# Patient Record
Sex: FEMALE | Race: OTHER | Employment: UNEMPLOYED | URBAN - METROPOLITAN AREA
[De-identification: names, ages, dates, MRNs, and addresses within clinical notes are randomized per-mention and may not be internally consistent; named-entity substitution may affect disease eponyms.]

---

## 2019-05-26 ENCOUNTER — HOSPITAL ENCOUNTER (EMERGENCY)
Age: 34
Discharge: HOME | End: 2019-05-26
Attending: EMERGENCY MEDICINE
Payer: MEDICAID

## 2019-05-26 ENCOUNTER — APPOINTMENT (OUTPATIENT)
Dept: CT IMAGING | Age: 34
End: 2019-05-26
Attending: EMERGENCY MEDICINE
Payer: MEDICAID

## 2019-05-26 VITALS
OXYGEN SATURATION: 100 % | TEMPERATURE: 98.4 F | DIASTOLIC BLOOD PRESSURE: 87 MMHG | HEIGHT: 64 IN | WEIGHT: 127 LBS | RESPIRATION RATE: 16 BRPM | SYSTOLIC BLOOD PRESSURE: 161 MMHG | HEART RATE: 91 BPM | BODY MASS INDEX: 21.68 KG/M2

## 2019-05-26 DIAGNOSIS — D64.9 ANEMIA, UNSPECIFIED TYPE: ICD-10-CM

## 2019-05-26 DIAGNOSIS — R10.32 ABDOMINAL PAIN, LLQ (LEFT LOWER QUADRANT): ICD-10-CM

## 2019-05-26 DIAGNOSIS — R73.9 HYPERGLYCEMIA: Primary | ICD-10-CM

## 2019-05-26 DIAGNOSIS — N28.9 RENAL INSUFFICIENCY: ICD-10-CM

## 2019-05-26 LAB
ALBUMIN SERPL-MCNC: 2.7 G/DL (ref 3.4–5)
ALBUMIN/GLOB SERPL: 0.7 {RATIO} (ref 0.8–1.7)
ALP SERPL-CCNC: 121 U/L (ref 45–117)
ALT SERPL-CCNC: 9 U/L (ref 13–56)
ANION GAP SERPL CALC-SCNC: 11 MMOL/L (ref 3–18)
APPEARANCE UR: CLEAR
AST SERPL-CCNC: 11 U/L (ref 15–37)
BACTERIA URNS QL MICRO: ABNORMAL /HPF
BASOPHILS # BLD: 0 K/UL (ref 0–0.1)
BASOPHILS NFR BLD: 0 % (ref 0–2)
BILIRUB SERPL-MCNC: 0.4 MG/DL (ref 0.2–1)
BILIRUB UR QL: NEGATIVE
BUN SERPL-MCNC: 21 MG/DL (ref 7–18)
BUN/CREAT SERPL: 14 (ref 12–20)
CALCIUM SERPL-MCNC: 8.1 MG/DL (ref 8.5–10.1)
CHLORIDE SERPL-SCNC: 104 MMOL/L (ref 100–108)
CO2 SERPL-SCNC: 23 MMOL/L (ref 21–32)
COLOR UR: YELLOW
CREAT SERPL-MCNC: 1.55 MG/DL (ref 0.6–1.3)
DIFFERENTIAL METHOD BLD: ABNORMAL
EOSINOPHIL # BLD: 0.1 K/UL (ref 0–0.4)
EOSINOPHIL NFR BLD: 1 % (ref 0–5)
EPITH CASTS URNS QL MICRO: ABNORMAL /LPF (ref 0–5)
ERYTHROCYTE [DISTWIDTH] IN BLOOD BY AUTOMATED COUNT: 12.8 % (ref 11.6–14.5)
GLOBULIN SER CALC-MCNC: 4 G/DL (ref 2–4)
GLUCOSE SERPL-MCNC: 233 MG/DL (ref 74–99)
GLUCOSE UR STRIP.AUTO-MCNC: 500 MG/DL
HCG UR QL: NEGATIVE
HCT VFR BLD AUTO: 26.7 % (ref 35–45)
HGB BLD-MCNC: 8.8 G/DL (ref 12–16)
HGB UR QL STRIP: ABNORMAL
KETONES UR QL STRIP.AUTO: NEGATIVE MG/DL
LEUKOCYTE ESTERASE UR QL STRIP.AUTO: NEGATIVE
LIPASE SERPL-CCNC: 124 U/L (ref 73–393)
LYMPHOCYTES # BLD: 2.8 K/UL (ref 0.9–3.6)
LYMPHOCYTES NFR BLD: 19 % (ref 21–52)
MAGNESIUM SERPL-MCNC: 2.2 MG/DL (ref 1.6–2.6)
MCH RBC QN AUTO: 29.5 PG (ref 24–34)
MCHC RBC AUTO-ENTMCNC: 33 G/DL (ref 31–37)
MCV RBC AUTO: 89.6 FL (ref 74–97)
MONOCYTES # BLD: 1.2 K/UL (ref 0.05–1.2)
MONOCYTES NFR BLD: 8 % (ref 3–10)
MUCOUS THREADS URNS QL MICRO: ABNORMAL /LPF
NEUTS SEG # BLD: 10.8 K/UL (ref 1.8–8)
NEUTS SEG NFR BLD: 72 % (ref 40–73)
NITRITE UR QL STRIP.AUTO: NEGATIVE
PH UR STRIP: 7 [PH] (ref 5–8)
PLATELET # BLD AUTO: 382 K/UL (ref 135–420)
PMV BLD AUTO: 8.5 FL (ref 9.2–11.8)
POTASSIUM SERPL-SCNC: 4.5 MMOL/L (ref 3.5–5.5)
PROT SERPL-MCNC: 6.7 G/DL (ref 6.4–8.2)
PROT UR STRIP-MCNC: 300 MG/DL
RBC # BLD AUTO: 2.98 M/UL (ref 4.2–5.3)
RBC #/AREA URNS HPF: ABNORMAL /HPF (ref 0–5)
SODIUM SERPL-SCNC: 138 MMOL/L (ref 136–145)
SP GR UR REFRACTOMETRY: 1.02 (ref 1–1.03)
UROBILINOGEN UR QL STRIP.AUTO: 1 EU/DL (ref 0.2–1)
WBC # BLD AUTO: 15 K/UL (ref 4.6–13.2)
WBC URNS QL MICRO: ABNORMAL /HPF (ref 0–5)

## 2019-05-26 PROCEDURE — 80053 COMPREHEN METABOLIC PANEL: CPT

## 2019-05-26 PROCEDURE — 83690 ASSAY OF LIPASE: CPT

## 2019-05-26 PROCEDURE — 96374 THER/PROPH/DIAG INJ IV PUSH: CPT

## 2019-05-26 PROCEDURE — 81025 URINE PREGNANCY TEST: CPT

## 2019-05-26 PROCEDURE — 81001 URINALYSIS AUTO W/SCOPE: CPT

## 2019-05-26 PROCEDURE — 96361 HYDRATE IV INFUSION ADD-ON: CPT

## 2019-05-26 PROCEDURE — 83735 ASSAY OF MAGNESIUM: CPT

## 2019-05-26 PROCEDURE — 74176 CT ABD & PELVIS W/O CONTRAST: CPT

## 2019-05-26 PROCEDURE — 74011250636 HC RX REV CODE- 250/636: Performed by: EMERGENCY MEDICINE

## 2019-05-26 PROCEDURE — 85025 COMPLETE CBC W/AUTO DIFF WBC: CPT

## 2019-05-26 PROCEDURE — 99284 EMERGENCY DEPT VISIT MOD MDM: CPT

## 2019-05-26 RX ORDER — INSULIN GLARGINE 100 [IU]/ML
30 INJECTION, SOLUTION SUBCUTANEOUS
COMMUNITY

## 2019-05-26 RX ORDER — KETOROLAC TROMETHAMINE 15 MG/ML
15 INJECTION, SOLUTION INTRAMUSCULAR; INTRAVENOUS
Status: COMPLETED | OUTPATIENT
Start: 2019-05-26 | End: 2019-05-26

## 2019-05-26 RX ADMIN — SODIUM CHLORIDE 1000 ML: 900 INJECTION, SOLUTION INTRAVENOUS at 00:25

## 2019-05-26 RX ADMIN — KETOROLAC TROMETHAMINE 15 MG: 15 INJECTION, SOLUTION INTRAMUSCULAR; INTRAVENOUS at 00:25

## 2019-05-26 NOTE — ED PROVIDER NOTES
EMERGENCY DEPARTMENT HISTORY AND PHYSICAL EXAM    Date: 5/26/2019  Patient Name: Lisbeth Munoz    History of Presenting Illness     Chief Complaint   Patient presents with    Abdominal Pain         History Provided By: Patient    Additional History (Context): Lisbeth Munoz is a 37 y.o. female with PMHX diabetes presents to the emergency department C/O left lower quadrant abdominal pain that radiates up to her left flank that started this evening. Patient states that she had vaginal irritation and discharge approximately 1 week ago and was diagnosed with a UTI. Patient states that she was started on amoxicillin. patient denies any current vaginal discharge or vaginal pain or itching. Pt denies nausea, vomiting, diarrhea, and any other sxs or complaints. PCP: No primary care provider on file. Past History     Past Medical History:  Past Medical History:   Diagnosis Date    Diabetes ([address]     Hypertension     UTI (urinary tract infection)        Past Surgical History:  Past Surgical History:   Procedure Laterality Date    HX GYN      tubal ligation, C Section       Family History:  History reviewed. No pertinent family history. Social History:  Social History     Tobacco Use    Smoking status: Never Smoker    Smokeless tobacco: Never Used   Substance Use Topics    Alcohol use: Never     Frequency: Never    Drug use: Not on file       Allergies:  No Known Allergies      Review of Systems   Review of Systems   Constitutional: Positive for chills. Negative for fever. HENT: Negative for congestion, ear pain, sinus pain and sore throat. Eyes: Negative for pain and visual disturbance. Respiratory: Negative for cough and shortness of breath. Cardiovascular: Negative for chest pain and leg swelling. Gastrointestinal: Positive for abdominal pain. Negative for constipation, diarrhea, nausea and vomiting. Genitourinary: Positive for flank pain.  Negative for dysuria, hematuria, vaginal bleeding and vaginal discharge. Musculoskeletal: Negative for back pain and neck pain. Skin: Negative for rash and wound. Neurological: Negative for dizziness, tremors, weakness, light-headedness and numbness. All other systems reviewed and are negative. Physical Exam     Vitals:    05/26/19 0009   BP: 164/82   Pulse: 96   Resp: 18   Temp: 98.1 °F (36.7 °C)   SpO2: 100%   Weight: 57.6 kg (127 lb)   Height: 5' 4\" (1.626 m)     Physical Exam    Nursing note and vitals reviewed    Constitutional: [address] female, no acute distress  Head: Normocephalic, Atraumatic  Eyes: Pupils are equal, round, and reactive to light, EOMI  Neck: Supple, non-tender  ENT: Poor dentition, moist mucous membranes  Cardiovascular: Regular rate and rhythm, no murmurs, rubs, or gallops  Chest: Normal work of breathing and chest excursion bilaterally  Lungs: Clear to ausculation bilaterally, no wheezes, no rhonchi  Abdomen: Soft, non tender, non distended, normoactive bowel sounds  Back: No evidence of trauma or deformity  Extremities: No evidence of trauma or deformity, no LE edema  Skin: Warm and dry, normal cap refill  Neuro: Alert and appropriate, CN intact, normal speech, moving all 4 extremities freely and symmetrically  Psychiatric: Normal mood and affect       Diagnostic Study Results     Labs -   No results found for this or any previous visit (from the past 12 hour(s)). Radiologic Studies -   No orders to display     CT Results  (Last 48 hours)    None        CXR Results  (Last 48 hours)    None            Medical Decision Making   I am the first provider for this patient. I reviewed the vital signs, available nursing notes, past medical history, past surgical history, family history and social history. Vital Signs-Reviewed the patient's vital signs.     Pulse Oximetry Analysis -100 % on room air    Records Reviewed: Nursing Notes and Old Medical Records    Provider Notes:   37 y.o. female presenting with left lower quadrant abdominal pain that radiates up to her left flank. Afebrile, not tachycardic. She does not appear acutely ill or in distress. Noted to be mildly hypertensive. Soft, nondistended abdomen with no tenderness, no rebound or guarding. Will obtain labs, urinalysis. No indication for pelvic exam as patient recently had one and is currently denying vaginal symptoms. Will obtain a CT scan to evaluate for stone. Will provide symptom control and reassess. Procedures:  Procedures    ED Course:   12:06 AM   Initial assessment performed. The patients presenting problems have been discussed, and they are in agreement with the care plan formulated and outlined with them. I have encouraged them to ask questions as they arise throughout their visit.    1:51 AM  UA not consistent with UTI. CMP showing renal insufficiency and hyperglycemia not consistent with DKA. CBC showing mild leukocytosis, anemia, no indication for emergent transfusion. CT scan showing no acute intra-abdominal process. On reassessment, patient reports improved symptoms. Urged the patient to finish her entire course of antibiotics as prescribed by provider urgent care. Diagnosis and Disposition       DISCHARGE NOTE:  1:26 AM    Phyllis Chong's  results have been reviewed with her. She has been counseled regarding her diagnosis, treatment, and plan. She verbally conveys understanding and agreement of the signs, symptoms, diagnosis, treatment and prognosis and additionally agrees to follow up as discussed. She also agrees with the care-plan and conveys that all of her questions have been answered. I have also provided discharge instructions for her that include: educational information regarding their diagnosis and treatment, and list of reasons why they would want to return to the ED prior to their follow-up appointment, should her condition change.  She has been provided with education for proper emergency department utilization. CLINICAL IMPRESSION:    No diagnosis found. PLAN:  1. D/C Home  2. Current Discharge Medication List        3. Follow-up Information    None       ____________________________________     Please note that this dictation was completed with [employer], the computer voice recognition software. Quite often unanticipated grammatical, syntax, homophones, and other interpretive errors are inadvertently transcribed by the computer software. Please disregard these errors. Please excuse any errors that have escaped final proofreading.

## 2019-05-26 NOTE — DISCHARGE INSTRUCTIONS
You were seen and evaluated in the Emergency Department. Please understand that your work up is not all encompassing and you should follow up with your primary care physician for further management and continuity of care. Please return to Emergency Department or seek medical attention immediately if you have acute worsening in your symptoms or develop chest pain, shortness of breath, repeated vomiting, fever, altered level of consciousness, coughing up blood, or start sweating and feel clammy. If you were prescribed any medicine for home, please take as prescribed by your health-care provider. If you were given any follow-up appointments or numbers to call, please do so as instructed. Avoid any tobacco products or excessive alcohol. Patient Education        Abdominal Pain: Care Instructions  Your Care Instructions    Abdominal pain has many possible causes. Some aren't serious and get better on their own in a few days. Others need more testing and treatment. If your pain continues or gets worse, you need to be rechecked and may need more tests to find out what is wrong. You may need surgery to correct the problem. Don't ignore new symptoms, such as fever, nausea and vomiting, urination problems, pain that gets worse, and dizziness. These may be signs of a more serious problem. Your doctor may have recommended a follow-up visit in the next 8 to 12 hours. If you are not getting better, you may need more tests or treatment. The doctor has checked you carefully, but problems can develop later. If you notice any problems or new symptoms, get medical treatment right away. Follow-up care is a key part of your treatment and safety. Be sure to make and go to all appointments, and call your doctor if you are having problems. It's also a good idea to know your test results and keep a list of the medicines you take. How can you care for yourself at home? · Rest until you feel better.   · To prevent dehydration, drink plenty of fluids, enough so that your urine is light yellow or clear like water. Choose water and other caffeine-free clear liquids until you feel better. If you have kidney, heart, or liver disease and have to limit fluids, talk with your doctor before you increase the amount of fluids you drink. · If your stomach is upset, eat mild foods, such as rice, dry toast or crackers, bananas, and applesauce. Try eating several small meals instead of two or three large ones. · Wait until 48 hours after all symptoms have gone away before you have spicy foods, alcohol, and drinks that contain caffeine. · Do not eat foods that are high in fat. · Avoid anti-inflammatory medicines such as aspirin, ibuprofen (Advil, Motrin), and naproxen (Aleve). These can cause stomach upset. Talk to your doctor if you take daily aspirin for another health problem. When should you call for help? Call 911 anytime you think you may need emergency care. For example, call if:    · You passed out (lost consciousness).     · You pass maroon or very bloody stools.     · You vomit blood or what looks like coffee grounds.     · You have new, severe belly pain.    Call your doctor now or seek immediate medical care if:    · Your pain gets worse, especially if it becomes focused in one area of your belly.     · You have a new or higher fever.     · Your stools are black and look like tar, or they have streaks of blood.     · You have unexpected vaginal bleeding.     · You have symptoms of a urinary tract infection. These may include:  ? Pain when you urinate. ? Urinating more often than usual.  ? Blood in your urine.     · You are dizzy or lightheaded, or you feel like you may faint.    Watch closely for changes in your health, and be sure to contact your doctor if:    · You are not getting better after 1 day (24 hours). Where can you learn more? Go to http://anita-chino.info/.   Enter H512 in the search box to learn more about \"Abdominal Pain: Care Instructions. \"  Current as of: September 23, 2018  Content Version: 11.9  © 9858-4101 [employer]. Care instructions adapted under license by [employer] (which disclaims liability or warranty for this information). If you have questions about a medical condition or this instruction, always ask your healthcare professional. [address] any warranty or liability for your use of this information. Patient Education        Anemia: Care Instructions  Your Care Instructions    Anemia is a low level of red blood cells, which carry oxygen throughout your body. Many things can cause anemia. Lack of iron is one of the most common causes. Your body needs iron to make hemoglobin, a substance in red blood cells that carries oxygen from the lungs to your body's cells. Without enough iron, the body produces fewer and smaller red blood cells. As a result, your body's cells do not get enough oxygen, and you feel tired and weak. And you may have trouble concentrating. Bleeding is the most common cause of a lack of iron. You may have heavy menstrual bleeding or bleeding caused by conditions such as ulcers, hemorrhoids, or cancer. Regular use of aspirin or other anti-inflammatory medicines (such as ibuprofen) also can cause bleeding in some people. A lack of iron in your diet also can cause anemia, especially at times when the body needs more iron, such as during pregnancy, infancy, and the teen years. Your doctor may have prescribed iron pills. It may take several months of treatment for your iron levels to return to normal. Your doctor also may suggest that you eat foods that are rich in iron, such as meat and beans. There are many other causes of anemia. It is not always due to a lack of iron. Finding the specific cause of your anemia will help your doctor find the right treatment for you.   Follow-up care is a key part of your treatment and safety. Be sure to make and go to all appointments, and call your doctor if you are having problems. It's also a good idea to know your test results and keep a list of the medicines you take. How can you care for yourself at home? · Take your medicines exactly as prescribed. Call your doctor if you think you are having a problem with your medicine. · If your doctor recommends iron pills, take them as directed:  ? Try to take the pills on an empty stomach about 1 hour before or 2 hours after meals. But you may need to take iron with food to avoid an upset stomach. ? Do not take antacids or drink milk or caffeine drinks (such as coffee, tea, or cola) at the same time or within 2 hours of the time that you take your iron. They can make it hard for your body to absorb the iron. ? Vitamin C (from food or supplements) helps your body absorb iron. Try taking iron pills with a glass of orange juice or some other food that is high in vitamin C, such as citrus fruits. ? Iron pills may cause stomach problems, such as heartburn, nausea, diarrhea, constipation, and cramps. Be sure to drink plenty of fluids, and include fruits, vegetables, and fiber in your diet each day. Iron pills often make your bowel movements dark or green. ? If you forget to take an iron pill, do not take a double dose of iron the next time you take a pill. ? Keep iron pills out of the reach of small children. An overdose of iron can be very dangerous. · Follow your doctor's advice about eating iron-rich foods. These include red meat, shellfish, poultry, eggs, beans, raisins, whole-grain bread, and leafy green vegetables. · Steam vegetables to help them keep their iron content. When should you call for help? Call 911 anytime you think you may need emergency care. For example, call if:    · You have symptoms of a heart attack. These may include:  ? Chest pain or pressure, or a strange feeling in the chest.  ? Sweating. ?  Shortness of breath. ? Nausea or vomiting. ? Pain, pressure, or a strange feeling in the back, neck, jaw, or upper belly or in one or both shoulders or arms. ? Lightheadedness or sudden weakness. ? A fast or irregular heartbeat. After you call 911, the  may tell you to chew 1 adult-strength or 2 to 4 low-dose aspirin. Wait for an ambulance. Do not try to drive yourself.     · You passed out (lost consciousness).    Call your doctor now or seek immediate medical care if:    · You have new or increased shortness of breath.     · You are dizzy or lightheaded, or you feel like you may faint.     · Your fatigue and weakness continue or get worse.     · You have any abnormal bleeding, such as:  ? Nosebleeds. ? Vaginal bleeding that is different (heavier, more frequent, at a different time of the month) than what you are used to.  ? Bloody or black stools, or rectal bleeding. ? Bloody or pink urine.    Watch closely for changes in your health, and be sure to contact your doctor if:    · You do not get better as expected. Where can you learn more? Go to http://anita-chino.info/. Enter R301 in the search box to learn more about \"Anemia: Care Instructions. \"  Current as of: May 6, 2018  Content Version: 11.9  © 0818-0439 [employer]. Care instructions adapted under license by [employer] (which disclaims liability or warranty for this information). If you have questions about a medical condition or this instruction, always ask your healthcare professional. [place] [address] any warranty or liability for your use of this information.

## 2019-06-06 ENCOUNTER — HOSPITAL ENCOUNTER (INPATIENT)
Age: 34
LOS: 2 days | Discharge: HOME | DRG: 420 | End: 2019-06-08
Attending: EMERGENCY MEDICINE | Admitting: HOSPITALIST
Payer: MEDICAID

## 2019-06-06 ENCOUNTER — APPOINTMENT (OUTPATIENT)
Dept: CT IMAGING | Age: 34
DRG: 420 | End: 2019-06-06
Attending: EMERGENCY MEDICINE
Payer: MEDICAID

## 2019-06-06 ENCOUNTER — HOSPITAL ENCOUNTER (EMERGENCY)
Age: 34
Discharge: HOME | DRG: 420 | End: 2019-06-06
Attending: EMERGENCY MEDICINE
Payer: MEDICAID

## 2019-06-06 ENCOUNTER — APPOINTMENT (OUTPATIENT)
Dept: GENERAL RADIOLOGY | Age: 34
DRG: 420 | End: 2019-06-06
Attending: EMERGENCY MEDICINE
Payer: MEDICAID

## 2019-06-06 VITALS
SYSTOLIC BLOOD PRESSURE: 157 MMHG | TEMPERATURE: 97.7 F | OXYGEN SATURATION: 100 % | RESPIRATION RATE: 19 BRPM | HEART RATE: 110 BPM | DIASTOLIC BLOOD PRESSURE: 73 MMHG

## 2019-06-06 DIAGNOSIS — D72.829 LEUKOCYTOSIS, UNSPECIFIED TYPE: ICD-10-CM

## 2019-06-06 DIAGNOSIS — K31.84 GASTROPARESIS: ICD-10-CM

## 2019-06-06 DIAGNOSIS — N17.9 ACUTE KIDNEY INJURY (HCC): ICD-10-CM

## 2019-06-06 DIAGNOSIS — R19.7 DIARRHEA, UNSPECIFIED TYPE: ICD-10-CM

## 2019-06-06 DIAGNOSIS — E87.20 LACTIC ACID ACIDOSIS: ICD-10-CM

## 2019-06-06 DIAGNOSIS — E10.10 DIABETIC KETOACIDOSIS WITHOUT COMA ASSOCIATED WITH TYPE 1 DIABETES MELLITUS (HCC): Primary | ICD-10-CM

## 2019-06-06 DIAGNOSIS — R65.10 SIRS (SYSTEMIC INFLAMMATORY RESPONSE SYNDROME) (HCC): ICD-10-CM

## 2019-06-06 DIAGNOSIS — R10.13 ABDOMINAL PAIN, EPIGASTRIC: ICD-10-CM

## 2019-06-06 DIAGNOSIS — E86.0 DEHYDRATION: ICD-10-CM

## 2019-06-06 DIAGNOSIS — R11.2 INTRACTABLE VOMITING WITH NAUSEA, UNSPECIFIED VOMITING TYPE: ICD-10-CM

## 2019-06-06 DIAGNOSIS — R73.9 HYPERGLYCEMIA: Primary | ICD-10-CM

## 2019-06-06 PROBLEM — E10.65 UNCONTROLLED TYPE 1 DIABETES MELLITUS WITH HYPERGLYCEMIA (HCC): Status: ACTIVE | Noted: 2019-06-06

## 2019-06-06 PROBLEM — E11.10 DKA (DIABETIC KETOACIDOSES): Status: ACTIVE | Noted: 2019-06-06

## 2019-06-06 PROBLEM — N39.0 UTI (URINARY TRACT INFECTION): Status: ACTIVE | Noted: 2019-06-06

## 2019-06-06 LAB
ADMINISTERED INITIALS, ADMINIT: NORMAL
ALBUMIN SERPL-MCNC: 2.8 G/DL (ref 3.4–5)
ALBUMIN SERPL-MCNC: 3 G/DL (ref 3.4–5)
ALBUMIN/GLOB SERPL: 0.7 {RATIO} (ref 0.8–1.7)
ALBUMIN/GLOB SERPL: 0.7 {RATIO} (ref 0.8–1.7)
ALP SERPL-CCNC: 130 U/L (ref 45–117)
ALP SERPL-CCNC: 144 U/L (ref 45–117)
ALT SERPL-CCNC: 7 U/L (ref 13–56)
ALT SERPL-CCNC: 7 U/L (ref 13–56)
ANION GAP SERPL CALC-SCNC: 12 MMOL/L (ref 3–18)
ANION GAP SERPL CALC-SCNC: 14 MMOL/L (ref 3–18)
ANION GAP SERPL CALC-SCNC: 15 MMOL/L (ref 3–18)
APPEARANCE UR: CLEAR
AST SERPL-CCNC: 10 U/L (ref 15–37)
AST SERPL-CCNC: 9 U/L (ref 15–37)
ATRIAL RATE: 109 BPM
BACTERIA URNS QL MICRO: ABNORMAL /HPF
BASOPHILS # BLD: 0 K/UL (ref 0–0.1)
BASOPHILS # BLD: 0 K/UL (ref 0–0.1)
BASOPHILS NFR BLD: 0 % (ref 0–2)
BASOPHILS NFR BLD: 0 % (ref 0–2)
BILIRUB DIRECT SERPL-MCNC: <0.1 MG/DL (ref 0–0.2)
BILIRUB SERPL-MCNC: 0.4 MG/DL (ref 0.2–1)
BILIRUB SERPL-MCNC: 0.4 MG/DL (ref 0.2–1)
BILIRUB UR QL: NEGATIVE
BUN SERPL-MCNC: 18 MG/DL (ref 7–18)
BUN SERPL-MCNC: 18 MG/DL (ref 7–18)
BUN SERPL-MCNC: 19 MG/DL (ref 7–18)
BUN/CREAT SERPL: 11 (ref 12–20)
BUN/CREAT SERPL: 11 (ref 12–20)
BUN/CREAT SERPL: 12 (ref 12–20)
CALCIUM SERPL-MCNC: 7.7 MG/DL (ref 8.5–10.1)
CALCIUM SERPL-MCNC: 8.5 MG/DL (ref 8.5–10.1)
CALCIUM SERPL-MCNC: 8.6 MG/DL (ref 8.5–10.1)
CALCULATED P AXIS, ECG09: 66 DEGREES
CALCULATED R AXIS, ECG10: 57 DEGREES
CALCULATED T AXIS, ECG11: 50 DEGREES
CHLORIDE SERPL-SCNC: 103 MMOL/L (ref 100–108)
CHLORIDE SERPL-SCNC: 103 MMOL/L (ref 100–108)
CHLORIDE SERPL-SCNC: 108 MMOL/L (ref 100–108)
CO2 SERPL-SCNC: 18 MMOL/L (ref 21–32)
CO2 SERPL-SCNC: 19 MMOL/L (ref 21–32)
CO2 SERPL-SCNC: 23 MMOL/L (ref 21–32)
COLOR UR: YELLOW
CREAT SERPL-MCNC: 1.55 MG/DL (ref 0.6–1.3)
CREAT SERPL-MCNC: 1.57 MG/DL (ref 0.6–1.3)
CREAT SERPL-MCNC: 1.8 MG/DL (ref 0.6–1.3)
D50 ADMINISTERED, D50ADM: 0 ML
D50 ORDER, D50ORD: 0 ML
DIAGNOSIS, 93000: NORMAL
DIFFERENTIAL METHOD BLD: ABNORMAL
DIFFERENTIAL METHOD BLD: ABNORMAL
EOSINOPHIL # BLD: 0 K/UL (ref 0–0.4)
EOSINOPHIL # BLD: 0.2 K/UL (ref 0–0.4)
EOSINOPHIL NFR BLD: 0 % (ref 0–5)
EOSINOPHIL NFR BLD: 1 % (ref 0–5)
EPITH CASTS URNS QL MICRO: ABNORMAL /LPF (ref 0–5)
ERYTHROCYTE [DISTWIDTH] IN BLOOD BY AUTOMATED COUNT: 12.9 % (ref 11.6–14.5)
ERYTHROCYTE [DISTWIDTH] IN BLOOD BY AUTOMATED COUNT: 13 % (ref 11.6–14.5)
EST. AVERAGE GLUCOSE BLD GHB EST-MCNC: 338 MG/DL
GLOBULIN SER CALC-MCNC: 3.9 G/DL (ref 2–4)
GLOBULIN SER CALC-MCNC: 4.4 G/DL (ref 2–4)
GLUCOSE BLD STRIP.AUTO-MCNC: 169 MG/DL (ref 70–110)
GLUCOSE BLD STRIP.AUTO-MCNC: 172 MG/DL (ref 70–110)
GLUCOSE BLD STRIP.AUTO-MCNC: 180 MG/DL (ref 70–110)
GLUCOSE BLD STRIP.AUTO-MCNC: 246 MG/DL (ref 70–110)
GLUCOSE BLD STRIP.AUTO-MCNC: 329 MG/DL (ref 70–110)
GLUCOSE BLD STRIP.AUTO-MCNC: 338 MG/DL (ref 70–110)
GLUCOSE BLD STRIP.AUTO-MCNC: 372 MG/DL (ref 70–110)
GLUCOSE BLD STRIP.AUTO-MCNC: 392 MG/DL (ref 70–110)
GLUCOSE BLD STRIP.AUTO-MCNC: 419 MG/DL (ref 70–110)
GLUCOSE BLD STRIP.AUTO-MCNC: 428 MG/DL (ref 70–110)
GLUCOSE BLD STRIP.AUTO-MCNC: 465 MG/DL (ref 70–110)
GLUCOSE BLD STRIP.AUTO-MCNC: 495 MG/DL (ref 70–110)
GLUCOSE BLD STRIP.AUTO-MCNC: 500 MG/DL (ref 70–110)
GLUCOSE BLD STRIP.AUTO-MCNC: 541 MG/DL (ref 70–110)
GLUCOSE SERPL-MCNC: 380 MG/DL (ref 74–99)
GLUCOSE SERPL-MCNC: 498 MG/DL (ref 74–99)
GLUCOSE SERPL-MCNC: 548 MG/DL (ref 74–99)
GLUCOSE UR STRIP.AUTO-MCNC: >1000 MG/DL
GLUCOSE, GLC: 169 MG/DL
GLUCOSE, GLC: 172 MG/DL
GLUCOSE, GLC: 180 MG/DL
GLUCOSE, GLC: 246 MG/DL
GLUCOSE, GLC: 329 MG/DL
GLUCOSE, GLC: 372 MG/DL
GLUCOSE, GLC: 419 MG/DL
GLUCOSE, GLC: 465 MG/DL
GLUCOSE, GLC: 495 MG/DL
HBA1C MFR BLD: 13.4 % (ref 4.2–5.6)
HCG SERPL QL: NEGATIVE
HCT VFR BLD AUTO: 28.3 % (ref 35–45)
HCT VFR BLD AUTO: 28.6 % (ref 35–45)
HGB BLD-MCNC: 9.6 G/DL (ref 12–16)
HGB BLD-MCNC: 9.7 G/DL (ref 12–16)
HGB UR QL STRIP: ABNORMAL
HIGH TARGET, HITG: 180 MG/DL
INSULIN ADMINSTERED, INSADM: 10.8 UNITS/HOUR
INSULIN ADMINSTERED, INSADM: 13.1 UNITS/HOUR
INSULIN ADMINSTERED, INSADM: 3.3 UNITS/HOUR
INSULIN ADMINSTERED, INSADM: 3.4 UNITS/HOUR
INSULIN ADMINSTERED, INSADM: 3.6 UNITS/HOUR
INSULIN ADMINSTERED, INSADM: 5.6 UNITS/HOUR
INSULIN ADMINSTERED, INSADM: 7.2 UNITS/HOUR
INSULIN ADMINSTERED, INSADM: 8.1 UNITS/HOUR
INSULIN ADMINSTERED, INSADM: 9.4 UNITS/HOUR
INSULIN ORDER, INSORD: 10.8 UNITS/HOUR
INSULIN ORDER, INSORD: 13.1 UNITS/HOUR
INSULIN ORDER, INSORD: 3.3 UNITS/HOUR
INSULIN ORDER, INSORD: 3.4 UNITS/HOUR
INSULIN ORDER, INSORD: 3.6 UNITS/HOUR
INSULIN ORDER, INSORD: 5.6 UNITS/HOUR
INSULIN ORDER, INSORD: 7.2 UNITS/HOUR
INSULIN ORDER, INSORD: 8.1 UNITS/HOUR
INSULIN ORDER, INSORD: 9.4 UNITS/HOUR
KETONES UR QL STRIP.AUTO: 15 MG/DL
LACTATE BLD-SCNC: 2.87 MMOL/L (ref 0.4–2)
LACTATE SERPL-SCNC: 3.4 MMOL/L (ref 0.4–2)
LEUKOCYTE ESTERASE UR QL STRIP.AUTO: NEGATIVE
LIPASE SERPL-CCNC: 103 U/L (ref 73–393)
LIPASE SERPL-CCNC: 149 U/L (ref 73–393)
LOW TARGET, LOT: 140 MG/DL
LYMPHOCYTES # BLD: 0.6 K/UL (ref 0.9–3.6)
LYMPHOCYTES # BLD: 2.3 K/UL (ref 0.9–3.6)
LYMPHOCYTES NFR BLD: 14 % (ref 21–52)
LYMPHOCYTES NFR BLD: 4 % (ref 21–52)
MAGNESIUM SERPL-MCNC: 1.2 MG/DL (ref 1.6–2.6)
MAGNESIUM SERPL-MCNC: 2 MG/DL (ref 1.6–2.6)
MAGNESIUM SERPL-MCNC: 2 MG/DL (ref 1.6–2.6)
MAGNESIUM SERPL-MCNC: 2.4 MG/DL (ref 1.6–2.6)
MCH RBC QN AUTO: 29.4 PG (ref 24–34)
MCH RBC QN AUTO: 29.9 PG (ref 24–34)
MCHC RBC AUTO-ENTMCNC: 33.6 G/DL (ref 31–37)
MCHC RBC AUTO-ENTMCNC: 34.3 G/DL (ref 31–37)
MCV RBC AUTO: 87.3 FL (ref 74–97)
MCV RBC AUTO: 87.7 FL (ref 74–97)
MINUTES UNTIL NEXT BG, NBG: 60 MIN
MONOCYTES # BLD: 0.1 K/UL (ref 0.05–1.2)
MONOCYTES # BLD: 0.9 K/UL (ref 0.05–1.2)
MONOCYTES NFR BLD: 0 % (ref 3–10)
MONOCYTES NFR BLD: 5 % (ref 3–10)
MULTIPLIER, MUL: 0.02
MULTIPLIER, MUL: 0.02
MULTIPLIER, MUL: 0.03
MULTIPLIER, MUL: 0.04
NEUTS SEG # BLD: 13.5 K/UL (ref 1.8–8)
NEUTS SEG # BLD: 16.5 K/UL (ref 1.8–8)
NEUTS SEG NFR BLD: 80 % (ref 40–73)
NEUTS SEG NFR BLD: 96 % (ref 40–73)
NITRITE UR QL STRIP.AUTO: NEGATIVE
ORDER INITIALS, ORDINIT: NORMAL
P-R INTERVAL, ECG05: 118 MS
PH UR STRIP: 6 [PH] (ref 5–8)
PHOSPHATE SERPL-MCNC: 3.6 MG/DL (ref 2.5–4.9)
PLATELET # BLD AUTO: 428 K/UL (ref 135–420)
PLATELET # BLD AUTO: 437 K/UL (ref 135–420)
PMV BLD AUTO: 8.9 FL (ref 9.2–11.8)
PMV BLD AUTO: 9.3 FL (ref 9.2–11.8)
POTASSIUM SERPL-SCNC: 4 MMOL/L (ref 3.5–5.5)
POTASSIUM SERPL-SCNC: 4.1 MMOL/L (ref 3.5–5.5)
POTASSIUM SERPL-SCNC: 4.1 MMOL/L (ref 3.5–5.5)
PROT SERPL-MCNC: 6.7 G/DL (ref 6.4–8.2)
PROT SERPL-MCNC: 7.4 G/DL (ref 6.4–8.2)
PROT UR STRIP-MCNC: 300 MG/DL
Q-T INTERVAL, ECG07: 388 MS
QRS DURATION, ECG06: 80 MS
QTC CALCULATION (BEZET), ECG08: 522 MS
RBC # BLD AUTO: 3.24 M/UL (ref 4.2–5.3)
RBC # BLD AUTO: 3.26 M/UL (ref 4.2–5.3)
RBC #/AREA URNS HPF: ABNORMAL /HPF (ref 0–5)
SODIUM SERPL-SCNC: 137 MMOL/L (ref 136–145)
SODIUM SERPL-SCNC: 138 MMOL/L (ref 136–145)
SODIUM SERPL-SCNC: 140 MMOL/L (ref 136–145)
SP GR UR REFRACTOMETRY: 1.02 (ref 1–1.03)
UROBILINOGEN UR QL STRIP.AUTO: 0.2 EU/DL (ref 0.2–1)
VENTRICULAR RATE, ECG03: 109 BPM
WBC # BLD AUTO: 16.9 K/UL (ref 4.6–13.2)
WBC # BLD AUTO: 17.2 K/UL (ref 4.6–13.2)
WBC URNS QL MICRO: ABNORMAL /HPF (ref 0–5)
YEAST URNS QL MICRO: ABNORMAL

## 2019-06-06 PROCEDURE — 82962 GLUCOSE BLOOD TEST: CPT

## 2019-06-06 PROCEDURE — 74011000250 HC RX REV CODE- 250: Performed by: EMERGENCY MEDICINE

## 2019-06-06 PROCEDURE — 83036 HEMOGLOBIN GLYCOSYLATED A1C: CPT

## 2019-06-06 PROCEDURE — 77030011943

## 2019-06-06 PROCEDURE — 74011250636 HC RX REV CODE- 250/636: Performed by: EMERGENCY MEDICINE

## 2019-06-06 PROCEDURE — 96361 HYDRATE IV INFUSION ADD-ON: CPT

## 2019-06-06 PROCEDURE — 99285 EMERGENCY DEPT VISIT HI MDM: CPT

## 2019-06-06 PROCEDURE — 74011000258 HC RX REV CODE- 258: Performed by: EMERGENCY MEDICINE

## 2019-06-06 PROCEDURE — 74011636637 HC RX REV CODE- 636/637: Performed by: EMERGENCY MEDICINE

## 2019-06-06 PROCEDURE — 81001 URINALYSIS AUTO W/SCOPE: CPT

## 2019-06-06 PROCEDURE — 87086 URINE CULTURE/COLONY COUNT: CPT

## 2019-06-06 PROCEDURE — 96376 TX/PRO/DX INJ SAME DRUG ADON: CPT

## 2019-06-06 PROCEDURE — 74011250636 HC RX REV CODE- 250/636: Performed by: HOSPITALIST

## 2019-06-06 PROCEDURE — 96375 TX/PRO/DX INJ NEW DRUG ADDON: CPT

## 2019-06-06 PROCEDURE — C9113 INJ PANTOPRAZOLE SODIUM, VIA: HCPCS | Performed by: EMERGENCY MEDICINE

## 2019-06-06 PROCEDURE — 74011000250 HC RX REV CODE- 250: Performed by: HOSPITALIST

## 2019-06-06 PROCEDURE — 83735 ASSAY OF MAGNESIUM: CPT

## 2019-06-06 PROCEDURE — 80076 HEPATIC FUNCTION PANEL: CPT

## 2019-06-06 PROCEDURE — 83690 ASSAY OF LIPASE: CPT

## 2019-06-06 PROCEDURE — 80048 BASIC METABOLIC PNL TOTAL CA: CPT

## 2019-06-06 PROCEDURE — 83605 ASSAY OF LACTIC ACID: CPT

## 2019-06-06 PROCEDURE — 80053 COMPREHEN METABOLIC PANEL: CPT

## 2019-06-06 PROCEDURE — 87077 CULTURE AEROBIC IDENTIFY: CPT

## 2019-06-06 PROCEDURE — 85025 COMPLETE CBC W/AUTO DIFF WBC: CPT

## 2019-06-06 PROCEDURE — 74176 CT ABD & PELVIS W/O CONTRAST: CPT

## 2019-06-06 PROCEDURE — 87040 BLOOD CULTURE FOR BACTERIA: CPT

## 2019-06-06 PROCEDURE — 74011250637 HC RX REV CODE- 250/637: Performed by: EMERGENCY MEDICINE

## 2019-06-06 PROCEDURE — 74018 RADEX ABDOMEN 1 VIEW: CPT

## 2019-06-06 PROCEDURE — 96372 THER/PROPH/DIAG INJ SC/IM: CPT

## 2019-06-06 PROCEDURE — 65610000006 HC RM INTENSIVE CARE

## 2019-06-06 PROCEDURE — 84100 ASSAY OF PHOSPHORUS: CPT

## 2019-06-06 PROCEDURE — 84703 CHORIONIC GONADOTROPIN ASSAY: CPT

## 2019-06-06 PROCEDURE — 93005 ELECTROCARDIOGRAM TRACING: CPT

## 2019-06-06 PROCEDURE — 96374 THER/PROPH/DIAG INJ IV PUSH: CPT

## 2019-06-06 PROCEDURE — 87045 FECES CULTURE AEROBIC BACT: CPT

## 2019-06-06 PROCEDURE — 96365 THER/PROPH/DIAG IV INF INIT: CPT

## 2019-06-06 PROCEDURE — 36415 COLL VENOUS BLD VENIPUNCTURE: CPT

## 2019-06-06 RX ORDER — ONDANSETRON 2 MG/ML
4 INJECTION INTRAMUSCULAR; INTRAVENOUS
Status: COMPLETED | OUTPATIENT
Start: 2019-06-06 | End: 2019-06-06

## 2019-06-06 RX ORDER — LIDOCAINE HYDROCHLORIDE 10 MG/ML
50 INJECTION INFILTRATION; PERINEURAL
Status: DISCONTINUED | OUTPATIENT
Start: 2019-06-06 | End: 2019-06-06

## 2019-06-06 RX ORDER — SODIUM CHLORIDE 9 MG/ML
150 INJECTION, SOLUTION INTRAVENOUS CONTINUOUS
Status: DISCONTINUED | OUTPATIENT
Start: 2019-06-06 | End: 2019-06-07

## 2019-06-06 RX ORDER — PANTOPRAZOLE SODIUM 40 MG/10ML
40 INJECTION, POWDER, LYOPHILIZED, FOR SOLUTION INTRAVENOUS
Status: COMPLETED | OUTPATIENT
Start: 2019-06-06 | End: 2019-06-06

## 2019-06-06 RX ORDER — DICYCLOMINE HYDROCHLORIDE 10 MG/ML
20 INJECTION INTRAMUSCULAR ONCE
Status: DISCONTINUED | OUTPATIENT
Start: 2019-06-06 | End: 2019-06-06

## 2019-06-06 RX ORDER — LABETALOL HCL 20 MG/4 ML
20 SYRINGE (ML) INTRAVENOUS
Status: COMPLETED | OUTPATIENT
Start: 2019-06-06 | End: 2019-06-06

## 2019-06-06 RX ORDER — TRAMADOL HYDROCHLORIDE 50 MG/1
50 TABLET ORAL
Status: DISCONTINUED | OUTPATIENT
Start: 2019-06-06 | End: 2019-06-08 | Stop reason: HOSPADM

## 2019-06-06 RX ORDER — SODIUM CHLORIDE 0.9 % (FLUSH) 0.9 %
5-10 SYRINGE (ML) INJECTION AS NEEDED
Status: DISCONTINUED | OUTPATIENT
Start: 2019-06-06 | End: 2019-06-08 | Stop reason: HOSPADM

## 2019-06-06 RX ORDER — MORPHINE SULFATE 4 MG/ML
4 INJECTION INTRAVENOUS
Status: COMPLETED | OUTPATIENT
Start: 2019-06-06 | End: 2019-06-06

## 2019-06-06 RX ORDER — MAGNESIUM SULFATE 100 %
4 CRYSTALS MISCELLANEOUS AS NEEDED
Status: DISCONTINUED | OUTPATIENT
Start: 2019-06-06 | End: 2019-06-07

## 2019-06-06 RX ORDER — METOCLOPRAMIDE HYDROCHLORIDE 5 MG/ML
10 INJECTION INTRAMUSCULAR; INTRAVENOUS
Status: COMPLETED | OUTPATIENT
Start: 2019-06-06 | End: 2019-06-06

## 2019-06-06 RX ORDER — DEXTROSE MONOHYDRATE 100 MG/ML
125-250 INJECTION, SOLUTION INTRAVENOUS AS NEEDED
Status: DISCONTINUED | OUTPATIENT
Start: 2019-06-06 | End: 2019-06-08 | Stop reason: HOSPADM

## 2019-06-06 RX ORDER — DEXTROSE 50 % IN WATER (D50W) INTRAVENOUS SYRINGE
25-50 AS NEEDED
Status: DISCONTINUED | OUTPATIENT
Start: 2019-06-06 | End: 2019-06-06 | Stop reason: RX

## 2019-06-06 RX ORDER — LOPERAMIDE HYDROCHLORIDE 2 MG/1
2 CAPSULE ORAL ONCE
Status: COMPLETED | OUTPATIENT
Start: 2019-06-06 | End: 2019-06-06

## 2019-06-06 RX ORDER — HEPARIN SODIUM 5000 [USP'U]/ML
5000 INJECTION, SOLUTION INTRAVENOUS; SUBCUTANEOUS EVERY 8 HOURS
Status: DISCONTINUED | OUTPATIENT
Start: 2019-06-06 | End: 2019-06-08 | Stop reason: HOSPADM

## 2019-06-06 RX ORDER — KETOROLAC TROMETHAMINE 30 MG/ML
30 INJECTION, SOLUTION INTRAMUSCULAR; INTRAVENOUS
Status: COMPLETED | OUTPATIENT
Start: 2019-06-06 | End: 2019-06-06

## 2019-06-06 RX ORDER — DICYCLOMINE HYDROCHLORIDE 10 MG/ML
20 INJECTION INTRAMUSCULAR
Status: COMPLETED | OUTPATIENT
Start: 2019-06-06 | End: 2019-06-06

## 2019-06-06 RX ORDER — LOPERAMIDE HYDROCHLORIDE 2 MG/1
2 CAPSULE ORAL
Qty: 20 CAP | Refills: 0 | Status: SHIPPED | OUTPATIENT
Start: 2019-06-06 | End: 2019-06-16

## 2019-06-06 RX ADMIN — METOCLOPRAMIDE 10 MG: 5 INJECTION, SOLUTION INTRAMUSCULAR; INTRAVENOUS at 04:30

## 2019-06-06 RX ADMIN — SODIUM CHLORIDE 8.1 UNITS/HR: 900 INJECTION, SOLUTION INTRAVENOUS at 14:39

## 2019-06-06 RX ADMIN — SODIUM CHLORIDE 150 ML/HR: 900 INJECTION, SOLUTION INTRAVENOUS at 14:07

## 2019-06-06 RX ADMIN — SODIUM CHLORIDE 1000 ML: 900 INJECTION, SOLUTION INTRAVENOUS at 12:28

## 2019-06-06 RX ADMIN — SODIUM CHLORIDE 1000 ML: 900 INJECTION, SOLUTION INTRAVENOUS at 05:51

## 2019-06-06 RX ADMIN — CEFTRIAXONE 1 G: 1 INJECTION, POWDER, FOR SOLUTION INTRAMUSCULAR; INTRAVENOUS at 20:09

## 2019-06-06 RX ADMIN — LIDOCAINE HYDROCHLORIDE 40 ML: 20 SOLUTION ORAL; TOPICAL at 05:50

## 2019-06-06 RX ADMIN — DICYCLOMINE HYDROCHLORIDE 20 MG: 20 INJECTION, SOLUTION INTRAMUSCULAR at 04:21

## 2019-06-06 RX ADMIN — HUMAN INSULIN 10 UNITS: 100 INJECTION, SOLUTION SUBCUTANEOUS at 05:53

## 2019-06-06 RX ADMIN — LOPERAMIDE HYDROCHLORIDE 2 MG: 2 CAPSULE ORAL at 05:51

## 2019-06-06 RX ADMIN — LABETALOL 20 MG/4 ML (5 MG/ML) INTRAVENOUS SYRINGE 20 MG: at 04:58

## 2019-06-06 RX ADMIN — SODIUM CHLORIDE 1000 ML: 900 INJECTION, SOLUTION INTRAVENOUS at 04:25

## 2019-06-06 RX ADMIN — PIPERACILLIN SODIUM,TAZOBACTAM SODIUM 3.38 G: 3; .375 INJECTION, POWDER, FOR SOLUTION INTRAVENOUS at 13:54

## 2019-06-06 RX ADMIN — METOCLOPRAMIDE 10 MG: 5 INJECTION, SOLUTION INTRAMUSCULAR; INTRAVENOUS at 12:42

## 2019-06-06 RX ADMIN — MORPHINE SULFATE 4 MG: 4 INJECTION INTRAVENOUS at 13:54

## 2019-06-06 RX ADMIN — PANTOPRAZOLE SODIUM 40 MG: 40 INJECTION, POWDER, FOR SOLUTION INTRAVENOUS at 04:26

## 2019-06-06 RX ADMIN — KETOROLAC TROMETHAMINE 30 MG: 30 INJECTION, SOLUTION INTRAMUSCULAR at 12:42

## 2019-06-06 RX ADMIN — LABETALOL 20 MG/4 ML (5 MG/ML) INTRAVENOUS SYRINGE 20 MG: at 05:53

## 2019-06-06 RX ADMIN — HEPARIN SODIUM 5000 UNITS: 5000 INJECTION, SOLUTION INTRAVENOUS; SUBCUTANEOUS at 20:09

## 2019-06-06 RX ADMIN — LIDOCAINE HYDROCHLORIDE 50 MG: 20 INJECTION, SOLUTION EPIDURAL; INFILTRATION; INTRACAUDAL; PERINEURAL at 05:58

## 2019-06-06 RX ADMIN — SODIUM CHLORIDE 150 ML/HR: 900 INJECTION, SOLUTION INTRAVENOUS at 20:30

## 2019-06-06 RX ADMIN — ONDANSETRON 4 MG: 2 INJECTION INTRAMUSCULAR; INTRAVENOUS at 12:31

## 2019-06-06 NOTE — ED NOTES
Attempted straight cath, less than 1 mL collected. Pt laying in large amount of urine on chux. Cleaned and dried pt.

## 2019-06-06 NOTE — ROUTINE PROCESS
TRANSFER - IN REPORT: 
 
Verbal report received from S. Tonja Sever, RN (name) on Tonia Phi  being received from ER (unit) for routine progression of care Report consisted of patients Situation, Background, Assessment and  
Recommendations(SBAR). Information from the following report(s) SBAR, Kardex, ED Summary, Procedure Summary and Recent Results was reviewed with the receiving nurse. Opportunity for questions and clarification was provided. Assessment completed upon patients arrival to unit and care assumed. Pt aox4, following commands, co abdominal pain, room air, tele monitored, insulin gtt. Q 1hr BS 
 
1915  Bedside, Verbal and Written shift change report given to ELIZABETH Manzo (oncoming nurse) by Willean Libman. Aranza Garcia RN (offgoing nurse). Report included the following information SBAR, Kardex, Intake/Output and Recent Results.

## 2019-06-06 NOTE — ED NOTES
Discharge instructions reviewed with opportunity for questions provided. Pt vocalized understanding. Armband removed and shredded. Pt stable condition at time of discharge. Pt standing by in lobby for transport.

## 2019-06-06 NOTE — H&P
History & Physical    Patient: Dorothy Chinchilla MRN: [id number]  CSN: [id number]    YOB: 1985  Age: 37 y.o. Sex: female      DOA: 6/6/2019  Primary Care Provider:  Mary Alice Biggs MD      Assessment/Plan     Patient Active Problem List   Diagnosis Code    Gastroparesis K31.84    Dehydration E86.0    Leukocytosis D72.829    Epigastric pain R10.13    Lactic acid acidosis E87.2    Acute kidney injury ([address] N17.9    Intractable vomiting with nausea R11.2    Uncontrolled type 1 diabetes mellitus with hyperglycemia ([address] E10.65       Admit to ICU    IDDM with hyperglycemia - started on insulin drip. Transition to basal, premeal and SSI once her blood sugars stabilized. IVF    N/V - secondary to gastroparesis and hyperglycemia. Continue with reglan. LESLIE - secondary to dehydration, started on IVF. Leukocytosis  - reactive vs infectious. Possible UTI - follow urine cultures, start on ceftriaxone. DVT prophylaxis with heparin    Estimated length of stay : 1-2 days    CC: N/V       HPI:     Dorothy Chinchilla is a 37 y.o. female who has past history of IDDM, gastroparesis, HTN presents to ER with concerns of N/V. Patient reports that she has been vomiting since yesterday, she was seen in ER yesterday and her blood sugars elevated, she was discharged. She returned with concerns of persistent n/v, she reports that she has not eaten since yesterday due to vomiting and has not taken her insulin. She has gastroparesis and takes reglan. In ER she is noted to have elevated blood sugars in 500 range, her CO2 at 18, anion gap at 15. Her CT abdomen showed possible stranding at ureters. Her wbc elevated at 17    Past Medical History:   Diagnosis Date    [facility] [address]     Gastroparesis     Hypertension     UTI (urinary tract infection)        Past Surgical History:   Procedure Laterality Date    HX GYN      tubal ligation, C Section       History reviewed.  No pertinent family history. Social History     Socioeconomic History    Marital status: SINGLE     Spouse name: Not on file    Number of children: Not on file    Years of education: Not on file    Highest education level: Not on file   Tobacco Use    Smoking status: Never Smoker    Smokeless tobacco: Never Used   Substance and Sexual Activity    Alcohol use: Never     Frequency: Never       Prior to Admission medications    Medication Sig Start Date End Date Taking? Authorizing Provider   loperamide (IMODIUM) 2 mg capsule Take 1 Cap by mouth four (4) times daily as needed for Diarrhea for up to 10 days. 6/6/19 6/16/19  Magnolia Lindo DO   famotidine (PEPCID PO) Take  by mouth. Tameka Balderas MD   insulin glargine (LANTUS U-100 INSULIN) 100 unit/mL injection 30 Units by SubCUTAneous route nightly. Indications: type 2 diabetes mellitus    Tameka Balderas MD   metoclopramide HCl (REGLAN PO) Take  by mouth. Tameka Balderas MD   insulin admin. supplies (INPEN, FOR NOVOLOG, SC) 10-12 Units by SubCUTAneous route Before breakfast, lunch, and dinner. Tameka Balderas MD       No Known Allergies    Review of Systems  Gen: No fever, chills, malaise, weight loss/gain. Heent: No headache, rhinorrhea, epistaxis, ear pain, hearing loss, sinus pain, neck pain/stiffness, sore throat. Heart: No chest pain, palpitations, STEVENS, pnd, or orthopnea. Resp: No cough, hemoptysis, wheezing and shortness of breath. GI: see above. : No urinary obstruction, dysuria or hematuria. Derm: No rash, new skin lesion or pruritis. Musc/skeletal: no bone or joint complains. Vasc: No edema, cyanosis or claudication. Endo: No heat/cold intolerance, no polyuria,polydipsia or polyphagia. Neuro: No unilateral weakness, numbness, tingling. No seizures. Heme: No easy bruising or bleeding.           Physical Exam:     Physical Exam:  Visit Vitals  /81   Pulse (!) 112   Temp 98.2 °F (36.8 °C)   Resp 14   Ht 5' 4\" (1.626 m)   Wt 57.6 kg (127 lb)   LMP 2019   SpO2 100%   BMI 21.80 kg/m²      O2 Device: Room air    Temp (24hrs), Av °F (36.7 °C), Min:97.7 °F (36.5 °C), Max:98.2 °F (36.8 °C)    701 -  1900  In:  [I.V.:]  Out: -    No intake/output data recorded. General:  Awake, cooperative, no distress. Head:  Normocephalic, without obvious abnormality, atraumatic. Eyes:  Conjunctivae/corneas clear, sclera anicteric, PERRL, EOMs intact. Nose: Nares normal. No drainage or sinus tenderness. Throat: Lips, mucosa, and tongue normal.    Neck: Supple, symmetrical, trachea midline, no adenopathy. Lungs:   Clear to auscultation bilaterally. Heart:   S1, S2, no murmur, click, rub or gallop. Abdomen: Soft, non-tender. Bowel sounds normal. No masses,  No organomegaly. Extremities: Extremities normal, atraumatic, no cyanosis or edema. Capillary refill normal.   Pulses: 2+ and symmetric all extremities. Skin: Skin color pink, turgor normal. No rashes or lesions   Neurologic: CNII-XII intact. No focal motor or sensory deficit.        Labs Reviewed:    CMP:   Lab Results   Component Value Date/Time     2019 01:51 PM    K 4.1 2019 01:51 PM     2019 01:51 PM    CO2 18 (L) 2019 01:51 PM    AGAP 14 2019 01:51 PM     (HH) 2019 01:51 PM    BUN 18 2019 01:51 PM    CREA 1.55 (H) 2019 01:51 PM    GFRAA 46 (L) 2019 01:51 PM    GFRNA 38 (L) 2019 01:51 PM    CA 7.7 (L) 2019 01:51 PM    MG 2.0 2019 05:26 PM    PHOS 3.6 2019 01:51 PM    ALB 2.8 (L) 2019 01:51 PM    TP 6.7 2019 01:51 PM    GLOB 3.9 2019 01:51 PM    AGRAT 0.7 (L) 2019 01:51 PM    SGOT 10 (L) 2019 01:51 PM    ALT 7 (L) 2019 01:51 PM     CBC:   Lab Results   Component Value Date/Time    WBC 17.2 (H) 2019 12:10 PM    HGB 9.6 (L) 2019 12:10 PM    HCT 28.6 (L) 2019 12:10 PM     (H) 2019 12:10 PM Procedures/imaging: see electronic medical records for all procedures/Xrays and details which were not copied into this note but were reviewed prior to creation of Plan        CC: Linda Waggoner MD

## 2019-06-06 NOTE — ED TRIAGE NOTES
Pt arrives via ems stretcher with c\o n/v/d x 1 day, pt sts she has pmhx of gastroparesis and DM, pt is able to make needs known speaking in complete sentences, pt in nad at this time, MD Harjinder Mendosa at bedside throughout triage

## 2019-06-06 NOTE — DIABETES MGMT
Diabetes Patient/Family Education Record  Factors That  May Influence Patients Ability  to Learn or  Comply with Recommendations   []   Language barrier    []   Cultural needs   [x]   Motivation    []   Cognitive limitation    []   Physical   []   Education    []   Physiological factors   []   Hearing/vision/speaking impairment   []   Uatsdin beliefs    []   Financial factors   []  Other:   []  No factors identified at this time.      Person Instructed:   [x]   Patient   [x]   Family   []  Other     Preference for Learning:   [x]   Verbal   []   Written   []  Demonstration     Level of Comprehension & Competence:    []  Good                                      [x] Fair                                     []  Poor                             [x]  Needs Reinforcement   [x]  Teachback completed    Education Component:   [x]  Medication management, including confirmation of home regimen    []  Nutritional management -obtain usual meal pattern   []  Exercise   []  Signs, symptoms, and treatment of hyperglycemia and hypoglycemia   [] Prevention, recognition and treatment of hyperglycemia and hypoglycemia   [x]  Importance of blood glucose monitoring; per pt daughter monitors BG 3x/day    []  Instruction on use of the blood glucose meter   [x]  Discuss the importance of HbA1C monitoring    []  Sick day guidelines   []  Proper use and disposal of lancets, needles, syringes or insulin pens (if appropriate)   []  Potential long-term complications (retinopathy, kidney disease, neuropathy, foot care)   [] Information about whom to contact in case of emergency or for more information    [x]  Goal:  Patient/family will demonstrate understanding of Diabetes Self Management Skills by: (date) _______  Plan for post-discharge education or self-management support:    [x] Outpatient class schedule provided            [] Patient Declined    [] Scheduled for outpatient classes (date) _______  Verify:  Does patient understand how diabetes medications work? ____________________________  Does patient know what their most recent A1c is? __________yes_________________________  Does patient monitor glucose at home? ____________________________yes_____________  Does patient have difficulty obtaining diabetes medications or testing supplies? _________no________         Laura Zarate MS, RN, CDE  Glycemic Control Team  [phone]  Pager [phone] (- 8:00-4:30P)  *After Hours pager [phone]

## 2019-06-06 NOTE — ED PROVIDER NOTES
EMERGENCY DEPARTMENT HISTORY AND PHYSICAL EXAM    Date: 6/6/2019  Patient Name: Pascual Lorenzo    History of Presenting Illness     Chief Complaint   Patient presents with    Nausea    Vomiting         History Provided By: Patient    Additional History (Context): Pascual Lorenzo is a 37 y.o. female with PMHX gastroparesis, diabetes presents to the emergency department C/O 2 days of diarrhea, nausea, vomiting and epigastric burning abdominal pain. Patient states that she has not taken her insulin in 2 days due to the vomiting. Patient states that she has Reglan for her gastroparesis however has not been able to tolerate p.o. Pt denies dysuria, hematuria, fever, chills and any other sxs or complaints. PCP: Christopher Barragan MD    Current Facility-Administered Medications   Medication Dose Route Frequency Provider Last Rate Last Dose    sodium chloride 0.9 % bolus infusion 1,000 mL  1,000 mL IntraVENous ONCE Magnolia Lindo, DO 1,000 mL/hr at 06/06/19 0551 1,000 mL at 06/06/19 0551     Current Outpatient Medications   Medication Sig Dispense Refill    loperamide (IMODIUM) 2 mg capsule Take 1 Cap by mouth four (4) times daily as needed for Diarrhea for up to 10 days. 20 Cap 0    famotidine (PEPCID PO) Take  by mouth.  insulin glargine (LANTUS U-100 INSULIN) 100 unit/mL injection by SubCUTAneous route nightly.  metoclopramide HCl (REGLAN PO) Take  by mouth.  insulin admin. supplies (INPEN, FOR NOVOLOG, SC) by SubCUTAneous route. Past History     Past Medical History:  Past Medical History:   Diagnosis Date    Diabetes ([address]     Gastroparesis     Hypertension     UTI (urinary tract infection)        Past Surgical History:  Past Surgical History:   Procedure Laterality Date    HX GYN      tubal ligation, C Section       Family History:  History reviewed. No pertinent family history.     Social History:  Social History     Tobacco Use    Smoking status: Never Smoker    Smokeless tobacco: Never Used   Substance Use Topics    Alcohol use: Never     Frequency: Never    Drug use: Not on file       Allergies:  No Known Allergies      Review of Systems   Review of Systems   Constitutional: Negative for chills and fever. HENT: Negative for congestion, ear pain, sinus pain and sore throat. Eyes: Negative for pain and visual disturbance. Respiratory: Negative for cough and shortness of breath. Cardiovascular: Negative for chest pain and leg swelling. Gastrointestinal: Positive for abdominal pain, diarrhea, nausea and vomiting. Negative for constipation. Genitourinary: Negative for dysuria, hematuria, vaginal bleeding and vaginal discharge. Musculoskeletal: Negative for back pain and neck pain. Skin: Negative for rash and wound. Neurological: Negative for dizziness, tremors, weakness, light-headedness and numbness. All other systems reviewed and are negative.       Physical Exam     Vitals:    06/06/19 0515 06/06/19 0600 06/06/19 0607 06/06/19 0630   BP: (!) 199/112 179/89 (!) 167/91 157/73   Pulse:  (!) 109 (!) 108 (!) 110   Resp:  13 14 19   Temp:       SpO2: 99% 100% 99% 100%     Physical Exam    Nursing note and vitals reviewed    Constitutional: 89 [facility] female, appears older than stated age  Head: Normocephalic, Atraumatic  Eyes: Pupils are equal, round, and reactive to light, EOMI  Neck: Supple, non-tender  Cardiovascular: Regular rate and rhythm, no murmurs, rubs, or gallops  Chest: Normal work of breathing and chest excursion bilaterally  Lungs: Clear to ausculation bilaterally, no wheezes, no rhonchi  Abdomen: Soft, mild epigastric tenderness with no rebound or guarding, non distended, normoactive bowel sounds  Back: No evidence of trauma or deformity  Extremities: No evidence of trauma or deformity, no LE edema  Skin: Warm and dry, normal cap refill  Neuro: Alert and appropriate, CN intact, normal speech, moving all 4 extremities freely and symmetrically  Psychiatric: Normal mood and affect       Diagnostic Study Results     Labs -     Recent Results (from the past 12 hour(s))   CBC WITH AUTOMATED DIFF    Collection Time: 06/06/19  4:10 AM   Result Value Ref Range    WBC 16.9 (H) 4.6 - 13.2 K/uL    RBC 3.24 (L) 4.20 - 5.30 M/uL    HGB 9.7 (L) 12.0 - 16.0 g/dL    HCT 28.3 (L) 35.0 - 45.0 %    MCV 87.3 74.0 - 97.0 FL    MCH 29.9 24.0 - 34.0 PG    MCHC 34.3 31.0 - 37.0 g/dL    RDW 12.9 11.6 - 14.5 %    PLATELET 271 (H) 497 - 420 K/uL    MPV 9.3 9.2 - 11.8 FL    NEUTROPHILS 80 (H) 40 - 73 %    LYMPHOCYTES 14 (L) 21 - 52 %    MONOCYTES 5 3 - 10 %    EOSINOPHILS 1 0 - 5 %    BASOPHILS 0 0 - 2 %    ABS. NEUTROPHILS 13.5 (H) 1.8 - 8.0 K/UL    ABS. LYMPHOCYTES 2.3 0.9 - 3.6 K/UL    ABS. MONOCYTES 0.9 0.05 - 1.2 K/UL    ABS. EOSINOPHILS 0.2 0.0 - 0.4 K/UL    ABS. BASOPHILS 0.0 0.0 - 0.1 K/UL    DF AUTOMATED     GLUCOSE, POC    Collection Time: 06/06/19  4:11 AM   Result Value Ref Range    Glucose (POC) 392 (H) 70 - 705 mg/dL   METABOLIC PANEL, COMPREHENSIVE    Collection Time: 06/06/19  4:45 AM   Result Value Ref Range    Sodium 138 136 - 145 mmol/L    Potassium 4.1 3.5 - 5.5 mmol/L    Chloride 103 100 - 108 mmol/L    CO2 23 21 - 32 mmol/L    Anion gap 12 3.0 - 18 mmol/L    Glucose 380 (H) 74 - 99 mg/dL    BUN 18 7.0 - 18 MG/DL    Creatinine 1.57 (H) 0.6 - 1.3 MG/DL    BUN/Creatinine ratio 11 (L) 12 - 20      GFR est AA 45 (L) >60 ml/min/1.73m2    GFR est non-AA 37 (L) >60 ml/min/1.73m2    Calcium 8.5 8.5 - 10.1 MG/DL    Bilirubin, total 0.4 0.2 - 1.0 MG/DL    ALT (SGPT) 7 (L) 13 - 56 U/L    AST (SGOT) 9 (L) 15 - 37 U/L    Alk.  phosphatase 144 (H) 45 - 117 U/L    Protein, total 7.4 6.4 - 8.2 g/dL    Albumin 3.0 (L) 3.4 - 5.0 g/dL    Globulin 4.4 (H) 2.0 - 4.0 g/dL    A-G Ratio 0.7 (L) 0.8 - 1.7     LIPASE    Collection Time: 06/06/19  4:45 AM   Result Value Ref Range    Lipase 149 73 - 393 U/L   MAGNESIUM    Collection Time: 06/06/19  4:45 AM   Result Value Ref Range    Magnesium 2.4 1.6 - 2.6 mg/dL   EKG, 12 LEAD, INITIAL    Collection Time: 06/06/19  5:55 AM   Result Value Ref Range    Ventricular Rate 109 BPM    Atrial Rate 109 BPM    P-R Interval 118 ms    QRS Duration 80 ms    Q-T Interval 388 ms    QTC Calculation (Bezet) 522 ms    Calculated P Axis 66 degrees    Calculated R Axis 57 degrees    Calculated T Axis 50 degrees    Diagnosis       Sinus tachycardia  Nonspecific ST abnormality  Abnormal ECG  No previous ECGs available     GLUCOSE, POC    Collection Time: 06/06/19  6:29 AM   Result Value Ref Range    Glucose (POC) 338 (H) 70 - 110 mg/dL       Radiologic Studies -   No orders to display     CT Results  (Last 48 hours)    None        CXR Results  (Last 48 hours)    None            Medical Decision Making   I am the first provider for this patient. I reviewed the vital signs, available nursing notes, past medical history, past surgical history, family history and social history. Vital Signs-Reviewed the patient's vital signs. Pulse Oximetry Analysis -100 % on room air    Records Reviewed: Nursing Notes and Old Medical Records     EKG interpretation: (Preliminary)  6:18 AM   Sinus tachycardia 109 bpm.  No acute ST elevation. Provider Notes:   37 y.o. female with a history of gastroparesis, diabetes presenting with vomiting, diarrhea, epigastric abdominal pain. Patient is afebrile and not tachycardic. However noted to be hypertensive. Will evaluate for DKA and obtain labs. Will give IV fluids. Will treat symptomatically. Patient's hypertension likely secondary to persistent vomiting, will treat her vomiting and reassess her blood pressure. However if patient continues to be persistently hypertensive in the emergency department, will treat with antihypertensive. Procedures:  Procedures    ED Course:   4:06 AM   Initial assessment performed.  The patients presenting problems have been discussed, and they are in agreement with the care plan formulated and outlined with them. I have encouraged them to ask questions as they arise throughout their visit. 6:20 AM  Patient's labs showing hyperglycemia, not consistent with DKA. Patient's blood pressure responsive with antihypertensive medications. Patient given insulin for hyperglycemia. Has had no episodes of emesis in the emergency department. Will discharge with symptom control for her diarrhea with Imodium. Diagnosis and Disposition       DISCHARGE NOTE:  6:41 AM    Phyllis Chong's  results have been reviewed with her. She has been counseled regarding her diagnosis, treatment, and plan. She verbally conveys understanding and agreement of the signs, symptoms, diagnosis, treatment and prognosis and additionally agrees to follow up as discussed. She also agrees with the care-plan and conveys that all of her questions have been answered. I have also provided discharge instructions for her that include: educational information regarding their diagnosis and treatment, and list of reasons why they would want to return to the ED prior to their follow-up appointment, should her condition change. She has been provided with education for proper emergency department utilization. CLINICAL IMPRESSION:    1. Hyperglycemia    2. Gastroparesis    3. Diarrhea, unspecified type        PLAN:  1. D/C Home  2. Current Discharge Medication List      START taking these medications    Details   loperamide (IMODIUM) 2 mg capsule Take 1 Cap by mouth four (4) times daily as needed for Diarrhea for up to 10 days. Qty: 20 Cap, Refills: 0           3.    Follow-up Information     Follow up With Specialties Details Why Contact Michel Infante MD [facility] Schedule an appointment as soon as possible for a visit in 2 days  [phone] [address]  [phone]      [facility] EMERGENCY DEPT Emergency Medicine  As needed if symptoms worsen [address] [id number]  [phone]        ____________________________________     Please note that this dictation was completed with [email or web address], the computer voice recognition software. Quite often unanticipated grammatical, syntax, homophones, and other interpretive errors are inadvertently transcribed by the computer software. Please disregard these errors. Please excuse any errors that have escaped final proofreading.

## 2019-06-06 NOTE — ED PROVIDER NOTES
EMERGENCY DEPARTMENT HISTORY AND PHYSICAL EXAM    Date: 6/6/2019  Patient Name: Rudy Jensen    History of Presenting Illness     Chief Complaint   Patient presents with    Vomiting         History Provided By: Patient    Chief Complaint: Vomiting  Duration: About 7 Hours      Additional History (Context): Rudy Jensen is a 37 y.o. female with PMHX of insulin-dependent diabetes gastroparesis, hypertension, UTI, tubal ligation who presents to the emergency department C/O multiple episodes of vomiting since discharge from here about 4 am this morning. Patient states that she was in the ED last getting IV fluids and medicines to lower her blood sugar. She states that ever since she was discharged she has been throwing up and did also have 2 episodes of diarrhea. She attempted to use Zofran but that did not help and she continued to vomit. She reports at least 7 episodes of vomiting. Associated sxs include moderate abdominal pain especially in the mid upper region, and feeling of dehydration. She admits that she was unable to take any of her diabetes medication since discharge last night. Pt denies fever, back pain, dysuria, hematuria, vaginal symptoms, cough, and any other sxs or complaints. LMP 3 weeks ago. History of tubal ligation.     PCP: Rafiq Booth MD    Current Facility-Administered Medications   Medication Dose Route Frequency Provider Last Rate Last Dose    insulin regular (NOVOLIN R, HUMULIN R) 100 Units in 0.9% sodium chloride 100 mL infusion  1-10 Units/hr IntraVENous TITRATE Yoel Arreola, DO 8.1 mL/hr at 06/06/19 1439 8.1 Units/hr at 06/06/19 1439    glucose chewable tablet 16 g  4 Tab Oral PRN Yoel Duck, DO        glucagon (GLUCAGEN) injection 1 mg  1 mg IntraMUSCular PRN Yoel Duck, DO        dextrose (D50W) injection syrg 12.5-25 g  25-50 mL IntraVENous PRN Yoel Duck, DO        0.9% sodium chloride infusion  150 mL/hr IntraVENous CONTINUOUS Ian Peter  mL/hr at 06/06/19 1407 150 mL/hr at 06/06/19 1407     Current Outpatient Medications   Medication Sig Dispense Refill    loperamide (IMODIUM) 2 mg capsule Take 1 Cap by mouth four (4) times daily as needed for Diarrhea for up to 10 days. 20 Cap 0    famotidine (PEPCID PO) Take  by mouth.  insulin glargine (LANTUS U-100 INSULIN) 100 unit/mL injection by SubCUTAneous route nightly.  metoclopramide HCl (REGLAN PO) Take  by mouth.  insulin admin. supplies (INPEN, FOR NOVOLOG, SC) by SubCUTAneous route. Past History     Past Medical History:  Past Medical History:   Diagnosis Date    Diabetes ([facility] [address]     Gastroparesis     Hypertension     UTI (urinary tract infection)        Past Surgical History:  Past Surgical History:   Procedure Laterality Date    HX GYN      tubal ligation, C Section       Family History:  History reviewed. No pertinent family history. Social History:  Social History     Tobacco Use    Smoking status: Never Smoker    Smokeless tobacco: Never Used   Substance Use Topics    Alcohol use: Never     Frequency: Never    Drug use: Not on file       Allergies:  No Known Allergies      Review of Systems   Review of Systems   Constitutional: Negative for chills and fever. HENT: Negative for congestion, rhinorrhea, sore throat and trouble swallowing. Eyes: Negative for visual disturbance. Respiratory: Negative for cough, shortness of breath and wheezing. Cardiovascular: Negative for chest pain. Gastrointestinal: Positive for abdominal pain, diarrhea, nausea and vomiting. Endocrine: Negative for polyuria. Genitourinary: Negative for dysuria. Musculoskeletal: Negative for arthralgias and neck stiffness. Skin: Negative for pallor and rash. Neurological: Negative for dizziness, weakness, numbness and headaches. Hematological: Does not bruise/bleed easily. Psychiatric/Behavioral: Negative for confusion and dysphoric mood.    All other systems reviewed and are negative. Physical Exam     Vitals:    06/06/19 1205 06/06/19 1234 06/06/19 1330 06/06/19 1428   BP: (!) 176/96 (!) 174/95 129/77 (!) 163/94   Pulse: (!) 110 (!) 111 (!) 112 (!) 111   Resp: 16 16 18 18   Temp: 98 °F (36.7 °C)   98.2 °F (36.8 °C)   SpO2: 98% 100% 99% 99%   Weight: 57.6 kg (127 lb)      Height: 5' 4\" (1.626 m)        Physical Exam   Constitutional: She is oriented to person, place, and time. She appears well-developed and well-nourished. No distress. HENT:   Head: Normocephalic and atraumatic. Dry mucous membranes  Fruity breath   Eyes: Pupils are equal, round, and reactive to light. Conjunctivae are normal. No scleral icterus. Neck: Normal range of motion. Neck supple. Cardiovascular: Intact distal pulses. Capillary refill < 3 seconds  Tachycardic   Pulmonary/Chest: Effort normal and breath sounds normal. No respiratory distress. She has no wheezes. Abdominal: Soft. Bowel sounds are normal. She exhibits no distension and no mass. There is tenderness. There is guarding. There is no rebound. Abdominal tenderness generalized but worse in the epigastric region with guarding    Patient going to bathroom with more nausea and vomiting   Musculoskeletal: Normal range of motion. She exhibits no edema. Lymphadenopathy:     She has no cervical adenopathy. Neurological: She is alert and oriented to person, place, and time. No cranial nerve deficit. Coordination normal.   Skin: Skin is warm and dry. No rash noted. She is not diaphoretic. Psychiatric: Her behavior is normal. Thought content normal.   Nursing note and vitals reviewed.         Diagnostic Study Results     Labs -     Recent Results (from the past 12 hour(s))   CBC WITH AUTOMATED DIFF    Collection Time: 06/06/19  4:10 AM   Result Value Ref Range    WBC 16.9 (H) 4.6 - 13.2 K/uL    RBC 3.24 (L) 4.20 - 5.30 M/uL    HGB 9.7 (L) 12.0 - 16.0 g/dL    HCT 28.3 (L) 35.0 - 45.0 %    MCV 87.3 74.0 - 97.0 FL    MCH 29.9 24.0 - 34.0 PG MCHC 34.3 31.0 - 37.0 g/dL    RDW 12.9 11.6 - 14.5 %    PLATELET 324 (H) 190 - 420 K/uL    MPV 9.3 9.2 - 11.8 FL    NEUTROPHILS 80 (H) 40 - 73 %    LYMPHOCYTES 14 (L) 21 - 52 %    MONOCYTES 5 3 - 10 %    EOSINOPHILS 1 0 - 5 %    BASOPHILS 0 0 - 2 %    ABS. NEUTROPHILS 13.5 (H) 1.8 - 8.0 K/UL    ABS. LYMPHOCYTES 2.3 0.9 - 3.6 K/UL    ABS. MONOCYTES 0.9 0.05 - 1.2 K/UL    ABS. EOSINOPHILS 0.2 0.0 - 0.4 K/UL    ABS. BASOPHILS 0.0 0.0 - 0.1 K/UL    DF AUTOMATED     GLUCOSE, POC    Collection Time: 06/06/19  4:11 AM   Result Value Ref Range    Glucose (POC) 392 (H) 70 - 938 mg/dL   METABOLIC PANEL, COMPREHENSIVE    Collection Time: 06/06/19  4:45 AM   Result Value Ref Range    Sodium 138 136 - 145 mmol/L    Potassium 4.1 3.5 - 5.5 mmol/L    Chloride 103 100 - 108 mmol/L    CO2 23 21 - 32 mmol/L    Anion gap 12 3.0 - 18 mmol/L    Glucose 380 (H) 74 - 99 mg/dL    BUN 18 7.0 - 18 MG/DL    Creatinine 1.57 (H) 0.6 - 1.3 MG/DL    BUN/Creatinine ratio 11 (L) 12 - 20      GFR est AA 45 (L) >60 ml/min/1.73m2    GFR est non-AA 37 (L) >60 ml/min/1.73m2    Calcium 8.5 8.5 - 10.1 MG/DL    Bilirubin, total 0.4 0.2 - 1.0 MG/DL    ALT (SGPT) 7 (L) 13 - 56 U/L    AST (SGOT) 9 (L) 15 - 37 U/L    Alk.  phosphatase 144 (H) 45 - 117 U/L    Protein, total 7.4 6.4 - 8.2 g/dL    Albumin 3.0 (L) 3.4 - 5.0 g/dL    Globulin 4.4 (H) 2.0 - 4.0 g/dL    A-G Ratio 0.7 (L) 0.8 - 1.7     LIPASE    Collection Time: 06/06/19  4:45 AM   Result Value Ref Range    Lipase 149 73 - 393 U/L   MAGNESIUM    Collection Time: 06/06/19  4:45 AM   Result Value Ref Range    Magnesium 2.4 1.6 - 2.6 mg/dL   EKG, 12 LEAD, INITIAL    Collection Time: 06/06/19  5:55 AM   Result Value Ref Range    Ventricular Rate 109 BPM    Atrial Rate 109 BPM    P-R Interval 118 ms    QRS Duration 80 ms    Q-T Interval 388 ms    QTC Calculation (Bezet) 522 ms    Calculated P Axis 66 degrees    Calculated R Axis 57 degrees    Calculated T Axis 50 degrees    Diagnosis       Sinus tachycardia  Nonspecific ST abnormality  Abnormal ECG  No previous ECGs available     GLUCOSE, POC    Collection Time: 06/06/19  6:29 AM   Result Value Ref Range    Glucose (POC) 338 (H) 70 - 110 mg/dL   CBC WITH AUTOMATED DIFF    Collection Time: 06/06/19 12:10 PM   Result Value Ref Range    WBC 17.2 (H) 4.6 - 13.2 K/uL    RBC 3.26 (L) 4.20 - 5.30 M/uL    HGB 9.6 (L) 12.0 - 16.0 g/dL    HCT 28.6 (L) 35.0 - 45.0 %    MCV 87.7 74.0 - 97.0 FL    MCH 29.4 24.0 - 34.0 PG    MCHC 33.6 31.0 - 37.0 g/dL    RDW 13.0 11.6 - 14.5 %    PLATELET 371 (H) 629 - 420 K/uL    MPV 8.9 (L) 9.2 - 11.8 FL    NEUTROPHILS 96 (H) 40 - 73 %    LYMPHOCYTES 4 (L) 21 - 52 %    MONOCYTES 0 (L) 3 - 10 %    EOSINOPHILS 0 0 - 5 %    BASOPHILS 0 0 - 2 %    ABS. NEUTROPHILS 16.5 (H) 1.8 - 8.0 K/UL    ABS. LYMPHOCYTES 0.6 (L) 0.9 - 3.6 K/UL    ABS. MONOCYTES 0.1 0.05 - 1.2 K/UL    ABS. EOSINOPHILS 0.0 0.0 - 0.4 K/UL    ABS.  BASOPHILS 0.0 0.0 - 0.1 K/UL    DF AUTOMATED     METABOLIC PANEL, BASIC    Collection Time: 06/06/19 12:10 PM   Result Value Ref Range    Sodium 137 136 - 145 mmol/L    Potassium 4.0 3.5 - 5.5 mmol/L    Chloride 103 100 - 108 mmol/L    CO2 19 (L) 21 - 32 mmol/L    Anion gap 15 3.0 - 18 mmol/L    Glucose 548 (HH) 74 - 99 mg/dL    BUN 19 (H) 7.0 - 18 MG/DL    Creatinine 1.80 (H) 0.6 - 1.3 MG/DL    BUN/Creatinine ratio 11 (L) 12 - 20      GFR est AA 39 (L) >60 ml/min/1.73m2    GFR est non-AA 32 (L) >60 ml/min/1.73m2    Calcium 8.6 8.5 - 10.1 MG/DL   HEMOGLOBIN A1C WITH EAG    Collection Time: 06/06/19 12:10 PM   Result Value Ref Range    Hemoglobin A1c 13.4 (H) 4.2 - 5.6 %    Est. average glucose 338 mg/dL   GLUCOSE, POC    Collection Time: 06/06/19 12:11 PM   Result Value Ref Range    Glucose (POC) 541 (HH) 70 - 110 mg/dL   URINALYSIS W/ RFLX MICROSCOPIC    Collection Time: 06/06/19 12:30 PM   Result Value Ref Range    Color YELLOW      Appearance CLEAR      Specific gravity 1.022 1.005 - 1.030      pH (UA) 6.0 5.0 - 8.0 Protein 300 (A) NEG mg/dL    Glucose >1,000 (A) NEG mg/dL    Ketone 15 (A) NEG mg/dL    Bilirubin NEGATIVE  NEG      Blood MODERATE (A) NEG      Urobilinogen 0.2 0.2 - 1.0 EU/dL    Nitrites NEGATIVE  NEG      Leukocyte Esterase NEGATIVE  NEG     URINE MICROSCOPIC ONLY    Collection Time: 06/06/19 12:30 PM   Result Value Ref Range    WBC 2 to 4 0 - 5 /hpf    RBC 20 to 25 0 - 5 /hpf    Epithelial cells 2+ 0 - 5 /lpf    Bacteria FEW (A) NEG /hpf    Yeast 1+ (A) NEG   GLUCOSE, POC    Collection Time: 06/06/19  1:49 PM   Result Value Ref Range    Glucose (POC) 465 (HH) 70 - 110 mg/dL   POC LACTIC ACID    Collection Time: 06/06/19  1:49 PM   Result Value Ref Range    Lactic Acid (POC) 2.87 (HH) 0.40 - 2.00 mmol/L   HEPATIC FUNCTION PANEL    Collection Time: 06/06/19  1:51 PM   Result Value Ref Range    Protein, total 6.7 6.4 - 8.2 g/dL    Albumin 2.8 (L) 3.4 - 5.0 g/dL    Globulin 3.9 2.0 - 4.0 g/dL    A-G Ratio 0.7 (L) 0.8 - 1.7      Bilirubin, total 0.4 0.2 - 1.0 MG/DL    Bilirubin, direct <0.1 0.0 - 0.2 MG/DL    Alk.  phosphatase 130 (H) 45 - 117 U/L    AST (SGOT) 10 (L) 15 - 37 U/L    ALT (SGPT) 7 (L) 13 - 56 U/L   LIPASE    Collection Time: 06/06/19  1:51 PM   Result Value Ref Range    Lipase 103 73 - 491 U/L   METABOLIC PANEL, BASIC    Collection Time: 06/06/19  1:51 PM   Result Value Ref Range    Sodium 140 136 - 145 mmol/L    Potassium 4.1 3.5 - 5.5 mmol/L    Chloride 108 100 - 108 mmol/L    CO2 18 (L) 21 - 32 mmol/L    Anion gap 14 3.0 - 18 mmol/L    Glucose 498 (HH) 74 - 99 mg/dL    BUN 18 7.0 - 18 MG/DL    Creatinine 1.55 (H) 0.6 - 1.3 MG/DL    BUN/Creatinine ratio 12 12 - 20      GFR est AA 46 (L) >60 ml/min/1.73m2    GFR est non-AA 38 (L) >60 ml/min/1.73m2    Calcium 7.7 (L) 8.5 - 10.1 MG/DL   MAGNESIUM    Collection Time: 06/06/19  1:51 PM   Result Value Ref Range    Magnesium 2.0 1.6 - 2.6 mg/dL   PHOSPHORUS    Collection Time: 06/06/19  1:51 PM   Result Value Ref Range    Phosphorus 3.6 2.5 - 4.9 MG/DL GLUCOSTABILIZER    Collection Time: 06/06/19  2:35 PM   Result Value Ref Range    Glucose 465 mg/dL    Insulin order 8.1 units/hour    Insulin adminstered 8.1 units/hour    Multiplier 0.020     Low target 140 mg/dL    High target 180 mg/dL    D50 order 0.0 ml    D50 administered 0.00 ml    Minutes until next BG 60 min    Order initials SMY     Administered initials SMY        Radiologic Studies -   CT ABD PELV WO CONT   Final Result   IMPRESSION:            1. Interval increased nonspecific bilateral perirenal stranding with mildly   dilated duplicated upper right-sided ureters and mildly dilated single left   upper ureter, with no evidence of urinary system calculus. Mild ureteral   dilatation may be related to vesicoureteral reflux, less likely sequela of   previously passed calculus. Possibility of ascending UTI and pyelonephritis   should also be considered, suggest correlation to urinalysis. 2. No other new acute or other significant findings. XR ABD (KUB)   Final Result   IMPRESSION:      Nonspecific nonobstructive gas pattern. CT Results  (Last 48 hours)               06/06/19 1407  CT ABD PELV WO CONT Final result    Impression:  IMPRESSION:               1. Interval increased nonspecific bilateral perirenal stranding with mildly   dilated duplicated upper right-sided ureters and mildly dilated single left   upper ureter, with no evidence of urinary system calculus. Mild ureteral   dilatation may be related to vesicoureteral reflux, less likely sequela of   previously passed calculus. Possibility of ascending UTI and pyelonephritis   should also be considered, suggest correlation to urinalysis. 2. No other new acute or other significant findings. Narrative:  EXAM:CT abdomen pelvis without contrast       CLINICAL INDICATION/HISTORY: Epigastric pain, vomiting, leukocytosis, diabetes       COMPARISON: 5/26/2019.        TECHNIQUE: Standard helical CT performed through the abdomen and pelvis without   contrast.  Coronal and sagittal reformations were generated. One or more dose   reduction techniques were used on this CT: automated exposure control,   adjustment of the mAs and/or kVp according to patient's size, and iterative   reconstruction techniques. The specific techniques utilized on this CT exam have   been documented in the patient's electronic medical record. Digital imaging and   communications and medicine (DICOM) format image data are available to   nonaffiliated external healthcare facilities or entities on a secure, media   free, reciprocally searchable basis with patient authorization for at least a 12   month period after this study. _______________       FINDINGS:       INFERIOR THORAX: Lung bases are clear with heart size within normal limits. LIVER/BILIARY: No suspicious liver lesion. No biliary dilation. Gallbladder   unremarkable. SPLEEN: Normal.       PANCREAS: Normal.       ADRENALS: Normal.       KIDNEYS: There is duplicated right-sided collecting system with mild dilatation   of both upper ureters down to the level of the uterus with no definite   dilatation more distally. No evidence of urinary system calculus. There is   increased mild bilateral perirenal stranding. There is also mild prominence of   single left ureter down to the level of the uterus. Stable small left lower pole   renal cyst.       LYMPH NODES: Several nonspecific subcentimeter likely benign reactive   retroperitoneal lymph nodes, stable with no new adenopathy. GI TRACT: No wall thickening or dilation. No ascites or free air. VASCULATURE: Unremarkable. PELVIC ORGANS: Bladder uterus and adnexa unremarkable with previous bilateral   tubal ligation. OTHER: No aggressive appearing osseous lesion.        _______________               CXR Results  (Last 48 hours)    None          Medications given in the ED-  Medications   insulin regular (NOVOLIN R, HUMULIN R) 100 Units in 0.9% sodium chloride 100 mL infusion (8.1 Units/hr IntraVENous New Bag 6/6/19 1439)   glucose chewable tablet 16 g (has no administration in time range)   glucagon (GLUCAGEN) injection 1 mg (has no administration in time range)   dextrose (D50W) injection syrg 12.5-25 g (has no administration in time range)   0.9% sodium chloride infusion (150 mL/hr IntraVENous New Bag 6/6/19 1407)   ondansetron (ZOFRAN) injection 4 mg (4 mg IntraVENous Given 6/6/19 1231)   sodium chloride 0.9 % bolus infusion 1,000 mL (0 mL IntraVENous IV Completed 6/6/19 1407)     Followed by   sodium chloride 0.9 % bolus infusion 1,000 mL (0 mL IntraVENous IV Completed 6/6/19 1407)   metoclopramide HCl (REGLAN) injection 10 mg (10 mg IntraVENous Given 6/6/19 1242)   ketorolac (TORADOL) injection 30 mg (30 mg IntraVENous Given 6/6/19 1242)   piperacillin-tazobactam (ZOSYN) 3.375 g in 0.9% sodium chloride (MBP/ADV) 100 mL MBP (0 g IntraVENous IV Completed 6/6/19 1501)   morphine injection 4 mg (4 mg IntraVENous Given 6/6/19 1354)         Medical Decision Making   I am the first provider for this patient. I reviewed the vital signs, available nursing notes, past medical history, past surgical history, family history and social history. Vital Signs-Reviewed the patient's vital signs. Pulse Oximetry Analysis -100 % on room air    Cardiac Monitor:  Rate: 116 bpm  Rhythm: Sinus tachycardia        Records Reviewed: Nursing Notes and Old Medical Records    Provider Notes (Medical Decision Making): DDX: DKA, dehydration, metabolic, hyperglycemia, stroke paresis, GERD, biliary, pancreatitis, appendicitis, SBO, UTI, other intra-abdominal etiology    Emergent IV fluid, Zofran, Reglan, analgesia. Consider CT abdomen      Procedures:  Procedures    ED Course:   Initial assessment performed.  The patients presenting problems have been discussed, and they are in agreement with the care plan formulated and outlined with them.  I have encouraged them to ask questions as they arise throughout their visit. WBC 17.5 will initiate sepsis bundle    Will get CT abdomen and pelvis, unable to do contrast as patient is creatinine has increased to 1.8, she has increased from 1.57 early this a.m prior to discharge.   CO2 19    Insulin glucose stabilizer    Patient had dose of Zosyn, 2 L bolus IV fluid, maintenance fluids as well    UA: Negative nitrites, negative leukocytes    Consult:  Discussed care with Dr. Betsy Lambert, Specialty: Hospitalist, standard discussion; including history of patients chief complaint, available diagnostic results, and treatment course. He accepts admission to ICU  2:15 PM , 6/6/2019     I discussed diagnosis, results, plan for admission with patient who agrees    Consult:  Discussed care with Dr. Mikala Jacobs specialty: ICU intensivist standard discussion; including history of patients chief complaint, available diagnostic results, and treatment course. Accepts consult  2:50 PM, 6/6/2019     No source of infection. Leukocytosis and lactic acid likely associated with DKA, stress phase reaction    Critical care time:   I have spent 60 minutes of critical care time involved in lab review, consultations with specialist, family decision making, and documentation. During this entire length of time I was immediately available to the patient. Critical care: The reason for providing this level of medical care for this critically ill patient was due to a critical illness that impaired one or more vital organ systems such that there was a high probability of imminent or life threatening deterioration in the patients condition. This care involved high complexity decision making to assess, manipulate and support vital system functions, to treat this degree vital organ system failure and to prevent further life threatening deterioration of the patient's condition.          Diagnosis and Disposition         CLINICAL IMPRESSION:    1. Diabetic ketoacidosis without coma associated with type 1 diabetes mellitus ([facility] [address]    2. Intractable vomiting with nausea, unspecified vomiting type    3. Abdominal pain, epigastric    4. Leukocytosis, unspecified type    5. SIRS (systemic inflammatory response syndrome) (HCC)    6. Gastroparesis    7. Acute kidney injury ([facility] [address]    8. Dehydration    9. Lactic acid acidosis        PLAN:  1. D/C Home  2. Current Discharge Medication List        3. Follow-up Information    None           DO Leeanna Sherwood medical dictation software was used for portions of this report. Unintended transcription errors may occur. My signature above authenticates this document and my orders, the final    diagnosis (es), discharge prescription (s), and instructions in the Epic    record.

## 2019-06-06 NOTE — DISCHARGE INSTRUCTIONS
Patient Education        Learning About High Blood Sugar  What is high blood sugar? Your body turns the food you eat into glucose (sugar), which it uses for energy. But if your body isn't able to use the sugar right away, it can build up in your blood and lead to high blood sugar. When the amount of sugar in your blood stays too high for too much of the time, you may have diabetes. Diabetes is a disease that can cause serious health problems. The good news is that lifestyle changes may help you get your blood sugar back to normal and avoid or delay diabetes. What causes high blood sugar? Sugar (glucose) can build up in your blood if you:  · Are overweight. · Have a family history of diabetes. · Take certain medicines, such as steroids. What are the symptoms? Having high blood sugar may not cause any symptoms at all. Or it may make you feel very thirsty or very hungry. You may also urinate more often than usual, have blurry vision, or lose weight without trying. How is high blood sugar treated? You can take steps to lower your blood sugar level if you understand what makes it get higher. Your doctor may want you to learn how to test your blood sugar level at home. Then you can see how illness, stress, or different kinds of food or medicine raise or lower your blood sugar level. Other tests may be needed to see if you have diabetes. How can you prevent high blood sugar? · Watch your weight. If you're overweight, losing just a small amount of weight may help. Reducing fat around your waist is most important. · Limit the amount of calories, sweets, and unhealthy fat you eat. Ask your doctor if a dietitian can help you. A registered dietitian can help you create meal plans that fit your lifestyle. · Get at least 30 minutes of exercise on most days of the week. Exercise helps control your blood sugar. It also helps you maintain a healthy weight. Walking is a good choice.  You also may want to do other activities, such as running, swimming, cycling, or playing tennis or team sports. · If your doctor prescribed medicines, take them exactly as prescribed. Call your doctor if you think you are having a problem with your medicine. You will get more details on the specific medicines your doctor prescribes. Follow-up care is a key part of your treatment and safety. Be sure to make and go to all appointments, and call your doctor if you are having problems. It's also a good idea to know your test results and keep a list of the medicines you take. Where can you learn more? Go to http://anitaTexertchino.info/. Enter O108 in the search box to learn more about \"Learning About High Blood Sugar. \"  Current as of: July 25, 2018  Content Version: 11.9  © 6018-6736 [employer]. Care instructions adapted under license by [employer]. (which disclaims liability or warranty for this information). If you have questions about a medical condition or this instruction, always ask your healthcare professional. [address] any warranty or liability for your use of this information. You were seen and evaluated in the Emergency Department. Please understand that your work up is not all encompassing and you should follow up with your primary care physician for further management and continuity of care. Please return to Emergency Department or seek medical attention immediately if you have acute worsening in your symptoms or develop chest pain, shortness of breath, repeated vomiting, fever, altered level of consciousness, coughing up blood, or start sweating and feel clammy. If you were prescribed any medicine for home, please take as prescribed by your health-care provider. If you were given any follow-up appointments or numbers to call, please do so as instructed. Avoid any tobacco products or excessive alcohol.   Patient Education        Diarrhea: Care Instructions  Your Care Instructions    Diarrhea is loose, watery stools (bowel movements). The exact cause is often hard to find. Sometimes diarrhea is your body's way of getting rid of what caused an upset stomach. Viruses, food poisoning, and many medicines can cause diarrhea. Some people get diarrhea in response to emotional stress, anxiety, or certain foods. Almost everyone has diarrhea now and then. It usually isn't serious, and your stools will return to normal soon. The important thing to do is replace the fluids you have lost, so you can prevent dehydration. The doctor has checked you carefully, but problems can develop later. If you notice any problems or new symptoms, get medical treatment right away. Follow-up care is a key part of your treatment and safety. Be sure to make and go to all appointments, and call your doctor if you are having problems. It's also a good idea to know your test results and keep a list of the medicines you take. How can you care for yourself at home? · Watch for signs of dehydration, which means your body has lost too much water. Dehydration is a serious condition and should be treated right away. Signs of dehydration are:  ? Increasing thirst and dry eyes and mouth. ? Feeling faint or lightheaded. ? Darker urine, and a smaller amount of urine than normal.  · To prevent dehydration, drink plenty of fluids, enough so that your urine is light yellow or clear like water. Choose water and other caffeine-free clear liquids until you feel better. If you have kidney, heart, or liver disease and have to limit fluids, talk with your doctor before you increase the amount of fluids you drink. · Begin eating small amounts of mild foods the next day, if you feel like it. ? Try yogurt that has live cultures of Lactobacillus. (Check the label.)  ? Avoid spicy foods, fruits, alcohol, and caffeine until 48 hours after all symptoms are gone. ?  Avoid chewing gum that contains sorbitol. ? Avoid dairy products (except for yogurt with Lactobacillus) while you have diarrhea and for 3 days after symptoms are gone. · The doctor may recommend that you take over-the-counter medicine, such as loperamide (Imodium), if you still have diarrhea after 6 hours. Read and follow all instructions on the label. Do not use this medicine if you have bloody diarrhea, a high fever, or other signs of serious illness. Call your doctor if you think you are having a problem with your medicine. When should you call for help? Call 911 anytime you think you may need emergency care. For example, call if:    · You passed out (lost consciousness).     · Your stools are maroon or very bloody.    Call your doctor now or seek immediate medical care if:    · You are dizzy or lightheaded, or you feel like you may faint.     · Your stools are black and look like tar, or they have streaks of blood.     · You have new or worse belly pain.     · You have symptoms of dehydration, such as:  ? Dry eyes and a dry mouth. ? Passing only a little dark urine. ? Feeling thirstier than usual.     · You have a new or higher fever.    Watch closely for changes in your health, and be sure to contact your doctor if:    · Your diarrhea is getting worse.     · You see pus in the diarrhea.     · You are not getting better after 2 days (48 hours). Where can you learn more? Go to http://anita-chino.info/. Enter A513 in the search box to learn more about \"Diarrhea: Care Instructions. \"  Current as of: September 23, 2018  Content Version: 11.9  © 7834-7817 [employer]. Care instructions adapted under license by [employer] (which disclaims liability or warranty for this information). If you have questions about a medical condition or this instruction, always ask your healthcare professional. [address] any warranty or liability for your use of this information. Patient Education        Gastroparesis: Care Instructions  Your Care Instructions    When you have gastroparesis, your stomach takes a lot longer to empty. This delay can cause belly pain, bloating, and belching. It also can cause hiccups, heartburn, nausea or vomiting. You may not feel like eating. These symptoms may come and go. They most often occur during and after meals. You may feel full after only a few bites of food. This condition occurs when the nerves to the stomach don't work properly. Diabetes is the most common cause of this nerve damage. Gastroparesis can make it harder to control your blood sugar levels. But keeping your blood sugar levels under control may help with your symptoms. Parkinson's disease, stroke, and some medicines can also cause this condition. Home treatment can often help. Follow-up care is a key part of your treatment and safety. Be sure to make and go to all appointments, and call your doctor if you are having problems. It's also a good idea to know your test results and keep a list of the medicines you take. How can you care for yourself at home? · Eat several small meals each day rather than three large meals. · Eat foods that are low in fiber and fat. · If your doctor suggests it, take medicines that help the stomach empty more quickly. These are called motility agents. When should you call for help? Call your doctor now or seek immediate medical care if:    · You are vomiting.     · You have new or worse belly pain.     · You have a fever.     · You cannot pass stools or gas.    Watch closely for changes in your health, and be sure to contact your doctor if you have any problems. Where can you learn more? Go to http://anita-chino.info/. Enter M106 in the search box to learn more about \"Gastroparesis: Care Instructions. \"  Current as of: March 27, 2018  Content Version: 11.9  © 9277-3233 [employer].  Care instructions adapted under license by [employer] (which disclaims liability or warranty for this information). If you have questions about a medical condition or this instruction, always ask your healthcare professional. [address] any warranty or liability for your use of this information.

## 2019-06-06 NOTE — ED TRIAGE NOTES
Pt arrived per EMS with c/o n/v after being d/c this morning around 0300 for same symptoms. Pt was treated for high BG and sent home. EMS reports pt states she took zofran w/ no relief and continual vomiting since discharge.

## 2019-06-06 NOTE — DIABETES MGMT
GLYCEMIC CONTROL PROGRESS NOTE:    - pt admitted in DKA, Glucostabilizer insulin gtt  - chart reviewed, known h/o poorly managed T2DM since birth of last child in 2009 (pt confirmed)  - HbA1C not within recommended range for age + comorbids on basal/MTI home regimen(pt admits to  Missing some mealtime injections)  - per pt first time in DKA, confirmed the following home regimen   *Lantus 30 units at bedtime   *Humalog 10-12 units qac  - recommend monitor IP BG trends and make adjustments to home regimen if needed  Recent Glucose Results:   Lab Results   Component Value Date/Time     () 06/06/2019 01:51 PM     () 06/06/2019 12:10 PM    GLUCPOC 419 () 06/06/2019 04:54 PM    GLUCPOC 428 () 06/06/2019 04:53 PM    GLUCPOC 495 ([facility]) 06/06/2019 03:46 PM     Dale Elena MS, RN, CDE  Glycemic Control Team  [phone]  Pager [phone] (M-TH 8:00-4:30P)  *After Hours pager [phone]        -

## 2019-06-06 NOTE — ROUTINE PROCESS
TRANSFER - OUT REPORT: 
 
Verbal report given to Jose Evangelista RN(name) on Nataliya Mitchell  being transferred to ICU(unit) for routine progression of care Report consisted of patients Situation, Background, Assessment and  
Recommendations(SBAR). Information from the following report(s) SBAR, ED Summary, [facility] and Recent Results was reviewed with the receiving nurse. Lines:  
Peripheral IV 06/06/19 Right; Lower Forearm (Active) Site Assessment Clean, dry, & intact 6/6/2019 12:10 PM  
Phlebitis Assessment 0 6/6/2019 12:10 PM  
Infiltration Assessment 0 6/6/2019 12:10 PM  
Dressing Status Clean, dry, & intact 6/6/2019 12:10 PM  
Dressing Type Transparent 6/6/2019 12:10 PM  
Hub Color/Line Status Pink;Patent; Flushed 6/6/2019 12:10 PM  
Action Taken Blood drawn 6/6/2019 12:10 PM  
Alcohol Cap Used Yes 6/6/2019 12:10 PM  
   
Peripheral IV 06/06/19 Left Forearm (Active) Site Assessment Clean, dry, & intact 6/6/2019  1:51 PM  
Phlebitis Assessment 0 6/6/2019  1:51 PM  
Infiltration Assessment 0 6/6/2019  1:51 PM  
Dressing Status Clean, dry, & intact 6/6/2019  1:51 PM  
Dressing Type Transparent 6/6/2019  1:51 PM  
Hub Color/Line Status Pink;Patent; Flushed 6/6/2019  1:51 PM  
Action Taken Blood drawn 6/6/2019  1:51 PM  
Alcohol Cap Used Yes 6/6/2019  1:51 PM  
  
 
Opportunity for questions and clarification was provided. Patient transported with: 
 Monitor Registered Nurse

## 2019-06-06 NOTE — DIABETES MGMT
NUTRITIONAL ASSESSMENT GLYCEMIC CONTROL/ PLAN OF CARE     Phyllis Chong           37 y.o.           6/6/2019                 1. Diabetic ketoacidosis without coma associated with type 1 diabetes mellitus ([address]    2. Intractable vomiting with nausea, unspecified vomiting type    3. Abdominal pain, epigastric    4. Leukocytosis, unspecified type    5. SIRS (systemic inflammatory response syndrome) (HCC)    6. Gastroparesis    7. Acute kidney injury ([address]    8. Dehydration    9. Lactic acid acidosis      PMHx: Type 1 Diabetes, Gastroparesis, HTN, UTI     INTERVENTIONS/PLAN:   -Recommend clear liquid as tolerated and transitioning to Diabetic Consistent Carb /Low fiber 5856-8681 kcal.    ASSESSMENT:   Nutritional Status:  Normal weight per BMI 21.8 kg/m2 (18.5-24.9 kg/m2)   NPO     Diabetes Management:     Recent Glucose Results:   Lab Results   Component Value Date/Time     () 06/06/2019 01:51 PM     () 06/06/2019 12:10 PM    GLUCPOC 495 () 06/06/2019 03:46 PM    GLUCPOC 500 () 06/06/2019 03:44 PM    GLUCPOC 465 ([facility]) 06/06/2019 01:49 PM     Within target range (non-ICU: <140; ICU<180): [] Yes   [x]  No    Current Insulin regimen:   Insulin regular via continuous insulin infusion    Home medication/insulin regimen:   Lantus   Novolog   *units not specified in PTA medications. HbA1c:(6/6/2019) 13.4% equivalent to average blood glucose level 338 mg/dL. Adequate glycemic control PTA:  [] Yes  [x] No     SUBJECTIVE/OBJECTIVE:   Information obtained from: Pt started on continuous insulin infusion. Pt w/ Hgb A1C 13.4% elevated for age and co-morbidities. Recommend starting pt on clear liquids once cleared to start PO diet per MD. Transition to Diabetic Consistent Carb Diet/ Low fiber 4331-6637 kcal as tolerated. Pt w/ hx of Gastroparesis. Low fiber will likely assist w/ diet tolerance once transitioning to PO diet. Unable to speak w/ pt. Will continue to monitor and follow-up per policy. Diet: DIET NPO    No data found. Medications: [x]                Reviewed     Most Recent POC Glucose:   Recent Labs     06/06/19  1351 06/06/19  1210 06/06/19  0445   * 548* 380*         Labs:   Lab Results   Component Value Date/Time    Hemoglobin A1c 13.4 (H) 06/06/2019 12:10 PM     Lab Results   Component Value Date/Time    Sodium 140 06/06/2019 01:51 PM    Potassium 4.1 06/06/2019 01:51 PM    Chloride 108 06/06/2019 01:51 PM    CO2 18 (L) 06/06/2019 01:51 PM    Anion gap 14 06/06/2019 01:51 PM    Glucose 498 (HH) 06/06/2019 01:51 PM    BUN 18 06/06/2019 01:51 PM    Creatinine 1.55 (H) 06/06/2019 01:51 PM    Calcium 7.7 (L) 06/06/2019 01:51 PM    Magnesium 2.0 06/06/2019 01:51 PM    Phosphorus 3.6 06/06/2019 01:51 PM    Albumin 2.8 (L) 06/06/2019 01:51 PM       Anthropometrics:  BMI (calculated): 21.8 kg/m2  Wt Readings from Last 1 Encounters:   06/06/19 57.6 kg (127 lb)      Ht Readings from Last 1 Encounters:   06/06/19 5' 4\" (1.626 m)       Estimated Nutrition Needs: 1565 kcal/day (MSJ x 1.25 ), 46 g PRO/day ( 0.8 g PRO/kg), 1565 mL fluid/day (1 mL fluid/kcal)  Based on:   [x]          Actual BW : 57.6 kg    Nutrition Diagnoses: Altered nutrition-related lab value related to hx of Type 1 Diabetes as evidenced by Hgb A1C 13.4%. Nutrition Interventions:  -Recommend clear liquid as tolerated and transitioning to Diabetic Consistent Carb /Low fiber 5712-9382 kcal.     Goal:   Blood glucose will be within target range of  mg/dL by 6/9/19.      Nutrition Monitoring and Evaluation      []     Monitor po intake on meal rounds  [x]     Continue inpatient monitoring and intervention  []     Other:      Nutrition Risk:  []   High     [x]  Moderate    []  Minimal/Uncompromised    Essie To  [phone]

## 2019-06-07 ENCOUNTER — HOME HEALTH ADMISSION (OUTPATIENT)
Dept: HOME HEALTH SERVICES | Facility: HOME HEALTH | Age: 34
End: 2019-06-07

## 2019-06-07 PROBLEM — D64.9 CHRONIC ANEMIA: Status: ACTIVE | Noted: 2019-06-07

## 2019-06-07 LAB
ADMINISTERED INITIALS, ADMINIT: NORMAL
ANION GAP SERPL CALC-SCNC: 10 MMOL/L (ref 3–18)
BUN SERPL-MCNC: 21 MG/DL (ref 7–18)
BUN/CREAT SERPL: 11 (ref 12–20)
CALCIUM SERPL-MCNC: 7 MG/DL (ref 8.5–10.1)
CHLORIDE SERPL-SCNC: 107 MMOL/L (ref 100–108)
CO2 SERPL-SCNC: 22 MMOL/L (ref 21–32)
CREAT SERPL-MCNC: 1.87 MG/DL (ref 0.6–1.3)
D50 ADMINISTERED, D50ADM: 0 ML
D50 ORDER, D50ORD: 0 ML
ERYTHROCYTE [DISTWIDTH] IN BLOOD BY AUTOMATED COUNT: 13.4 % (ref 11.6–14.5)
GLUCOSE BLD STRIP.AUTO-MCNC: 103 MG/DL (ref 70–110)
GLUCOSE BLD STRIP.AUTO-MCNC: 111 MG/DL (ref 70–110)
GLUCOSE BLD STRIP.AUTO-MCNC: 114 MG/DL (ref 70–110)
GLUCOSE BLD STRIP.AUTO-MCNC: 127 MG/DL (ref 70–110)
GLUCOSE BLD STRIP.AUTO-MCNC: 128 MG/DL (ref 70–110)
GLUCOSE BLD STRIP.AUTO-MCNC: 142 MG/DL (ref 70–110)
GLUCOSE BLD STRIP.AUTO-MCNC: 147 MG/DL (ref 70–110)
GLUCOSE BLD STRIP.AUTO-MCNC: 170 MG/DL (ref 70–110)
GLUCOSE BLD STRIP.AUTO-MCNC: 173 MG/DL (ref 70–110)
GLUCOSE BLD STRIP.AUTO-MCNC: 181 MG/DL (ref 70–110)
GLUCOSE BLD STRIP.AUTO-MCNC: 198 MG/DL (ref 70–110)
GLUCOSE BLD STRIP.AUTO-MCNC: 211 MG/DL (ref 70–110)
GLUCOSE BLD STRIP.AUTO-MCNC: 218 MG/DL (ref 70–110)
GLUCOSE BLD STRIP.AUTO-MCNC: 81 MG/DL (ref 70–110)
GLUCOSE SERPL-MCNC: 133 MG/DL (ref 74–99)
GLUCOSE, GLC: 111 MG/DL
GLUCOSE, GLC: 127 MG/DL
GLUCOSE, GLC: 128 MG/DL
GLUCOSE, GLC: 142 MG/DL
GLUCOSE, GLC: 147 MG/DL
GLUCOSE, GLC: 170 MG/DL
GLUCOSE, GLC: 173 MG/DL
GLUCOSE, GLC: 181 MG/DL
GLUCOSE, GLC: 198 MG/DL
GLUCOSE, GLC: 211 MG/DL
HCT VFR BLD AUTO: 23.5 % (ref 35–45)
HGB BLD-MCNC: 7.9 G/DL (ref 12–16)
HIGH TARGET, HITG: 180 MG/DL
INSULIN ADMINSTERED, INSADM: 0 UNITS/HOUR
INSULIN ADMINSTERED, INSADM: 0 UNITS/HOUR
INSULIN ADMINSTERED, INSADM: 0.7 UNITS/HOUR
INSULIN ADMINSTERED, INSADM: 1 UNITS/HOUR
INSULIN ADMINSTERED, INSADM: 1.4 UNITS/HOUR
INSULIN ADMINSTERED, INSADM: 2.5 UNITS/HOUR
INSULIN ADMINSTERED, INSADM: 2.6 UNITS/HOUR
INSULIN ADMINSTERED, INSADM: 3 UNITS/HOUR
INSULIN ADMINSTERED, INSADM: 3.3 UNITS/HOUR
INSULIN ADMINSTERED, INSADM: 3.6 UNITS/HOUR
INSULIN ORDER, INSORD: 0 UNITS/HOUR
INSULIN ORDER, INSORD: 0 UNITS/HOUR
INSULIN ORDER, INSORD: 0.7 UNITS/HOUR
INSULIN ORDER, INSORD: 1 UNITS/HOUR
INSULIN ORDER, INSORD: 1.4 UNITS/HOUR
INSULIN ORDER, INSORD: 2.5 UNITS/HOUR
INSULIN ORDER, INSORD: 2.6 UNITS/HOUR
INSULIN ORDER, INSORD: 3 UNITS/HOUR
INSULIN ORDER, INSORD: 3.3 UNITS/HOUR
INSULIN ORDER, INSORD: 3.6 UNITS/HOUR
LOW TARGET, LOT: 140 MG/DL
MAGNESIUM SERPL-MCNC: 1.8 MG/DL (ref 1.6–2.6)
MAGNESIUM SERPL-MCNC: 3.1 MG/DL (ref 1.6–2.6)
MAGNESIUM SERPL-MCNC: 3.2 MG/DL (ref 1.6–2.6)
MCH RBC QN AUTO: 29.6 PG (ref 24–34)
MCHC RBC AUTO-ENTMCNC: 33.6 G/DL (ref 31–37)
MCV RBC AUTO: 88 FL (ref 74–97)
MINUTES UNTIL NEXT BG, NBG: 60 MIN
MULTIPLIER, MUL: 0
MULTIPLIER, MUL: 0
MULTIPLIER, MUL: 0.01
MULTIPLIER, MUL: 0.01
MULTIPLIER, MUL: 0.02
MULTIPLIER, MUL: 0.02
MULTIPLIER, MUL: 0.03
ORDER INITIALS, ORDINIT: NORMAL
PLATELET # BLD AUTO: 336 K/UL (ref 135–420)
PMV BLD AUTO: 8.4 FL (ref 9.2–11.8)
POTASSIUM SERPL-SCNC: 3.5 MMOL/L (ref 3.5–5.5)
RBC # BLD AUTO: 2.67 M/UL (ref 4.2–5.3)
SODIUM SERPL-SCNC: 139 MMOL/L (ref 136–145)
WBC # BLD AUTO: 24.1 K/UL (ref 4.6–13.2)

## 2019-06-07 PROCEDURE — 74011000250 HC RX REV CODE- 250: Performed by: HOSPITALIST

## 2019-06-07 PROCEDURE — 82962 GLUCOSE BLOOD TEST: CPT

## 2019-06-07 PROCEDURE — 83735 ASSAY OF MAGNESIUM: CPT

## 2019-06-07 PROCEDURE — 80048 BASIC METABOLIC PNL TOTAL CA: CPT

## 2019-06-07 PROCEDURE — 74011000258 HC RX REV CODE- 258: Performed by: INTERNAL MEDICINE

## 2019-06-07 PROCEDURE — 85027 COMPLETE CBC AUTOMATED: CPT

## 2019-06-07 PROCEDURE — 74011250637 HC RX REV CODE- 250/637: Performed by: INTERNAL MEDICINE

## 2019-06-07 PROCEDURE — 74011250636 HC RX REV CODE- 250/636: Performed by: INTERNAL MEDICINE

## 2019-06-07 PROCEDURE — 36415 COLL VENOUS BLD VENIPUNCTURE: CPT

## 2019-06-07 PROCEDURE — 74011250636 HC RX REV CODE- 250/636: Performed by: HOSPITALIST

## 2019-06-07 PROCEDURE — 65270000029 HC RM PRIVATE

## 2019-06-07 PROCEDURE — 74011636637 HC RX REV CODE- 636/637: Performed by: HOSPITALIST

## 2019-06-07 RX ORDER — DEXTROSE MONOHYDRATE AND SODIUM CHLORIDE 5; .9 G/100ML; G/100ML
150 INJECTION, SOLUTION INTRAVENOUS CONTINUOUS
Status: DISCONTINUED | OUTPATIENT
Start: 2019-06-07 | End: 2019-06-07

## 2019-06-07 RX ORDER — POTASSIUM CHLORIDE 20 MEQ/1
40 TABLET, EXTENDED RELEASE ORAL
Status: COMPLETED | OUTPATIENT
Start: 2019-06-07 | End: 2019-06-07

## 2019-06-07 RX ORDER — MAGNESIUM SULFATE 100 %
16 CRYSTALS MISCELLANEOUS AS NEEDED
Status: DISCONTINUED | OUTPATIENT
Start: 2019-06-07 | End: 2019-06-08 | Stop reason: HOSPADM

## 2019-06-07 RX ORDER — MAGNESIUM SULFATE 1 G/100ML
INJECTION INTRAVENOUS
Status: DISCONTINUED
Start: 2019-06-07 | End: 2019-06-07 | Stop reason: WASHOUT

## 2019-06-07 RX ORDER — MAGNESIUM SULFATE HEPTAHYDRATE 40 MG/ML
INJECTION, SOLUTION INTRAVENOUS
Status: DISPENSED
Start: 2019-06-07 | End: 2019-06-07

## 2019-06-07 RX ORDER — INSULIN GLARGINE 100 [IU]/ML
30 INJECTION, SOLUTION SUBCUTANEOUS DAILY
Status: DISCONTINUED | OUTPATIENT
Start: 2019-06-08 | End: 2019-06-07

## 2019-06-07 RX ORDER — INSULIN GLARGINE 100 [IU]/ML
30 INJECTION, SOLUTION SUBCUTANEOUS DAILY
Status: DISCONTINUED | OUTPATIENT
Start: 2019-06-07 | End: 2019-06-08 | Stop reason: HOSPADM

## 2019-06-07 RX ORDER — METOCLOPRAMIDE 5 MG/1
10 TABLET ORAL 4 TIMES DAILY
Status: DISCONTINUED | OUTPATIENT
Start: 2019-06-08 | End: 2019-06-08 | Stop reason: HOSPADM

## 2019-06-07 RX ORDER — INSULIN LISPRO 100 [IU]/ML
INJECTION, SOLUTION INTRAVENOUS; SUBCUTANEOUS
Status: DISCONTINUED | OUTPATIENT
Start: 2019-06-07 | End: 2019-06-08 | Stop reason: HOSPADM

## 2019-06-07 RX ORDER — INSULIN LISPRO 100 [IU]/ML
5 INJECTION, SOLUTION INTRAVENOUS; SUBCUTANEOUS
Status: DISCONTINUED | OUTPATIENT
Start: 2019-06-07 | End: 2019-06-08 | Stop reason: HOSPADM

## 2019-06-07 RX ORDER — FAMOTIDINE 20 MG/1
20 TABLET, FILM COATED ORAL DAILY
Status: DISCONTINUED | OUTPATIENT
Start: 2019-06-08 | End: 2019-06-08 | Stop reason: HOSPADM

## 2019-06-07 RX ORDER — MAGNESIUM SULFATE HEPTAHYDRATE 40 MG/ML
2 INJECTION, SOLUTION INTRAVENOUS
Status: COMPLETED | OUTPATIENT
Start: 2019-06-07 | End: 2019-06-07

## 2019-06-07 RX ADMIN — INSULIN LISPRO 5 UNITS: 100 INJECTION, SOLUTION INTRAVENOUS; SUBCUTANEOUS at 18:12

## 2019-06-07 RX ADMIN — CEFTRIAXONE 1 G: 1 INJECTION, POWDER, FOR SOLUTION INTRAMUSCULAR; INTRAVENOUS at 18:13

## 2019-06-07 RX ADMIN — INSULIN GLARGINE 30 UNITS: 100 INJECTION, SOLUTION SUBCUTANEOUS at 12:46

## 2019-06-07 RX ADMIN — HEPARIN SODIUM 5000 UNITS: 5000 INJECTION, SOLUTION INTRAVENOUS; SUBCUTANEOUS at 12:47

## 2019-06-07 RX ADMIN — MAGNESIUM SULFATE HEPTAHYDRATE 2 G: 40 INJECTION, SOLUTION INTRAVENOUS at 03:24

## 2019-06-07 RX ADMIN — MAGNESIUM SULFATE HEPTAHYDRATE 2 G: 40 INJECTION, SOLUTION INTRAVENOUS at 02:23

## 2019-06-07 RX ADMIN — HEPARIN SODIUM 5000 UNITS: 5000 INJECTION, SOLUTION INTRAVENOUS; SUBCUTANEOUS at 02:25

## 2019-06-07 RX ADMIN — POTASSIUM CHLORIDE 40 MEQ: 20 TABLET, EXTENDED RELEASE ORAL at 18:13

## 2019-06-07 RX ADMIN — INSULIN LISPRO 4 UNITS: 100 INJECTION, SOLUTION INTRAVENOUS; SUBCUTANEOUS at 18:12

## 2019-06-07 RX ADMIN — DEXTROSE MONOHYDRATE AND SODIUM CHLORIDE 150 ML/HR: 5; .9 INJECTION, SOLUTION INTRAVENOUS at 02:33

## 2019-06-07 RX ADMIN — HEPARIN SODIUM 5000 UNITS: 5000 INJECTION, SOLUTION INTRAVENOUS; SUBCUTANEOUS at 18:13

## 2019-06-07 NOTE — ACP (ADVANCE CARE PLANNING)
AMD Reviewed    provided education on Advance Medical Directives and left a copy with the patient. She stated she would want her mother, who was at the bedside but does have one (of 5) child over 25. Patient was encouraged to call the  if it is completed while here or if more clarification was needed. [address], M.Div.   Board Certified   [phone] - Office

## 2019-06-07 NOTE — PROGRESS NOTES
conducted an initial consultation and Spiritual Assessment for Melchor Krabbe, who is a 37 y.o.,female. Patient's mom and another at the bedside. She reports good support. Patient has five children. One is over 18 and would be her legal NOK. The reason the Patient came to the hospital is:   Patient Active Problem List    Diagnosis Date Noted    Gastroparesis 06/06/2019    Dehydration 06/06/2019    Leukocytosis 06/06/2019    Epigastric pain 06/06/2019    Lactic acid acidosis 06/06/2019    Acute kidney [facility] [address] 06/06/2019    Intractable vomiting with nausea 06/06/2019    Uncontrolled type 1 diabetes mellitus with hyperglycemia ([facility] [address] 06/06/2019    UTI (urinary tract infection) 06/06/2019      Initiated a relationship of care and support. Explored issues of danni, belief, spirituality and Jew/ritual needs. Listened empathically. Provided information about Spiritual Care Services. Offered assurance of continued prayers on patients behalf. Chart reviewed.  confirmed Patient's Muslim Affiliation. Patient processed feelings about current hospitalization. Patient expressed gratitude for Spiritual Care visit. Patient was reviewed in ICU Interdisciplinary Rounds. Chaplains will continue to follow and will provide pastoral care as needed or requested.  recommends bedside caregivers page  on duty if patient shows signs of acute spiritual or emotional distress. [address] M.Div.   Board Certified   [phone] - Office

## 2019-06-07 NOTE — PROGRESS NOTES
0730: Bedside shift change report received from ELIZABETH Freeman. Report included the following information SBAR, Kardex, Intake/Output, MAR, Recent Results, Med Rec Status and Cardiac Rhythm Sinus Tach/NSR and plan of care. 0800: Shift assessment complete. See flowsheet. 1200: Reassessment complete. See flowsheet. 1345: Insulin drip discontinued per order. 1600: Reassessment complete. See flowsheet. 1930: Bedside shift change report given to ELVIA Bauer RN and Riley Schreiber RN. Report included the following information SBAR, Kardex, Intake/Output, MAR, Recent Results, Med Rec Status and Cardiac Rhythm sinus tach/nsr and plan of care.

## 2019-06-07 NOTE — PROGRESS NOTES
Hospitalist Progress Note    Patient: Rosa Peoples MRN: [id number]  CSN: [id number]    YOB: 1985  Age: 37 y.o. Sex: female    DOA: 6/6/2019 LOS:  LOS: 1 day                Assessment/Plan     Patient Active Problem List   Diagnosis Code    Gastroparesis K31.84    Dehydration E86.0    Leukocytosis D72.829    Epigastric pain R10.13    Lactic acid acidosis E87.2    Acute kidney injury ([address] N17.9    Intractable vomiting with nausea R11.2    Uncontrolled type 1 diabetes mellitus with hyperglycemia (HCC) E10.65    UTI (urinary tract infection) N39.0            38 yo female with history of IDDM, admitted for hyperglycemia    Feels better, nausea subsided, tolerating diet. IDDM with hyperglycemia -  on insulin drip. Blood sugars stable Transition to basal, pre meal and SSI     N/V - subsided, secondary to gastroparesis and hyperglycemia. Continue with reglan. Check and replace lytes.     LESLIE - secondary to dehydration, started on IVF. She possibly has CKD secondary to diabetes.      Leukocytosis  - reactive vs infectious.      Possible UTI - follow urine cultures, on ceftriaxone.      DVT prophylaxis with heparin           Disposition : 1-2 days    Review of systems  General: No fevers or chills. Cardiovascular: No chest pain or pressure. No palpitations. Pulmonary: No shortness of breath. Gastrointestinal: No nausea, vomiting. Physical Exam:  General: Awake, cooperative, no acute distress    HEENT: NC, Atraumatic. PERRLA, anicteric sclerae. Lungs: CTA Bilaterally. No Wheezing/Rhonchi/Rales. Heart:  S1 S2,  No murmur, No Rubs, No Gallops  Abdomen: Soft, Non distended, Non tender.  +Bowel sounds,   Extremities: No c/c/e  Psych:   Not anxious or agitated. Neurologic:  No acute neurological deficit.            Vital signs/Intake and Output:  Visit Vitals  /76   Pulse 100   Temp 98.3 °F (36.8 °C)   Resp 16   Ht 5' 4\" (1.626 m)   Wt 57.6 kg (127 lb)   SpO2 100%   BMI 21.80 kg/m²     Current Shift:  06/07 0701 - 06/07 1900  In: -   Out: 450 [Urine:450]  Last three shifts:  06/05 1901 - 06/07 0700  In: 9062 [P.O.:1920; I.V.:2510]  Out: 2121 [Urine:1650]            Labs: Results:       Chemistry Recent Labs     06/07/19  0130 06/06/19  1351 06/06/19  1210 06/06/19  0445   * 498* 548* 380*    140 137 138   K 3.5 4.1 4.0 4.1    108 103 103   CO2 22 18* 19* 23   BUN 21* 18 19* 18   CREA 1.87* 1.55* 1.80* 1.57*   CA 7.0* 7.7* 8.6 8.5   AGAP 10 14 15 12   BUCR 11* 12 11* 11*   AP  --  130*  --  144*   TP  --  6.7  --  7.4   ALB  --  2.8*  --  3.0*   GLOB  --  3.9  --  4.4*   AGRAT  --  0.7*  --  0.7*      CBC w/Diff Recent Labs     06/07/19  0555 06/06/19  1210 06/06/19  0410   WBC 24.1* 17.2* 16.9*   RBC 2.67* 3.26* 3.24*   HGB 7.9* 9.6* 9.7*   HCT 23.5* 28.6* 28.3*    428* 437*   GRANS  --  96* 80*   LYMPH  --  4* 14*   EOS  --  0 1      Cardiac Enzymes No results for input(s): CPK, CKND1, TESS in the last 72 hours. No lab exists for component: CKRMB, TROIP   Coagulation No results for input(s): PTP, INR, APTT in the last 72 hours. No lab exists for component: INREXT    Lipid Panel No results found for: CHOL, CHOLPOCT, CHOLX, CHLST, CHOLV, 835405, HDL, LDL, LDLC, DLDLP, 839815, VLDLC, VLDL, TGLX, TRIGL, TRIGP, TGLPOCT, CHHD, CHHDX   BNP No results for input(s): BNPP in the last 72 hours.    Liver Enzymes Recent Labs     06/06/19  1351   TP 6.7   ALB 2.8*   *   SGOT 10*      Thyroid Studies No results found for: T4, T3U, TSH, TSHEXT     Procedures/imaging: see electronic medical records for all procedures/Xrays and details which were not copied into this note but were reviewed prior to creation of Plan

## 2019-06-07 NOTE — CONSULTS
[facility]  Pulmonary, Critical Care, and Sleep Medicine    Initial Patient Consult    Name: Elise Green MRN: [id number]   : 1985 Hospital: [facility] [place]   Date: 2019  Room: [facility]/     Subjective: This patient has been seen and evaluated at the request of Dr. Fco Quevedo for hyperglycemia needing icu admssion. Patient is a 37 y.o. female with hx of IDDM, gastroparesis - she did not take insulin due to poor oral intake, and subsequent days started having vomiting. She came to ER and found to have elevated BG. She needed insulin drip overnight. Patient is awake, alert. No symptoms now - no abd pains or vomiting. No cough or SOB. Past Medical History:   Diagnosis Date    Diabetes ([address]     Gastroparesis     Hypertension     UTI (urinary tract infection)       Past Surgical History:   Procedure Laterality Date    HX GYN      tubal ligation, C Section      Prior to Admission medications    Medication Sig Start Date End Date Taking? Authorizing Provider   loperamide (IMODIUM) 2 mg capsule Take 1 Cap by mouth four (4) times daily as needed for Diarrhea for up to 10 days. 19  Edith Lindo, DO   famotidine (PEPCID PO) Take  by mouth. Tameka Balderas MD   insulin glargine (LANTUS U-100 INSULIN) 100 unit/mL injection 30 Units by SubCUTAneous route nightly. Indications: type 2 diabetes mellitus    Tameka Balderas MD   metoclopramide HCl (REGLAN PO) Take  by mouth. Tameka Balderas MD   insulin admin. supplies (INPEN, FOR NOVOLOG, SC) 10-12 Units by SubCUTAneous route Before breakfast, lunch, and dinner. Tameka Balderas MD     No Known Allergies   Social History     Tobacco Use    Smoking status: Never Smoker    Smokeless tobacco: Never Used   Substance Use Topics    Alcohol use: Never     Frequency: Never      History reviewed. No pertinent family history.      Current Facility-Administered Medications   Medication Dose Route Frequency    magnesium sulfate 2 g/50 ml 2 gram/50 mL (4 %) IVPB premix pgbk        insulin lispro (HUMALOG) injection   SubCUTAneous AC&HS    insulin lispro (HUMALOG) injection 5 Units  5 Units SubCUTAneous TIDAC    insulin glargine (LANTUS) injection 30 Units  30 Units SubCUTAneous DAILY    cefTRIAXone (ROCEPHIN) 1 g in sterile water (preservative free) 10 mL IV syringe  1 g IntraVENous Q24H    heparin (porcine) injection 5,000 Units  5,000 Units SubCUTAneous Q8H       Latest lactic acid:   Lactic acid   Date Value Ref Range Status   2019 3.4 (HH) 0.4 - 2.0 MMOL/L Final     Comment:     CALLED TO AND CORRECTLY REPEATED BY:  Yue Montilla RN ICU ON [id number] AT 1830 BY DAWOOD       Review of Systems:  Ears, nose, mouth, throat, and face: No epistaxis, no difficulty in swallowing  Respiratory: as above  Cardiovascular: no chest pain or palpitations  Gastrointestinal: no vomitting or diarrhea, no bleeding symptoms  Genitourinary: No urinary symptoms  Integument/breast: No ulcers  Musculoskeletal:Neg  Neurological: No focal weakness or seizures or headaches  Constitutional: No fever or chills or weight loss       Objective:   Vital Signs:    Visit Vitals  /83   Pulse (!) 101   Temp 97.3 °F (36.3 °C)   Resp 16   Ht 5' 4\" (1.626 m)   Wt 57.6 kg (127 lb)   LMP 2019   SpO2 100%   BMI 21.80 kg/m²       O2 Device: Room air       Temp (24hrs), Av °F (36.7 °C), Min:97.3 °F (36.3 °C), Max:98.5 °F (36.9 °C)       Intake/Output:   Last shift:      701 -  190  In: 906 [I.V.:906]  Out: 450 [Urine:450]  Last 3 shifts: 1901 -  0700  In: 4430 [P.O.:1920;  I.V.:2510]  Out: 1650 [Urine:1650]    Intake/Output Summary (Last 24 hours) at 2019 1415  Last data filed at 2019 1200  Gross per 24 hour   Intake 3336.01 ml   Output 2100 ml   Net 1236.01 ml        Physical Exam:   Comfortable; on room air; acyanotic  HEENT: pupils not dilated, no scleral jaundice  Neck: No adenopathy or thyroid swelling  CVS: S1S2 no murmurs; JVD not elevated  RS: Good air entry bilaterally, no wheezes or crackles  Abd: soft, non tender, no hepatosplenomegaly, no abd distension, no guarding or rigidity, bowel sounds heard  Neuro: awake, alert, non focal exam  Extrm: no leg edema or swelling or clubbing  Skin: no rash  Lymphatic: no cervical or supraclavicular adenopathy    Telemetry: SR       Data review:     Recent Results (from the past 24 hour(s))   GLUCOSTABILIZER    Collection Time: 06/06/19  2:35 PM   Result Value Ref Range    Glucose 465 mg/dL    Insulin order 8.1 units/hour    Insulin adminstered 8.1 units/hour    Multiplier 0.020     Low target 140 mg/dL    High target 180 mg/dL    D50 order 0.0 ml    D50 administered 0.00 ml    Minutes until next BG 60 min    Order initials SMY     Administered initials SMY    GLUCOSE, POC    Collection Time: 06/06/19  3:44 PM   Result Value Ref Range    Glucose (POC) 500 (HH) 70 - 110 mg/dL   GLUCOSE, POC    Collection Time: 06/06/19  3:46 PM   Result Value Ref Range    Glucose (POC) 495 (HH) 70 - 110 mg/dL   GLUCOSTABILIZER    Collection Time: 06/06/19  3:47 PM   Result Value Ref Range    Glucose 495 mg/dL    Insulin order 13.1 units/hour    Insulin adminstered 13.1 units/hour    Multiplier 0.030     Low target 140 mg/dL    High target 180 mg/dL    D50 order 0.0 ml    D50 administered 0.00 ml    Minutes until next BG 60 min    Order initials ab     Administered initials ab    CULTURE, URINE    Collection Time: 06/06/19  3:49 PM   Result Value Ref Range    Special Requests: NO SPECIAL REQUESTS      Culture result: CULTURE IN PROGRESS,FURTHER UPDATES TO FOLLOW     GLUCOSE, POC    Collection Time: 06/06/19  4:53 PM   Result Value Ref Range    Glucose (POC) 428 (HH) 70 - 110 mg/dL   GLUCOSE, POC    Collection Time: 06/06/19  4:54 PM   Result Value Ref Range    Glucose (POC) 419 (HH) 70 - 110 mg/dL   GLUCOSTABILIZER    Collection Time: 06/06/19  4:55 PM   Result Value Ref Range    Glucose 419 mg/dL Insulin order 7.2 units/hour    Insulin adminstered 7.2 units/hour    Multiplier 0.020     Low target 140 mg/dL    High target 180 mg/dL    D50 order 0.0 ml    D50 administered 0.00 ml    Minutes until next BG 60 min    Order initials jt     Administered initials jt    MAGNESIUM    Collection Time: 06/06/19  5:26 PM   Result Value Ref Range    Magnesium 2.0 1.6 - 2.6 mg/dL   LACTIC ACID    Collection Time: 06/06/19  5:26 PM   Result Value Ref Range    Lactic acid 3.4 (HH) 0.4 - 2.0 MMOL/L   GLUCOSE, POC    Collection Time: 06/06/19  5:55 PM   Result Value Ref Range    Glucose (POC) 372 (H) 70 - 110 mg/dL   GLUCOSTABILIZER    Collection Time: 06/06/19  5:56 PM   Result Value Ref Range    Glucose 372 mg/dL    Insulin order 9.4 units/hour    Insulin adminstered 9.4 units/hour    Multiplier 0.030     Low target 140 mg/dL    High target 180 mg/dL    D50 order 0.0 ml    D50 administered 0.00 ml    Minutes until next BG 60 min    Order initials jt     Administered initials jt    GLUCOSE, POC    Collection Time: 06/06/19  6:48 PM   Result Value Ref Range    Glucose (POC) 329 (H) 70 - 110 mg/dL   GLUCOSTABILIZER    Collection Time: 06/06/19  6:57 PM   Result Value Ref Range    Glucose 329 mg/dL    Insulin order 10.8 units/hour    Insulin adminstered 10.8 units/hour    Multiplier 0.040     Low target 140 mg/dL    High target 180 mg/dL    D50 order 0.0 ml    D50 administered 0.00 ml    Minutes until next BG 60 min    Order initials jt     Administered initials jt    GLUCOSE, POC    Collection Time: 06/06/19  7:59 PM   Result Value Ref Range    Glucose (POC) 246 (H) 70 - 110 mg/dL   GLUCOSTABILIZER    Collection Time: 06/06/19  7:59 PM   Result Value Ref Range    Glucose 246 mg/dL    Insulin order 5.6 units/hour    Insulin adminstered 5.6 units/hour    Multiplier 0.030     Low target 140 mg/dL    High target 180 mg/dL    D50 order 0.0 ml    D50 administered 0.00 ml    Minutes until next BG 60 min    Order initials ab Administered initials ab    GLUCOSE, POC    Collection Time: 06/06/19  9:03 PM   Result Value Ref Range    Glucose (POC) 172 (H) 70 - 110 mg/dL   GLUCOSTABILIZER    Collection Time: 06/06/19  9:03 PM   Result Value Ref Range    Glucose 172 mg/dL    Insulin order 3.4 units/hour    Insulin adminstered 3.4 units/hour    Multiplier 0.030     Low target 140 mg/dL    High target 180 mg/dL    D50 order 0.0 ml    D50 administered 0.00 ml    Minutes until next BG 60 min    Order initials kw     Administered initials kw    GLUCOSE, POC    Collection Time: 06/06/19 10:03 PM   Result Value Ref Range    Glucose (POC) 180 (H) 70 - 110 mg/dL   GLUCOSTABILIZER    Collection Time: 06/06/19 10:04 PM   Result Value Ref Range    Glucose 180 mg/dL    Insulin order 3.6 units/hour    Insulin adminstered 3.6 units/hour    Multiplier 0.030     Low target 140 mg/dL    High target 180 mg/dL    D50 order 0.0 ml    D50 administered 0.00 ml    Minutes until next BG 60 min    Order initials kw     Administered initials kw    MAGNESIUM    Collection Time: 06/06/19 10:46 PM   Result Value Ref Range    Magnesium 1.2 (L) 1.6 - 2.6 mg/dL   GLUCOSE, POC    Collection Time: 06/06/19 11:06 PM   Result Value Ref Range    Glucose (POC) 169 (H) 70 - 110 mg/dL   GLUCOSTABILIZER    Collection Time: 06/06/19 11:06 PM   Result Value Ref Range    Glucose 169 mg/dL    Insulin order 3.3 units/hour    Insulin adminstered 3.3 units/hour    Multiplier 0.030     Low target 140 mg/dL    High target 180 mg/dL    D50 order 0.0 ml    D50 administered 0.00 ml    Minutes until next BG 60 min    Order initials je     Administered initials je    GLUCOSE, POC    Collection Time: 06/07/19 12:10 AM   Result Value Ref Range    Glucose (POC) 170 (H) 70 - 110 mg/dL   GLUCOSTABILIZER    Collection Time: 06/07/19 12:11 AM   Result Value Ref Range    Glucose 170 mg/dL    Insulin order 3.3 units/hour    Insulin adminstered 3.3 units/hour    Multiplier 0.030     Low target 140 mg/dL High target 180 mg/dL    D50 order 0.0 ml    D50 administered 0.00 ml    Minutes until next BG 60 min    Order initials kw     Administered initials kw    GLUCOSE, POC    Collection Time: 06/07/19  1:13 AM   Result Value Ref Range    Glucose (POC) 142 (H) 70 - 110 mg/dL   GLUCOSTABILIZER    Collection Time: 06/07/19  1:16 AM   Result Value Ref Range    Glucose 142 mg/dL    Insulin order 2.5 units/hour    Insulin adminstered 2.5 units/hour    Multiplier 0.030     Low target 140 mg/dL    High target 180 mg/dL    D50 order 0.0 ml    D50 administered 0.00 ml    Minutes until next BG 60 min    Order initials je     Administered initials je    METABOLIC PANEL, BASIC    Collection Time: 06/07/19  1:30 AM   Result Value Ref Range    Sodium 139 136 - 145 mmol/L    Potassium 3.5 3.5 - 5.5 mmol/L    Chloride 107 100 - 108 mmol/L    CO2 22 21 - 32 mmol/L    Anion gap 10 3.0 - 18 mmol/L    Glucose 133 (H) 74 - 99 mg/dL    BUN 21 (H) 7.0 - 18 MG/DL    Creatinine 1.87 (H) 0.6 - 1.3 MG/DL    BUN/Creatinine ratio 11 (L) 12 - 20      GFR est AA 37 (L) >60 ml/min/1.73m2    GFR est non-AA 30 (L) >60 ml/min/1.73m2    Calcium 7.0 (L) 8.5 - 10.1 MG/DL   MAGNESIUM    Collection Time: 06/07/19  1:30 AM   Result Value Ref Range    Magnesium 1.8 1.6 - 2.6 mg/dL   GLUCOSE, POC    Collection Time: 06/07/19  2:18 AM   Result Value Ref Range    Glucose (POC) 111 (H) 70 - 110 mg/dL   GLUCOSTABILIZER    Collection Time: 06/07/19  2:19 AM   Result Value Ref Range    Glucose 111 mg/dL    Insulin order 1.0 units/hour    Insulin adminstered 1.0 units/hour    Multiplier 0.020     Low target 140 mg/dL    High target 180 mg/dL    D50 order 0.0 ml    D50 administered 0.00 ml    Minutes until next BG 60 min    Order initials TLP     Administered initials TLP    GLUCOSE, POC    Collection Time: 06/07/19  3:23 AM   Result Value Ref Range    Glucose (POC) 127 (H) 70 - 110 mg/dL   GLUCOSTABILIZER    Collection Time: 06/07/19  3:24 AM   Result Value Ref Range Glucose 127 mg/dL    Insulin order 0.7 units/hour    Insulin adminstered 0.7 units/hour    Multiplier 0.010     Low target 140 mg/dL    High target 180 mg/dL    D50 order 0.0 ml    D50 administered 0.00 ml    Minutes until next BG 60 min    Order initials TLP     Administered initials TLP    GLUCOSE, POC    Collection Time: 06/07/19  4:27 AM   Result Value Ref Range    Glucose (POC) 128 (H) 70 - 110 mg/dL   GLUCOSTABILIZER    Collection Time: 06/07/19  4:28 AM   Result Value Ref Range    Glucose 128 mg/dL    Insulin order 0.0 units/hour    Insulin adminstered 0.0 units/hour    Multiplier 0.000     Low target 140 mg/dL    High target 180 mg/dL    D50 order 0.0 ml    D50 administered 0.00 ml    Minutes until next BG 60 min    Order initials kw     Administered initials kw    GLUCOSE, POC    Collection Time: 06/07/19  5:31 AM   Result Value Ref Range    Glucose (POC) 173 (H) 70 - 110 mg/dL   GLUCOSTABILIZER    Collection Time: 06/07/19  5:36 AM   Result Value Ref Range    Glucose 173 mg/dL    Insulin order 0.0 units/hour    Insulin adminstered 0.0 units/hour    Multiplier 0.000     Low target 140 mg/dL    High target 180 mg/dL    D50 order 0.0 ml    D50 administered 0.00 ml    Minutes until next BG 60 min    Order initials kw     Administered initials kw    MAGNESIUM    Collection Time: 06/07/19  5:55 AM   Result Value Ref Range    Magnesium 3.2 (H) 1.6 - 2.6 mg/dL   CBC W/O DIFF    Collection Time: 06/07/19  5:55 AM   Result Value Ref Range    WBC 24.1 (H) 4.6 - 13.2 K/uL    RBC 2.67 (L) 4.20 - 5.30 M/uL    HGB 7.9 (L) 12.0 - 16.0 g/dL    HCT 23.5 (L) 35.0 - 45.0 %    MCV 88.0 74.0 - 97.0 FL    MCH 29.6 24.0 - 34.0 PG    MCHC 33.6 31.0 - 37.0 g/dL    RDW 13.4 11.6 - 14.5 %    PLATELET 803 706 - 072 K/uL    MPV 8.4 (L) 9.2 - 11.8 FL   GLUCOSE, POC    Collection Time: 06/07/19  6:43 AM   Result Value Ref Range    Glucose (POC) 198 (H) 70 - 110 mg/dL   GLUCOSTABILIZER    Collection Time: 06/07/19  6:47 AM   Result Value Ref Range    Glucose 198 mg/dL    Insulin order 1.4 units/hour    Insulin adminstered 1.4 units/hour    Multiplier 0.010     Low target 140 mg/dL    High target 180 mg/dL    D50 order 0.0 ml    D50 administered 0.00 ml    Minutes until next BG 60 min    Order initials TLP     Administered initials TLP    GLUCOSE, POC    Collection Time: 06/07/19  7:50 AM   Result Value Ref Range    Glucose (POC) 211 (H) 70 - 110 mg/dL   GLUCOSTABILIZER    Collection Time: 06/07/19  8:04 AM   Result Value Ref Range    Glucose 211 mg/dL    Insulin order 3.0 units/hour    Insulin adminstered 3.0 units/hour    Multiplier 0.020     Low target 140 mg/dL    High target 180 mg/dL    D50 order 0.0 ml    D50 administered 0.00 ml    Minutes until next BG 60 min    Order initials vt     Administered initials vt    GLUCOSE, POC    Collection Time: 06/07/19  9:13 AM   Result Value Ref Range    Glucose (POC) 181 (H) 70 - 110 mg/dL   GLUCOSTABILIZER    Collection Time: 06/07/19  9:23 AM   Result Value Ref Range    Glucose 181 mg/dL    Insulin order 3.6 units/hour    Insulin adminstered 3.6 units/hour    Multiplier 0.030     Low target 140 mg/dL    High target 180 mg/dL    D50 order 0.0 ml    D50 administered 0.00 ml    Minutes until next BG 60 min    Order initials vt     Administered initials vt    MAGNESIUM    Collection Time: 06/07/19  9:45 AM   Result Value Ref Range    Magnesium 3.1 (H) 1.6 - 2.6 mg/dL   GLUCOSE, POC    Collection Time: 06/07/19 10:31 AM   Result Value Ref Range    Glucose (POC) 147 (H) 70 - 110 mg/dL   GLUCOSTABILIZER    Collection Time: 06/07/19 10:33 AM   Result Value Ref Range    Glucose 147 mg/dL    Insulin order 2.6 units/hour    Insulin adminstered 2.6 units/hour    Multiplier 0.030     Low target 140 mg/dL    High target 180 mg/dL    D50 order 0.0 ml    D50 administered 0.00 ml    Minutes until next BG 60 min    Order initials ab     Administered initials ab    GLUCOSE, POC    Collection Time: 06/07/19 11:49 AM Result Value Ref Range    Glucose (POC) 114 (H) 70 - 110 mg/dL              Ref. Range 6/6/2019 13:51   HCG, Ql. Latest Ref Range: NEG   NEGATIVE         All Micro Results     Procedure Component Value Units Date/Time    CULTURE, URINE [[id number]] Collected:  06/06/19 1549    Order Status:  Completed Specimen:  Urine from Clean catch Updated:  06/07/19 1205     Special Requests: NO SPECIAL REQUESTS        Culture result:       CULTURE IN PROGRESS,FURTHER UPDATES TO FOLLOW          CULTURE, BLOOD [[id number]] Collected:  06/06/19 1351    Order Status:  Completed Specimen:  Whole Blood Updated:  06/07/19 0723     Special Requests: NO SPECIAL REQUESTS        Culture result: NO GROWTH AFTER 17 HOURS       CULTURE, BLOOD [[id number]] Collected:  06/06/19 1347    Order Status:  Completed Specimen:  Whole Blood Updated:  06/07/19 0723     Special Requests: NO SPECIAL REQUESTS        Culture result: NO GROWTH AFTER 17 HOURS             Imaging:  [x]I have personally reviewed the patients chest radiographs images and report     Results from [place] encounter on 06/06/19   XR ABD (KUB)    Narrative EXAM: XR ABD (KUB)    CLINICAL INDICATION/HISTORY: abd pain, vomiting    > Additional: None. COMPARISON: None. > Reference Exam: None. TECHNIQUE: Supine KUB obtained.    _______________    FINDINGS:    Multiple nondilated nonspecific air-filled small bowel loops noted. Surgical  clips, likely previous bilateral tubal ligation. No abnormal calcification or  other soft tissue abnormality. _______________      Impression IMPRESSION:    Nonspecific nonobstructive gas pattern. Results from [place] encounter on 06/06/19   CT ABD PELV WO CONT    Narrative EXAM:CT abdomen pelvis without contrast    CLINICAL INDICATION/HISTORY: Epigastric pain, vomiting, leukocytosis, diabetes    COMPARISON: 5/26/2019.     TECHNIQUE: Standard helical CT performed through the abdomen and pelvis without  contrast. Coronal and sagittal reformations were generated. One or more dose  reduction techniques were used on this CT: automated exposure control,  adjustment of the mAs and/or kVp according to patient's size, and iterative  reconstruction techniques. The specific techniques utilized on this CT exam have  been documented in the patient's electronic medical record. Digital imaging and  communications and medicine (DICOM) format image data are available to  nonaffiliated external healthcare facilities or entities on a secure, media  free, reciprocally searchable basis with patient authorization for at least a 12  month period after this study. _______________    FINDINGS:    INFERIOR THORAX: Lung bases are clear with heart size within normal limits. LIVER/BILIARY: No suspicious liver lesion. No biliary dilation. Gallbladder  unremarkable. SPLEEN: Normal.    PANCREAS: Normal.    ADRENALS: Normal.    KIDNEYS: There is duplicated right-sided collecting system with mild dilatation  of both upper ureters down to the level of the uterus with no definite  dilatation more distally. No evidence of urinary system calculus. There is  increased mild bilateral perirenal stranding. There is also mild prominence of  single left ureter down to the level of the uterus. Stable small left lower pole  renal cyst.    LYMPH NODES: Several nonspecific subcentimeter likely benign reactive  retroperitoneal lymph nodes, stable with no new adenopathy. GI TRACT: No wall thickening or dilation. No ascites or free air. VASCULATURE: Unremarkable. PELVIC ORGANS: Bladder uterus and adnexa unremarkable with previous bilateral  tubal ligation. OTHER: No aggressive appearing osseous lesion. _______________      Impression IMPRESSION:        1.  Interval increased nonspecific bilateral perirenal stranding with mildly  dilated duplicated upper right-sided ureters and mildly dilated single left  upper ureter, with no evidence of urinary system calculus. Mild ureteral  dilatation may be related to vesicoureteral reflux, less likely sequela of  previously passed calculus. Possibility of ascending UTI and pyelonephritis  should also be considered, suggest correlation to urinalysis. 2. No other new acute or other significant findings. IMPRESSION:   · Type 1 DM with hyperglycemia - E10.65  · Gastroparesis - K31.84  · Dehydration - E86.0   · Leukocytosis - D72.829   · UTI - N39.0  · Acute renal failure - N17.9  · Chronic anemia - D64.9      RECOMMENDATIONS:   · Pulm: stable respirations; on room air  · Cardiac: BP stable  · ID: ceftriaxone; follow urine cx  · Renal: watch IOs and renal fn; replace kcl  · GI: oral ADA diet  · Endo: transitioned to lantus insulin and SSI  · Neuro: stable  · Hem: watch Hb   · Diabetes education  · Oral hydration  · Proph:  DVt proph sc heparin  · D.w patient and updated  · Ok to medical floor   Will defer respective systems problem management to primary and other consultant and follow patient in ICU with primary and other medical team  Further recommendations will be based on the patient's response to recommended treatment and results of the investigation ordered. Quality Care: PPI, DVT prophylaxis, HOB elevated, Infection control all reviewed and addressed.   · Lines/Tubes: PIVs  ADVANCE DIRECTIVE: Full Code    · Thank you for the consult      Moderate complexity decision making was performed in this consultation and evaluation of this patient       Ciara Hodgson MD

## 2019-06-07 NOTE — PROGRESS NOTES
Reason for Admission:   C/o nausea and vomiting RRAT Score:  7                  Plan for utilizing home health:    [facility] for diabetes education                      Current Advanced Directive/Advance Care Plan: not on chart will discuss in IDR's if palliative consult needed    Likelihood of Readmission:  noTransition of Care Plan:   Chart  Reviewed noted from ED note pt arrived to Ed with c/o N/V  Along with epigastric discomfort, type 1 diabetic has not taken insulin for 2 days due to vomiting,pt admitted to ICU for DKA cm will attend IDR's and discuss d/c planning with pt. Care Management Interventions  PCP Verified by CM: Yes  Palliative Care Criteria Met (RRAT>21 & CHF Dx)?: No  Mode of Transport at Discharge: Self  Current Support Network:  Other

## 2019-06-07 NOTE — PROGRESS NOTES
NUTRITION UPDATE    -Diet: increase kcal to DM 1800kcal as pt is in ICU and 1500-1600kcal will not meet estimated needs      José Lewis RD  PAGER:  [phone]

## 2019-06-08 VITALS
OXYGEN SATURATION: 98 % | SYSTOLIC BLOOD PRESSURE: 161 MMHG | HEIGHT: 64 IN | RESPIRATION RATE: 18 BRPM | DIASTOLIC BLOOD PRESSURE: 88 MMHG | WEIGHT: 144.4 LBS | BODY MASS INDEX: 24.65 KG/M2 | TEMPERATURE: 98.6 F | HEART RATE: 99 BPM

## 2019-06-08 PROBLEM — E11.65 UNCONTROLLED TYPE 2 DIABETES MELLITUS WITH HYPERGLYCEMIA (HCC): Status: ACTIVE | Noted: 2019-06-06

## 2019-06-08 LAB
ANION GAP SERPL CALC-SCNC: 8 MMOL/L (ref 3–18)
BACTERIA SPEC CULT: ABNORMAL
BASOPHILS # BLD: 0 K/UL (ref 0–0.1)
BASOPHILS NFR BLD: 0 % (ref 0–2)
BUN SERPL-MCNC: 16 MG/DL (ref 7–18)
BUN/CREAT SERPL: 9 (ref 12–20)
CALCIUM SERPL-MCNC: 8 MG/DL (ref 8.5–10.1)
CHLORIDE SERPL-SCNC: 108 MMOL/L (ref 100–108)
CO2 SERPL-SCNC: 24 MMOL/L (ref 21–32)
CREAT SERPL-MCNC: 1.79 MG/DL (ref 0.6–1.3)
DIFFERENTIAL METHOD BLD: ABNORMAL
EOSINOPHIL # BLD: 0.2 K/UL (ref 0–0.4)
EOSINOPHIL NFR BLD: 1 % (ref 0–5)
ERYTHROCYTE [DISTWIDTH] IN BLOOD BY AUTOMATED COUNT: 13.6 % (ref 11.6–14.5)
ERYTHROCYTE [DISTWIDTH] IN BLOOD BY AUTOMATED COUNT: 13.6 % (ref 11.6–14.5)
GLUCOSE BLD STRIP.AUTO-MCNC: 145 MG/DL (ref 70–110)
GLUCOSE BLD STRIP.AUTO-MCNC: 206 MG/DL (ref 70–110)
GLUCOSE BLD STRIP.AUTO-MCNC: 223 MG/DL (ref 70–110)
GLUCOSE SERPL-MCNC: 227 MG/DL (ref 74–99)
HCT VFR BLD AUTO: 24.9 % (ref 35–45)
HCT VFR BLD AUTO: 26.1 % (ref 35–45)
HGB BLD-MCNC: 8.3 G/DL (ref 12–16)
HGB BLD-MCNC: 8.6 G/DL (ref 12–16)
LYMPHOCYTES # BLD: 3.3 K/UL (ref 0.9–3.6)
LYMPHOCYTES NFR BLD: 21 % (ref 21–52)
MCH RBC QN AUTO: 29.3 PG (ref 24–34)
MCH RBC QN AUTO: 29.4 PG (ref 24–34)
MCHC RBC AUTO-ENTMCNC: 33 G/DL (ref 31–37)
MCHC RBC AUTO-ENTMCNC: 33.3 G/DL (ref 31–37)
MCV RBC AUTO: 88.3 FL (ref 74–97)
MCV RBC AUTO: 88.8 FL (ref 74–97)
MONOCYTES # BLD: 1.2 K/UL (ref 0.05–1.2)
MONOCYTES NFR BLD: 8 % (ref 3–10)
NEUTS SEG # BLD: 11.2 K/UL (ref 1.8–8)
NEUTS SEG NFR BLD: 70 % (ref 40–73)
PLATELET # BLD AUTO: 382 K/UL (ref 135–420)
PLATELET # BLD AUTO: 398 K/UL (ref 135–420)
PMV BLD AUTO: 8.8 FL (ref 9.2–11.8)
PMV BLD AUTO: 8.9 FL (ref 9.2–11.8)
POTASSIUM SERPL-SCNC: 4 MMOL/L (ref 3.5–5.5)
RBC # BLD AUTO: 2.82 M/UL (ref 4.2–5.3)
RBC # BLD AUTO: 2.94 M/UL (ref 4.2–5.3)
SERVICE CMNT-IMP: ABNORMAL
SODIUM SERPL-SCNC: 140 MMOL/L (ref 136–145)
WBC # BLD AUTO: 15.8 K/UL (ref 4.6–13.2)
WBC # BLD AUTO: 15.9 K/UL (ref 4.6–13.2)

## 2019-06-08 PROCEDURE — 36415 COLL VENOUS BLD VENIPUNCTURE: CPT

## 2019-06-08 PROCEDURE — 85027 COMPLETE CBC AUTOMATED: CPT

## 2019-06-08 PROCEDURE — 80048 BASIC METABOLIC PNL TOTAL CA: CPT

## 2019-06-08 PROCEDURE — 74011636637 HC RX REV CODE- 636/637: Performed by: HOSPITALIST

## 2019-06-08 PROCEDURE — 74011250636 HC RX REV CODE- 250/636: Performed by: HOSPITALIST

## 2019-06-08 PROCEDURE — 74011250637 HC RX REV CODE- 250/637: Performed by: HOSPITALIST

## 2019-06-08 PROCEDURE — 85025 COMPLETE CBC W/AUTO DIFF WBC: CPT

## 2019-06-08 PROCEDURE — 82962 GLUCOSE BLOOD TEST: CPT

## 2019-06-08 RX ORDER — AMLODIPINE BESYLATE 5 MG/1
5 TABLET ORAL DAILY
Qty: 30 TAB | Refills: 0 | Status: ON HOLD | OUTPATIENT
Start: 2019-06-08 | End: 2019-11-01

## 2019-06-08 RX ORDER — AMOXICILLIN AND CLAVULANATE POTASSIUM 500; 125 MG/1; MG/1
1 TABLET, FILM COATED ORAL 2 TIMES DAILY
Qty: 16 TAB | Refills: 0 | Status: SHIPPED | OUTPATIENT
Start: 2019-06-08 | End: 2019-06-16

## 2019-06-08 RX ADMIN — INSULIN LISPRO 5 UNITS: 100 INJECTION, SOLUTION INTRAVENOUS; SUBCUTANEOUS at 12:22

## 2019-06-08 RX ADMIN — HEPARIN SODIUM 5000 UNITS: 5000 INJECTION, SOLUTION INTRAVENOUS; SUBCUTANEOUS at 03:23

## 2019-06-08 RX ADMIN — FAMOTIDINE 20 MG: 20 TABLET ORAL at 09:42

## 2019-06-08 RX ADMIN — METOCLOPRAMIDE HYDROCHLORIDE 10 MG: 5 TABLET ORAL at 09:42

## 2019-06-08 RX ADMIN — INSULIN LISPRO 4 UNITS: 100 INJECTION, SOLUTION INTRAVENOUS; SUBCUTANEOUS at 09:42

## 2019-06-08 RX ADMIN — INSULIN GLARGINE 30 UNITS: 100 INJECTION, SOLUTION SUBCUTANEOUS at 09:54

## 2019-06-08 RX ADMIN — INSULIN LISPRO 5 UNITS: 100 INJECTION, SOLUTION INTRAVENOUS; SUBCUTANEOUS at 09:43

## 2019-06-08 RX ADMIN — Medication 10 ML: at 09:43

## 2019-06-08 NOTE — PROGRESS NOTES
1930 - Bedside report obtained. Assumed care of patient. 2030 - Shift assessment completed at this time. 2130 - .

## 2019-06-08 NOTE — ROUTINE PROCESS
TRANSFER - IN REPORT: 
 
Verbal report received from Adalid Zarco RN (name) on Obi Pencil  being received from ICU (unit) for routine progression of care Report consisted of patients Situation, Background, Assessment and  
Recommendations(SBAR). Information from the following report(s) SBAR, Kardex, Intake/Output, MAR, Recent Results, Cardiac Rhythm NSR/SinusTach and Alarm Parameters  was reviewed with the receiving nurse. Opportunity for questions and clarification was provided. Assessment completed upon patients arrival to unit and care assumed.

## 2019-06-08 NOTE — ROUTINE PROCESS
TRANSFER - OUT REPORT: 
 
Verbal report given to Elva Camacho RN (name) on Umang Cartagena  being transferred to [address] [place] [address] ([facility]) for routine progression of care Report consisted of patients Situation, Background, Assessment and  
Recommendations(SBAR). Information from the following report(s) SBAR, Kardex, ED Summary, Procedure Summary, Intake/Output, MAR, Recent Results, Med Rec Status and Cardiac Rhythm NSR was reviewed with the receiving nurse. Lines:  
Peripheral IV 06/06/19 Right; Lower Forearm (Active) Site Assessment Clean, dry, & intact 6/7/2019  8:30 PM  
Phlebitis Assessment 0 6/7/2019  8:30 PM  
Infiltration Assessment 0 6/7/2019  8:30 PM  
Dressing Status Clean, dry, & intact 6/7/2019  8:30 PM  
Dressing Type Transparent;Tape 6/7/2019  8:30 PM  
Hub Color/Line Status Pink;Capped 6/7/2019  8:30 PM  
Action Taken Open ports on tubing capped 6/7/2019  4:00 PM  
Alcohol Cap Used Yes 6/7/2019  8:30 PM  
   
Peripheral IV 06/06/19 Left Forearm (Active) Site Assessment Clean, dry, & intact 6/7/2019  8:30 PM  
Phlebitis Assessment 0 6/7/2019  8:30 PM  
Infiltration Assessment 0 6/7/2019  8:30 PM  
Dressing Status Clean, dry, & intact 6/7/2019  8:30 PM  
Dressing Type Transparent;Tape 6/7/2019  8:30 PM  
Hub Color/Line Status Pink;Capped 6/7/2019  8:30 PM  
Action Taken Open ports on tubing capped 6/7/2019  4:00 PM  
Alcohol Cap Used Yes 6/7/2019  8:30 PM  
  
 
Opportunity for questions and clarification was provided. Patient transported with: 
 [facility]

## 2019-06-08 NOTE — PROGRESS NOTES
Pulm/CC    Patient has been moved out of icu  I would sign off - please call if needed    Daryl Ordonez MD

## 2019-06-08 NOTE — PROGRESS NOTES
Pt showered and tolerated breakfast and lunch. Denies nausea and vomiting. Denies pain. Pt is to discharge with mother. Reiterated need to follow up with PCP, complete augmentin abx, and follow up with urologist.     Dual AVS reviewed with Elroy Paget. All medications reviewed individually with patient. Opportunities for questions and concerns provided. Patient discharged via (mode of transport ie. Car, ambulance or air transport) Car. Patient's arm band appropriately discarded.

## 2019-06-08 NOTE — PROGRESS NOTES
2323- Patient blood sugar 81. Given 4oz of juice. Pt showing no s/s of hypoglycemia. Shift Summary- Pt had an uneventful night. No complaints of pain or nausea.       Patient Vitals for the past 12 hrs:   Temp Pulse Resp BP SpO2   06/08/19 0314 99.5 °F (37.5 °C) 100 16 174/89 97 %   06/07/19 2320 98.1 °F (36.7 °C) 96 15 153/87 98 %   06/07/19 2200  99 15 168/86 97 %   06/07/19 2100  100 14 160/84    06/07/19 2000  99 17 171/83    06/07/19 1954 98.6 °F (37 °C)       06/07/19 1900  100 15 154/77 98 %

## 2019-06-08 NOTE — DISCHARGE SUMMARY
Discharge Summary    Patient: Yasir Schmitt MRN: [id number]  CSN: [id number]    YOB: 1985  Age: 37 y.o. Sex: female    DOA: 6/6/2019 LOS:  LOS: 2 days   Discharge Date:      Primary Care Provider:  Mike Hidalgo MD    Admission Diagnoses: DKA (diabetic ketoacidoses) ([facility] [E13.10]  Intractable vomiting with nausea [R11.2]  Gastroparesis [K31.84]  Leukocytosis [D72.829]  Lactic acid acidosis [E87.2]  Epigastric pain [R10.13]  Acute kidney injury ([facility] [N17.9]  Dehydration [E86.0]    Discharge Diagnoses:    Hospital Problems  Never Reviewed          Codes Class Noted POA    Chronic anemia ICD-10-CM: D64.9  ICD-9-CM: 285.9  6/7/2019 Unknown        Gastroparesis ICD-10-CM: K31.84  ICD-9-CM: 536.3  6/6/2019 Unknown        Dehydration ICD-10-CM: E86.0  ICD-9-CM: 276.51  6/6/2019 Unknown        Leukocytosis ICD-10-CM: D72.829  ICD-9-CM: 288.60  6/6/2019 Unknown        Epigastric pain ICD-10-CM: R10.13  ICD-9-CM: 789.06  6/6/2019 Unknown        Lactic acid acidosis ICD-10-CM: E87.2  ICD-9-CM: 276.2  6/6/2019 Unknown        Acute kidney injury ([facility] ICD-10-CM: N17.9  ICD-9-CM: 584.9  6/6/2019 Unknown        Intractable vomiting with nausea ICD-10-CM: R11.2  ICD-9-CM: 536.2  6/6/2019 Unknown        * (Principal) Uncontrolled type 2 diabetes mellitus with hyperglycemia ([facility] ICD-10-CM: E11.65  ICD-9-CM: 250.02  6/6/2019         UTI (urinary tract infection) ICD-10-CM: N39.0  ICD-9-CM: 599.0  6/6/2019 Unknown              Discharge Condition: stable     Discharge Medications:     Current Discharge Medication List      START taking these medications    Details   amoxicillin-clavulanate (AUGMENTIN) 500-125 mg per tablet Take 1 Tab by mouth two (2) times a day for 8 days. Qty: 16 Tab, Refills: 0      amLODIPine (NORVASC) 5 mg tablet Take 1 Tab by mouth daily.   Qty: 30 Tab, Refills: 0         CONTINUE these medications which have NOT CHANGED    Details   loperamide (IMODIUM) 2 mg capsule Take 1 Cap by mouth four (4) times daily as needed for Diarrhea for up to 10 days. Qty: 20 Cap, Refills: 0      famotidine (PEPCID PO) Take  by mouth. insulin glargine (LANTUS U-100 INSULIN) 100 unit/mL injection 30 Units by SubCUTAneous route nightly. Indications: type 2 diabetes mellitus      metoclopramide HCl (REGLAN PO) Take  by mouth. insulin admin. supplies (INPEN, FOR NOVOLOG, SC) 10-12 Units by SubCUTAneous route Before breakfast, lunch, and dinner. Procedures : none     Consults: Pulmonary/Critical Care      PHYSICAL EXAM   Visit Vitals  /88   Pulse 99   Temp 98.6 °F (37 °C)   Resp 18   Ht 5' 4\" (1.626 m)   Wt 65.5 kg (144 lb 6.4 oz)   SpO2 98%   BMI 24.79 kg/m²     General: Awake, cooperative, no acute distress    HEENT: NC, Atraumatic. PERRLA, EOMI. Anicteric sclerae. Lungs:  CTA Bilaterally. No Wheezing/Rhonchi/Rales. Heart:  Regular  rhythm,  No murmur, No Rubs, No Gallops  Abdomen: Soft, Non distended, Non tender. +Bowel sounds,   Extremities: No c/c/e  Psych:   Not anxious or agitated. Neurologic:  No acute neurological deficits. Admission HPI :   Preet Middleton is a 37 y.o. female who has past history of IDDM, gastroparesis, HTN presents to ER with concerns of N/V. Patient reports that she has been vomiting since yesterday, she was seen in ER yesterday and her blood sugars elevated, she was discharged. She returned with concerns of persistent n/v, she reports that she has not eaten since yesterday due to vomiting and has not taken her insulin. She has gastroparesis and takes reglan. In ER she is noted to have elevated blood sugars in 500 range, her CO2 at 18, anion gap at 15. Her CT abdomen showed possible stranding at ureters. Her wbc elevated at [facility] Course : Preet Middleton is a 37 y.o. female who has past history of IDDM, gastroparesis, HTN was admitted for IDDM with  Hyperglycemia.  Insulin gtt was administrated for glucose control. She received education for DM. Iv hydration was given. She was able to wean off gtt and  glucosse controlled per insulin. She has all insulin at home. Needs check glucose 3-4 times per day. She also having leukocytosis with ct abdomen done with ascending UTI and pyelonephritis possible related to vesicoureteral reflux. Rocephin was started and urine cx:  17081 COLONIES/mL STREPTOCOCCI, BETA HEMOLYTIC GROUP B    Culture result: Abnormal       Final   <10,000 COLONIES/mL STAPHYLOCOCCUS AUREUS    Culture result:      Final   [id number]   COLONIES/mL   MIXED GRAM POSITIVE JOANA, PROBABLE SKIN/GENITAL CONTAMINATION. She does reported recurrent uti. She needs f/u with urology for further evaluation     She tolerated diet well before d/c     Her wanda got improving, and her renal function was around her base line. Avoid nsaids -she indicated a verbal understanding           Activity: Activity as tolerated    Diet: Diabetic Diet    Follow-up: PCP and urology     Disposition: home     Minutes spent on discharge: 45 min       Labs: Results:       Chemistry Recent Labs     06/08/19  0915 06/07/19  0130 06/06/19  1351  06/06/19  0445   * 133* 498*   < > 380*    139 140   < > 138   K 4.0 3.5 4.1   < > 4.1    107 108   < > 103   CO2 24 22 18*   < > 23   BUN 16 21* 18   < > 18   CREA 1.79* 1.87* 1.55*   < > 1.57*   CA 8.0* 7.0* 7.7*   < > 8.5   AGAP 8 10 14   < > 12   BUCR 9* 11* 12   < > 11*   AP  --   --  130*  --  144*   TP  --   --  6.7  --  7.4   ALB  --   --  2.8*  --  3.0*   GLOB  --   --  3.9  --  4.4*   AGRAT  --   --  0.7*  --  0.7*    < > = values in this interval not displayed.       CBC w/Diff Recent Labs     06/08/19  0915 06/08/19  0439 06/07/19  0555 06/06/19  1210 06/06/19  0410   WBC 15.9* 15.8* 24.1* 17.2* 16.9*   RBC 2.94* 2.82* 2.67* 3.26* 3.24*   HGB 8.6* 8.3* 7.9* 9.6* 9.7*   HCT 26.1* 24.9* 23.5* 28.6* 28.3*    382 336 428* 437*   GRANS 70  --   --  96* 80*   LYMPH 21  --   --  4* 14*   EOS 1  --   --  0 1      Cardiac Enzymes No results for input(s): CPK, CKND1, TESS in the last 72 hours. No lab exists for component: CKRMB, TROIP   Coagulation No results for input(s): PTP, INR, APTT in the last 72 hours. No lab exists for component: INREXT, INREXT    Lipid Panel No results found for: CHOL, CHOLPOCT, CHOLX, CHLST, CHOLV, 974007, HDL, LDL, LDLC, DLDLP, 896207, VLDLC, VLDL, TGLX, TRIGL, TRIGP, TGLPOCT, CHHD, CHHDX   BNP No results for input(s): BNPP in the last 72 hours. Liver Enzymes Recent Labs     06/06/19  1351   TP 6.7   ALB 2.8*   *   SGOT 10*      Thyroid Studies No results found for: T4, T3U, TSH, TSHEXT, TSHEXT         Significant Diagnostic Studies: Xr Abd (kub)    Result Date: 6/6/2019  EXAM: XR ABD (KUB) CLINICAL INDICATION/HISTORY: abd pain, vomiting   > Additional: None. COMPARISON: None. > Reference Exam: None. TECHNIQUE: Supine KUB obtained. _______________ FINDINGS: Multiple nondilated nonspecific air-filled small bowel loops noted. Surgical clips, likely previous bilateral tubal ligation. No abnormal calcification or other soft tissue abnormality. _______________     IMPRESSION: Nonspecific nonobstructive gas pattern. Ct Abd Pelv Wo Cont    Result Date: 6/6/2019  EXAM:CT abdomen pelvis without contrast CLINICAL INDICATION/HISTORY: Epigastric pain, vomiting, leukocytosis, diabetes COMPARISON: 5/26/2019. TECHNIQUE: Standard helical CT performed through the abdomen and pelvis without contrast.  Coronal and sagittal reformations were generated. One or more dose reduction techniques were used on this CT: automated exposure control, adjustment of the mAs and/or kVp according to patient's size, and iterative reconstruction techniques. The specific techniques utilized on this CT exam have been documented in the patient's electronic medical record.   Digital imaging and communications and medicine (DICOM) format image data are available to nonaffiliated external healthcare facilities or entities on a secure, media free, reciprocally searchable basis with patient authorization for at least a 12 month period after this study. _______________ FINDINGS: INFERIOR THORAX: Lung bases are clear with heart size within normal limits. LIVER/BILIARY: No suspicious liver lesion. No biliary dilation. Gallbladder unremarkable. SPLEEN: Normal. PANCREAS: Normal. ADRENALS: Normal. KIDNEYS: There is duplicated right-sided collecting system with mild dilatation of both upper ureters down to the level of the uterus with no definite dilatation more distally. No evidence of urinary system calculus. There is increased mild bilateral perirenal stranding. There is also mild prominence of single left ureter down to the level of the uterus. Stable small left lower pole renal cyst. LYMPH NODES: Several nonspecific subcentimeter likely benign reactive retroperitoneal lymph nodes, stable with no new adenopathy. GI TRACT: No wall thickening or dilation. No ascites or free air. VASCULATURE: Unremarkable. PELVIC ORGANS: Bladder uterus and adnexa unremarkable with previous bilateral tubal ligation. OTHER: No aggressive appearing osseous lesion. _______________     IMPRESSION: 1. Interval increased nonspecific bilateral perirenal stranding with mildly dilated duplicated upper right-sided ureters and mildly dilated single left upper ureter, with no evidence of urinary system calculus. Mild ureteral dilatation may be related to vesicoureteral reflux, less likely sequela of previously passed calculus. Possibility of ascending UTI and pyelonephritis should also be considered, suggest correlation to urinalysis. 2. No other new acute or other significant findings. Ct Abd Pelv Wo Cont    Result Date: 5/26/2019  EXAM: CT of the abdomen and pelvis INDICATION: Pain. COMPARISON: None.  TECHNIQUE: Axial CT imaging of the abdomen and pelvis was performed without intravenous contrast. Multiplanar reformats were generated. Dose reduction techniques used: automated exposure control, adjustment of the mAs and/or kVp according to patient size, and iterative reconstruction techniques. _______________ FINDINGS: LOWER CHEST: Unremarkable. LIVER, BILIARY: Liver is normal. No biliary dilation. Gallbladder is unremarkable. PANCREAS: Normal. SPLEEN: Normal. ADRENALS: Normal. KIDNEYS: Normal. LYMPH NODES: No enlarged lymph nodes. GASTROINTESTINAL TRACT: No bowel dilation or wall thickening. The appendix is not identified. PELVIC ORGANS: Unremarkable. VASCULATURE: Unremarkable. BONES: No acute or aggressive osseous abnormalities identified. OTHER: None. _______________     No renal calculi or renal collecting system obstructive change. No acute intra-abdominal process identified.             [facility]     CC: Sonya Truong MD

## 2019-06-08 NOTE — DISCHARGE INSTRUCTIONS
Patient Education        Diabetic Ketoacidosis (DKA): Care Instructions  Your Care Instructions  Diabetic ketoacidosis (DKA) happens when the body does not have enough insulin and can't get the sugar it needs for energy. When the body can't use sugar for energy, it starts to use fat for energy. This process makes fatty acids called ketones. The ketones build up in the blood and change the chemical balance in your body. This problem can be very dangerous and needs to be treated. Without treatment, it can lead to a coma or death. DKA occurs most often in people with type 1 diabetes. But people with type 2 diabetes also can get it. DKA can be caused by many things. It can happen if you don't take enough insulin. It can also happen if you have an infection or illness like the flu. Sometimes it happens if you are very dehydrated. DKA can only be treated with insulin and fluids. These are often given in a vein (IV). Follow-up care is a key part of your treatment and safety. Be sure to make and go to all appointments, and call your doctor if you are having problems. It's also a good idea to know your test results and keep a list of the medicines you take. How can you care for yourself at home? To reduce your chance of ketoacidosis:  · Take your insulin and other diabetes medicines on time and in the right dose. ? If an infection caused your DKA and your doctor prescribed antibiotics, take them as directed. Do not stop taking them just because you feel better. You need to take the full course of antibiotics. · Test your blood sugar before meals and at bedtime or as often as your doctor advises. This is the best way to know when your blood sugar is high so you can treat it early. Watching for symptoms is not as helpful. This is because you may not have symptoms until your blood sugar is very high. Or you may not notice them. · Teach others at work and at home how to check your blood sugar.  Make sure that someone else knows how do it in case you can't. · Wear or carry medical identification at all times. This is very important in case you are too sick or injured to speak for yourself. · Talk to your doctor about when you can start to exercise again. · Eat regular meals that spread your calories and carbohydrate throughout the day. This will help keep your blood sugar steady. · When you are sick:  ? Take your insulin and diabetes medicines. This is important even if you are vomiting and having trouble eating or drinking. Your blood sugar may go up because you are sick. If you are eating less than normal, you may need to change your dose of insulin. Talk with your doctor about a plan when you are well. Then you will know what to do when you are sick. ? Drink extra fluids to prevent dehydration. These include water, broth, and sugar-free drinks. If you don't drink enough, the insulin from your shot may not get into your blood. So your blood sugar may go up. ? Try to eat as you normally do, with a focus on healthy food choices. ? Check your blood sugar at least every 3 to 4 hours. Check it more often if it's rising fast. If your doctor has told you to take an extra insulin dose for high blood sugar levels (for example, above 240 mg/dL) be sure to take the right amount. If you're not sure how much to take, call your doctor. ? Check your temperature and pulse often. If your temperature goes up, call your doctor. You may be getting worse. ? If you take insulin, check your urine or blood for ketones, especially when you have high blood sugar (for example, above 240 mg/dL). Call your doctor if your ketone level is moderate or high. If you know your blood sugar is high, treat it before it gets worse. · If you missed your usual dose of insulin or other diabetes medicine, take the missed dose or take the amount your doctor told you to take if this happens.   · If you and your doctor decide on a dose of extra-fast-acting insulin, give yourself the right dose. If you take insulin and your doctor has not told you how much fast-acting insulin to take based on your blood sugar level, call your doctor. · Drink extra water or sugar-free drinks to prevent dehydration. · Wait 30 minutes after you take extra insulin or missed medicines. Then check your blood sugar again. · If symptoms of high blood sugar get worse or your blood sugar level keeps rising, call your doctor. If you start to feel sleepy or confused, call 911. When should you call for help? Call 911 anytime you think you may need emergency care. For example, call if:    · You passed out (lost consciousness).     · You are confused or cannot think clearly.     · Your blood sugar is very high or very low.    Watch closely for changes in your health, and be sure to contact your doctor if:    · Your blood sugar stays outside the level your doctor set for you.     · You have any problems. Where can you learn more? Go to http://anita-chino.info/. Mario Waldron in the search box to learn more about \"Diabetic Ketoacidosis (DKA): Care Instructions. \"  Current as of: July 25, 2018  Content Version: 11.9  © 8571-0644 [employer]. Care instructions adapted under license by [employer] (which disclaims liability or warranty for this information). If you have questions about a medical condition or this instruction, always ask your healthcare professional. [place] [address] any warranty or liability for your use of this information.

## 2019-06-08 NOTE — ROUTINE PROCESS
Bedside shift change report given to Jose Mcginnis RN  (oncoming nurse) by Juan Matias RN  (offgoing nurse). Report included the following information SBAR, Kardex, Intake/Output, MAR, Recent Results and Alarm Parameters .

## 2019-06-09 ENCOUNTER — HOME CARE VISIT (OUTPATIENT)
Dept: HOME HEALTH SERVICES | Facility: HOME HEALTH | Age: 34
End: 2019-06-09

## 2019-06-09 LAB
BACTERIA SPEC CULT: NORMAL
SERVICE CMNT-IMP: NORMAL

## 2019-06-11 ENCOUNTER — HOME CARE VISIT (OUTPATIENT)
Dept: SCHEDULING | Facility: HOME HEALTH | Age: 34
End: 2019-06-11

## 2019-06-11 PROCEDURE — G0299 HHS/HOSPICE OF RN EA 15 MIN: HCPCS

## 2019-06-12 LAB
BACTERIA SPEC CULT: NORMAL
BACTERIA SPEC CULT: NORMAL
SERVICE CMNT-IMP: NORMAL
SERVICE CMNT-IMP: NORMAL

## 2019-11-01 ENCOUNTER — APPOINTMENT (OUTPATIENT)
Dept: GENERAL RADIOLOGY | Age: 34
End: 2019-11-01
Attending: EMERGENCY MEDICINE
Payer: MEDICAID

## 2019-11-01 ENCOUNTER — HOSPITAL ENCOUNTER (OUTPATIENT)
Age: 34
Setting detail: OBSERVATION
Discharge: HOME | End: 2019-11-03
Attending: EMERGENCY MEDICINE | Admitting: HOSPITALIST
Payer: MEDICAID

## 2019-11-01 DIAGNOSIS — N28.9 RENAL INSUFFICIENCY: ICD-10-CM

## 2019-11-01 DIAGNOSIS — R73.9 HYPERGLYCEMIA: ICD-10-CM

## 2019-11-01 DIAGNOSIS — D64.9 CHRONIC ANEMIA: Primary | ICD-10-CM

## 2019-11-01 DIAGNOSIS — E86.0 DEHYDRATION: ICD-10-CM

## 2019-11-01 PROBLEM — R07.9 CHEST PAIN: Status: ACTIVE | Noted: 2019-11-01

## 2019-11-01 PROBLEM — N18.30 ACUTE RENAL FAILURE SUPERIMPOSED ON STAGE 3 CHRONIC KIDNEY DISEASE (HCC): Status: ACTIVE | Noted: 2019-11-01

## 2019-11-01 PROBLEM — N17.9 ACUTE RENAL FAILURE SUPERIMPOSED ON STAGE 3 CHRONIC KIDNEY DISEASE (HCC): Status: ACTIVE | Noted: 2019-11-01

## 2019-11-01 LAB
ALBUMIN SERPL-MCNC: 2.8 G/DL (ref 3.4–5)
ALBUMIN/GLOB SERPL: 0.7 {RATIO} (ref 0.8–1.7)
ALP SERPL-CCNC: 125 U/L (ref 45–117)
ALT SERPL-CCNC: 7 U/L (ref 13–56)
ANION GAP SERPL CALC-SCNC: 8 MMOL/L (ref 3–18)
ANION GAP SERPL CALC-SCNC: 9 MMOL/L (ref 3–18)
AST SERPL-CCNC: 8 U/L (ref 10–38)
BASOPHILS # BLD: 0 K/UL (ref 0–0.1)
BASOPHILS NFR BLD: 0 % (ref 0–2)
BILIRUB SERPL-MCNC: 0.3 MG/DL (ref 0.2–1)
BNP SERPL-MCNC: 298 PG/ML (ref 0–450)
BUN SERPL-MCNC: 23 MG/DL (ref 7–18)
BUN SERPL-MCNC: 26 MG/DL (ref 7–18)
BUN/CREAT SERPL: 12 (ref 12–20)
BUN/CREAT SERPL: 12 (ref 12–20)
CALCIUM SERPL-MCNC: 7.6 MG/DL (ref 8.5–10.1)
CALCIUM SERPL-MCNC: 8.1 MG/DL (ref 8.5–10.1)
CHLORIDE SERPL-SCNC: 110 MMOL/L (ref 100–111)
CHLORIDE SERPL-SCNC: 113 MMOL/L (ref 100–111)
CK MB CFR SERPL CALC: NORMAL % (ref 0–4)
CK MB CFR SERPL CALC: NORMAL % (ref 0–4)
CK MB SERPL-MCNC: <1 NG/ML (ref 5–25)
CK MB SERPL-MCNC: <1 NG/ML (ref 5–25)
CK SERPL-CCNC: 55 U/L (ref 26–192)
CK SERPL-CCNC: 58 U/L (ref 26–192)
CO2 SERPL-SCNC: 22 MMOL/L (ref 21–32)
CO2 SERPL-SCNC: 22 MMOL/L (ref 21–32)
CREAT SERPL-MCNC: 1.98 MG/DL (ref 0.6–1.3)
CREAT SERPL-MCNC: 2.23 MG/DL (ref 0.6–1.3)
DIFFERENTIAL METHOD BLD: ABNORMAL
EOSINOPHIL # BLD: 0.2 K/UL (ref 0–0.4)
EOSINOPHIL NFR BLD: 2 % (ref 0–5)
ERYTHROCYTE [DISTWIDTH] IN BLOOD BY AUTOMATED COUNT: 14.5 % (ref 11.6–14.5)
EST. AVERAGE GLUCOSE BLD GHB EST-MCNC: 309 MG/DL
FOLATE SERPL-MCNC: 15.4 NG/ML (ref 3.1–17.5)
GLOBULIN SER CALC-MCNC: 3.9 G/DL (ref 2–4)
GLUCOSE BLD STRIP.AUTO-MCNC: 241 MG/DL (ref 70–110)
GLUCOSE SERPL-MCNC: 204 MG/DL (ref 74–99)
GLUCOSE SERPL-MCNC: 234 MG/DL (ref 74–99)
HBA1C MFR BLD: 12.4 % (ref 4.2–5.6)
HCG SERPL QL: NEGATIVE
HCT VFR BLD AUTO: 22.3 % (ref 35–45)
HEMOCCULT STL QL: NEGATIVE
HGB BLD-MCNC: 7.1 G/DL (ref 12–16)
IRON SATN MFR SERPL: 7 %
IRON SERPL-MCNC: 21 UG/DL (ref 50–175)
LIPASE SERPL-CCNC: 110 U/L (ref 73–393)
LYMPHOCYTES # BLD: 2.4 K/UL (ref 0.9–3.6)
LYMPHOCYTES NFR BLD: 20 % (ref 21–52)
MAGNESIUM SERPL-MCNC: 2.2 MG/DL (ref 1.6–2.6)
MCH RBC QN AUTO: 26.6 PG (ref 24–34)
MCHC RBC AUTO-ENTMCNC: 31.8 G/DL (ref 31–37)
MCV RBC AUTO: 83.5 FL (ref 74–97)
MONOCYTES # BLD: 0.7 K/UL (ref 0.05–1.2)
MONOCYTES NFR BLD: 6 % (ref 3–10)
NEUTS SEG # BLD: 8.7 K/UL (ref 1.8–8)
NEUTS SEG NFR BLD: 72 % (ref 40–73)
PLATELET # BLD AUTO: 428 K/UL (ref 135–420)
PMV BLD AUTO: 8.3 FL (ref 9.2–11.8)
POTASSIUM SERPL-SCNC: 3.9 MMOL/L (ref 3.5–5.5)
POTASSIUM SERPL-SCNC: 3.9 MMOL/L (ref 3.5–5.5)
PROT SERPL-MCNC: 6.7 G/DL (ref 6.4–8.2)
RBC # BLD AUTO: 2.67 M/UL (ref 4.2–5.3)
RBC MORPH BLD: ABNORMAL
RBC MORPH BLD: ABNORMAL
SODIUM SERPL-SCNC: 141 MMOL/L (ref 136–145)
SODIUM SERPL-SCNC: 143 MMOL/L (ref 136–145)
TIBC SERPL-MCNC: 288 UG/DL (ref 250–450)
TROPONIN I SERPL-MCNC: <0.02 NG/ML (ref 0–0.04)
TROPONIN I SERPL-MCNC: <0.02 NG/ML (ref 0–0.04)
VIT B12 SERPL-MCNC: 375 PG/ML (ref 211–911)
WBC # BLD AUTO: 12 K/UL (ref 4.6–13.2)

## 2019-11-01 PROCEDURE — 96372 THER/PROPH/DIAG INJ SC/IM: CPT

## 2019-11-01 PROCEDURE — 85025 COMPLETE CBC W/AUTO DIFF WBC: CPT

## 2019-11-01 PROCEDURE — 99218 HC RM OBSERVATION: CPT

## 2019-11-01 PROCEDURE — 96360 HYDRATION IV INFUSION INIT: CPT

## 2019-11-01 PROCEDURE — 80053 COMPREHEN METABOLIC PANEL: CPT

## 2019-11-01 PROCEDURE — 99285 EMERGENCY DEPT VISIT HI MDM: CPT

## 2019-11-01 PROCEDURE — 74011250636 HC RX REV CODE- 250/636: Performed by: EMERGENCY MEDICINE

## 2019-11-01 PROCEDURE — 83036 HEMOGLOBIN GLYCOSYLATED A1C: CPT

## 2019-11-01 PROCEDURE — 83690 ASSAY OF LIPASE: CPT

## 2019-11-01 PROCEDURE — 83540 ASSAY OF IRON: CPT

## 2019-11-01 PROCEDURE — 74011636637 HC RX REV CODE- 636/637: Performed by: HOSPITALIST

## 2019-11-01 PROCEDURE — 82962 GLUCOSE BLOOD TEST: CPT

## 2019-11-01 PROCEDURE — 82272 OCCULT BLD FECES 1-3 TESTS: CPT

## 2019-11-01 PROCEDURE — 74011250636 HC RX REV CODE- 250/636: Performed by: HOSPITALIST

## 2019-11-01 PROCEDURE — 83735 ASSAY OF MAGNESIUM: CPT

## 2019-11-01 PROCEDURE — 83880 ASSAY OF NATRIURETIC PEPTIDE: CPT

## 2019-11-01 PROCEDURE — 96361 HYDRATE IV INFUSION ADD-ON: CPT

## 2019-11-01 PROCEDURE — 82550 ASSAY OF CK (CPK): CPT

## 2019-11-01 PROCEDURE — 71046 X-RAY EXAM CHEST 2 VIEWS: CPT

## 2019-11-01 PROCEDURE — 82607 VITAMIN B-12: CPT

## 2019-11-01 PROCEDURE — 93005 ELECTROCARDIOGRAM TRACING: CPT

## 2019-11-01 PROCEDURE — 84703 CHORIONIC GONADOTROPIN ASSAY: CPT

## 2019-11-01 RX ORDER — FAMOTIDINE 20 MG/1
20 TABLET, FILM COATED ORAL DAILY
Status: DISCONTINUED | OUTPATIENT
Start: 2019-11-02 | End: 2019-11-03 | Stop reason: HOSPADM

## 2019-11-01 RX ORDER — AMLODIPINE BESYLATE 5 MG/1
5 TABLET ORAL DAILY
Status: DISCONTINUED | OUTPATIENT
Start: 2019-11-02 | End: 2019-11-02

## 2019-11-01 RX ORDER — ACETAMINOPHEN 325 MG/1
650 TABLET ORAL
Status: DISCONTINUED | OUTPATIENT
Start: 2019-11-01 | End: 2019-11-03 | Stop reason: HOSPADM

## 2019-11-01 RX ORDER — KETOROLAC TROMETHAMINE 30 MG/ML
15 INJECTION, SOLUTION INTRAMUSCULAR; INTRAVENOUS
Status: DISCONTINUED | OUTPATIENT
Start: 2019-11-01 | End: 2019-11-02

## 2019-11-01 RX ORDER — DIPHENHYDRAMINE HYDROCHLORIDE 50 MG/ML
12.5 INJECTION, SOLUTION INTRAMUSCULAR; INTRAVENOUS
Status: DISCONTINUED | OUTPATIENT
Start: 2019-11-01 | End: 2019-11-03 | Stop reason: HOSPADM

## 2019-11-01 RX ORDER — INSULIN LISPRO 100 [IU]/ML
INJECTION, SOLUTION INTRAVENOUS; SUBCUTANEOUS
Status: DISCONTINUED | OUTPATIENT
Start: 2019-11-01 | End: 2019-11-03 | Stop reason: HOSPADM

## 2019-11-01 RX ORDER — INSULIN GLARGINE 100 [IU]/ML
30 INJECTION, SOLUTION SUBCUTANEOUS
Status: DISCONTINUED | OUTPATIENT
Start: 2019-11-01 | End: 2019-11-03 | Stop reason: HOSPADM

## 2019-11-01 RX ORDER — NALOXONE HYDROCHLORIDE 0.4 MG/ML
0.4 INJECTION, SOLUTION INTRAMUSCULAR; INTRAVENOUS; SUBCUTANEOUS AS NEEDED
Status: DISCONTINUED | OUTPATIENT
Start: 2019-11-01 | End: 2019-11-03 | Stop reason: HOSPADM

## 2019-11-01 RX ORDER — DOCUSATE SODIUM 100 MG/1
100 CAPSULE, LIQUID FILLED ORAL 2 TIMES DAILY
Status: DISCONTINUED | OUTPATIENT
Start: 2019-11-01 | End: 2019-11-01

## 2019-11-01 RX ORDER — HEPARIN SODIUM 5000 [USP'U]/ML
5000 INJECTION, SOLUTION INTRAVENOUS; SUBCUTANEOUS EVERY 8 HOURS
Status: DISCONTINUED | OUTPATIENT
Start: 2019-11-01 | End: 2019-11-03 | Stop reason: HOSPADM

## 2019-11-01 RX ORDER — ONDANSETRON 2 MG/ML
4 INJECTION INTRAMUSCULAR; INTRAVENOUS
Status: DISCONTINUED | OUTPATIENT
Start: 2019-11-01 | End: 2019-11-03 | Stop reason: HOSPADM

## 2019-11-01 RX ORDER — SODIUM CHLORIDE 9 MG/ML
75 INJECTION, SOLUTION INTRAVENOUS CONTINUOUS
Status: DISCONTINUED | OUTPATIENT
Start: 2019-11-01 | End: 2019-11-02

## 2019-11-01 RX ORDER — MAGNESIUM SULFATE 100 %
4 CRYSTALS MISCELLANEOUS AS NEEDED
Status: DISCONTINUED | OUTPATIENT
Start: 2019-11-01 | End: 2019-11-03 | Stop reason: HOSPADM

## 2019-11-01 RX ADMIN — SODIUM CHLORIDE 1000 ML: 900 INJECTION, SOLUTION INTRAVENOUS at 15:04

## 2019-11-01 RX ADMIN — SODIUM CHLORIDE 500 ML: 900 INJECTION, SOLUTION INTRAVENOUS at 15:04

## 2019-11-01 RX ADMIN — INSULIN GLARGINE 30 UNITS: 100 INJECTION, SOLUTION SUBCUTANEOUS at 21:48

## 2019-11-01 RX ADMIN — HEPARIN SODIUM 5000 UNITS: 5000 INJECTION INTRAVENOUS; SUBCUTANEOUS at 19:01

## 2019-11-01 RX ADMIN — SODIUM CHLORIDE 75 ML/HR: 900 INJECTION, SOLUTION INTRAVENOUS at 19:01

## 2019-11-01 RX ADMIN — INSULIN LISPRO 6 UNITS: 100 INJECTION, SOLUTION INTRAVENOUS; SUBCUTANEOUS at 21:48

## 2019-11-01 NOTE — PROGRESS NOTES
Problem: Diabetes Self-Management  Goal: *Disease process and treatment process  Description  Define diabetes and identify own type of diabetes; list 3 options for treating diabetes. Outcome: Progressing Towards Goal  Goal: *Incorporating nutritional management into lifestyle  Description  Describe effect of type, amount and timing of food on blood glucose; list 3 methods for planning meals. Outcome: Progressing Towards Goal     Problem: Falls - Risk of  Goal: *Absence of Falls  Description  Document Denise Conklin Fall Risk and appropriate interventions in the flowsheet.   Outcome: Progressing Towards Goal  Note:   Fall Risk Interventions:            Medication Interventions: Patient to call before getting OOB, Teach patient to arise slowly                   Problem: Patient Education: Go to Patient Education Activity  Goal: Patient/Family Education  Outcome: Progressing Towards Goal

## 2019-11-01 NOTE — ROUTINE PROCESS
TRANSFER - IN REPORT: 
 
Verbal report received from EDIN Schmitz(name) on Savana Chakraborty  being received from ED(unit) for routine progression of care Report consisted of patients Situation, Background, Assessment and  
Recommendations(SBAR). Information from the following report(s) SBAR, Kardex, Intake/Output, MAR, Accordion, Recent Results and Med Rec Status was reviewed with the receiving nurse. Opportunity for questions and clarification was provided. Assessment completed upon patients arrival to unit and care assumed.

## 2019-11-01 NOTE — ED PROVIDER NOTES
EMERGENCY DEPARTMENT HISTORY AND PHYSICAL EXAM    Date: 11/1/2019  Patient Name: Dexter Colunga    History of Presenting Illness     Chief Complaint   Patient presents with    Chest Pain         History Provided By: Patient    Additional History (Context): Dexter Colunga is a 37 y.o. female with PMHX insulin-dependent diabetes presents to the emergency department C/O left-sided lateral chest pain that is worse with palpation. Patient states that she has had a chronic cough and developed the chest pain couple days ago. Patient denies any sputum production. Pt denies shortness of breath, abdominal pain, nausea, vomiting, and any other sxs or complaints. Denies Hx of PE or DVT. Denies any recent immobilization. PCP: Tanvir Wang MD    Current Outpatient Medications   Medication Sig Dispense Refill    amLODIPine (NORVASC) 5 mg tablet Take 1 Tab by mouth daily. 30 Tab 0    famotidine (PEPCID PO) Take  by mouth.  insulin glargine (LANTUS U-100 INSULIN) 100 unit/mL injection 30 Units by SubCUTAneous route nightly. Indications: type 2 diabetes mellitus      metoclopramide HCl (REGLAN PO) Take  by mouth.  insulin admin. supplies (INPEN, FOR NOVOLOG, SC) 10-12 Units by SubCUTAneous route Before breakfast, lunch, and dinner. Past History     Past Medical History:  Past Medical History:   Diagnosis Date    Diabetes ([address]     Gastroparesis     Gastroparesis     Hypertension     UTI (urinary tract infection)        Past Surgical History:  Past Surgical History:   Procedure Laterality Date    HX APPENDECTOMY      HX GYN      tubal ligation, C Section       Family History:  History reviewed. No pertinent family history.     Social History:  Social History     Tobacco Use    Smoking status: Never Smoker    Smokeless tobacco: Never Used   Substance Use Topics    Alcohol use: Never     Frequency: Never    Drug use: Not Currently       Allergies:  No Known Allergies      Review of Systems Review of Systems   Constitutional: Negative for chills and fever. HENT: Negative for congestion, ear pain, sinus pain and sore throat. Eyes: Negative for pain and visual disturbance. Respiratory: Negative for cough and shortness of breath. Cardiovascular: Positive for chest pain. Negative for leg swelling. Gastrointestinal: Negative for abdominal pain, constipation, diarrhea, nausea and vomiting. Genitourinary: Negative for dysuria, hematuria, vaginal bleeding and vaginal discharge. Musculoskeletal: Negative for back pain and neck pain. Skin: Negative for rash and wound. Neurological: Negative for dizziness, tremors, weakness, light-headedness and numbness. All other systems reviewed and are negative. Physical Exam     Vitals:    11/01/19 1258 11/01/19 1402 11/01/19 1415 11/01/19 1530   BP: 109/59 154/83 154/85 152/80   Pulse: 88 89 89 90   Resp: 18 12 17 15   Temp: 98.2 °F (36.8 °C)      SpO2: 100% 100% 98% 100%   Weight: 57.6 kg (127 lb)      Height: 5' 4\" (1.626 m)        Physical Exam   Musculoskeletal:        Arms:      Nursing note and vitals reviewed    Constitutional: [employer] female who appears older than stated age, no acute distress  Head: Normocephalic, Atraumatic  ENT: Dry mucous membranes, poor dentition  Eyes: Pupils are equal, round, and reactive to light, EOMI  Neck: Supple, non-tender  Cardiovascular: Regular rate and rhythm, no murmurs, rubs, or gallops  Chest: Tenderness over the lateral portion of her left lower ribs with no obvious crepitus, deformity, swelling, ecchymoses or erythema. Normal work of breathing and chest excursion bilaterally  Lungs: Clear to ausculation bilaterally, no wheezes, no rhonchi  Abdomen: Soft, non tender, non distended, normoactive bowel sounds  Rectal: Good rectal tone, no fissures, no palpable masses, brown stool  Back: No evidence of trauma or deformity  Extremities: No evidence of trauma or deformity, no LE edema.  No streaking erythema, vesicular lesions, ulcerations or bulla  Skin: Warm and dry, normal cap refill  Neuro: Alert and appropriate, CN intact, normal speech, moving all 4 extremities freely and symmetrically  Psychiatric: Normal mood and affect       Diagnostic Study Results     Labs -     Recent Results (from the past 12 hour(s))   EKG, 12 LEAD, INITIAL    Collection Time: 11/01/19  1:55 PM   Result Value Ref Range    Ventricular Rate 87 BPM    Atrial Rate 87 BPM    P-R Interval 146 ms    QRS Duration 82 ms    Q-T Interval 384 ms    QTC Calculation (Bezet) 462 ms    Calculated P Axis 53 degrees    Calculated R Axis 75 degrees    Calculated T Axis 34 degrees    Diagnosis       Normal sinus rhythm  Nonspecific T wave abnormality  Prolonged QT  Abnormal ECG  When compared with ECG of 06-JUN-2019 05:55,  Nonspecific T wave abnormality now evident in Lateral leads  QT has shortened     CBC WITH AUTOMATED DIFF    Collection Time: 11/01/19  2:02 PM   Result Value Ref Range    WBC 12.0 4.6 - 13.2 K/uL    RBC 2.67 (L) 4.20 - 5.30 M/uL    HGB 7.1 (L) 12.0 - 16.0 g/dL    HCT 22.3 (L) 35.0 - 45.0 %    MCV 83.5 74.0 - 97.0 FL    MCH 26.6 24.0 - 34.0 PG    MCHC 31.8 31.0 - 37.0 g/dL    RDW 14.5 11.6 - 14.5 %    PLATELET 129 (H) 697 - 420 K/uL    MPV 8.3 (L) 9.2 - 11.8 FL    NEUTROPHILS 72 40 - 73 %    LYMPHOCYTES 20 (L) 21 - 52 %    MONOCYTES 6 3 - 10 %    EOSINOPHILS 2 0 - 5 %    BASOPHILS 0 0 - 2 %    ABS. NEUTROPHILS 8.7 (H) 1.8 - 8.0 K/UL    ABS. LYMPHOCYTES 2.4 0.9 - 3.6 K/UL    ABS. MONOCYTES 0.7 0.05 - 1.2 K/UL    ABS. EOSINOPHILS 0.2 0.0 - 0.4 K/UL    ABS.  BASOPHILS 0.0 0.0 - 0.1 K/UL    RBC COMMENTS MICROCYTOSIS  1+        RBC COMMENTS HYPOCHROMIA  SLIGHT        DF AUTOMATED     METABOLIC PANEL, COMPREHENSIVE    Collection Time: 11/01/19  2:02 PM   Result Value Ref Range    Sodium 141 136 - 145 mmol/L    Potassium 3.9 3.5 - 5.5 mmol/L    Chloride 110 100 - 111 mmol/L    CO2 22 21 - 32 mmol/L    Anion gap 9 3.0 - 18 mmol/L Glucose 234 (H) 74 - 99 mg/dL    BUN 26 (H) 7.0 - 18 MG/DL    Creatinine 2.23 (H) 0.6 - 1.3 MG/DL    BUN/Creatinine ratio 12 12 - 20      GFR est AA 30 (L) >60 ml/min/1.73m2    GFR est non-AA 25 (L) >60 ml/min/1.73m2    Calcium 8.1 (L) 8.5 - 10.1 MG/DL    Bilirubin, total 0.3 0.2 - 1.0 MG/DL    ALT (SGPT) 7 (L) 13 - 56 U/L    AST (SGOT) 8 (L) 10 - 38 U/L    Alk.  phosphatase 125 (H) 45 - 117 U/L    Protein, total 6.7 6.4 - 8.2 g/dL    Albumin 2.8 (L) 3.4 - 5.0 g/dL    Globulin 3.9 2.0 - 4.0 g/dL    A-G Ratio 0.7 (L) 0.8 - 1.7     NT-PRO BNP    Collection Time: 11/01/19  2:02 PM   Result Value Ref Range    NT pro- 0 - 450 PG/ML   MAGNESIUM    Collection Time: 11/01/19  2:02 PM   Result Value Ref Range    Magnesium 2.2 1.6 - 2.6 mg/dL   CARDIAC PANEL,(CK, CKMB & TROPONIN)    Collection Time: 11/01/19  2:02 PM   Result Value Ref Range    CK 58 26 - 192 U/L    CK - MB <1.0 <3.6 ng/ml    CK-MB Index  0.0 - 4.0 %     CALCULATION NOT PERFORMED WHEN RESULT IS BELOW LINEAR LIMIT    Troponin-I, QT <0.02 0.0 - 0.045 NG/ML   LIPASE    Collection Time: 11/01/19  2:02 PM   Result Value Ref Range    Lipase 110 73 - 393 U/L   HCG QL SERUM    Collection Time: 11/01/19  2:02 PM   Result Value Ref Range    HCG, Ql. NEGATIVE  NEG     CARDIAC PANEL,(CK, CKMB & TROPONIN)    Collection Time: 11/01/19  4:05 PM   Result Value Ref Range    CK 55 26 - 192 U/L    CK - MB <1.0 <3.6 ng/ml    CK-MB Index  0.0 - 4.0 %     CALCULATION NOT PERFORMED WHEN RESULT IS BELOW LINEAR LIMIT    Troponin-I, QT <0.02 0.0 - 4.068 NG/ML   METABOLIC PANEL, BASIC    Collection Time: 11/01/19  4:05 PM   Result Value Ref Range    Sodium 143 136 - 145 mmol/L    Potassium 3.9 3.5 - 5.5 mmol/L    Chloride 113 (H) 100 - 111 mmol/L    CO2 22 21 - 32 mmol/L    Anion gap 8 3.0 - 18 mmol/L    Glucose 204 (H) 74 - 99 mg/dL    BUN 23 (H) 7.0 - 18 MG/DL    Creatinine 1.98 (H) 0.6 - 1.3 MG/DL    BUN/Creatinine ratio 12 12 - 20      GFR est AA 35 (L) >60 ml/min/1.73m2 GFR est non-AA 29 (L) >60 ml/min/1.73m2    Calcium 7.6 (L) 8.5 - 10.1 MG/DL       Radiologic Studies -   XR CHEST PA LAT   Final Result   IMPRESSION:      No acute radiographic cardiopulmonary abnormality. CT Results  (Last 48 hours)    None        CXR Results  (Last 48 hours)               11/01/19 1334  XR CHEST PA LAT Final result    Impression:  IMPRESSION:       No acute radiographic cardiopulmonary abnormality. Narrative:  EXAM: XR CHEST PA LAT       CLINICAL INDICATION/HISTORY: Left-sided chest pain   -Additional: None       COMPARISON: None       TECHNIQUE: PA and lateral views of the chest       _______________       FINDINGS:       HEART AND MEDIASTINUM: Cardiac size and mediastinal contours are within normal   limits       LUNGS AND PLEURAL SPACES: No focal pneumonic consolidation, pneumothorax or   pleural effusion. Superposition of vascular shadows at the right lung base noted   to project over the posterior right ninth rib       BONY THORAX AND SOFT TISSUES: No acute osseous abnormality       _______________                   Medical Decision Making   I am the first provider for this patient. I reviewed the vital signs, available nursing notes, past medical history, past surgical history, family history and social history. Vital Signs-Reviewed the patient's vital signs. Pulse Oximetry Analysis -100 % on room air    Cardiac Monitor:  Rate: 88 bpm  Rhythm: Regular    EKG interpretation: (Preliminary)  1:56 PM normal sinus rhythm 87 bpm.  QTc 462 ms. No acute ST elevation    Records Reviewed: Nursing Notes and Old Medical Records    Provider Notes:   37 y.o. female with a history of insulin-dependent diabetes who presents with left-sided chest pain that is worse with palpation. On exam patient is with appropriate vital signs, not appearing acutely ill. Tenderness over the left lower lateral ribs with no crepitus, deformity, swelling, ecchymosis or erythema.   Patient's exam was consistent with likely musculoskeletal origin however will eval for ACS. EKG showed no acute ST changes or T wave abnomalities concerning for ischemia. We will complete a set of cardiac enzymes to evaluate for ACS. Differential includes gastritis, GERD, PUD, and must less likely, ACS. We will also obtain chest x-ray to evaluate for any pulmonary pathology. Patient is not tachycardic, no c/o of calf pain and not hypoxic and hx is not concerning for PE. Patient can be PERC out and is low risk on Well's. Patient has minimal risk factors and a low HEART score of 1 for risk factor, with low risk of MACE. I anticipate with no ST changes or T wave abnormalities, and negative Troponins that patient may be discharged for outpatient follow up. Procedures:  Procedures    ED Course:   1:03 PM   Initial assessment performed. The patients presenting problems have been discussed, and they are in agreement with the care plan formulated and outlined with them. I have encouraged them to ask questions as they arise throughout their visit. 3:17 PM  Patient's cardiac enzymes negative. Chest x-ray showing no acute process. CMP shows slightly elevated BUN and renal insufficiency from her baseline. Likely component of dehydration. Patient will be IV hydrated and BMP will be rechecked. 4:59 PM  Despite IV hydration, patient's creatinine was minimally improved. Will admit for renal insufficiency, chronic anemia. Rectal exam showing brown stool, will send for occult blood testing. Diagnosis and Disposition       Core Measures:  For Hospitalized Patients:    1. Hospitalization Decision Time:  The decision to hospitalize the patient was made by Malinda Alejandro DO at 4:48 PM on 11/1/2019    2.  Aspirin: Aspirin was not given because the patient did not present with a stroke at the time of their Emergency Department evaluation    4:48 PM  Patient is being admitted to the hospital by Majo Angel MD. The results of their tests and reasons for their admission have been discussed with them and/or available family. They convey agreement and understanding for the need to be admitted and for their admission diagnosis. CONDITIONS ON ADMISSION:  Sepsis is not present at the time of admission. Pneumonia is not present at the time of admission. MRSA is not present at the time of admission. Wound infection is not present at the time of admission. Pressure Ulcer is not present at the time of admission. CLINICAL IMPRESSION:    1. Chronic anemia    2. Dehydration    3. Renal insufficiency    4. Hyperglycemia      ____________________________________     Please note that this dictation was completed with [employer], the computer voice recognition software. Quite often unanticipated grammatical, syntax, homophones, and other interpretive errors are inadvertently transcribed by the computer software. Please disregard these errors. Please excuse any errors that have escaped final proofreading.

## 2019-11-01 NOTE — PROGRESS NOTES
1815: Patient care received. Patient alert and oriented x 4. Patient resting in bed. Patient stable. Call light with in reach bed in lowest position.

## 2019-11-01 NOTE — H&P
History & Physical    Patient: Ankit Chavez MRN: [id number]  CSN: [id number]    YOB: 1985  Age: 37 y.o. Sex: female      DOA: 11/1/2019  Primary Care Provider:  Angelina Elder MD      Assessment/Plan     Hospital Problems  Never Reviewed          Codes Class Noted POA    * (Principal) Chest pain ICD-10-CM: R07.9  ICD-9-CM: 786.50  11/1/2019 Unknown        Anemia ICD-10-CM: D64.9  ICD-9-CM: 285.9  11/1/2019 Unknown        Hyperglycemia ICD-10-CM: R73.9  ICD-9-CM: 790.29  11/1/2019 Unknown        Acute renal failure superimposed on stage 3 chronic kidney disease ([facility] [address] ICD-10-CM: N17.9, N18.3  ICD-9-CM: 584.9, 585.3  11/1/2019         Uncontrolled type 2 diabetes mellitus with hyperglycemia (HCC) ICD-10-CM: E11.65  ICD-9-CM: 250.02  6/6/2019 Yes                Admit to tele     Chest pain reproducible and pleural chest pain   Follow-up for cardiac enzyme trend  Echo, VQ scan to rule out PE    Acute on chronic renal failure3  Not following nephrology, will continue give hydration. Avoid IV contrast and NSAIDs for pain  Case discussed with Dr. Maxine Reza     Anemia  We will check B12 folate, iron profile  Will transfuse if H&H ,7/21  Give iron transfusion, occult stool negative  Due to chronic renal disease and menorrhagia    Hypertension        Full code  Estimate  length of stay : 1-2 days    DVT : heparin/scd   ppi proph  CC: Chest pain       HPI:     Ankit Chavez is a 37 y.o. female who passed to medical history  diabetes, hypertension came to ER due to chest pain since yesterday. The chest pain located left side of the chest, no history of trauma, chest pain increased with inspiration. Her EKG no ST segmental changes. Cardiac enzymes was with normal limit. Chest x-ray no acute process. She was found creatinine 2.23, H&H 7.1/22. 3. IV hydration was giving in ER.   She denies any black stool, but she has heavy menstrual.  Occult stool is negative in ER    Denies any slurred speech/headache/n/v/blurred vission/d/c/palpitation/gait change/bleeding. Denies smoking/ any alcohol or drug use. Visit Vitals  /59   Pulse 87   Temp 98.2 °F (36.8 °C)   Resp 14   Ht 5' 4\" (1.626 m)   Wt 57.6 kg (127 lb)   LMP 10/31/2019   SpO2 100%   BMI 21.80 kg/m²      O2 Device: Room air      Past Medical History:   Diagnosis Date    Diabetes ([address]     Gastroparesis     Gastroparesis     Hypertension     UTI (urinary tract infection)        Past Surgical History:   Procedure Laterality Date    HX APPENDECTOMY      HX GYN      tubal ligation, C Section       Family History      Medical History Relation Name Comments   Diabetes Father       Asthma Mother       Diabetes Mother       High Cholesterol Mother       Hypertension Mother       Vision Problems Mother       Cancer Sister   leukemia     Relation Name Status Comments   Father   Alive     Maternal Grandfather   Alive     Maternal Grandmother        Mother   Alive     Paternal Grandfather        Paternal Grandmother        Sister            Social History     Socioeconomic History    Marital status: SINGLE     Spouse name: Not on file    Number of children: Not on file    Years of education: Not on file    Highest education level: Not on file   Tobacco Use    Smoking status: Never Smoker    Smokeless tobacco: Never Used   Substance and Sexual Activity    Alcohol use: Never     Frequency: Never    Drug use: Not Currently       Prior to Admission medications    Medication Sig Start Date End Date Taking? Authorizing Provider   amLODIPine (NORVASC) 5 mg tablet Take 1 Tab by mouth daily. 19   Shandra Tellez MD   famotidine (PEPCID PO) Take  by mouth. Tameka Balderas MD   insulin glargine (LANTUS U-100 INSULIN) 100 unit/mL injection 30 Units by SubCUTAneous route nightly. Indications: type 2 diabetes mellitus    Tameka Balderas MD   metoclopramide HCl (REGLAN PO) Take  by mouth. Tameka Balderas MD   insulin admin. supplies (INPEN, FOR NOVOLOG, SC) 10-12 Units by SubCUTAneous route Before breakfast, lunch, and dinner. Other, MD Tameka       No Known Allergies    Review of Systems  Gen: No fever, chills, malaise, weight loss/gain. Heent: No headache, rhinorrhea, epistaxis, ear pain, hearing loss, sinus pain, neck pain/stiffness, sore throat. Heart: +chest pain, no palpitations, STEVENS, pnd, or orthopnea. Resp: No cough, hemoptysis, wheezing and shortness of breath. GI: No nausea, vomiting, diarrhea, constipation, melena or hematochezia. : No urinary obstruction, dysuria or hematuria. Derm: +rash, no new skin lesion or pruritis. Musc/skeletal: no bone or joint complains. Vasc: No edema, cyanosis or claudication. Endo: No heat/cold intolerance, no polyuria,polydipsia or polyphagia. Neuro: No unilateral weakness, numbness, tingling. No seizures. Heme: No easy bruising or bleeding. Physical Exam:     Physical Exam:  Visit Vitals  /59   Pulse 87   Temp 98.2 °F (36.8 °C)   Resp 14   Ht 5' 4\" (1.626 m)   Wt 57.6 kg (127 lb)   LMP 10/31/2019   SpO2 100%   BMI 21.80 kg/m²      O2 Device: Room air    Temp (24hrs), Av.2 °F (36.8 °C), Min:98.2 °F (36.8 °C), Max:98.2 °F (36.8 °C)    No intake/output data recorded. No intake/output data recorded. General:  Awake, cooperative, no distress. Head:  Normocephalic, without obvious abnormality, atraumatic. Rash on forehead    Eyes:  Conjunctivae/corneas clear, sclera anicteric, PERRL, EOMs intact. Nose: Nares normal. No drainage or sinus tenderness. Throat: Lips, mucosa, and tongue normal. .   Neck: Supple, symmetrical, trachea midline, no adenopathy. Lungs:   Clear to auscultation bilaterally. Heart:  Regular rate and rhythm, S1, S2 normal, no murmur, click, rub or gallop. Abdomen: Soft, non-tender. Bowel sounds normal. No masses,  No organomegaly. Extremities: Extremities normal, atraumatic, no cyanosis or edema.    Pulses: 2+ and symmetric all extremities. Skin: Skin color-pink, texture, turgor normal.  Psoriasis rash on forehead, abdomen, trunk. Capillary refill normal    Neurologic: CNII-XII intact. No focal motor or sensory deficit.        Labs Reviewed:    BMP:   Lab Results   Component Value Date/Time     11/01/2019 04:05 PM    K 3.9 11/01/2019 04:05 PM     (H) 11/01/2019 04:05 PM    CO2 22 11/01/2019 04:05 PM    AGAP 8 11/01/2019 04:05 PM     (H) 11/01/2019 04:05 PM    BUN 23 (H) 11/01/2019 04:05 PM    CREA 1.98 (H) 11/01/2019 04:05 PM    GFRAA 35 (L) 11/01/2019 04:05 PM    GFRNA 29 (L) 11/01/2019 04:05 PM     CMP:   Lab Results   Component Value Date/Time     11/01/2019 04:05 PM    K 3.9 11/01/2019 04:05 PM     (H) 11/01/2019 04:05 PM    CO2 22 11/01/2019 04:05 PM    AGAP 8 11/01/2019 04:05 PM     (H) 11/01/2019 04:05 PM    BUN 23 (H) 11/01/2019 04:05 PM    CREA 1.98 (H) 11/01/2019 04:05 PM    GFRAA 35 (L) 11/01/2019 04:05 PM    GFRNA 29 (L) 11/01/2019 04:05 PM    CA 7.6 (L) 11/01/2019 04:05 PM    MG 2.2 11/01/2019 02:02 PM    ALB 2.8 (L) 11/01/2019 02:02 PM    TP 6.7 11/01/2019 02:02 PM    GLOB 3.9 11/01/2019 02:02 PM    AGRAT 0.7 (L) 11/01/2019 02:02 PM    SGOT 8 (L) 11/01/2019 02:02 PM    ALT 7 (L) 11/01/2019 02:02 PM     CBC:   Lab Results   Component Value Date/Time    WBC 12.0 11/01/2019 02:02 PM    HGB 7.1 (L) 11/01/2019 02:02 PM    HCT 22.3 (L) 11/01/2019 02:02 PM     (H) 11/01/2019 02:02 PM     All Cardiac Markers in the last 24 hours:   Lab Results   Component Value Date/Time    CPK 55 11/01/2019 04:05 PM    CPK 58 11/01/2019 02:02 PM    CKMB <1.0 11/01/2019 04:05 PM    CKMB <1.0 11/01/2019 02:02 PM    CKND1  11/01/2019 04:05 PM     CALCULATION NOT PERFORMED WHEN RESULT IS BELOW LINEAR LIMIT    CKND1  11/01/2019 02:02 PM     CALCULATION NOT PERFORMED WHEN RESULT IS BELOW LINEAR LIMIT    Donel Link <0.02 11/01/2019 04:05 PM    TROIQ <0.02 11/01/2019 02:02 PM     Recent Glucose Results:   Lab Results   Component Value Date/Time     (H) 11/01/2019 04:05 PM     (H) 11/01/2019 02:02 PM     ABG: No results found for: PH, PHI, PCO2, PCO2I, PO2, PO2I, HCO3, HCO3I, FIO2, FIO2I  COAGS: No results found for: APTT, PTP, INR, INREXT, INREXT  Liver Panel:   Lab Results   Component Value Date/Time    ALB 2.8 (L) 11/01/2019 02:02 PM    TP 6.7 11/01/2019 02:02 PM    GLOB 3.9 11/01/2019 02:02 PM    AGRAT 0.7 (L) 11/01/2019 02:02 PM    SGOT 8 (L) 11/01/2019 02:02 PM    ALT 7 (L) 11/01/2019 02:02 PM     (H) 11/01/2019 02:02 PM     Pancreatic Markers:   Lab Results   Component Value Date/Time    LPSE 110 11/01/2019 02:02 PM       Procedures/imaging: see electronic medical records for all procedures/Xrays and details which were not copied into this note but were reviewed prior to creation of Danya Hannah MD, Internal Medicine     CC: Sebastien Ordonez MD

## 2019-11-02 ENCOUNTER — APPOINTMENT (OUTPATIENT)
Dept: NUCLEAR MEDICINE | Age: 34
End: 2019-11-02
Attending: HOSPITALIST
Payer: MEDICAID

## 2019-11-02 PROBLEM — H00.011 HORDEOLUM EXTERNUM OF RIGHT UPPER EYELID: Status: ACTIVE | Noted: 2019-11-02

## 2019-11-02 LAB
AMPHET UR QL SCN: NEGATIVE
ANION GAP SERPL CALC-SCNC: 6 MMOL/L (ref 3–18)
ATRIAL RATE: 87 BPM
BARBITURATES UR QL SCN: NEGATIVE
BASOPHILS # BLD: 0 K/UL (ref 0–0.1)
BASOPHILS NFR BLD: 0 % (ref 0–2)
BENZODIAZ UR QL: NEGATIVE
BUN SERPL-MCNC: 22 MG/DL (ref 7–18)
BUN/CREAT SERPL: 12 (ref 12–20)
CALCIUM SERPL-MCNC: 7.8 MG/DL (ref 8.5–10.1)
CALCULATED P AXIS, ECG09: 53 DEGREES
CALCULATED R AXIS, ECG10: 75 DEGREES
CALCULATED T AXIS, ECG11: 34 DEGREES
CANNABINOIDS UR QL SCN: NEGATIVE
CHLORIDE SERPL-SCNC: 113 MMOL/L (ref 100–111)
CK MB CFR SERPL CALC: 1.4 % (ref 0–4)
CK MB CFR SERPL CALC: NORMAL % (ref 0–4)
CK MB SERPL-MCNC: 1.2 NG/ML (ref 5–25)
CK MB SERPL-MCNC: <1 NG/ML (ref 5–25)
CK SERPL-CCNC: 73 U/L (ref 26–192)
CK SERPL-CCNC: 83 U/L (ref 26–192)
CO2 SERPL-SCNC: 25 MMOL/L (ref 21–32)
COCAINE UR QL SCN: NEGATIVE
CREAT SERPL-MCNC: 1.85 MG/DL (ref 0.6–1.3)
DIAGNOSIS, 93000: NORMAL
DIFFERENTIAL METHOD BLD: ABNORMAL
EOSINOPHIL # BLD: 0.1 K/UL (ref 0–0.4)
EOSINOPHIL NFR BLD: 1 % (ref 0–5)
ERYTHROCYTE [DISTWIDTH] IN BLOOD BY AUTOMATED COUNT: 14.4 % (ref 11.6–14.5)
GLUCOSE BLD STRIP.AUTO-MCNC: 134 MG/DL (ref 70–110)
GLUCOSE BLD STRIP.AUTO-MCNC: 195 MG/DL (ref 70–110)
GLUCOSE BLD STRIP.AUTO-MCNC: 205 MG/DL (ref 70–110)
GLUCOSE BLD STRIP.AUTO-MCNC: 240 MG/DL (ref 70–110)
GLUCOSE BLD STRIP.AUTO-MCNC: 98 MG/DL (ref 70–110)
GLUCOSE SERPL-MCNC: 76 MG/DL (ref 74–99)
HCT VFR BLD AUTO: 20.4 % (ref 35–45)
HCT VFR BLD AUTO: 24.6 % (ref 35–45)
HDSCOM,HDSCOM: NORMAL
HGB BLD-MCNC: 6.4 G/DL (ref 12–16)
HGB BLD-MCNC: 8 G/DL (ref 12–16)
LYMPHOCYTES # BLD: 3.9 K/UL (ref 0.9–3.6)
LYMPHOCYTES NFR BLD: 35 % (ref 21–52)
MAGNESIUM SERPL-MCNC: 1.9 MG/DL (ref 1.6–2.6)
MCH RBC QN AUTO: 26.4 PG (ref 24–34)
MCHC RBC AUTO-ENTMCNC: 31.4 G/DL (ref 31–37)
MCV RBC AUTO: 84.3 FL (ref 74–97)
METHADONE UR QL: NEGATIVE
MONOCYTES # BLD: 0.7 K/UL (ref 0.05–1.2)
MONOCYTES NFR BLD: 6 % (ref 3–10)
NEUTS SEG # BLD: 6.4 K/UL (ref 1.8–8)
NEUTS SEG NFR BLD: 58 % (ref 40–73)
OPIATES UR QL: NEGATIVE
P-R INTERVAL, ECG05: 146 MS
PCP UR QL: NEGATIVE
PLATELET # BLD AUTO: 398 K/UL (ref 135–420)
PMV BLD AUTO: 8.1 FL (ref 9.2–11.8)
POTASSIUM SERPL-SCNC: 3.5 MMOL/L (ref 3.5–5.5)
Q-T INTERVAL, ECG07: 384 MS
QRS DURATION, ECG06: 82 MS
QTC CALCULATION (BEZET), ECG08: 462 MS
RBC # BLD AUTO: 2.42 M/UL (ref 4.2–5.3)
SODIUM SERPL-SCNC: 144 MMOL/L (ref 136–145)
TROPONIN I SERPL-MCNC: <0.02 NG/ML (ref 0–0.04)
TROPONIN I SERPL-MCNC: <0.02 NG/ML (ref 0–0.04)
VENTRICULAR RATE, ECG03: 87 BPM
WBC # BLD AUTO: 11.2 K/UL (ref 4.6–13.2)

## 2019-11-02 PROCEDURE — 85025 COMPLETE CBC W/AUTO DIFF WBC: CPT

## 2019-11-02 PROCEDURE — A9567 TECHNETIUM TC-99M AEROSOL: HCPCS

## 2019-11-02 PROCEDURE — 74011636637 HC RX REV CODE- 636/637: Performed by: HOSPITALIST

## 2019-11-02 PROCEDURE — 85018 HEMOGLOBIN: CPT

## 2019-11-02 PROCEDURE — 80307 DRUG TEST PRSMV CHEM ANLYZR: CPT

## 2019-11-02 PROCEDURE — 74011250636 HC RX REV CODE- 250/636: Performed by: HOSPITALIST

## 2019-11-02 PROCEDURE — 82962 GLUCOSE BLOOD TEST: CPT

## 2019-11-02 PROCEDURE — 82043 UR ALBUMIN QUANTITATIVE: CPT

## 2019-11-02 PROCEDURE — 96372 THER/PROPH/DIAG INJ SC/IM: CPT

## 2019-11-02 PROCEDURE — 80048 BASIC METABOLIC PNL TOTAL CA: CPT

## 2019-11-02 PROCEDURE — 86923 COMPATIBILITY TEST ELECTRIC: CPT

## 2019-11-02 PROCEDURE — 83735 ASSAY OF MAGNESIUM: CPT

## 2019-11-02 PROCEDURE — 96361 HYDRATE IV INFUSION ADD-ON: CPT

## 2019-11-02 PROCEDURE — P9016 RBC LEUKOCYTES REDUCED: HCPCS

## 2019-11-02 PROCEDURE — 36415 COLL VENOUS BLD VENIPUNCTURE: CPT

## 2019-11-02 PROCEDURE — 86900 BLOOD TYPING SEROLOGIC ABO: CPT

## 2019-11-02 PROCEDURE — 99218 HC RM OBSERVATION: CPT

## 2019-11-02 PROCEDURE — 74011250637 HC RX REV CODE- 250/637: Performed by: HOSPITALIST

## 2019-11-02 PROCEDURE — 36430 TRANSFUSION BLD/BLD COMPNT: CPT

## 2019-11-02 PROCEDURE — 82550 ASSAY OF CK (CPK): CPT

## 2019-11-02 RX ORDER — SODIUM CHLORIDE 9 MG/ML
250 INJECTION, SOLUTION INTRAVENOUS AS NEEDED
Status: DISCONTINUED | OUTPATIENT
Start: 2019-11-02 | End: 2019-11-03 | Stop reason: HOSPADM

## 2019-11-02 RX ORDER — ERYTHROMYCIN 5 MG/G
OINTMENT OPHTHALMIC EVERY 8 HOURS
Status: DISCONTINUED | OUTPATIENT
Start: 2019-11-02 | End: 2019-11-03 | Stop reason: HOSPADM

## 2019-11-02 RX ADMIN — ERYTHROMYCIN: 5 OINTMENT OPHTHALMIC at 22:13

## 2019-11-02 RX ADMIN — HEPARIN SODIUM 5000 UNITS: 5000 INJECTION INTRAVENOUS; SUBCUTANEOUS at 04:37

## 2019-11-02 RX ADMIN — INSULIN LISPRO 6 UNITS: 100 INJECTION, SOLUTION INTRAVENOUS; SUBCUTANEOUS at 15:03

## 2019-11-02 RX ADMIN — ERYTHROMYCIN: 5 OINTMENT OPHTHALMIC at 14:57

## 2019-11-02 RX ADMIN — IRON SUCROSE 100 MG: 20 INJECTION, SOLUTION INTRAVENOUS at 14:11

## 2019-11-02 RX ADMIN — INSULIN GLARGINE 30 UNITS: 100 INJECTION, SOLUTION SUBCUTANEOUS at 22:09

## 2019-11-02 RX ADMIN — INSULIN LISPRO 3 UNITS: 100 INJECTION, SOLUTION INTRAVENOUS; SUBCUTANEOUS at 22:09

## 2019-11-02 RX ADMIN — FAMOTIDINE 20 MG: 20 TABLET ORAL at 09:36

## 2019-11-02 RX ADMIN — HEPARIN SODIUM 5000 UNITS: 5000 INJECTION INTRAVENOUS; SUBCUTANEOUS at 17:36

## 2019-11-02 RX ADMIN — HEPARIN SODIUM 5000 UNITS: 5000 INJECTION INTRAVENOUS; SUBCUTANEOUS at 09:36

## 2019-11-02 NOTE — ROUTINE PROCESS
Bedside and Verbal shift change report given to JACKIE Corea R.N. (oncoming nurse) by VIANCA Mendes R.N. (offgoing nurse). Report included the following information SBAR, Kardex, Intake/Output, MAR, Accordion, Recent Results and Med Rec Status.

## 2019-11-02 NOTE — PROGRESS NOTES
Bedside and Verbal shift change report given to Leland Ledezma  (oncoming nurse) by Lawanda Barker RN  (offgoing nurse). Report given with SBAR, Kardex, Intake/Output and Recent Results.

## 2019-11-02 NOTE — ROUTINE PROCESS
Bedside and Verbal shift change report given to ARIN Gomes R.N. (oncoming nurse) by VIANCA Mendes R.N. (offgoing nurse). Report included the following information SBAR, Kardex, Intake/Output, MAR, Accordion, Recent Results and Med Rec Status.

## 2019-11-02 NOTE — CONSULTS
Nephrology Consult Note    Consult requesting Physican:   Dr Penaloza Records    Reason for Consult:   Acute on CKD    HPI:   Val Quintanilla is 38 yo female with PMHx of DM, gastroparesis, anemia and psoriasis who presented with chest pain and mild SOB. No fever. On initial eval CxR and cardiac enzymes are unremarkable, labs showed Cr of 2.2 and Hb 7.1. Her recent baseline Cr had been ~1.5 and has no nephrology FU. Pt has long hx of fe def anemia, claims from heavy menstrual bleed. She denies hx of HTN or cardiac disease. Pt was started on IVF hydration, Cr today down to 1.8. Hb dropped to 6.4, w/ low iron store. She is about to get prbc. She is currently on her menstrual period. CP seems to be improving. No fever or SOB. CT abd (6/2019): There is duplicated right-sided collecting system with mild dilatation of both upper ureters down to the level of the uterus with no definite dilatation more distally. No evidence of urinary system calculus. There is increased mild bilateral perirenal stranding. There is also mild prominence of single left ureter down to the level of the uterus. Stable small left lower pole renal cyst.    Impression:   - Acute on CKD likely prerenal, improving on hydration Cr 2.2=>1.8 today  - CKD stage 3, likely DMN recent baseline Cr 1.5.  - Proteinuria, likely sec to DMN  - Hematuria, likely sec to DMN  - Sever Fe def anemia ?heavy menstrual blood los  - DM, uncontrolled HbA1C 12.4%.  W/ gastroparesis  - Chest pain, ?non-cardiac, ?demand ischemia from sever anemia, now better  - HTN, undiagnosed  - Lt renal cyst  - Psoriasis       Plan:   -D/C IVF  -Encourage PO hydration  -D/C Toradol (NSAIDs)  -Plan for PRBC tx  -Will check GN serology, U alb   -Avoid nephrotoxins including NSAIDs and iodinated iv dye   -Once Cr stabilizes, will need ACEi or ARB  -Monitor UOP and serum chem   -Advised on better BS control      Medications:     Current Facility-Administered Medications:     0.9% sodium chloride infusion 250 mL, 250 mL, IntraVENous, PRN, Fela Alfonso MD    amLODIPine (NORVASC) tablet 5 mg, 5 mg, Oral, DAILY, Abdiaziz Romero MD    famotidine (PEPCID) tablet 20 mg, 20 mg, Oral, DAILY, Abdiaziz Romero MD, 20 mg at 11/02/19 0936    insulin glargine (LANTUS) injection 30 Units, 30 Units, SubCUTAneous, QHS, Abdiaziz Romero MD, 30 Units at 11/01/19 2148    0.9% sodium chloride infusion, 75 mL/hr, IntraVENous, CONTINUOUS, Abdiaziz Romero MD, Last Rate: 75 mL/hr at 11/01/19 1901, 75 mL/hr at 11/01/19 1901    acetaminophen (TYLENOL) tablet 650 mg, 650 mg, Oral, Q4H PRN, Abdiaziz Romero MD    ketorolac (TORADOL) injection 15 mg, 15 mg, IntraVENous, Q6H PRN, Abdiaziz Romero MD    [facility]) injection 0.4 mg, 0.4 mg, IntraVENous, PRN, Abdiaziz Romero MD    diphenhydrAMINE (BENADRYL) injection 12.5 mg, 12.5 mg, IntraVENous, Q4H PRN, Abdiaziz Romero MD    ondansetron [facility]) injection 4 mg, 4 mg, IntraVENous, Q4H PRN, Abdiaziz Romero MD    heparin (porcine) injection 5,000 Units, 5,000 Units, SubCUTAneous, Q8H, Abdiaziz Romero MD, 5,000 Units at 11/02/19 8269    insulin lispro (HUMALOG) injection, , SubCUTAneous, AC&HS, Abdiaziz Romero MD, Stopped at 11/02/19 0700    glucose chewable tablet 16 g, 4 Tab, Oral, PRN, Abdiaziz Romero MD    glucagon [facility]) injection 1 mg, 1 mg, IntraMUSCular, PRN, Abdiaziz Romero MD    influenza vaccine 2019-20 (6 mos+)(PF) (FLUARIX/FLULAVAL/FLUZONE QUAD) injection 0.5 mL, 0.5 mL, IntraMUSCular, PRIOR TO DISCHARGE, Abdiaziz Romero MD    PM/SHx:     Active Ambulatory Problems     Diagnosis Date Noted    Gastroparesis 06/06/2019    Dehydration 06/06/2019    Leukocytosis 06/06/2019    Epigastric pain 06/06/2019    Lactic acid acidosis 06/06/2019    Acute kidney injury ([facility] [address] 06/06/2019    Intractable vomiting with nausea 06/06/2019    Uncontrolled type 2 diabetes mellitus with hyperglycemia ([facility] [address] 06/06/2019    UTI (urinary tract infection) 06/06/2019    Chronic anemia 06/07/2019     Resolved Ambulatory Problems     Diagnosis Date Noted    No Resolved Ambulatory Problems     Past Medical History:   Diagnosis Date    Diabetes ([facility] [address]     Hypertension        SHx:     Social History     Socioeconomic History    Marital status: SINGLE     Spouse name: Not on file    Number of children: Not on file    Years of education: Not on file    Highest education level: Not on file   Occupational History    Not on file   Social Needs    Financial resource strain: Not on file    Food insecurity:     Worry: Not on file     Inability: Not on file    Transportation needs:     Medical: Not on file     Non-medical: Not on file   Tobacco Use    Smoking status: Never Smoker    Smokeless tobacco: Never Used   Substance and Sexual Activity    Alcohol use: Never     Frequency: Never    Drug use: Not Currently    Sexual activity: Not on file   Lifestyle    Physical activity:     Days per week: Not on file     Minutes per session: Not on file    Stress: Not on file   Relationships    Social connections:     Talks on phone: Not on file     Gets together: Not on file     Attends Alevism service: Not on file     Active member of club or organization: Not on file     Attends meetings of clubs or organizations: Not on file     Relationship status: Not on file    Intimate partner violence:     Fear of current or ex partner: Not on file     Emotionally abused: Not on file     Physically abused: Not on file     Forced sexual activity: Not on file   Other Topics Concern    Not on file   Social History Narrative    Not on file       Allergies:   No Known Allergies    Review of Systems:   Review of System is negative except per HPI    Physical Assessment:     Visit Vitals  /80 (BP 1 Location: Right arm, BP Patient Position: At rest)   Pulse 79   Temp 97.5 °F (36.4 °C)   Resp 18   Ht 5' 4\" (1.626 m)   Wt 61 kg (134 lb 7.7 oz)   SpO2 100%   BMI 23.08 kg/m²       Intake/Output Summary (Last 24 hours) at 11/2/2019 1005  Last data filed at 11/2/2019 0931  Gross per 24 hour Intake 480 ml   Output    Net 480 ml       General Appearance: no pain, no distress  Head: atraumatic  HEENT: no jaundice, moist oral mucosa  Neck: no JVD noted  Chest: LS clear to auscultation bilaterally  Heart: S1/S2, no murmur, gallop or rub, RRR  Adbomen: soft, nontender, BS active   : no flank tenderness, no taylor catheter  Skin: Psoriatic lesion on Rt side of forehead  Extremity: no edema, cyanosis or clubbing  MS: No joint deformity or tenderness  Neuro: conscious, alert, oriented x 3, no focal deficit    Courtney Oconnor MD    Work Number: [phone]

## 2019-11-02 NOTE — PROGRESS NOTES
[id number] Shift Summary: Pt rested well overnight with no complaints. Reported Hbg this AM of 6.4 to Dr Xin Herman. She stated she will consider placing an order for one unit of PRBC. No other clinical concerns noted.

## 2019-11-02 NOTE — PROGRESS NOTES
Verbal bedside received from 10 [facility] off going nurse. Assumed patient care, bed in low position, call light within reach, white board updated. 0600 Patient had uneventful night. No acute distress. Bedside and Verbal shift change report given to Shanthi Dacosta RN (oncoming nurse) by Zamzam Morgan (offgoing nurse). Report included the following information SBAR, Kardex and MAR.

## 2019-11-02 NOTE — PROGRESS NOTES
Problem: Diabetes Self-Management  Goal: *Disease process and treatment process  Description  Define diabetes and identify own type of diabetes; list 3 options for treating diabetes. Outcome: Progressing Towards Goal  Goal: *Incorporating nutritional management into lifestyle  Description  Describe effect of type, amount and timing of food on blood glucose; list 3 methods for planning meals. Outcome: Progressing Towards Goal  Goal: *Incorporating physical activity into lifestyle  Description  State effect of exercise on blood glucose levels. Outcome: Progressing Towards Goal  Goal: *Developing strategies to promote health/change behavior  Description  Define the ABC's of diabetes; identify appropriate screenings, schedule and personal plan for screenings. Outcome: Progressing Towards Goal  Goal: *Using medications safely  Description  State effect of diabetes medications on diabetes; name diabetes medication taking, action and side effects. Outcome: Progressing Towards Goal  Goal: *Monitoring blood glucose, interpreting and using results  Description  Identify recommended blood glucose targets  and personal targets. Outcome: Progressing Towards Goal  Goal: *Prevention, detection, treatment of acute complications  Description  List symptoms of hyper- and hypoglycemia; describe how to treat low blood sugar and actions for lowering  high blood glucose level. Outcome: Progressing Towards Goal  Goal: *Prevention, detection and treatment of chronic complications  Description  Define the natural course of diabetes and describe the relationship of blood glucose levels to long term complications of diabetes.   Outcome: Progressing Towards Goal  Goal: *Developing strategies to address psychosocial issues  Description  Describe feelings about living with diabetes; identify support needed and support network  Outcome: Progressing Towards Goal  Goal: *Insulin pump training  Outcome: Progressing Towards Goal  Goal: *Sick day guidelines  Outcome: Progressing Towards Goal  Goal: *Patient Specific Goal (EDIT GOAL, INSERT TEXT)  Outcome: Progressing Towards Goal     Problem: Patient Education: Go to Patient Education Activity  Goal: Patient/Family Education  Outcome: Progressing Towards Goal     Problem: Falls - Risk of  Goal: *Absence of Falls  Description  Document Rima Buenrostro Fall Risk and appropriate interventions in the flowsheet.   Outcome: Progressing Towards Goal  Note:   Fall Risk Interventions:            Medication Interventions: Patient to call before getting OOB, Teach patient to arise slowly                   Problem: Patient Education: Go to Patient Education Activity  Goal: Patient/Family Education  Outcome: Progressing Towards Goal

## 2019-11-02 NOTE — PROGRESS NOTES
Hospitalist Progress Note-critical care note     Patient: Kary Meza MRN: [id number]  CSN: [id number]    YOB: 1985  Age: 37 y.o. Sex: female    DOA: 11/1/2019 LOS:  LOS: 0 days            Chief complaint: chest pain, sty, anemia . wanda on ckd3 . Assessment/Plan         Hospital Problems  Never Reviewed          Codes Class Noted POA    Hordeolum externum of right upper eyelid ICD-10-CM: H00.011  ICD-9-CM: 373.11  11/2/2019 Unknown        * (Principal) Chest pain ICD-10-CM: R07.9  ICD-9-CM: 786.50  11/1/2019 Unknown        Anemia ICD-10-CM: D64.9  ICD-9-CM: 285.9  11/1/2019 Unknown        Hyperglycemia ICD-10-CM: R73.9  ICD-9-CM: 790.29  11/1/2019 Unknown        Acute renal failure superimposed on stage 3 chronic kidney disease ([facility] [address] ICD-10-CM: N17.9, N18.3  ICD-9-CM: 584.9, 585.3  11/1/2019         Uncontrolled type 2 diabetes mellitus with hyperglycemia (HCC) ICD-10-CM: E11.65  ICD-9-CM: 250.02  6/6/2019 Yes            Chest pain reproducible and pleural chest pain   Improving . ce wnl   Echo, VQ scan to rule out PE     Acute on chronic renal failure3   Improving ,   Avoid IV contrast and NSAIDs for pain  Case discussed with Dr. Alan Espinosa      Anemia-iron deficiency   Transfuse blood and iron   Give iron transfusion, occult stool negative  Due to chronic renal disease and menorrhagia     Hypertension  Not taking medication     Sty of rt side   Top abx,     Subjective: chest pain better, eye lip swelling , no blurred vision   rn : no taking norvasc   Disposition :tbd,   Review of systems:    General: No fevers or chills. Cardiovascular: No chest pain or pressure. No palpitations. Pulmonary: No shortness of breath. Gastrointestinal: No nausea, vomiting.      Vital signs/Intake and Output:  Visit Vitals  /89 (BP 1 Location: Right arm, BP Patient Position: At rest)   Pulse 81   Temp 98.2 °F (36.8 °C)   Resp 18   Ht 5' 4\" (1.626 m)   Wt 61 kg (134 lb 7.7 oz)   SpO2 100%   BMI 23.08 kg/m² Current Shift:  11/02 0701 - 11/02 1900  In: 600 [P.O.:600]  Out: -   Last three shifts:  No intake/output data recorded. Physical Exam:  General: WD, WN. Alert, cooperative, no acute distress    HEENT: NC, Atraumatic. PERRLA, anicteric sclerae. Rt upper eye lid-mild swelling, sty noted   Lungs: CTA Bilaterally. No Wheezing/Rhonchi/Rales. Heart:  Regular  rhythm,  No murmur, No Rubs, No Gallops  Abdomen: Soft, Non distended, Non tender.  +Bowel sounds,   Extremities: No c/c/e  Psych:   Not anxious or agitated. Neurologic:  No acute neurological deficit. Labs: Results:       Chemistry Recent Labs     11/02/19  0145 11/01/19  1605 11/01/19  1402   GLU 76 204* 234*    143 141   K 3.5 3.9 3.9   * 113* 110   CO2 25 22 22   BUN 22* 23* 26*   CREA 1.85* 1.98* 2.23*   CA 7.8* 7.6* 8.1*   AGAP 6 8 9   BUCR 12 12 12   AP  --   --  125*   TP  --   --  6.7   ALB  --   --  2.8*   GLOB  --   --  3.9   AGRAT  --   --  0.7*      CBC w/Diff Recent Labs     11/02/19  0145 11/01/19  1402   WBC 11.2 12.0   RBC 2.42* 2.67*   HGB 6.4* 7.1*   HCT 20.4* 22.3*    428*   GRANS 58 72   LYMPH 35 20*   EOS 1 2      Cardiac Enzymes Recent Labs     11/02/19  0738 11/02/19 0145   CPK 83 73   CKND1 1.4 CALCULATION NOT PERFORMED WHEN RESULT IS BELOW LINEAR LIMIT      Coagulation No results for input(s): PTP, INR, APTT, INREXT, INREXT in the last 72 hours. Lipid Panel No results found for: CHOL, CHOLPOCT, CHOLX, CHLST, CHOLV, 566106, HDL, HDLP, LDL, LDLC, DLDLP, 608918, VLDLC, VLDL, TGLX, TRIGL, TRIGP, TGLPOCT, CHHD, CHHDX   BNP No results for input(s): BNPP in the last 72 hours.    Liver Enzymes Recent Labs     11/01/19  1402   TP 6.7   ALB 2.8*   *   SGOT 8*      Thyroid Studies No results found for: T4, T3U, TSH, TSHEXT, TSHEXT     Procedures/imaging: see electronic medical records for all procedures/Xrays and details which were not copied into this note but were reviewed prior to creation of Ana Hernández MD

## 2019-11-02 NOTE — PROGRESS NOTES
0710:Received verbal bedside report from off going nurse ARIN Gomes R.N. Patient care received. Patient alert and oriented x 4. Patient resting in bed denies pain. Patient stable. Call light with in reach bed in lowest position.

## 2019-11-02 NOTE — PROGRESS NOTES
INITIAL NUTRITION ASSESSMENT     RECOMMENDATIONS/PLAN:   Will order phos given decreased renal function  Recc Glucerna at this time, will order BID  Tight blood glucose control--? If wt loss is related to uncontrolled DM  Monitor PO intakes, weight, fluid status, skin integrity, bowel function  REASON FOR ASSESSMENT:     []  RN Referral:    [] MST score >/=2  Malnutrition Screening Tool (MST):  Recently Lost Weight Without Trying: Yes  If Yes, How Much Weight Loss: 2-13 lbs  Eating Poorly Due to Decreased Appetite: Yes  MST Score: 2    [] Enteral/Parenteral Nutrition PTA   [] Pregnant (Not in Labor):  [] Gestational DM     [] Multigestation   [] Pressure Ulcer/Wound Care needs    NUTRITION ASSESSMENT:   Client History: 37 yrs old Female admitted with c/o chest pain     PMHx: DM, gastroparesis, HTN, UTI  Cultural/Lutheran Food Preferences: None Identified    FOOD/NUTRITION HISTORY  Diet History: unable to obtain at this time  Food Allergies:  [x] NKFA       Pertinent PTA Medications: immodium, pepcid, lantus, reglan, insulin    NUTRITION INTAKE   Diet Order: diabetic consistent carb 1800kcal     Average PO Intake:       No data found.    Pertinent Medications:  [x] Reviewed; pepcid, lantus, lispro  Electrolyte Replacement Protocol: []K  []Mg  []PO4    Insulin:  [] SSI  [x] Pre-meal   [x]  Basal   [] Drip  [] None  Pt expected to meet estimated nutrient needs through next review:          [x]  Yes      ANTHROPOMETRICS  Height: 5' 4\" (162.6 cm)       Weight: 61 kg (134 lb 7.7 oz)    BMI: 23.1 kg/m^2  -  normal weight (18.5%-24.9% BMI)        Weight change: wt loss of approx 11lbs x 5 months; 7.6% body weight                                  Comparison to Reference Standards:  IBW: 120 lbs      %IBW: 112%      AdjBW: n/a kg    NUTRITION-FOCUSED PHYSICAL ASSESSMENT  Skin: no pressure area per documentation  GI: BM 10/31    BIOCHEMICAL DATA & MEDICAL TESTS  Pertinent Labs:  [x] Reviewed; [id number], [id number], i sent a my chart message, please set up blood work at her convenience no rush   NUTRITION PRESCRIPTION  Calories: 0049-3292 kcal/day based on 25-30kcal/kg  Protein: 61 g/day based on 1 g/kg  CHO: 190-229 g/day based on 50% of total energy  Fluid: 5594-7911 ml/day based on 1 kcal/ml      NUTRITION DIAGNOSES:   1. Unintended weight loss related to decreased appetite as evidenced by MST report and wt loss of approx 7.6% x 5 months    NUTRITION INTERVENTIONS:   INTERVENTIONS:        GOALS:  1. Commercial beverage: Glucerna BID 1. PO intakes of glucerna BID >75% throughout the next 3 days     LEARNING NEEDS (Diet, Supplementation, Food/Nutrient-Drug Interaction):   [x] None Identified  [] Inpatient education provided/documented    [] Identified and patient:  [] Declined     [] Was not appropriate/indicated  NUTRITION MONITORING /EVALUATION:   Follow PO intake  Monitor wt  Monitor renal labs, electrolytes, fluid status  Monitor for additional supplement needs    [] Participated in Interdisciplinary Rounds  [x] [address] [place] [address] Reviewed/Documented  DISCHARGE NUTRITION RECOMMENDATIONS ADDRESSED:     [] Yes- recommend diabetic diet    [] To be determined closer to discharge    NUTRITION RISK:     [x]  At risk                     []  Not currently at risk     Will follow-up per policy.   Maria T Saenz 1

## 2019-11-02 NOTE — PROGRESS NOTES
Problem: Diabetes Self-Management  Goal: *Disease process and treatment process  Description  Define diabetes and identify own type of diabetes; list 3 options for treating diabetes. Outcome: Progressing Towards Goal  Goal: *Incorporating nutritional management into lifestyle  Description  Describe effect of type, amount and timing of food on blood glucose; list 3 methods for planning meals. Outcome: Progressing Towards Goal  Goal: *Incorporating physical activity into lifestyle  Description  State effect of exercise on blood glucose levels. Outcome: Progressing Towards Goal     Problem: Falls - Risk of  Goal: *Absence of Falls  Description  Document Maxim Dipak Fall Risk and appropriate interventions in the flowsheet.   Outcome: Progressing Towards Goal  Note:   Fall Risk Interventions:            Medication Interventions: Patient to call before getting OOB, Teach patient to arise slowly           Problem: Patient Education: Go to Patient Education Activity  Goal: Patient/Family Education  Outcome: Progressing Towards Goal     Problem: Nutrition Deficit  Goal: *Optimize nutritional status  Outcome: Progressing Towards Goal

## 2019-11-02 NOTE — PROGRESS NOTES
DC Plan: Discharge home with MD follow up, family assistance once medically stable. Will need Medicaid taxi home at discharge. Chart reviewed as CM on call. Pt admitted to tele in obs status for chest pain, anemia. Met with pt at bedside, no friend/family present. Pt states she lives with mom,  and her 5 kids. Pt independent and [facility]. Pt states her PCP is MD Amaro per face sheet. Pt states she also plans to follow up @ [facility] and has an appt schd for Wed. Pt takes Medicaid taxi to appts. No immediate dc concerns identified. Obs letter given. CM will cont to follow for transition of care needs and be available via hospital . Reason for Admission:   Per H&P, pt is \"is a 37 y.o. female who passed to medical history  diabetes, hypertension came to ER due to chest pain since yesterday. The chest pain located left side of the chest, no history of trauma, chest pain increased with inspiration. Her EKG no ST segmental changes. Cardiac enzymes was with normal limit. Chest x-ray no acute process. She was found creatinine 2.23, H&H 7.1/22. 3. IV hydration was giving in ER. She denies any black stool, but she has heavy menstrual.  Occult stool is negative in ER\"                   RRAT Score:      Low 10               Plan for utilizing home health:      None at this time, pt independent                    Current Advanced Directive/Advance Care Plan: Not on file                         Transition of Care Plan:          MD follow up    Care Management Interventions  PCP Verified by CM: Yes  Mode of Transport at Discharge:  Other (see comment)(medicaid taxi)  Transition of Care Consult (CM Consult): Discharge Planning  Current Support Network: Lives with Spouse  Confirm Follow Up Transport: Cab  Plan discussed with Pt/Family/Caregiver: Yes  Discharge Location  Discharge Placement: Home with family assistance

## 2019-11-03 ENCOUNTER — APPOINTMENT (OUTPATIENT)
Dept: NON INVASIVE DIAGNOSTICS | Age: 34
End: 2019-11-03
Attending: HOSPITALIST
Payer: MEDICAID

## 2019-11-03 VITALS
HEIGHT: 64 IN | WEIGHT: 127 LBS | BODY MASS INDEX: 21.68 KG/M2 | DIASTOLIC BLOOD PRESSURE: 91 MMHG | RESPIRATION RATE: 14 BRPM | TEMPERATURE: 98.2 F | OXYGEN SATURATION: 100 % | SYSTOLIC BLOOD PRESSURE: 155 MMHG | HEART RATE: 87 BPM

## 2019-11-03 LAB
ABO + RH BLD: NORMAL
ANION GAP SERPL CALC-SCNC: 7 MMOL/L (ref 3–18)
AV VELOCITY RATIO: 0.8
AV VTI RATIO: 0.8
BASOPHILS # BLD: 0 K/UL (ref 0–0.1)
BASOPHILS NFR BLD: 0 % (ref 0–2)
BLD PROD TYP BPU: NORMAL
BLOOD GROUP ANTIBODIES SERPL: NORMAL
BPU ID: NORMAL
BUN SERPL-MCNC: 21 MG/DL (ref 7–18)
BUN/CREAT SERPL: 11 (ref 12–20)
CALCIUM SERPL-MCNC: 8.3 MG/DL (ref 8.5–10.1)
CALLED TO:,BCALL1: NORMAL
CHLORIDE SERPL-SCNC: 114 MMOL/L (ref 100–111)
CO2 SERPL-SCNC: 23 MMOL/L (ref 21–32)
CREAT SERPL-MCNC: 1.86 MG/DL (ref 0.6–1.3)
CREAT UR-MCNC: 89 MG/DL (ref 30–125)
CROSSMATCH RESULT,%XM: NORMAL
DIFFERENTIAL METHOD BLD: ABNORMAL
ECHO AO ASC DIAM: 2.43 CM
ECHO AV AREA PEAK VELOCITY: 2.8 CM2
ECHO AV AREA VTI: 2.9 CM2
ECHO AV AREA/BSA PEAK VELOCITY: 1.7 CM2/M2
ECHO AV AREA/BSA VTI: 1.8 CM2/M2
ECHO AV MEAN GRADIENT: 2.1 MMHG
ECHO AV MEAN VELOCITY: 0.7 M/S
ECHO AV PEAK GRADIENT: 3.8 MMHG
ECHO AV PEAK VELOCITY: 97.98 CM/S
ECHO AV VTI: 18.69 CM
ECHO IVC PROX: 1.2 CM
ECHO LA AREA 2C: 13.8 CM2
ECHO LA AREA 4C: 14.5 CM2
ECHO LA MAJOR AXIS: 3.59 CM
ECHO LA VOL 2C: 47.65 ML (ref 22–52)
ECHO LA VOL 4C: 43.39 ML (ref 22–52)
ECHO LA VOL BP: 53.46 ML (ref 22–52)
ECHO LA VOLUME INDEX A2C: 29.55 ML/M2 (ref 16–28)
ECHO LA VOLUME INDEX A4C: 26.91 ML/M2 (ref 16–28)
ECHO LV E' LATERAL VELOCITY: 7 CM/S
ECHO LV E' SEPTAL VELOCITY: 5 CM/S
ECHO LV EDV A2C: 90.8 ML
ECHO LV EDV A4C: 80.1 ML
ECHO LV EDV BP: 88.7 ML (ref 56–104)
ECHO LV EDV INDEX A4C: 49.7 ML/M2
ECHO LV EDV INDEX BP: 55 ML/M2
ECHO LV EDV NDEX A2C: 56.3 ML/M2
ECHO LV EDV TEICHHOLZ: 0.42 ML
ECHO LV EJECTION FRACTION A2C: 59 %
ECHO LV EJECTION FRACTION A4C: 58 %
ECHO LV EJECTION FRACTION BIPLANE: 59.8 % (ref 55–100)
ECHO LV ESV A2C: 37.4 ML
ECHO LV ESV A4C: 33.3 ML
ECHO LV ESV BP: 35.7 ML (ref 19–49)
ECHO LV ESV INDEX A2C: 23.2 ML/M2
ECHO LV ESV INDEX A4C: 20.6 ML/M2
ECHO LV ESV INDEX BP: 22.1 ML/M2
ECHO LV ESV TEICHHOLZ: 0.21 ML
ECHO LV INTERNAL DIMENSION DIASTOLIC: 3.95 CM (ref 3.9–5.3)
ECHO LV INTERNAL DIMENSION SYSTOLIC: 2.98 CM
ECHO LV IVSD: 1.28 CM (ref 0.6–0.9)
ECHO LV MASS 2D: 228.1 G (ref 67–162)
ECHO LV MASS INDEX 2D: 141.4 G/M2 (ref 43–95)
ECHO LV POSTERIOR WALL DIASTOLIC: 1.42 CM (ref 0.6–0.9)
ECHO LV POSTERIOR WALL SYSTOLIC: 0 CM
ECHO LVOT DIAM: 2.11 CM
ECHO LVOT PEAK GRADIENT: 2.5 MMHG
ECHO LVOT PEAK VELOCITY: 78.29 CM/S
ECHO LVOT VTI: 15.64 CM
ECHO MV A VELOCITY: 93.62 CM/S
ECHO MV AREA PHT: 7 CM2
ECHO MV E DECELERATION TIME (DT): 108.7 MS
ECHO MV E VELOCITY: 61.28 CM/S
ECHO MV E/A RATIO: 0.65
ECHO MV E/E' LATERAL: 8.75
ECHO MV E/E' RATIO (AVERAGED): 10.51
ECHO MV E/E' SEPTAL: 12.26
ECHO MV PRESSURE HALF TIME (PHT): 31.5 MS
ECHO RA AREA 4C: 7.22 CM2
ECHO RA VOLUME: 13.7 ML
ECHO RV INTERNAL DIMENSION: 2.88 CM
ECHO TRICUSPID ANNULAR PEAK SYSTOLIC VELOCITY: 2.3 CM/S
EOSINOPHIL # BLD: 0.2 K/UL (ref 0–0.4)
EOSINOPHIL NFR BLD: 2 % (ref 0–5)
ERYTHROCYTE [DISTWIDTH] IN BLOOD BY AUTOMATED COUNT: 14.7 % (ref 11.6–14.5)
GLUCOSE BLD STRIP.AUTO-MCNC: 96 MG/DL (ref 70–110)
GLUCOSE SERPL-MCNC: 55 MG/DL (ref 74–99)
HCT VFR BLD AUTO: 25.8 % (ref 35–45)
HGB BLD-MCNC: 8.4 G/DL (ref 12–16)
LVFS 2D: 24.66 %
LVOT MG: 1.35 MMHG
LVOT MV: 0.55 CM/S
LVSV (MOD BI): 32.84 ML
LVSV (MOD SINGLE 4C): 28.98 ML
LVSV (MOD SINGLE): 33.05 ML
LVSV (TEICH): 20.82 ML
LYMPHOCYTES # BLD: 3.4 K/UL (ref 0.9–3.6)
LYMPHOCYTES NFR BLD: 33 % (ref 21–52)
MAGNESIUM SERPL-MCNC: 2.1 MG/DL (ref 1.6–2.6)
MCH RBC QN AUTO: 27.8 PG (ref 24–34)
MCHC RBC AUTO-ENTMCNC: 32.6 G/DL (ref 31–37)
MCV RBC AUTO: 85.4 FL (ref 74–97)
MICROALBUMIN UR-MCNC: 338 MG/DL (ref 0–3)
MICROALBUMIN/CREAT UR-RTO: 3798 MG/G (ref 0–30)
MONOCYTES # BLD: 0.7 K/UL (ref 0.05–1.2)
MONOCYTES NFR BLD: 6 % (ref 3–10)
MV DEC SLOPE: 5.64
NEUTS SEG # BLD: 6.1 K/UL (ref 1.8–8)
NEUTS SEG NFR BLD: 59 % (ref 40–73)
PHOSPHATE SERPL-MCNC: 3.1 MG/DL (ref 2.5–4.9)
PLATELET # BLD AUTO: 470 K/UL (ref 135–420)
PMV BLD AUTO: 8.7 FL (ref 9.2–11.8)
POTASSIUM SERPL-SCNC: 4.2 MMOL/L (ref 3.5–5.5)
RBC # BLD AUTO: 3.02 M/UL (ref 4.2–5.3)
SODIUM SERPL-SCNC: 144 MMOL/L (ref 136–145)
SPECIMEN EXP DATE BLD: NORMAL
STATUS OF UNIT,%ST: NORMAL
UNIT DIVISION, %UDIV: 0
WBC # BLD AUTO: 10.4 K/UL (ref 4.6–13.2)

## 2019-11-03 PROCEDURE — 84100 ASSAY OF PHOSPHORUS: CPT

## 2019-11-03 PROCEDURE — 93306 TTE W/DOPPLER COMPLETE: CPT

## 2019-11-03 PROCEDURE — 86160 COMPLEMENT ANTIGEN: CPT

## 2019-11-03 PROCEDURE — 83735 ASSAY OF MAGNESIUM: CPT

## 2019-11-03 PROCEDURE — 90686 IIV4 VACC NO PRSV 0.5 ML IM: CPT | Performed by: HOSPITALIST

## 2019-11-03 PROCEDURE — 74011250637 HC RX REV CODE- 250/637: Performed by: HOSPITALIST

## 2019-11-03 PROCEDURE — 82962 GLUCOSE BLOOD TEST: CPT

## 2019-11-03 PROCEDURE — 85025 COMPLETE CBC W/AUTO DIFF WBC: CPT

## 2019-11-03 PROCEDURE — 80048 BASIC METABOLIC PNL TOTAL CA: CPT

## 2019-11-03 PROCEDURE — 86038 ANTINUCLEAR ANTIBODIES: CPT

## 2019-11-03 PROCEDURE — 99218 HC RM OBSERVATION: CPT

## 2019-11-03 PROCEDURE — 74011250636 HC RX REV CODE- 250/636: Performed by: HOSPITALIST

## 2019-11-03 PROCEDURE — 82784 ASSAY IGA/IGD/IGG/IGM EACH: CPT

## 2019-11-03 PROCEDURE — 36415 COLL VENOUS BLD VENIPUNCTURE: CPT

## 2019-11-03 PROCEDURE — 96372 THER/PROPH/DIAG INJ SC/IM: CPT

## 2019-11-03 PROCEDURE — 90471 IMMUNIZATION ADMIN: CPT

## 2019-11-03 RX ORDER — AMLODIPINE BESYLATE 5 MG/1
5 TABLET ORAL DAILY
Qty: 30 TAB | Refills: 0 | Status: SHIPPED | OUTPATIENT
Start: 2019-11-04

## 2019-11-03 RX ORDER — AMLODIPINE BESYLATE 5 MG/1
5 TABLET ORAL DAILY
Status: DISCONTINUED | OUTPATIENT
Start: 2019-11-03 | End: 2019-11-03 | Stop reason: HOSPADM

## 2019-11-03 RX ADMIN — HEPARIN SODIUM 5000 UNITS: 5000 INJECTION INTRAVENOUS; SUBCUTANEOUS at 02:38

## 2019-11-03 RX ADMIN — ERYTHROMYCIN: 5 OINTMENT OPHTHALMIC at 06:33

## 2019-11-03 RX ADMIN — FAMOTIDINE 20 MG: 20 TABLET ORAL at 08:48

## 2019-11-03 RX ADMIN — ERYTHROMYCIN: 5 OINTMENT OPHTHALMIC at 13:51

## 2019-11-03 RX ADMIN — INFLUENZA VIRUS VACCINE 0.5 ML: 15; 15; 15; 15 SUSPENSION INTRAMUSCULAR at 14:39

## 2019-11-03 RX ADMIN — AMLODIPINE BESYLATE 5 MG: 5 TABLET ORAL at 08:48

## 2019-11-03 RX ADMIN — HEPARIN SODIUM 5000 UNITS: 5000 INJECTION INTRAVENOUS; SUBCUTANEOUS at 09:43

## 2019-11-03 NOTE — PROGRESS NOTES
Problem: Falls - Risk of  Goal: *Absence of Falls  Description  Document Elias Myers Fall Risk and appropriate interventions in the flowsheet.   Outcome: Progressing Towards Goal  Note:   Fall Risk Interventions:            Medication Interventions: Patient to call before getting OOB, Teach patient to arise slowly, Utilize gait belt for transfers/ambulation, Bed/chair exit alarm    Elimination Interventions: Call light in reach              Problem: Patient Education: Go to Patient Education Activity  Goal: Patient/Family Education  Outcome: Progressing Towards Goal     Problem: Nutrition Deficit  Goal: *Optimize nutritional status  Outcome: Progressing Towards Goal

## 2019-11-03 NOTE — PROGRESS NOTES
Nephrology Progress Note      HPI:   Kristen Garcia is 36 yo female with PMHx of DM, gastroparesis, anemia and psoriasis who presented with chest pain and mild SOB. No fever. On initial eval CxR and cardiac enzymes are unremarkable, labs showed Cr of 2.2 and Hb 7.1. Her recent baseline Cr had been ~1.5 and has no nephrology FU. Pt has long hx of fe def anemia, claims from heavy menstrual bleed. She denies hx of HTN or cardiac disease. Pt was started on IVF hydration, Cr today down to 1.8. Hb dropped to 6.4, w/ low iron store. She is about to get prbc. She is currently on her menstrual period. CP seems to be improving. No fever or SOB. CT abd (6/2019): There is duplicated right-sided collecting system with mild dilatation of both upper ureters down to the level of the uterus with no definite dilatation more distally. No evidence of urinary system calculus. There is increased mild bilateral perirenal stranding. There is also mild prominence of single left ureter down to the level of the uterus. Stable small left lower pole renal cyst.      -Pt w/o new complaints today. No SOB or CP. Impression:   - Acute on CKD likely prerenal, improving on hydration Cr 2.2=>1.8 today. Toradol stopped  - CKD stage 3, likely DMN recent baseline Cr 1.5.  - Proteinuria, likely sec to DMN  - Hematuria, likely sec to DMN  - Sever Fe def anemia ?heavy menstrual blood loss. S/P prbc tx  - DM, uncontrolled HbA1C 12.4%. W/ gastroparesis  - Chest pain, ?non-cardiac, ?demand ischemia from sever anemia, now better  - HTN, undiagnosed.  Started on Norvasc   - Lt renal cyst  - Psoriasis       Plan:   -Encourage PO hydration  -GN serology, U alb pending  -Avoid nephrotoxins including NSAIDs and iodinated iv dye   -Once Cr stabilizes, will need ACEi or ARB  -Monitor UOP and serum chem   -Continue Norvasc, dose to be adjusted eventually  -Advised on better BS control  -OK for d/c, will FU in office       Medications:     Current Facility-Administered Medications:     amLODIPine (NORVASC) tablet 5 mg, 5 mg, Oral, DAILY, Shelley Atkinson MD, 5 mg at 11/03/19 0848    0.9% sodium chloride infusion 250 mL, 250 mL, IntraVENous, PRN, Margret Alfonso MD    erythromycin (ILOTYCIN) 5 mg/gram (0.5 %) ophthalmic ointment, , Right Eye, Q8H, Shelley Atkinson MD    famotidine (PEPCID) tablet 20 mg, 20 mg, Oral, DAILY, Shelley Atkinson MD, 20 mg at 11/03/19 0848    insulin glargine (LANTUS) injection 30 Units, 30 Units, SubCUTAneous, QHS, Shelley Atkinson MD, 30 Units at 11/02/19 2209    acetaminophen (TYLENOL) tablet 650 mg, 650 mg, Oral, Q4H PRN, Shelley Atkinson MD    naloxone [facility]) injection 0.4 mg, 0.4 mg, IntraVENous, PRN, Shelley Atkinson MD    diphenhydrAMINE (BENADRYL) injection 12.5 mg, 12.5 mg, IntraVENous, Q4H PRN, Shelley Atkinson MD    ondansetron [facility]) injection 4 mg, 4 mg, IntraVENous, Q4H PRN, Shelley Atkinson MD    heparin (porcine) injection 5,000 Units, 5,000 Units, SubCUTAneous, Q8H, Shelley Atkinson MD, 5,000 Units at 11/03/19 5465    insulin lispro (HUMALOG) injection, , SubCUTAneous, AC&HS, Shelley Atkinson MD, Stopped at 11/03/19 7647    glucose chewable tablet 16 g, 4 Tab, Oral, PRN, Shelley Atkinson MD    glucagon [facility]) injection 1 mg, 1 mg, IntraMUSCular, PRN, Shelley Atkinson MD    influenza vaccine 2019-20 (6 mos+)(PF) (FLUARIX/FLULAVAL/FLUZONE QUAD) injection 0.5 mL, 0.5 mL, IntraMUSCular, PRIOR TO DISCHARGE, Shelley Atkinson MD      Physical Assessment:     Visit Vitals  BP (!) 166/98 (BP 1 Location: Right arm, BP Patient Position: At rest)   Pulse 87   Temp 98.6 °F (37 °C)   Resp 14   Ht 5' 4\" (1.626 m)   Wt 61.8 kg (136 lb 3.9 oz)   SpO2 100%   BMI 23.39 kg/m²       Intake/Output Summary (Last 24 hours) at 11/3/2019 1129  Last data filed at 11/3/2019 0839  Gross per 24 hour   Intake 710.8 ml   Output    Net 710.8 ml       General Appearance: no pain, no distress  Head: atraumatic  HEENT: no jaundice, moist oral mucosa  Neck: no JVD noted  Chest: LS clear to auscultation bilaterally  Heart: S1/S2, no murmur, gallop or rub, RRR  Adbomen: soft, nontender, BS active   : no flank tenderness, no taylor catheter  Skin: Psoriatic lesion on Rt side of forehead  Extremity: no edema, cyanosis or clubbing  MS: No joint deformity or tenderness  Neuro: conscious, alert, oriented x 3, no focal deficit    Ramy Mccann MD    Work Number: [id number]

## 2019-11-03 NOTE — DISCHARGE SUMMARY
Discharge Summary    Patient: Chrissy Dewitt MRN: [id number]  CSN: [id number]    YOB: 1985  Age:  [address] y.o. Sex: female    DOA: 11/1/2019 LOS:  LOS: 0 days   Discharge Date:      Primary Care Provider:  Evie Yan MD    Admission Diagnoses: Renal insufficiency [N28.9]  Anemia [D64.9]  Hyperglycemia [R73.9]  Chest pain [R07.9]    Discharge Diagnoses:    Hospital Problems  Never Reviewed          Codes Class Noted POA    Hordeolum externum of right upper eyelid ICD-10-CM: H00.011  ICD-9-CM: 373.11  11/2/2019 Unknown        * (Principal) Chest pain ICD-10-CM: R07.9  ICD-9-CM: 786.50  11/1/2019 Unknown        Anemia ICD-10-CM: D64.9  ICD-9-CM: 285.9  11/1/2019 Unknown        Hyperglycemia ICD-10-CM: R73.9  ICD-9-CM: 790.29  11/1/2019 Unknown        Acute renal failure superimposed on stage 3 chronic kidney disease ([facility] ICD-10-CM: N17.9, N18.3  ICD-9-CM: 584.9, 585.3  11/1/2019         Uncontrolled type 2 diabetes mellitus with hyperglycemia ([facility] ICD-10-CM: E11.65  ICD-9-CM: 250.02  6/6/2019 Yes              Discharge Condition: stable     Discharge Medications:     Current Discharge Medication List      START taking these medications    Details   amLODIPine (NORVASC) 5 mg tablet Take 1 Tab by mouth daily. Qty: 30 Tab, Refills: 0         CONTINUE these medications which have NOT CHANGED    Details   loperamide HCl (IMODIUM PO) Take  by mouth. famotidine (PEPCID PO) Take  by mouth. insulin glargine (LANTUS U-100 INSULIN) 100 unit/mL injection 30 Units by SubCUTAneous route nightly. Indications: type 2 diabetes mellitus      metoclopramide HCl (REGLAN PO) Take  by mouth. insulin admin. supplies (INPEN, FOR NOVOLOG, SC) 10-12 Units by SubCUTAneous route Before breakfast, lunch, and dinner.              Procedures : none     Consults: Nephrology      PHYSICAL EXAM   Visit Vitals  BP (!) 155/91 (BP 1 Location: Left arm)   Pulse 87   Temp 98.2 °F (36.8 °C)   Resp 14   Ht 5' 4\" (1.626 m) Wt 57.6 kg (127 lb)   SpO2 100%   BMI 21.80 kg/m²     General: Awake, cooperative, no acute distress    HEENT: NC, Atraumatic. PERRLA, EOMI. Anicteric sclerae. sty no rt eye upper lid-improving   Lungs:  CTA Bilaterally. No Wheezing/Rhonchi/Rales. Heart:  Regular  rhythm,  no murmur, No Rubs, No Gallops  Abdomen: Soft, Non distended, Non tender. +Bowel sounds,   Extremities: No c/c/e  Psych:   Not anxious or agitated. Neurologic:  No acute neurological deficits. Admission HPI :   Lui Sierra is a 37 y.o. female who passed to medical history  diabetes, hypertension came to ER due to chest pain since yesterday. The chest pain located left side of the chest, no history of trauma, chest pain increased with inspiration. Her EKG no ST segmental changes. Cardiac enzymes was with normal limit. Chest x-ray no acute process. She was found creatinine 2.23, H&H 7.1/22. 3. IV hydration was giving in ER. She denies any black stool, but she has heavy menstrual.  Occult stool is negative in ER     Denies any slurred speech/headache/n/v/blurred vission/d/c/palpitation/gait change/bleeding. Denies smoking/ any alcohol or drug use. Hospital Course : Lui Sierra is a 37 y.o. female who passed to medical history  diabetes, hypertension was admitted for chest pain. Since pt  was admitted, pt  was put on cardiac monitor and acs was ruled out. V/q scan was negative for PE. Echo was done no wall motion abnormality. She had pain on her left bjap-lluisrancuts-ffcorxrt take tylenol as needed. Pt remained chest pain free. She also received blood transfusion/IRon infusion for her anemia. Occult stool negative. Recommend continue take iron supplement. She has htn, not taking medication. norvasc was prescribed. Renal on board for her wanda on ckd3. She needs to f/u with renal as out-pt. Insulin was given for DM.        Discharge planning discussed with patient, she agrees with the plan and no questions and concerns at this point. Activity: Activity as tolerated    Diet: Diabetic Diet    Follow-up: PCP and nephrologist     Disposition: home     Minutes spent on discharge: 45 min       Labs: Results:       Chemistry Recent Labs     11/03/19 0330 11/02/19 0145 11/01/19  1605 11/01/19  1402   GLU 55* 76 204* 234*    144 143 141   K 4.2 3.5 3.9 3.9   * 113* 113* 110   CO2 23 25 22 22   BUN 21* 22* 23* 26*   CREA 1.86* 1.85* 1.98* 2.23*   CA 8.3* 7.8* 7.6* 8.1*   AGAP 7 6 8 9   BUCR 11* 12 12 12   AP  --   --   --  125*   TP  --   --   --  6.7   ALB  --   --   --  2.8*   GLOB  --   --   --  3.9   AGRAT  --   --   --  0.7*      CBC w/Diff Recent Labs     11/03/19 0330 11/02/19  1737 11/02/19 0145 11/01/19  1402   WBC 10.4  --  11.2 12.0   RBC 3.02*  --  2.42* 2.67*   HGB 8.4* 8.0* 6.4* 7.1*   HCT 25.8* 24.6* 20.4* 22.3*   *  --  398 428*   GRANS 59  --  58 72   LYMPH 33  --  35 20*   EOS 2  --  1 2      Cardiac Enzymes Recent Labs     11/02/19  0738 11/02/19 0145   CPK 83 73   CKND1 1.4 CALCULATION NOT PERFORMED WHEN RESULT IS BELOW LINEAR LIMIT      Coagulation No results for input(s): PTP, INR, APTT, INREXT in the last 72 hours. Lipid Panel No results found for: CHOL, CHOLPOCT, CHOLX, CHLST, CHOLV, 293690, HDL, HDLP, LDL, LDLC, DLDLP, 057819, VLDLC, VLDL, TGLX, TRIGL, TRIGP, TGLPOCT, CHHD, CHHDX   BNP No results for input(s): BNPP in the last 72 hours. Liver Enzymes Recent Labs     11/01/19  1402   TP 6.7   ALB 2.8*   *   SGOT 8*      Thyroid Studies No results found for: T4, T3U, TSH, TSHEXT         Significant Diagnostic Studies: Nm Lung Vent/perf    Result Date: 11/2/2019  VENTILATION PERFUSION SCINTIGRAPHY: INDICATION: Chest pain, pulmonary embolism. COMPARISON: Chest x-ray performed 11/1/2019 RADIOPHARMACEUTICAL: 0.8 mCi Tc-99m DTPA aerosol by inhalation and 6.6 mCi Tc-99m MAA IV, left antecubital fossa injection site.  TECHNIQUE: Static images were obtained for both the ventilation and perfusion studies in the anterior, posterior, bilateral lateral, bilateral anterior oblique, and bilateral posterior oblique projections. FINDINGS: CHEST RADIOGRAPH:  Correlation is made to a two view chest radiograph dated November 1, 2019. There are no confluent infiltrates or pleural effusions. VENTILATION SCINTIGRAPHY:  The aerosol ventilation images are normal. Homogeneous distribution of radiotracer throughout the lungs on the ventilation examination. Swallowed radiotracer in the esophagus and stomach is noted. PERFUSION SCINTIGRAPHY:  The MAA perfusion images demonstrate homogeneous perfusion throughout the lungs. No filling defects. IMPRESSION:  Low likelihood ratio for pulmonary embolism. Xr Chest Pa Lat    Result Date: 11/1/2019  EXAM: XR CHEST PA LAT CLINICAL INDICATION/HISTORY: Left-sided chest pain -Additional: None COMPARISON: None TECHNIQUE: PA and lateral views of the chest _______________ FINDINGS: HEART AND MEDIASTINUM: Cardiac size and mediastinal contours are within normal limits LUNGS AND PLEURAL SPACES: No focal pneumonic consolidation, pneumothorax or pleural effusion. Superposition of vascular shadows at the right lung base noted to project over the posterior right ninth rib BONY THORAX AND SOFT TISSUES: No acute osseous abnormality _______________     IMPRESSION: No acute radiographic cardiopulmonary abnormality.             [facility]     CC: Darshana Petit MD

## 2019-11-03 NOTE — DISCHARGE INSTRUCTIONS
You were seen and evaluated in the Emergency Department. Please understand that your work up is not all encompassing and you should follow up with your primary care physician for further management and continuity of care. Please return to Emergency Department or seek medical attention immediately if you have acute worsening in your symptoms or develop chest pain, shortness of breath, repeated vomiting, fever, altered level of consciousness, coughing up blood, or start sweating and feel clammy. If you were prescribed any medicine for home, please take as prescribed by your health-care provider. If you were given any follow-up appointments or numbers to call, please do so as instructed. Avoid any tobacco products or excessive alcohol. Patient Education        Anemia: Care Instructions  Your Care Instructions    Anemia is a low level of red blood cells, which carry oxygen throughout your body. Many things can cause anemia. Lack of iron is one of the most common causes. Your body needs iron to make hemoglobin, a substance in red blood cells that carries oxygen from the lungs to your body's cells. Without enough iron, the body produces fewer and smaller red blood cells. As a result, your body's cells do not get enough oxygen, and you feel tired and weak. And you may have trouble concentrating. Bleeding is the most common cause of a lack of iron. You may have heavy menstrual bleeding or bleeding caused by conditions such as ulcers, hemorrhoids, or cancer. Regular use of aspirin or other anti-inflammatory medicines (such as ibuprofen) also can cause bleeding in some people. A lack of iron in your diet also can cause anemia, especially at times when the body needs more iron, such as during pregnancy, infancy, and the teen years. Your doctor may have prescribed iron pills.  It may take several months of treatment for your iron levels to return to normal. Your doctor also may suggest that you eat foods that are rich in iron, such as meat and beans. There are many other causes of anemia. It is not always due to a lack of iron. Finding the specific cause of your anemia will help your doctor find the right treatment for you. Follow-up care is a key part of your treatment and safety. Be sure to make and go to all appointments, and call your doctor if you are having problems. It's also a good idea to know your test results and keep a list of the medicines you take. How can you care for yourself at home? · Take your medicines exactly as prescribed. Call your doctor if you think you are having a problem with your medicine. · If your doctor recommends iron pills, take them as directed:  ? Try to take the pills on an empty stomach about 1 hour before or 2 hours after meals. But you may need to take iron with food to avoid an upset stomach. ? Do not take antacids or drink milk or caffeine drinks (such as coffee, tea, or cola) at the same time or within 2 hours of the time that you take your iron. They can make it hard for your body to absorb the iron. ? Vitamin C (from food or supplements) helps your body absorb iron. Try taking iron pills with a glass of orange juice or some other food that is high in vitamin C, such as citrus fruits. ? Iron pills may cause stomach problems, such as heartburn, nausea, diarrhea, constipation, and cramps. Be sure to drink plenty of fluids, and include fruits, vegetables, and fiber in your diet each day. Iron pills often make your bowel movements dark or green. ? If you forget to take an iron pill, do not take a double dose of iron the next time you take a pill. ? Keep iron pills out of the reach of small children. An overdose of iron can be very dangerous. · Follow your doctor's advice about eating iron-rich foods. These include red meat, shellfish, poultry, eggs, beans, raisins, whole-grain bread, and leafy green vegetables. · Steam vegetables to help them keep their iron content.   When should you call for help? Call 911 anytime you think you may need emergency care. For example, call if:    · You have symptoms of a heart attack. These may include:  ? Chest pain or pressure, or a strange feeling in the chest.  ? Sweating. ? Shortness of breath. ? Nausea or vomiting. ? Pain, pressure, or a strange feeling in the back, neck, jaw, or upper belly or in one or both shoulders or arms. ? Lightheadedness or sudden weakness. ? A fast or irregular heartbeat. After you call 911, the  may tell you to chew 1 adult-strength or 2 to 4 low-dose aspirin. Wait for an ambulance. Do not try to drive yourself.     · You passed out (lost consciousness).    Call your doctor now or seek immediate medical care if:    · You have new or increased shortness of breath.     · You are dizzy or lightheaded, or you feel like you may faint.     · Your fatigue and weakness continue or get worse.     · You have any abnormal bleeding, such as:  ? Nosebleeds. ? Vaginal bleeding that is different (heavier, more frequent, at a different time of the month) than what you are used to.  ? Bloody or black stools, or rectal bleeding. ? Bloody or pink urine.    Watch closely for changes in your health, and be sure to contact your doctor if:    · You do not get better as expected. Where can you learn more? Go to http://anita-chino.info/. Enter R301 in the search box to learn more about \"Anemia: Care Instructions. \"  Current as of: March 28, 2019  Content Version: 12.2  © 2933-8679 [employer]. Care instructions adapted under license by [employer] (which disclaims liability or warranty for this information). If you have questions about a medical condition or this instruction, always ask your healthcare professional. [address] any warranty or liability for your use of this information.          Patient Education        Dehydration: Care Instructions  Your Care Instructions  Dehydration happens when your body loses too much fluid. This might happen when you do not drink enough water or you lose large amounts of fluids from your body because of diarrhea, vomiting, or sweating. Severe dehydration can be life-threatening. Water and minerals called electrolytes help put your body fluids back in balance. Learn the early signs of fluid loss, and drink more fluids to prevent dehydration. Follow-up care is a key part of your treatment and safety. Be sure to make and go to all appointments, and call your doctor if you are having problems. It's also a good idea to know your test results and keep a list of the medicines you take. How can you care for yourself at home? · To prevent dehydration, drink plenty of fluids, enough so that your urine is light yellow or clear like water. Choose water and other caffeine-free clear liquids until you feel better. If you have kidney, heart, or liver disease and have to limit fluids, talk with your doctor before you increase the amount of fluids you drink. · If you do not feel like eating or drinking, try taking small sips of water, sports drinks, or other rehydration drinks. · Get plenty of rest.  To prevent dehydration  · Add more fluids to your diet and daily routine, unless your doctor has told you not to. · During hot weather, drink more fluids. Drink even more fluids if you exercise a lot. Stay away from drinks with alcohol or caffeine. · Watch for the symptoms of dehydration. These include:  ? A dry, sticky mouth. ? Dark yellow urine, and not much of it. ? Dry and sunken eyes. ? Feeling very tired. · Learn what problems can lead to dehydration. These include:  ? Diarrhea, fever, and vomiting. ? Any illness with a fever, such as pneumonia or the flu. ? Activities that cause heavy sweating, such as endurance races and heavy outdoor work in hot or humid weather. ?  Alcohol or drug use or problems caused by quitting their use (withdrawal). ? Certain medicines, such as cold and allergy pills (antihistamines), diet pills (diuretics), and laxatives. ? Certain diseases, such as diabetes, cancer, and heart or kidney disease. When should you call for help? Call 911 anytime you think you may need emergency care. For example, call if:    · You passed out (lost consciousness).    Call your doctor now or seek immediate medical care if:    · You are confused and cannot think clearly.     · You are dizzy or lightheaded, or you feel like you may faint.     · You have signs of needing more fluids. You have sunken eyes and a dry mouth, and you pass only a little dark urine.     · You cannot keep fluids down.    Watch closely for changes in your health, and be sure to contact your doctor if:    · You are not making tears.     · Your skin is very dry and sags slowly back into place after you pinch it.     · Your mouth and eyes are very dry. Where can you learn more? Go to http://anita-chino.info/. Enter H565 in the search box to learn more about \"Dehydration: Care Instructions. \"  Current as of: June 26, 2019  Content Version: 12.2  © 9123-5201 [employer]. Care instructions adapted under license by [employer] (which disclaims liability or warranty for this information). If you have questions about a medical condition or this instruction, always ask your healthcare professional. [place] [address] any warranty or liability for your use of this information.          DISCHARGE SUMMARY from Nurse    PATIENT INSTRUCTIONS:    After general anesthesia or intravenous sedation, for 24 hours or while taking prescription Narcotics:  · Limit your activities  · Do not drive and operate hazardous machinery  · Do not make important personal or business decisions  · Do  not drink alcoholic beverages  · If you have not urinated within 8 hours after discharge, please contact your surgeon on call.    Report the following to your surgeon:  · Excessive pain, swelling, redness or odor of or around the surgical area  · Temperature over 100.5  · Nausea and vomiting lasting longer than 4 hours or if unable to take medications  · Any signs of decreased circulation or nerve impairment to extremity: change in color, persistent  numbness, tingling, coldness or increase pain  · Any questions    What to do at Home:  Recommended activity: Activity as tolerated. *  Please give a list of your current medications to your Primary Care Provider. *  Please update this list whenever your medications are discontinued, doses are      changed, or new medications (including over-the-counter products) are added. *  Please carry medication information at all times in case of emergency situations. These are general instructions for a healthy lifestyle:    No smoking/ No tobacco products/ Avoid exposure to second hand smoke  Surgeon General's Warning:  Quitting smoking now greatly reduces serious risk to your health. Obesity, smoking, and sedentary lifestyle greatly increases your risk for illness    A healthy diet, regular physical exercise & weight monitoring are important for maintaining a healthy lifestyle    You may be retaining fluid if you have a history of heart failure or if you experience any of the following symptoms:  Weight gain of 3 pounds or more overnight or 5 pounds in a week, increased swelling in our hands or feet or shortness of breath while lying flat in bed. Please call your doctor as soon as you notice any of these symptoms; do not wait until your next office visit. The discharge information has been reviewed with the patient. The patient verbalized understanding.   Discharge medications reviewed with the patient and appropriate educational materials and side effects teaching were provided.   ___________________________________________________________________________________________________________________________________

## 2019-11-03 NOTE — PROGRESS NOTES
6435 Received bedside verbal report from Carmen Hull RN. Pt in bed resting. Call bell in reach. 7084 Discharge information reviewed by Renetta Christianson RN.

## 2019-11-03 NOTE — PHYSICIAN ADVISORY
[facility] Physician Advisor Recommendation The information in this document is a recommendation to be used for utilization review and utilization management purposes only. This recommendation is not an order. The recommendation is made based on the information reviewed at the time of the referral, is pursuant to the [place] [employer] [facility] Conditions of Participation (42 CFR Part 482), and is neither a judgment nor an assessment with regard to the appropriateness or quality of clinical care. Nothing in this document may be used to limit, alter, or affect clinical services provided to the patient named below. The provider of services is ultimately responsible for the submission of a claim that has met all requirements for correct coding, billing, and reimbursement. Letter of Status Determination:  
Recommend hospitalization status upgraded from OBSERVATION  to INPATIENT  Status Pt Name:  Inder Flannery MR#  
 [id number] / 
Payor: Karmen Smith / Plan: [address] [place] [address] / Product Type: [employer] Medicaid /   
CELIA#  [id number] Room and [facility]  @ [address] [facility]  
Hospitalization date  11/1/2019  1:02 PM  
Current Attending Physician  Nestor Rudd MD  
Principal diagnosis  Renal insufficiency [N28.9] Anemia [D64.9] Hyperglycemia [R73.9] Chest pain [R07.9] Clinicals  37 y.o.  female hospitalized with above diagnosis Plan for PRBC tx 
-Will check GN serology, U alb  
-Avoid nephrotoxins including NSAIDs and iodinated iv dye  
-Once Cr stabilizes, will need ACEi or ARB 
-Monitor UOP and serum chem  
-Advised on better BS control STATUS DETERMINATION  Upgrade to inpatient Additional comments Payor: [employer] / Plan: [id number] [facility] / Product Type: Managed Care Medicaid /   
  
Nissa Suarez M.D.,  [facility] [id number] [facility] T: [phone]

## 2019-11-05 LAB
ANA TITR SER IF: NEGATIVE {TITER}
C3 SERPL-MCNC: 122 MG/DL (ref 82–167)
C4 SERPL-MCNC: 28 MG/DL (ref 14–44)
PLEASE NOTE, 734348: NORMAL

## 2019-11-07 LAB
ALBUMIN SERPL ELPH-MCNC: 3.1 G/DL (ref 2.9–4.4)
ALBUMIN/GLOB SERPL: 0.9 {RATIO} (ref 0.7–1.7)
ALPHA1 GLOB SERPL ELPH-MCNC: 0.2 G/DL (ref 0–0.4)
ALPHA2 GLOB SERPL ELPH-MCNC: 0.9 G/DL (ref 0.4–1)
B-GLOBULIN SERPL ELPH-MCNC: 1 G/DL (ref 0.7–1.3)
GAMMA GLOB SERPL ELPH-MCNC: 1.4 G/DL (ref 0.4–1.8)
GLOBULIN SER-MCNC: 3.5 G/DL (ref 2.2–3.9)
IGA SERPL-MCNC: 407 MG/DL (ref 87–352)
IGG SERPL-MCNC: 1388 MG/DL (ref 700–1600)
IGM SERPL-MCNC: 187 MG/DL (ref 26–217)
INTERPRETATION SERPL IEP-IMP: ABNORMAL
M PROTEIN SERPL ELPH-MCNC: ABNORMAL G/DL
PROT SERPL-MCNC: 6.6 G/DL (ref 6–8.5)

## 2019-12-21 ENCOUNTER — APPOINTMENT (OUTPATIENT)
Dept: GENERAL RADIOLOGY | Age: 34
DRG: 468 | End: 2019-12-21
Attending: EMERGENCY MEDICINE
Payer: MEDICAID

## 2019-12-21 ENCOUNTER — HOSPITAL ENCOUNTER (INPATIENT)
Age: 34
LOS: 2 days | Discharge: HOME | DRG: 468 | End: 2019-12-23
Attending: EMERGENCY MEDICINE | Admitting: INTERNAL MEDICINE
Payer: MEDICAID

## 2019-12-21 ENCOUNTER — APPOINTMENT (OUTPATIENT)
Dept: CT IMAGING | Age: 34
DRG: 468 | End: 2019-12-21
Attending: EMERGENCY MEDICINE
Payer: MEDICAID

## 2019-12-21 DIAGNOSIS — Z79.4 TYPE 2 DIABETES MELLITUS WITH HYPERGLYCEMIA, WITH LONG-TERM CURRENT USE OF INSULIN (HCC): ICD-10-CM

## 2019-12-21 DIAGNOSIS — Z86.39 HX OF DIABETIC GASTROPARESIS: ICD-10-CM

## 2019-12-21 DIAGNOSIS — R10.84 ABDOMINAL PAIN, GENERALIZED: Primary | ICD-10-CM

## 2019-12-21 DIAGNOSIS — E11.65 TYPE 2 DIABETES MELLITUS WITH HYPERGLYCEMIA, WITH LONG-TERM CURRENT USE OF INSULIN (HCC): ICD-10-CM

## 2019-12-21 DIAGNOSIS — R11.2 INTRACTABLE NAUSEA AND VOMITING: ICD-10-CM

## 2019-12-21 DIAGNOSIS — N39.0 URINARY TRACT INFECTION WITHOUT HEMATURIA, SITE UNSPECIFIED: ICD-10-CM

## 2019-12-21 LAB
ALBUMIN SERPL-MCNC: 2.8 G/DL (ref 3.4–5)
ALBUMIN/GLOB SERPL: 0.7 {RATIO} (ref 0.8–1.7)
ALP SERPL-CCNC: 158 U/L (ref 45–117)
ALT SERPL-CCNC: 8 U/L (ref 13–56)
ANION GAP SERPL CALC-SCNC: 10 MMOL/L (ref 3–18)
APPEARANCE UR: ABNORMAL
AST SERPL-CCNC: 9 U/L (ref 10–38)
BACTERIA URNS QL MICRO: ABNORMAL /HPF
BASOPHILS # BLD: 0 K/UL (ref 0–0.1)
BASOPHILS NFR BLD: 0 % (ref 0–2)
BILIRUB SERPL-MCNC: 0.4 MG/DL (ref 0.2–1)
BILIRUB UR QL: NEGATIVE
BUN SERPL-MCNC: 27 MG/DL (ref 7–18)
BUN/CREAT SERPL: 11 (ref 12–20)
CALCIUM SERPL-MCNC: 8.8 MG/DL (ref 8.5–10.1)
CHLORIDE SERPL-SCNC: 110 MMOL/L (ref 100–111)
CO2 SERPL-SCNC: 21 MMOL/L (ref 21–32)
COLOR UR: ABNORMAL
CREAT SERPL-MCNC: 2.52 MG/DL (ref 0.6–1.3)
DIFFERENTIAL METHOD BLD: ABNORMAL
EOSINOPHIL # BLD: 0.1 K/UL (ref 0–0.4)
EOSINOPHIL NFR BLD: 1 % (ref 0–5)
EPITH CASTS URNS QL MICRO: ABNORMAL /LPF (ref 0–5)
ERYTHROCYTE [DISTWIDTH] IN BLOOD BY AUTOMATED COUNT: 15.1 % (ref 11.6–14.5)
EST. AVERAGE GLUCOSE BLD GHB EST-MCNC: 235 MG/DL
GLOBULIN SER CALC-MCNC: 4.2 G/DL (ref 2–4)
GLUCOSE BLD STRIP.AUTO-MCNC: 194 MG/DL (ref 70–110)
GLUCOSE BLD STRIP.AUTO-MCNC: 202 MG/DL (ref 70–110)
GLUCOSE SERPL-MCNC: 226 MG/DL (ref 74–99)
GLUCOSE UR STRIP.AUTO-MCNC: 500 MG/DL
HBA1C MFR BLD: 9.8 % (ref 4.2–5.6)
HCG UR QL: NEGATIVE
HCT VFR BLD AUTO: 29.6 % (ref 35–45)
HGB BLD-MCNC: 9.8 G/DL (ref 12–16)
HGB UR QL STRIP: ABNORMAL
KETONES UR QL STRIP.AUTO: 15 MG/DL
LEUKOCYTE ESTERASE UR QL STRIP.AUTO: ABNORMAL
LIPASE SERPL-CCNC: 123 U/L (ref 73–393)
LYMPHOCYTES # BLD: 2.2 K/UL (ref 0.9–3.6)
LYMPHOCYTES NFR BLD: 17 % (ref 21–52)
MCH RBC QN AUTO: 28.3 PG (ref 24–34)
MCHC RBC AUTO-ENTMCNC: 33.1 G/DL (ref 31–37)
MCV RBC AUTO: 85.5 FL (ref 74–97)
MONOCYTES # BLD: 0.5 K/UL (ref 0.05–1.2)
MONOCYTES NFR BLD: 4 % (ref 3–10)
NEUTS SEG # BLD: 10 K/UL (ref 1.8–8)
NEUTS SEG NFR BLD: 78 % (ref 40–73)
NITRITE UR QL STRIP.AUTO: NEGATIVE
PH UR STRIP: 6 [PH] (ref 5–8)
PLATELET # BLD AUTO: 385 K/UL (ref 135–420)
PMV BLD AUTO: 8.9 FL (ref 9.2–11.8)
POTASSIUM SERPL-SCNC: 3.7 MMOL/L (ref 3.5–5.5)
PROT SERPL-MCNC: 7 G/DL (ref 6.4–8.2)
PROT UR STRIP-MCNC: 300 MG/DL
RBC # BLD AUTO: 3.46 M/UL (ref 4.2–5.3)
RBC #/AREA URNS HPF: ABNORMAL /HPF (ref 0–5)
SODIUM SERPL-SCNC: 141 MMOL/L (ref 136–145)
SP GR UR REFRACTOMETRY: 1.02 (ref 1–1.03)
UROBILINOGEN UR QL STRIP.AUTO: 0.2 EU/DL (ref 0.2–1)
WBC # BLD AUTO: 12.8 K/UL (ref 4.6–13.2)
WBC URNS QL MICRO: ABNORMAL /HPF (ref 0–5)

## 2019-12-21 PROCEDURE — 85025 COMPLETE CBC W/AUTO DIFF WBC: CPT

## 2019-12-21 PROCEDURE — 74011000250 HC RX REV CODE- 250: Performed by: INTERNAL MEDICINE

## 2019-12-21 PROCEDURE — 74018 RADEX ABDOMEN 1 VIEW: CPT

## 2019-12-21 PROCEDURE — 65270000029 HC RM PRIVATE

## 2019-12-21 PROCEDURE — 74176 CT ABD & PELVIS W/O CONTRAST: CPT

## 2019-12-21 PROCEDURE — 74011250636 HC RX REV CODE- 250/636: Performed by: EMERGENCY MEDICINE

## 2019-12-21 PROCEDURE — 82962 GLUCOSE BLOOD TEST: CPT

## 2019-12-21 PROCEDURE — 83036 HEMOGLOBIN GLYCOSYLATED A1C: CPT

## 2019-12-21 PROCEDURE — 81025 URINE PREGNANCY TEST: CPT

## 2019-12-21 PROCEDURE — C9113 INJ PANTOPRAZOLE SODIUM, VIA: HCPCS | Performed by: INTERNAL MEDICINE

## 2019-12-21 PROCEDURE — 87186 SC STD MICRODIL/AGAR DIL: CPT

## 2019-12-21 PROCEDURE — 80053 COMPREHEN METABOLIC PANEL: CPT

## 2019-12-21 PROCEDURE — 96376 TX/PRO/DX INJ SAME DRUG ADON: CPT

## 2019-12-21 PROCEDURE — 99284 EMERGENCY DEPT VISIT MOD MDM: CPT

## 2019-12-21 PROCEDURE — 74011250637 HC RX REV CODE- 250/637: Performed by: INTERNAL MEDICINE

## 2019-12-21 PROCEDURE — 81001 URINALYSIS AUTO W/SCOPE: CPT

## 2019-12-21 PROCEDURE — 74011250637 HC RX REV CODE- 250/637: Performed by: FAMILY MEDICINE

## 2019-12-21 PROCEDURE — 74011636637 HC RX REV CODE- 636/637: Performed by: EMERGENCY MEDICINE

## 2019-12-21 PROCEDURE — 83690 ASSAY OF LIPASE: CPT

## 2019-12-21 PROCEDURE — 74011636637 HC RX REV CODE- 636/637: Performed by: INTERNAL MEDICINE

## 2019-12-21 PROCEDURE — 87077 CULTURE AEROBIC IDENTIFY: CPT

## 2019-12-21 PROCEDURE — 96374 THER/PROPH/DIAG INJ IV PUSH: CPT

## 2019-12-21 PROCEDURE — 96361 HYDRATE IV INFUSION ADD-ON: CPT

## 2019-12-21 PROCEDURE — 87086 URINE CULTURE/COLONY COUNT: CPT

## 2019-12-21 PROCEDURE — 74011000250 HC RX REV CODE- 250: Performed by: EMERGENCY MEDICINE

## 2019-12-21 PROCEDURE — 74011250636 HC RX REV CODE- 250/636: Performed by: INTERNAL MEDICINE

## 2019-12-21 PROCEDURE — 96375 TX/PRO/DX INJ NEW DRUG ADDON: CPT

## 2019-12-21 RX ORDER — SODIUM CHLORIDE 9 MG/ML
50 INJECTION, SOLUTION INTRAVENOUS CONTINUOUS
Status: DISCONTINUED | OUTPATIENT
Start: 2019-12-21 | End: 2019-12-23

## 2019-12-21 RX ORDER — MAGNESIUM SULFATE 100 %
4 CRYSTALS MISCELLANEOUS AS NEEDED
Status: DISCONTINUED | OUTPATIENT
Start: 2019-12-21 | End: 2019-12-23 | Stop reason: HOSPADM

## 2019-12-21 RX ORDER — INSULIN GLARGINE 100 [IU]/ML
30 INJECTION, SOLUTION SUBCUTANEOUS
Status: DISCONTINUED | OUTPATIENT
Start: 2019-12-21 | End: 2019-12-22

## 2019-12-21 RX ORDER — METOCLOPRAMIDE HYDROCHLORIDE 5 MG/ML
5 INJECTION INTRAMUSCULAR; INTRAVENOUS EVERY 6 HOURS
Status: DISCONTINUED | OUTPATIENT
Start: 2019-12-21 | End: 2019-12-23 | Stop reason: HOSPADM

## 2019-12-21 RX ORDER — DIPHENHYDRAMINE HYDROCHLORIDE 50 MG/ML
25 INJECTION, SOLUTION INTRAMUSCULAR; INTRAVENOUS
Status: COMPLETED | OUTPATIENT
Start: 2019-12-21 | End: 2019-12-21

## 2019-12-21 RX ORDER — SODIUM CHLORIDE 9 MG/ML
125 INJECTION, SOLUTION INTRAVENOUS ONCE
Status: COMPLETED | OUTPATIENT
Start: 2019-12-21 | End: 2019-12-21

## 2019-12-21 RX ORDER — KETOROLAC TROMETHAMINE 30 MG/ML
30 INJECTION, SOLUTION INTRAMUSCULAR; INTRAVENOUS ONCE
Status: COMPLETED | OUTPATIENT
Start: 2019-12-21 | End: 2019-12-21

## 2019-12-21 RX ORDER — METOCLOPRAMIDE HYDROCHLORIDE 5 MG/ML
10 INJECTION INTRAMUSCULAR; INTRAVENOUS
Status: COMPLETED | OUTPATIENT
Start: 2019-12-21 | End: 2019-12-21

## 2019-12-21 RX ORDER — MAG HYDROX/ALUMINUM HYD/SIMETH 200-200-20
30 SUSPENSION, ORAL (FINAL DOSE FORM) ORAL
Status: DISCONTINUED | OUTPATIENT
Start: 2019-12-21 | End: 2019-12-23 | Stop reason: HOSPADM

## 2019-12-21 RX ORDER — DEXTROSE MONOHYDRATE 100 MG/ML
125-250 INJECTION, SOLUTION INTRAVENOUS AS NEEDED
Status: DISCONTINUED | OUTPATIENT
Start: 2019-12-21 | End: 2019-12-23 | Stop reason: HOSPADM

## 2019-12-21 RX ORDER — INSULIN LISPRO 100 [IU]/ML
INJECTION, SOLUTION INTRAVENOUS; SUBCUTANEOUS
Status: DISCONTINUED | OUTPATIENT
Start: 2019-12-21 | End: 2019-12-23 | Stop reason: HOSPADM

## 2019-12-21 RX ORDER — AMLODIPINE BESYLATE 5 MG/1
5 TABLET ORAL DAILY
Status: DISCONTINUED | OUTPATIENT
Start: 2019-12-22 | End: 2019-12-23 | Stop reason: HOSPADM

## 2019-12-21 RX ORDER — ACETAMINOPHEN 325 MG/1
650 TABLET ORAL
Status: DISCONTINUED | OUTPATIENT
Start: 2019-12-21 | End: 2019-12-23 | Stop reason: HOSPADM

## 2019-12-21 RX ORDER — ONDANSETRON 2 MG/ML
4 INJECTION INTRAMUSCULAR; INTRAVENOUS
Status: DISCONTINUED | OUTPATIENT
Start: 2019-12-21 | End: 2019-12-23 | Stop reason: HOSPADM

## 2019-12-21 RX ORDER — HEPARIN SODIUM 5000 [USP'U]/ML
5000 INJECTION, SOLUTION INTRAVENOUS; SUBCUTANEOUS EVERY 8 HOURS
Status: DISCONTINUED | OUTPATIENT
Start: 2019-12-21 | End: 2019-12-23 | Stop reason: HOSPADM

## 2019-12-21 RX ORDER — MAG HYDROX/ALUMINUM HYD/SIMETH 200-200-20
30 SUSPENSION, ORAL (FINAL DOSE FORM) ORAL
Status: DISCONTINUED | OUTPATIENT
Start: 2019-12-21 | End: 2019-12-21

## 2019-12-21 RX ORDER — MAG HYDROX/ALUMINUM HYD/SIMETH 200-200-20
30 SUSPENSION, ORAL (FINAL DOSE FORM) ORAL
Status: COMPLETED | OUTPATIENT
Start: 2019-12-21 | End: 2019-12-21

## 2019-12-21 RX ADMIN — DIPHENHYDRAMINE HYDROCHLORIDE 25 MG: 50 INJECTION, SOLUTION INTRAMUSCULAR; INTRAVENOUS at 13:43

## 2019-12-21 RX ADMIN — ALUMINUM HYDROXIDE, MAGNESIUM HYDROXIDE, AND SIMETHICONE 30 ML: 200; 200; 20 SUSPENSION ORAL at 22:27

## 2019-12-21 RX ADMIN — INSULIN LISPRO 4 UNITS: 100 INJECTION, SOLUTION INTRAVENOUS; SUBCUTANEOUS at 17:07

## 2019-12-21 RX ADMIN — CEFTRIAXONE 1 G: 1 INJECTION, POWDER, FOR SOLUTION INTRAMUSCULAR; INTRAVENOUS at 13:22

## 2019-12-21 RX ADMIN — ONDANSETRON 4 MG: 2 INJECTION INTRAMUSCULAR; INTRAVENOUS at 22:27

## 2019-12-21 RX ADMIN — DIPHENHYDRAMINE HYDROCHLORIDE 25 MG: 50 INJECTION, SOLUTION INTRAMUSCULAR; INTRAVENOUS at 12:06

## 2019-12-21 RX ADMIN — ONDANSETRON 4 MG: 2 INJECTION INTRAMUSCULAR; INTRAVENOUS at 16:45

## 2019-12-21 RX ADMIN — INSULIN GLARGINE 30 UNITS: 100 INJECTION, SOLUTION SUBCUTANEOUS at 22:26

## 2019-12-21 RX ADMIN — HUMAN INSULIN 5 UNITS: 100 INJECTION, SOLUTION SUBCUTANEOUS at 13:17

## 2019-12-21 RX ADMIN — INSULIN LISPRO 2 UNITS: 100 INJECTION, SOLUTION INTRAVENOUS; SUBCUTANEOUS at 22:32

## 2019-12-21 RX ADMIN — SODIUM CHLORIDE 40 MG: 9 INJECTION, SOLUTION INTRAMUSCULAR; INTRAVENOUS; SUBCUTANEOUS at 18:50

## 2019-12-21 RX ADMIN — METOCLOPRAMIDE 10 MG: 5 INJECTION, SOLUTION INTRAMUSCULAR; INTRAVENOUS at 12:06

## 2019-12-21 RX ADMIN — KETOROLAC TROMETHAMINE 30 MG: 30 INJECTION, SOLUTION INTRAMUSCULAR at 12:06

## 2019-12-21 RX ADMIN — SODIUM CHLORIDE 1000 ML: 900 INJECTION, SOLUTION INTRAVENOUS at 13:17

## 2019-12-21 RX ADMIN — METOCLOPRAMIDE 10 MG: 5 INJECTION, SOLUTION INTRAMUSCULAR; INTRAVENOUS at 13:43

## 2019-12-21 RX ADMIN — SODIUM CHLORIDE 125 ML/HR: 900 INJECTION, SOLUTION INTRAVENOUS at 13:21

## 2019-12-21 RX ADMIN — HEPARIN SODIUM 5000 UNITS: 5000 INJECTION INTRAVENOUS; SUBCUTANEOUS at 17:07

## 2019-12-21 RX ADMIN — METOCLOPRAMIDE 5 MG: 5 INJECTION, SOLUTION INTRAMUSCULAR; INTRAVENOUS at 18:26

## 2019-12-21 RX ADMIN — SODIUM CHLORIDE 100 ML/HR: 900 INJECTION, SOLUTION INTRAVENOUS at 16:45

## 2019-12-21 RX ADMIN — ALUMINUM HYDROXIDE, MAGNESIUM HYDROXIDE, AND SIMETHICONE 30 ML: 200; 200; 20 SUSPENSION ORAL at 18:49

## 2019-12-21 RX ADMIN — SODIUM CHLORIDE 1000 ML: 900 INJECTION, SOLUTION INTRAVENOUS at 12:06

## 2019-12-21 NOTE — ED NOTES
TRANSFER - OUT REPORT:    Verbal report given to (name) on Qamar Pry  being transferred to [facility](unit) for routine progression of care       Report consisted of patients Situation, Background, Assessment and   Recommendations(SBAR). Information from the following report(s) SBAR and Kardex was reviewed with the receiving nurse. Lines:   Peripheral IV 12/21/19 Anterior Antecubital (Active)   Site Assessment Clean, dry, & intact 12/21/2019  2:14 PM   Phlebitis Assessment 0 12/21/2019  2:14 PM   Infiltration Assessment 4 12/21/2019  2:14 PM   Dressing Status Clean, dry, & intact 12/21/2019  2:14 PM   Hub Color/Line Status Pink 12/21/2019  2:14 PM        Opportunity for questions and clarification was provided.       Patient transported with:   [email or web address]

## 2019-12-21 NOTE — ED PROVIDER NOTES
EMERGENCY DEPARTMENT HISTORY AND PHYSICAL EXAM    Date: 12/21/2019  Patient Name: Sally Posadas    History of Presenting Illness     Chief Complaint   Patient presents with    Abdominal Pain         History Provided By: Patient      Sally Posadas is a 37 y.o. female who presents to the emergency department C/O abdominal pain, nausea, vomiting. Patient states that the symptoms have been going on since Thursday, 2 days ago. She states the pain is located all over, denies any diarrhea, constipation. She states she is currently on her menstrual cycle but denies any vaginal discharge, denies any fevers. States she does have a history of diabetes with gastroparesis and states her last dose of insulin was on Thursday as she has not been able to eat or drink anything. EMS report states they gave 8 mg of Zofran by mouth without relief. .     PCP: Makenna Simpson MD    Current Facility-Administered Medications   Medication Dose Route Frequency Provider Last Rate Last Dose    sodium chloride 0.9 % bolus infusion 1,000 mL  1,000 mL IntraVENous ONCE Luke Guevara, DO 1,000 mL/hr at 12/21/19 1317 1,000 mL at 12/21/19 1317     Current Outpatient Medications   Medication Sig Dispense Refill    amLODIPine (NORVASC) 5 mg tablet Take 1 Tab by mouth daily. 30 Tab 0    loperamide HCl (IMODIUM PO) Take  by mouth.  famotidine (PEPCID PO) Take  by mouth.  insulin glargine (LANTUS U-100 INSULIN) 100 unit/mL injection 30 Units by SubCUTAneous route nightly. Indications: type 2 diabetes mellitus      metoclopramide HCl (REGLAN PO) Take  by mouth.  insulin admin. supplies (INPEN, FOR NOVOLOG, SC) 10-12 Units by SubCUTAneous route Before breakfast, lunch, and dinner.          Past History     Past Medical History:  Past Medical History:   Diagnosis Date    Diabetes ([address]     Gastroparesis     Gastroparesis     Hypertension     UTI (urinary tract infection)        Past Surgical History:  Past Surgical History: Procedure Laterality Date    HX APPENDECTOMY      HX GYN      tubal ligation, C Section       Family History:  History reviewed. No pertinent family history. Social History:  Social History     Tobacco Use    Smoking status: Never Smoker    Smokeless tobacco: Never Used   Substance Use Topics    Alcohol use: Never     Frequency: Never    Drug use: Not Currently       Allergies:  No Known Allergies      Review of Systems   Review of Systems   Constitutional: Negative for fever. Respiratory: Negative for shortness of breath. Cardiovascular: Negative for chest pain. Gastrointestinal: Positive for abdominal pain, nausea and vomiting. Negative for constipation and diarrhea.          Physical Exam     Vitals:    12/21/19 1147   BP: (!) 177/92   Resp: 18   Temp: 98.2 °F (36.8 °C)   SpO2: 100%   Weight: 62.1 kg (137 lb)     Physical Exam    Nursing notes and vital signs reviewed    Constitutional: Chronically ill-appearing, mild distress, actively dry heaving  HEENT: Normocephalic, Atraumatic, Pupils are equal, round, and reactive to light, EOMI  Cardiovascular: Regular rate and rhythm, no murmurs  Chest: Normal work of breathing and chest excursion bilaterally  Lungs: Clear to ausculation bilaterally  Abdomen: Soft, generalized tenderness, non-peritoneal, nonrigid non distended, normoactive bowel sounds  Back: No evidence of trauma or deformity  Extremities: No evidence of trauma or deformity, no LE edema  Skin: Warm and dry, normal cap refill  Neuro: Alert and oriented x 3, CN intact, normal speech, strength and sensation full and symmetric bilaterally, normal coordination  Psychiatric: Normal mood and affect      Diagnostic Study Results     Labs -     Recent Results (from the past 72 hour(s))   METABOLIC PANEL, COMPREHENSIVE    Collection Time: 12/21/19 11:51 AM   Result Value Ref Range    Sodium 141 136 - 145 mmol/L    Potassium 3.7 3.5 - 5.5 mmol/L    Chloride 110 100 - 111 mmol/L    CO2 21 21 - 32 mmol/L    Anion gap 10 3.0 - 18 mmol/L    Glucose 226 (H) 74 - 99 mg/dL    BUN 27 (H) 7.0 - 18 MG/DL    Creatinine 2.52 (H) 0.6 - 1.3 MG/DL    BUN/Creatinine ratio 11 (L) 12 - 20      GFR est AA 26 (L) >60 ml/min/1.73m2    GFR est non-AA 22 (L) >60 ml/min/1.73m2    Calcium 8.8 8.5 - 10.1 MG/DL    Bilirubin, total 0.4 0.2 - 1.0 MG/DL    ALT (SGPT) 8 (L) 13 - 56 U/L    AST (SGOT) 9 (L) 10 - 38 U/L    Alk. phosphatase 158 (H) 45 - 117 U/L    Protein, total 7.0 6.4 - 8.2 g/dL    Albumin 2.8 (L) 3.4 - 5.0 g/dL    Globulin 4.2 (H) 2.0 - 4.0 g/dL    A-G Ratio 0.7 (L) 0.8 - 1.7     CBC WITH AUTOMATED DIFF    Collection Time: 12/21/19 11:51 AM   Result Value Ref Range    WBC 12.8 4.6 - 13.2 K/uL    RBC 3.46 (L) 4.20 - 5.30 M/uL    HGB 9.8 (L) 12.0 - 16.0 g/dL    HCT 29.6 (L) 35.0 - 45.0 %    MCV 85.5 74.0 - 97.0 FL    MCH 28.3 24.0 - 34.0 PG    MCHC 33.1 31.0 - 37.0 g/dL    RDW 15.1 (H) 11.6 - 14.5 %    PLATELET 575 210 - 557 K/uL    MPV 8.9 (L) 9.2 - 11.8 FL    NEUTROPHILS 78 (H) 40 - 73 %    LYMPHOCYTES 17 (L) 21 - 52 %    MONOCYTES 4 3 - 10 %    EOSINOPHILS 1 0 - 5 %    BASOPHILS 0 0 - 2 %    ABS. NEUTROPHILS 10.0 (H) 1.8 - 8.0 K/UL    ABS. LYMPHOCYTES 2.2 0.9 - 3.6 K/UL    ABS. MONOCYTES 0.5 0.05 - 1.2 K/UL    ABS. EOSINOPHILS 0.1 0.0 - 0.4 K/UL    ABS.  BASOPHILS 0.0 0.0 - 0.1 K/UL    DF AUTOMATED     LIPASE    Collection Time: 12/21/19 11:51 AM   Result Value Ref Range    Lipase 123 73 - 393 U/L   URINALYSIS W/ RFLX MICROSCOPIC    Collection Time: 12/21/19 12:16 PM   Result Value Ref Range    Color STRAW      Appearance TURBID      Specific gravity 1.019 1.005 - 1.030      pH (UA) 6.0 5.0 - 8.0      Protein 300 (A) NEG mg/dL    Glucose 500 (A) NEG mg/dL    Ketone 15 (A) NEG mg/dL    Bilirubin NEGATIVE  NEG      Blood LARGE (A) NEG      Urobilinogen 0.2 0.2 - 1.0 EU/dL    Nitrites NEGATIVE  NEG      Leukocyte Esterase MODERATE (A) NEG     URINE MICROSCOPIC ONLY    Collection Time: 12/21/19 12:16 PM   Result Value Ref Range    WBC TOO NUMEROUS TO COUNT 0 - 5 /hpf    RBC TOO NUMEROUS TO COUNT 0 - 5 /hpf    Epithelial cells 2+ 0 - 5 /lpf    Bacteria 4+ (A) NEG /hpf   HCG URINE, QL. - POC    Collection Time: 12/21/19 12:27 PM   Result Value Ref Range    Pregnancy test,urine (POC) NEGATIVE  NEG         Radiologic Studies -   XR ABD (KUB)   Final Result   Impression:   Nonobstructive bowel gas pattern. No evidence of acute abnormality. CT ABD PELV WO CONT    (Results Pending)     CT Results  (Last 48 hours)    None        CXR Results  (Last 48 hours)    None          Medications given in the ED-  Medications   sodium chloride 0.9 % bolus infusion 1,000 mL (1,000 mL IntraVENous New Bag 12/21/19 1317)   sodium chloride 0.9 % bolus infusion 1,000 mL (1,000 mL IntraVENous New Bag 12/21/19 1206)   metoclopramide HCl (REGLAN) injection 10 mg (10 mg IntraVENous Given 12/21/19 1206)   diphenhydrAMINE (BENADRYL) injection 25 mg (25 mg IntraVENous Given 12/21/19 1206)   ketorolac (TORADOL) injection 30 mg (30 mg IntraVENous Given 12/21/19 1206)   0.9% sodium chloride infusion (125 mL/hr IntraVENous New Bag 12/21/19 1321)   insulin regular (NOVOLIN R, HUMULIN R) injection 5 Units (5 Units IntraVENous Given 12/21/19 1317)   cefTRIAXone (ROCEPHIN) 1 g in sterile water (preservative free) 10 mL IV syringe (1 g IntraVENous Given 12/21/19 1322)   metoclopramide HCl (REGLAN) injection 10 mg (10 mg IntraVENous Given 12/21/19 1343)   diphenhydrAMINE (BENADRYL) injection 25 mg (25 mg IntraVENous Given 12/21/19 1343)         Medical Decision Making   I am the first provider for this patient. I reviewed the vital signs, available nursing notes, past medical history, past surgical history, family history and social history. Vital Signs-Reviewed the patient's vital signs.     Pulse Oximetry Analysis - 100% on room air     Cardiac Monitor:  Rate: 99 bpm  Rhythm: sinus    Records Reviewed: Nursing Notes    Procedures:  Procedures    ED Course: Patient was given Reglan, Benadryl and Toradol. She states the pain is a little bit better but is still feeling nauseous with vomiting. We will give another dose of Reglan and Benadryl. Laboratory findings showing worsening kidney function than prior likely due to her dehydration from nausea and vomiting. Her blood glucose noted to be over 200. She was given 5 units of insulin IV and given 2 L of IV fluids with continuous infusion of normal saline. X-ray did not show any evidence of air-fluid levels. Will obtain CT scan dry to evaluate for other processes as she also is noted to have a UTI and was given 1 g Rocephin. Considering her persistent nausea and vomiting will plan for admission. Patient is agreeable to this plan    Diagnosis and Disposition       Core Measures:  For Hospitalized Patients:    1. Hospitalization Decision Time:  The decision to hospitalize the patient was made by Tabitha Presley DO at 1:55 PM on 12/21/2019    2. Aspirin: Aspirin was not given because the patient did not present with a stroke at the time of their Emergency Department evaluation    1:54 PM  Patient is being admitted to the hospital by Dr. Tara Sarkar. The results of their tests and reasons for their admission have been discussed with them and/or available family. They convey agreement and understanding for the need to be admitted and for their admission diagnosis. CONDITIONS ON ADMISSION:  Sepsis is not present at the time of admission. Deep Vein Thrombosis is not present at the time of admission. Thrombosis is not present at the time of admission. Urinary Tract Infection is present at the time of admission. Pneumonia is not present at the time of admission. MRSA is not present at the time of admission. Wound infection is not present at the time of admission. Pressure Ulcer is not present at the time of admission. CLINICAL IMPRESSION:    1. Abdominal pain, generalized    2. Hx of diabetic gastroparesis    3. Intractable nausea and vomiting    4. Urinary tract infection without hematuria, site unspecified    5. Type 2 diabetes mellitus with hyperglycemia, with long-term current use of insulin ([facility] [address]          Please note that this dictation was completed with [employer], the computer voice recognition software. Quite often unanticipated grammatical, syntax, homophones, and other interpretive errors are inadvertently transcribed by the computer software. Please disregard these errors. Please excuse any errors that have escaped final proofreading.

## 2019-12-21 NOTE — H&P
HISTORY AND PHYSICAL EXAMINATION      Assessment:     Patient Active Problem List    Diagnosis Date Noted    Intractable nausea and vomiting 12/21/2019    Abdominal pain, generalized 12/21/2019    Hx of diabetic gastroparesis 12/21/2019    Type 2 diabetes mellitus with hyperglycemia, with long-term current use of insulin ([address] 12/21/2019    Hordeolum externum of right upper eyelid 11/02/2019    Chest pain 11/01/2019    Anemia 11/01/2019    Hyperglycemia 11/01/2019    Acute renal failure superimposed on stage 3 chronic kidney disease ([address] 11/01/2019    Chronic anemia 06/07/2019    Gastroparesis 06/06/2019    Dehydration 06/06/2019    Leukocytosis 06/06/2019    Epigastric pain 06/06/2019    Lactic acid acidosis 06/06/2019    Acute kidney injury ([address] 06/06/2019    Intractable vomiting with nausea 06/06/2019    Uncontrolled type 2 diabetes mellitus with hyperglycemia ([address] 06/06/2019    UTI (urinary tract infection) 06/06/2019     1. Abdominal pain, generalized with Nausea and vomitting   2. Hx of diabetic gastroparesis    3. Intractable nausea and vomiting    4. Urinary tract infection without hematuria, site unspecified    5. Type 2 diabetes mellitus with hyperglycemia, with long-term current use of insulin            Plan:   Admit  IVF and IV abx  Restart home meds  accucheck and SSI    IV Reglan  Labs reviewed and as ordered    GI/DVT Prophylaxis  --sq hep  Code Status: full    Subjective:     Jose Armando Gamble is a 37 y.o. female being admitted to the hospital with  persitant abd pain and nausea. She has not been taking her insulin in last few days. She is co increasing abd pain -- epigastric with nausea and vomiting.  She has h/o gastroparesis and not taking her Reglan    Past Medical History:   Diagnosis Date    Diabetes ([address]     Gastroparesis     Gastroparesis     Hypertension     UTI (urinary tract infection)        Past Surgical History:   Procedure Laterality Date    HX APPENDECTOMY  HX GYN      tubal ligation, C Section       No Known Allergies    Current Facility-Administered Medications   Medication Dose Route Frequency Provider Last Rate Last Dose    [START ON 2019] amLODIPine (NORVASC) tablet 5 mg  5 mg Oral DAILY Alla Enriquez MD        insulin glargine (LANTUS) injection 30 Units  30 Units SubCUTAneous QHS Alla Enriquez MD        metoclopramide HCl (REGLAN) injection 5 mg  5 mg IntraVENous Q6H Alla Enriqeuz MD        pantoprazole (PROTONIX) 40 mg in 0.9% sodium chloride 10 mL injection  40 mg IntraVENous Q12H Alla Enriquez MD        0.9% sodium chloride infusion  100 mL/hr IntraVENous CONTINUOUS Alla Enriquez MD        acetaminophen (TYLENOL) tablet 650 mg  650 mg Oral Q4H PRN Alla Enriquez MD        heparin (porcine) injection 5,000 Units  5,000 Units SubCUTAneous Q8H Alla Enriquez MD        ondansetron [facility]) injection 4 mg  4 mg IntraVENous Q4H PRN Alla Enriquez MD        insulin lispro (HUMALOG) injection   SubCUTAneous AC&HS Alla Enriquez MD        glucose chewable tablet 16 g  4 Tab Oral PRN Alla Enriquez MD        glucagon (GLUCAGEN) injection 1 mg  1 mg IntraMUSCular PRN Alla Enriquez MD        dextrose 10% infusion 125-250 mL  125-250 mL IntraVENous PRN Alla Enriquez MD           Prior to Admission Medications   Prescriptions Last Dose Informant Patient Reported? Taking? amLODIPine (NORVASC) 5 mg tablet   No No   Sig: Take 1 Tab by mouth daily. famotidine (PEPCID PO)   Yes No   Sig: Take  by mouth. insulin admin. supplies ([facility], [employer] [place])   Yes No   Sig: 10-12 Units by SubCUTAneous route Before breakfast, lunch, and dinner. insulin glargine (LANTUS U-100 INSULIN) 100 unit/mL injection   Yes No   Si Units by SubCUTAneous route nightly. Indications: type 2 diabetes mellitus   loperamide HCl (IMODIUM PO)   Yes No   Sig: Take  by mouth.   metoclopramide HCl (REGLAN PO)   Yes No   Sig: Take  by mouth. Facility-Administered Medications: None       Social History     Socioeconomic History    Marital status: SINGLE     Spouse name: Not on file    Number of children: Not on file    Years of education: Not on file    Highest education level: Not on file   Occupational History    Not on file   Social Needs    Financial resource strain: Not on file    Food insecurity:     Worry: Not on file     Inability: Not on file    Transportation needs:     Medical: Not on file     Non-medical: Not on file   Tobacco Use    Smoking status: Never Smoker    Smokeless tobacco: Never Used   Substance and Sexual Activity    Alcohol use: Never     Frequency: Never    Drug use: Not Currently    Sexual activity: Not on file   Lifestyle    Physical activity:     Days per week: Not on file     Minutes per session: Not on file    Stress: Not on file   Relationships    Social connections:     Talks on phone: Not on file     Gets together: Not on file     Attends [employer] service: Not on file     Active member of club or organization: Not on file     Attends meetings of clubs or organizations: Not on file     Relationship status: Not on file    Intimate partner violence:     Fear of current or ex partner: Not on file     Emotionally abused: Not on file     Physically abused: Not on file     Forced sexual activity: Not on file   Other Topics Concern    Not on file   Social History Narrative    Not on file       History reviewed. No pertinent family history. Review of Systems   Constitutional: Positive for malaise/fatigue. HENT: Negative. Eyes: Negative. Respiratory: Positive for shortness of breath. Cardiovascular: Positive for leg swelling. Negative for chest pain, palpitations and orthopnea. Gastrointestinal: Positive for nausea and vomiting. Negative for abdominal pain, diarrhea and heartburn. Genitourinary: Negative for dysuria and hematuria. Musculoskeletal: Negative for joint pain and myalgias. Skin: Negative. Neurological: Positive for weakness. Psychiatric/Behavioral: Negative for depression, substance abuse and suicidal ideas. The patient is not nervous/anxious. Objective:     Patient Vitals for the past 24 hrs:   BP Temp Pulse Resp SpO2 Weight   12/21/19 1445 164/71 98.2 °F (36.8 °C) 100 14 100 % 60.7 kg (133 lb 12.8 oz)   12/21/19 1400 (!) 166/96 98.6 °F (37 °C) 78  100 %    12/21/19 1147 (!) 177/92 98.2 °F (36.8 °C)  18 100 % 62.1 kg (137 lb)           Extended / Orthostatic Vitals:    Vital Signs  Level of Consciousness: Alert (12/21/19 1445)  Temp: 98.2 °F (36.8 °C) (12/21/19 1445)  Temp Source: Oral (12/21/19 1445)  Pulse (Heart Rate): 100 (12/21/19 1445)  Resp Rate: 14 (12/21/19 1445)  BP: 164/71(taken at 1457) (12/21/19 1445)  MAP (Monitor): 114 (12/21/19 1400)  MAP (Calculated): 102 (12/21/19 1445)  MEWS Score: 0 (12/21/19 1445)         Oxygen Therapy  O2 Sat (%): 100 % (12/21/19 1445)  Pulse via Oximetry: 100 beats per minute (12/21/19 1400)     Physical Exam  Vitals signs and nursing note reviewed. Constitutional:       General: She is in acute distress. Neck:      Musculoskeletal: Normal range of motion and neck supple. Vascular: No JVD. Cardiovascular:      Rate and Rhythm: Normal rate and regular rhythm. Heart sounds: Normal heart sounds. Pulmonary:      Effort: Respiratory distress present. Breath sounds: Normal breath sounds. Abdominal:      General: Bowel sounds are normal. There is no distension. Palpations: Abdomen is soft. Tenderness: There is tenderness. There is guarding. There is no rebound. Musculoskeletal: Normal range of motion. Skin:     General: Skin is warm and dry. Neurological:      Mental Status: She is alert and oriented to person, place, and time.    Psychiatric:         Mood and Affect: Mood and affect normal.         Laboratory:     Recent Results (from the past 24 hour(s))   METABOLIC PANEL, COMPREHENSIVE Collection Time: 12/21/19 11:51 AM   Result Value Ref Range    Sodium 141 136 - 145 mmol/L    Potassium 3.7 3.5 - 5.5 mmol/L    Chloride 110 100 - 111 mmol/L    CO2 21 21 - 32 mmol/L    Anion gap 10 3.0 - 18 mmol/L    Glucose 226 (H) 74 - 99 mg/dL    BUN 27 (H) 7.0 - 18 MG/DL    Creatinine 2.52 (H) 0.6 - 1.3 MG/DL    BUN/Creatinine ratio 11 (L) 12 - 20      GFR est AA 26 (L) >60 ml/min/1.73m2    GFR est non-AA 22 (L) >60 ml/min/1.73m2    Calcium 8.8 8.5 - 10.1 MG/DL    Bilirubin, total 0.4 0.2 - 1.0 MG/DL    ALT (SGPT) 8 (L) 13 - 56 U/L    AST (SGOT) 9 (L) 10 - 38 U/L    Alk. phosphatase 158 (H) 45 - 117 U/L    Protein, total 7.0 6.4 - 8.2 g/dL    Albumin 2.8 (L) 3.4 - 5.0 g/dL    Globulin 4.2 (H) 2.0 - 4.0 g/dL    A-G Ratio 0.7 (L) 0.8 - 1.7     CBC WITH AUTOMATED DIFF    Collection Time: 12/21/19 11:51 AM   Result Value Ref Range    WBC 12.8 4.6 - 13.2 K/uL    RBC 3.46 (L) 4.20 - 5.30 M/uL    HGB 9.8 (L) 12.0 - 16.0 g/dL    HCT 29.6 (L) 35.0 - 45.0 %    MCV 85.5 74.0 - 97.0 FL    MCH 28.3 24.0 - 34.0 PG    MCHC 33.1 31.0 - 37.0 g/dL    RDW 15.1 (H) 11.6 - 14.5 %    PLATELET 777 183 - 928 K/uL    MPV 8.9 (L) 9.2 - 11.8 FL    NEUTROPHILS 78 (H) 40 - 73 %    LYMPHOCYTES 17 (L) 21 - 52 %    MONOCYTES 4 3 - 10 %    EOSINOPHILS 1 0 - 5 %    BASOPHILS 0 0 - 2 %    ABS. NEUTROPHILS 10.0 (H) 1.8 - 8.0 K/UL    ABS. LYMPHOCYTES 2.2 0.9 - 3.6 K/UL    ABS. MONOCYTES 0.5 0.05 - 1.2 K/UL    ABS. EOSINOPHILS 0.1 0.0 - 0.4 K/UL    ABS.  BASOPHILS 0.0 0.0 - 0.1 K/UL    DF AUTOMATED     LIPASE    Collection Time: 12/21/19 11:51 AM   Result Value Ref Range    Lipase 123 73 - 393 U/L   URINALYSIS W/ RFLX MICROSCOPIC    Collection Time: 12/21/19 12:16 PM   Result Value Ref Range    Color STRAW      Appearance TURBID      Specific gravity 1.019 1.005 - 1.030      pH (UA) 6.0 5.0 - 8.0      Protein 300 (A) NEG mg/dL    Glucose 500 (A) NEG mg/dL    Ketone 15 (A) NEG mg/dL    Bilirubin NEGATIVE  NEG      Blood LARGE (A) NEG Urobilinogen 0.2 0.2 - 1.0 EU/dL    Nitrites NEGATIVE  NEG      Leukocyte Esterase MODERATE (A) NEG     URINE MICROSCOPIC ONLY    Collection Time: 12/21/19 12:16 PM   Result Value Ref Range    WBC TOO NUMEROUS TO COUNT 0 - 5 /hpf    RBC TOO NUMEROUS TO COUNT 0 - 5 /hpf    Epithelial cells 2+ 0 - 5 /lpf    Bacteria 4+ (A) NEG /hpf   HCG URINE, QL. - POC    Collection Time: 12/21/19 12:27 PM   Result Value Ref Range    Pregnancy test,urine (POC) NEGATIVE  NEG           Imaging/Procedures:     Significant Diagnostic Studies: Xr Abd (kub)    Result Date: 12/21/2019  Abdomen, single view COMPARISON: Radiographs June 6, 2019; CT scan of the same date INDICATION: Abdominal pain, nausea and vomiting FINDINGS: Supine AP view the abdomen on two separate exposures obtained. The included bowel gas pattern is nonobstructive and nonspecific. Fallopian tube surgical clips project over the pelvis. Phlebolith along the anterolateral left pelvic rim as previously. No acute osseous abnormality. Lung bases clear. Impression: Nonobstructive bowel gas pattern. No evidence of acute abnormality. Ct Abd Pelv Wo Cont    Result Date: 12/21/2019  EXAM: CT of the abdomen and pelvis INDICATION: Abdominal pain, urinary tract infection, vomiting COMPARISON: June 6, 2019 TECHNIQUE: Axial CT imaging of the abdomen and pelvis was performed without intravenous contrast. Multiplanar reformats were generated. One or more dose reduction techniques were used on this CT: automated exposure control, adjustment of the mAs and/or kVp according to patient size, and iterative reconstruction techniques. The specific techniques used on this CT exam have been documented in the patient's electronic medical record.   Digital Imaging and Communications in Medicine (DICOM) format image data are available to nonaffiliated external healthcare facilities or entities on a secure, media free, reciprocally searchable basis with patient authorization for at least a 12-month period after this study. _______________ FINDINGS: LOWER CHEST: Unremarkable. LIVER, BILIARY: Liver is normal. No biliary dilation. Gallbladder is unremarkable. PANCREAS: Normal. SPLEEN: Normal. ADRENALS: Normal. KIDNEYS/URETERS/BLADDER: No evidence of hydronephrosis involving either kidney. Mild edematous appearance of the left kidney with somewhat asymmetric perinephric fat stranding noted. No evidence of nephrolithiasis. No distal ureteral or urinary bladder stone. Urinary bladder normal in CT appearance. PELVIC ORGANS: Bilateral fallopian tube surgical clips present. Uterus unremarkable in CT appearance. VASCULATURE: Unremarkable LYMPH NODES: No enlarged lymph nodes. GASTROINTESTINAL TRACT: No bowel dilation or wall thickening. No morphology of bowel obstruction. No free intraperitoneal gas. The appendix is surgically absent. BONES: No acute or aggressive osseous abnormalities identified. OTHER: None. _______________     IMPRESSION: 1. No evidence of hydronephrosis or urolithiasis. 2. Somewhat edematous appearance to the left kidney with mild perinephric stranding. On this unenhanced examination, these findings may reflect changes related to pyelitis and/or pyelonephritis. 3. Normal caliber small and large bowel. No bowel obstruction. Prior appendectomy.          Stacey Phan MD    12/21/2019, 4:06 PM

## 2019-12-21 NOTE — PROGRESS NOTES
Elizabethshamar 240 to floor. No obvious distress. Resting in bed. Indicates that she is nauseated. 1645 Gave zofran 4 mg IV for nausea. Pt c/o pain but refused tylenol. 1838 Pt c/o burning pain in chest rated 9/10. Informed Dr. Jacinto Sessions. Order received for Maalox 30 cc PO q6h prn. Also, give protonix 40 mg IV now. 1930 Bedside and Verbal shift change report given to CURT Morton RN (oncoming nurse) by Beata Downey RN  (offgoing nurse). Report included the following information SBAR, Kardex, Intake/Output and MAR. SHIFT SUMMARY: No other events since arrival to the floor. Pt continued to be nauseated but no observed vomiting. Poor appetite. Denies passing flatus, but had a BM. Pt requests for assistance for incontinence care, though she is able to ambulate to the restroom. PTA med list is partially incomplete because pt is not familiar with medication doses.

## 2019-12-21 NOTE — ED TRIAGE NOTES
Patient arrived to Ed by EMS with c/o upper abdominal pain with nausea and vomiting.  Patient was given 8 mg zofran ODT with no relief

## 2019-12-21 NOTE — ROUTINE PROCESS
TRANSFER - IN REPORT: 
 
Verbal report received from CHARLENE Wick RN(name) on Gayathri Sanchez  being received from ED(unit) for routine progression of care Report consisted of patients Situation, Background, Assessment and  
Recommendations(SBAR). Information from the following report(s) SBAR, Kardex, Intake/Output and MAR was reviewed with the receiving nurse. Opportunity for questions and clarification was provided. Assessment completed upon patients arrival to unit and care assumed.

## 2019-12-22 LAB
GLUCOSE BLD STRIP.AUTO-MCNC: 186 MG/DL (ref 70–110)
GLUCOSE BLD STRIP.AUTO-MCNC: 192 MG/DL (ref 70–110)
GLUCOSE BLD STRIP.AUTO-MCNC: 214 MG/DL (ref 70–110)

## 2019-12-22 PROCEDURE — 65270000029 HC RM PRIVATE

## 2019-12-22 PROCEDURE — 74011250637 HC RX REV CODE- 250/637: Performed by: FAMILY MEDICINE

## 2019-12-22 PROCEDURE — 74011250636 HC RX REV CODE- 250/636: Performed by: INTERNAL MEDICINE

## 2019-12-22 PROCEDURE — 74011250637 HC RX REV CODE- 250/637: Performed by: INTERNAL MEDICINE

## 2019-12-22 PROCEDURE — 82962 GLUCOSE BLOOD TEST: CPT

## 2019-12-22 PROCEDURE — 74011636637 HC RX REV CODE- 636/637: Performed by: INTERNAL MEDICINE

## 2019-12-22 PROCEDURE — 74011000250 HC RX REV CODE- 250: Performed by: FAMILY MEDICINE

## 2019-12-22 PROCEDURE — C9113 INJ PANTOPRAZOLE SODIUM, VIA: HCPCS | Performed by: INTERNAL MEDICINE

## 2019-12-22 PROCEDURE — 74011000250 HC RX REV CODE- 250: Performed by: INTERNAL MEDICINE

## 2019-12-22 RX ORDER — METOPROLOL TARTRATE 5 MG/5ML
5 INJECTION INTRAVENOUS ONCE
Status: COMPLETED | OUTPATIENT
Start: 2019-12-22 | End: 2019-12-22

## 2019-12-22 RX ORDER — INSULIN GLARGINE 100 [IU]/ML
10 INJECTION, SOLUTION SUBCUTANEOUS ONCE
Status: COMPLETED | OUTPATIENT
Start: 2019-12-22 | End: 2019-12-22

## 2019-12-22 RX ORDER — INSULIN GLARGINE 100 [IU]/ML
50 INJECTION, SOLUTION SUBCUTANEOUS
Status: DISCONTINUED | OUTPATIENT
Start: 2019-12-22 | End: 2019-12-23 | Stop reason: HOSPADM

## 2019-12-22 RX ORDER — CLONIDINE 0.2 MG/24H
1 PATCH, EXTENDED RELEASE TRANSDERMAL
Status: DISCONTINUED | OUTPATIENT
Start: 2019-12-22 | End: 2019-12-23 | Stop reason: HOSPADM

## 2019-12-22 RX ADMIN — INSULIN LISPRO 2 UNITS: 100 INJECTION, SOLUTION INTRAVENOUS; SUBCUTANEOUS at 09:26

## 2019-12-22 RX ADMIN — HEPARIN SODIUM 5000 UNITS: 5000 INJECTION INTRAVENOUS; SUBCUTANEOUS at 16:46

## 2019-12-22 RX ADMIN — CEFTRIAXONE SODIUM 1 G: 1 INJECTION, POWDER, FOR SOLUTION INTRAMUSCULAR; INTRAVENOUS at 13:34

## 2019-12-22 RX ADMIN — AMLODIPINE BESYLATE 5 MG: 5 TABLET ORAL at 09:26

## 2019-12-22 RX ADMIN — INSULIN GLARGINE 50 UNITS: 100 INJECTION, SOLUTION SUBCUTANEOUS at 22:39

## 2019-12-22 RX ADMIN — METOCLOPRAMIDE 5 MG: 5 INJECTION, SOLUTION INTRAMUSCULAR; INTRAVENOUS at 17:43

## 2019-12-22 RX ADMIN — SODIUM CHLORIDE 40 MG: 9 INJECTION, SOLUTION INTRAMUSCULAR; INTRAVENOUS; SUBCUTANEOUS at 22:40

## 2019-12-22 RX ADMIN — INSULIN LISPRO 3 UNITS: 100 INJECTION, SOLUTION INTRAVENOUS; SUBCUTANEOUS at 22:39

## 2019-12-22 RX ADMIN — HEPARIN SODIUM 5000 UNITS: 5000 INJECTION INTRAVENOUS; SUBCUTANEOUS at 01:28

## 2019-12-22 RX ADMIN — INSULIN GLARGINE 10 UNITS: 100 INJECTION, SOLUTION SUBCUTANEOUS at 11:52

## 2019-12-22 RX ADMIN — METOPROLOL TARTRATE 5 MG: 5 INJECTION INTRAVENOUS at 02:48

## 2019-12-22 RX ADMIN — SODIUM CHLORIDE 40 MG: 9 INJECTION, SOLUTION INTRAMUSCULAR; INTRAVENOUS; SUBCUTANEOUS at 09:26

## 2019-12-22 RX ADMIN — METOCLOPRAMIDE 5 MG: 5 INJECTION, SOLUTION INTRAMUSCULAR; INTRAVENOUS at 06:52

## 2019-12-22 RX ADMIN — METOCLOPRAMIDE 5 MG: 5 INJECTION, SOLUTION INTRAMUSCULAR; INTRAVENOUS at 01:28

## 2019-12-22 RX ADMIN — METOCLOPRAMIDE 5 MG: 5 INJECTION, SOLUTION INTRAMUSCULAR; INTRAVENOUS at 11:52

## 2019-12-22 RX ADMIN — INSULIN LISPRO 2 UNITS: 100 INJECTION, SOLUTION INTRAVENOUS; SUBCUTANEOUS at 11:53

## 2019-12-22 RX ADMIN — HEPARIN SODIUM 5000 UNITS: 5000 INJECTION INTRAVENOUS; SUBCUTANEOUS at 09:25

## 2019-12-22 RX ADMIN — INSULIN LISPRO 4 UNITS: 100 INJECTION, SOLUTION INTRAVENOUS; SUBCUTANEOUS at 16:46

## 2019-12-22 NOTE — PROGRESS NOTES
0815  Bedside and Verbal shift change report given to [address] [place] [address] (oncoming nurse) by Jazmín Mendoza RN (offgoing nurse). Report included the following information SBAR, Kardex, Intake/Output, MAR and Recent Results. 1400  Pt is resting quietly. No complaint of pain or nausea. 2103  Bedside and Verbal shift change report given to Etelvina Hoffman RN (oncoming nurse) by Alex Buckley RN (offgoing nurse). Report included the following information SBAR, Kardex, Intake/Output, MAR and Recent Results.

## 2019-12-22 NOTE — PROGRESS NOTES
Hospitalist Progress Note    Patient: Brenda Mantilla MRN: [id number]  CSN: [id number]    YOB: 1985  Age: 37 y.o. Sex: female    DOA: 12/21/2019 LOS:  LOS: 1 day              IMPRESSION and Plan:    Brenda Mantilla is a 37 y.o. female with   Patient Active Problem List    Diagnosis Date Noted    Intractable nausea and vomiting 12/21/2019    Abdominal pain, generalized 12/21/2019    Hx of diabetic gastroparesis 12/21/2019    Type 2 diabetes mellitus with hyperglycemia, with long-term current use of insulin ([address] 12/21/2019    Hordeolum externum of right upper eyelid 11/02/2019    Chest pain 11/01/2019    Anemia 11/01/2019    Hyperglycemia 11/01/2019    Acute renal failure superimposed on stage 3 chronic kidney disease ([address] 11/01/2019    Chronic anemia 06/07/2019    Gastroparesis 06/06/2019    Dehydration 06/06/2019    Leukocytosis 06/06/2019    Epigastric pain 06/06/2019    Lactic acid acidosis 06/06/2019    Acute kidney injury ([address] 06/06/2019    Intractable vomiting with nausea 06/06/2019    Uncontrolled type 2 diabetes mellitus with hyperglycemia ([address] 06/06/2019    UTI (urinary tract infection) 06/06/2019     Active Problems:    Intractable nausea and vomiting (12/21/2019)      Abdominal pain, generalized (12/21/2019)      Hx of diabetic gastroparesis (12/21/2019)      Type 2 diabetes mellitus with hyperglycemia, with long-term current use of insulin ([address] (12/21/2019)        1. Abdominal pain, generalized with Nausea and vomitting -- appears to be improving. Advance diet. 2. Hx of diabetic gastroparesis  -- advises to be compliant with meds. Cont Reglan   3. Intractable nausea and vomiting  -- due to above. Improving     4. Urinary tract infection without hematuria, site unspecified  -- cont IV Rocephin. UC pending     5. Type 2 diabetes mellitus with hyperglycemia, with long-term current use of insulin    -- BS still higher. HA1c 9.8. increase Lantus.  D/w pt and  at bedside regarding strict DM cotnrol         6     LESLIE with CKD --- she has been fu by renal . Cont to ann. Advised to avoid NSAIDS    Patient's condition is improved    Dispo: Advance diet and if tolerating okay to dc home         Recommend to continue hospitalization. Discussed with patient. Chief Complaints:   Chief Complaint   Patient presents with    Abdominal Pain     SUBJECTIVE:  Pt is seen and examined. feels better. Sone nausea but no vomiting. Review of systems:    Review of Systems   Constitutional: Positive for malaise/fatigue. HENT: Negative. Eyes: Negative. Respiratory: Negative for shortness of breath. Cardiovascular: Negative for chest pain, palpitations, orthopnea and leg swelling. Gastrointestinal: Positive for nausea. Negative for abdominal pain, diarrhea, heartburn and vomiting. Genitourinary: Negative for dysuria and hematuria. Skin: Negative. Neurological: Positive for weakness. Psychiatric/Behavioral: Negative for depression, substance abuse and suicidal ideas. The patient is not nervous/anxious.         PE:  Patient Vitals for the past 24 hrs:   BP Temp Pulse Resp SpO2 Weight   12/22/19 0747 164/80 98.9 °F (37.2 °C) (!) 105 16 99 %    12/22/19 0345 170/81  (!) 102      12/22/19 0330 172/89 98.3 °F (36.8 °C) (!) 103 16 100 %    12/22/19 0311 175/89  (!) 105      12/22/19 0248 (!) 182/97  (!) 106      12/22/19 0126 167/84 98.5 °F (36.9 °C) (!) 106 18 99 %    12/21/19 2310 (!) 180/93 98.2 °F (36.8 °C) (!) 102 16 100 % 62.1 kg (136 lb 14.5 oz)   12/21/19 2234 175/88 98.3 °F (36.8 °C) (!) 106 16 99 %    12/21/19 1911 157/82 98.4 °F (36.9 °C) (!) 102 14 99 %    12/21/19 1445 164/71 98.2 °F (36.8 °C) 100 14 100 % 60.7 kg (133 lb 12.8 oz)   12/21/19 1400 (!) 166/96 98.6 °F (37 °C) 78  100 %    12/21/19 1147 (!) 177/92 98.2 °F (36.8 °C)  18 100 % 62.1 kg (137 lb)       Intake/Output Summary (Last 24 hours) at 12/22/2019 1101  Last data filed at 12/22/2019 3269  Gross per 24 hour   Intake 1411.63 ml   Output    Net 1411.63 ml     Patient Vitals for the past 120 hrs:   Weight   12/21/19 1147 62.1 kg (137 lb)   12/21/19 1445 60.7 kg (133 lb 12.8 oz)   12/21/19 2310 62.1 kg (136 lb 14.5 oz)         Physical Exam  Vitals signs and nursing note reviewed. Constitutional:       General: She is in acute distress. Neck:      Musculoskeletal: Normal range of motion and neck supple. Vascular: No JVD. Cardiovascular:      Rate and Rhythm: Normal rate and regular rhythm. Heart sounds: Normal heart sounds. Pulmonary:      Effort: Respiratory distress present. Breath sounds: Normal breath sounds. Abdominal:      General: Bowel sounds are normal. There is no distension. Palpations: Abdomen is soft. Tenderness: There is no tenderness. There is guarding. There is no rebound. Musculoskeletal: Normal range of motion. Skin:     General: Skin is warm and dry. Neurological:      Mental Status: She is alert and oriented to person, place, and time. Psychiatric:         Mood and Affect: Affect normal.             Intake and Output:  Current Shift:  No intake/output data recorded. Last three shifts:  12/20 1901 - 12/22 0700  In: 1411.6 [I.V.:1411.6]  Out: -     Lab/Data Reviewed:  No results found for this or any previous visit (from the past 8 hour(s)).   Medications:  Current Facility-Administered Medications   Medication Dose Route Frequency    cloNIDine (CATAPRES) 0.2 mg/24 hr patch 1 Patch  1 Patch TransDERmal Q7D    amLODIPine (NORVASC) tablet 5 mg  5 mg Oral DAILY    insulin glargine (LANTUS) injection 30 Units  30 Units SubCUTAneous QHS    metoclopramide HCl (REGLAN) injection 5 mg  5 mg IntraVENous Q6H    0.9% sodium chloride infusion  100 mL/hr IntraVENous CONTINUOUS    acetaminophen (TYLENOL) tablet 650 mg  650 mg Oral Q4H PRN    heparin (porcine) injection 5,000 Units  5,000 Units SubCUTAneous Q8H    ondansetron (ZOFRAN) injection 4 mg  4 mg IntraVENous Q4H PRN    insulin lispro (HUMALOG) injection   SubCUTAneous AC&HS    glucose chewable tablet 16 g  4 Tab Oral PRN    glucagon (GLUCAGEN) injection 1 mg  1 mg IntraMUSCular PRN    dextrose 10% infusion 125-250 mL  125-250 mL IntraVENous PRN    pantoprazole (PROTONIX) 40 mg in 0.9% sodium chloride 10 mL injection  40 mg IntraVENous Q12H    alum-mag hydroxide-simeth (MYLANTA) oral suspension 30 mL  30 mL Oral Q4H PRN       Recent Results (from the past 24 hour(s))   METABOLIC PANEL, COMPREHENSIVE    Collection Time: 12/21/19 11:51 AM   Result Value Ref Range    Sodium 141 136 - 145 mmol/L    Potassium 3.7 3.5 - 5.5 mmol/L    Chloride 110 100 - 111 mmol/L    CO2 21 21 - 32 mmol/L    Anion gap 10 3.0 - 18 mmol/L    Glucose 226 (H) 74 - 99 mg/dL    BUN 27 (H) 7.0 - 18 MG/DL    Creatinine 2.52 (H) 0.6 - 1.3 MG/DL    BUN/Creatinine ratio 11 (L) 12 - 20      GFR est AA 26 (L) >60 ml/min/1.73m2    GFR est non-AA 22 (L) >60 ml/min/1.73m2    Calcium 8.8 8.5 - 10.1 MG/DL    Bilirubin, total 0.4 0.2 - 1.0 MG/DL    ALT (SGPT) 8 (L) 13 - 56 U/L    AST (SGOT) 9 (L) 10 - 38 U/L    Alk. phosphatase 158 (H) 45 - 117 U/L    Protein, total 7.0 6.4 - 8.2 g/dL    Albumin 2.8 (L) 3.4 - 5.0 g/dL    Globulin 4.2 (H) 2.0 - 4.0 g/dL    A-G Ratio 0.7 (L) 0.8 - 1.7     CBC WITH AUTOMATED DIFF    Collection Time: 12/21/19 11:51 AM   Result Value Ref Range    WBC 12.8 4.6 - 13.2 K/uL    RBC 3.46 (L) 4.20 - 5.30 M/uL    HGB 9.8 (L) 12.0 - 16.0 g/dL    HCT 29.6 (L) 35.0 - 45.0 %    MCV 85.5 74.0 - 97.0 FL    MCH 28.3 24.0 - 34.0 PG    MCHC 33.1 31.0 - 37.0 g/dL    RDW 15.1 (H) 11.6 - 14.5 %    PLATELET 063 118 - 788 K/uL    MPV 8.9 (L) 9.2 - 11.8 FL    NEUTROPHILS 78 (H) 40 - 73 %    LYMPHOCYTES 17 (L) 21 - 52 %    MONOCYTES 4 3 - 10 %    EOSINOPHILS 1 0 - 5 %    BASOPHILS 0 0 - 2 %    ABS. NEUTROPHILS 10.0 (H) 1.8 - 8.0 K/UL    ABS. LYMPHOCYTES 2.2 0.9 - 3.6 K/UL    ABS.  MONOCYTES 0.5 0.05 - 1.2 K/UL    ABS. EOSINOPHILS 0.1 0.0 - 0.4 K/UL    ABS.  BASOPHILS 0.0 0.0 - 0.1 K/UL    DF AUTOMATED     LIPASE    Collection Time: 12/21/19 11:51 AM   Result Value Ref Range    Lipase 123 73 - 393 U/L   HEMOGLOBIN A1C WITH EAG    Collection Time: 12/21/19 11:51 AM   Result Value Ref Range    Hemoglobin A1c 9.8 (H) 4.2 - 5.6 %    Est. average glucose 235 mg/dL   URINALYSIS W/ RFLX MICROSCOPIC    Collection Time: 12/21/19 12:16 PM   Result Value Ref Range    Color STRAW      Appearance TURBID      Specific gravity 1.019 1.005 - 1.030      pH (UA) 6.0 5.0 - 8.0      Protein 300 (A) NEG mg/dL    Glucose 500 (A) NEG mg/dL    Ketone 15 (A) NEG mg/dL    Bilirubin NEGATIVE  NEG      Blood LARGE (A) NEG      Urobilinogen 0.2 0.2 - 1.0 EU/dL    Nitrites NEGATIVE  NEG      Leukocyte Esterase MODERATE (A) NEG     URINE MICROSCOPIC ONLY    Collection Time: 12/21/19 12:16 PM   Result Value Ref Range    WBC TOO NUMEROUS TO COUNT 0 - 5 /hpf    RBC TOO NUMEROUS TO COUNT 0 - 5 /hpf    Epithelial cells 2+ 0 - 5 /lpf    Bacteria 4+ (A) NEG /hpf   CULTURE, URINE    Collection Time: 12/21/19 12:16 PM   Result Value Ref Range    Special Requests: NO SPECIAL REQUESTS      Culture result: >100,000 COLONIES/mL GRAM NEGATIVE RODS (A)     HCG URINE, QL. - POC    Collection Time: 12/21/19 12:27 PM   Result Value Ref Range    Pregnancy test,urine (POC) NEGATIVE  NEG     GLUCOSE, POC    Collection Time: 12/21/19  4:44 PM   Result Value Ref Range    Glucose (POC) 202 (H) 70 - 110 mg/dL   GLUCOSE, POC    Collection Time: 12/21/19  9:53 PM   Result Value Ref Range    Glucose (POC) 194 (H) 70 - 110 mg/dL       Procedures/imaging: see electronic medical records for all procedures/Xrays and details which were not copied into this note but were reviewed prior to creation of Plan    Aundrea Sams MD   12/22/2019, 11:01 AM

## 2019-12-22 NOTE — PROGRESS NOTES
2227  Patient complaining of severe epigastric pain nausea and vomiting,mylanta been effective but not due at the [facility] notified and orders given for stat mylanta and rescheduled dose to every 4hrs,zofrzn given as well,will continue to monitor. 9311  Patient hypertensive in the 228'X systolic,takes Norvasc at home but unable to tolerate anything orally at the moment. Md notified and orders given for I.v metoprolol 5mg now and .2mg of catapres patch,orders carried out and partially effective. Patient also started on telemetry monitor box for close monitoring while getting metoprolol. 0600  Shift summary  Patient remained uncomfortable all shift with nausea partially controlled with zofran and reglan,B/P remains elevated post treatment. Diagnosed with UTI,recephin ordered x1, notified and will adjust orders. Patient Vitals for the past 12 hrs:   Temp Pulse Resp BP SpO2   12/22/19 0345  (!) 102  170/81    12/22/19 0330 98.3 °F (36.8 °C) (!) 103 16 172/89 100 %   12/22/19 0311  (!) 105  175/89    12/22/19 0248  (!) 106  (!) 182/97    12/22/19 0126 98.5 °F (36.9 °C) (!) 106 18 167/84 99 %   12/21/19 2310 98.2 °F (36.8 °C) (!) 102 16 (!) 180/93 100 %   12/21/19 2234 98.3 °F (36.8 °C) (!) 106 16 175/88 99 %   12/21/19 1911 98.4 °F (36.9 °C) (!) 102 14 157/82 99 %     Bedside, Verbal and Written shift change report given to [phone] (oncoming nurse) by Louie Hilliard RN   (offgoing nurse). Report included the following information SBAR, Kardex and MAR.

## 2019-12-22 NOTE — PROGRESS NOTES
DC Plan: Discharge home with MD follow up, family assistance. Will need Medicaid transport home    Chart reviewed as CM on call. Pt admitted to medical by hospitalist.  Met with pt at bedside, no friend/family present. Pt states she lives with family. Home address confirmed per face sheet. Pt states she will need medicaid transport home. Pt independent, [facility]. Pts PCP is MD Amaro. Pt states she also sees MD Covarrubias. No dc concerns identified. CM will cont to follow for transition of care needs and will be available via hospital  on weekends. Reason for Admission:   Per H&P, pt is \"is a 37 y.o. female being admitted to the hospital with  persitant abd pain and nausea. She has not been taking her insulin in last few days. She is co increasing abd pain -- epigastric with nausea and vomiting. She has h/o gastroparesis and not taking her Reglan\"                   RRAT Score:    Low 11                 Plan for utilizing home health:      None at this time                    Current Advanced Directive/Advance Care Plan: Not on file                         Transition of Care Plan:          MD follow up            Care Management Interventions  PCP Verified by CM: Yes  Mode of Transport at Discharge:  Other (see comment)(medicaid transport)  Transition of Care Consult (CM Consult): Discharge Planning  Current Support Network: Lives with Spouse  Confirm Follow Up Transport: Other (see comment)(,medicaid transport)  The Plan for Transition of Care is Related to the Following Treatment Goals : HOME, MD FOLLOW UP  Discharge Location  Discharge Placement: Home with family assistance

## 2019-12-23 ENCOUNTER — APPOINTMENT (OUTPATIENT)
Dept: CT IMAGING | Age: 34
DRG: 468 | End: 2019-12-23
Attending: INTERNAL MEDICINE
Payer: MEDICAID

## 2019-12-23 VITALS
HEIGHT: 64 IN | HEART RATE: 85 BPM | SYSTOLIC BLOOD PRESSURE: 152 MMHG | WEIGHT: 136.91 LBS | BODY MASS INDEX: 23.37 KG/M2 | DIASTOLIC BLOOD PRESSURE: 87 MMHG | RESPIRATION RATE: 18 BRPM | TEMPERATURE: 98 F | OXYGEN SATURATION: 98 %

## 2019-12-23 LAB
ALBUMIN SERPL-MCNC: 2.4 G/DL (ref 3.4–5)
ALBUMIN/GLOB SERPL: 0.7 {RATIO} (ref 0.8–1.7)
ALP SERPL-CCNC: 127 U/L (ref 45–117)
ALT SERPL-CCNC: 7 U/L (ref 13–56)
ANION GAP SERPL CALC-SCNC: 9 MMOL/L (ref 3–18)
AST SERPL-CCNC: 12 U/L (ref 10–38)
BACTERIA SPEC CULT: ABNORMAL
BASOPHILS # BLD: 0 K/UL (ref 0–0.1)
BASOPHILS NFR BLD: 0 % (ref 0–2)
BILIRUB SERPL-MCNC: 0.2 MG/DL (ref 0.2–1)
BUN SERPL-MCNC: 23 MG/DL (ref 7–18)
BUN/CREAT SERPL: 10 (ref 12–20)
CALCIUM SERPL-MCNC: 7.8 MG/DL (ref 8.5–10.1)
CHLORIDE SERPL-SCNC: 110 MMOL/L (ref 100–111)
CO2 SERPL-SCNC: 22 MMOL/L (ref 21–32)
CREAT SERPL-MCNC: 2.42 MG/DL (ref 0.6–1.3)
DIFFERENTIAL METHOD BLD: ABNORMAL
EOSINOPHIL # BLD: 0.2 K/UL (ref 0–0.4)
EOSINOPHIL NFR BLD: 2 % (ref 0–5)
ERYTHROCYTE [DISTWIDTH] IN BLOOD BY AUTOMATED COUNT: 15.1 % (ref 11.6–14.5)
GLOBULIN SER CALC-MCNC: 3.5 G/DL (ref 2–4)
GLUCOSE BLD STRIP.AUTO-MCNC: 133 MG/DL (ref 70–110)
GLUCOSE BLD STRIP.AUTO-MCNC: 153 MG/DL (ref 70–110)
GLUCOSE BLD STRIP.AUTO-MCNC: 162 MG/DL (ref 70–110)
GLUCOSE BLD STRIP.AUTO-MCNC: 218 MG/DL (ref 70–110)
GLUCOSE SERPL-MCNC: 48 MG/DL (ref 74–99)
HCT VFR BLD AUTO: 23.8 % (ref 35–45)
HGB BLD-MCNC: 7.9 G/DL (ref 12–16)
LYMPHOCYTES # BLD: 4.2 K/UL (ref 0.9–3.6)
LYMPHOCYTES NFR BLD: 33 % (ref 21–52)
MAGNESIUM SERPL-MCNC: 2.2 MG/DL (ref 1.6–2.6)
MCH RBC QN AUTO: 27.7 PG (ref 24–34)
MCHC RBC AUTO-ENTMCNC: 33.2 G/DL (ref 31–37)
MCV RBC AUTO: 83.5 FL (ref 74–97)
MONOCYTES # BLD: 1 K/UL (ref 0.05–1.2)
MONOCYTES NFR BLD: 8 % (ref 3–10)
NEUTS SEG # BLD: 7.4 K/UL (ref 1.8–8)
NEUTS SEG NFR BLD: 57 % (ref 40–73)
PLATELET # BLD AUTO: 411 K/UL (ref 135–420)
PMV BLD AUTO: 8.7 FL (ref 9.2–11.8)
POTASSIUM SERPL-SCNC: 2.8 MMOL/L (ref 3.5–5.5)
POTASSIUM SERPL-SCNC: 3.7 MMOL/L (ref 3.5–5.5)
PROT SERPL-MCNC: 5.9 G/DL (ref 6.4–8.2)
RBC # BLD AUTO: 2.85 M/UL (ref 4.2–5.3)
SERVICE CMNT-IMP: ABNORMAL
SODIUM SERPL-SCNC: 141 MMOL/L (ref 136–145)
WBC # BLD AUTO: 12.9 K/UL (ref 4.6–13.2)

## 2019-12-23 PROCEDURE — 74011000258 HC RX REV CODE- 258: Performed by: INTERNAL MEDICINE

## 2019-12-23 PROCEDURE — 74011250637 HC RX REV CODE- 250/637: Performed by: INTERNAL MEDICINE

## 2019-12-23 PROCEDURE — 74011250636 HC RX REV CODE- 250/636: Performed by: INTERNAL MEDICINE

## 2019-12-23 PROCEDURE — 85025 COMPLETE CBC W/AUTO DIFF WBC: CPT

## 2019-12-23 PROCEDURE — 36415 COLL VENOUS BLD VENIPUNCTURE: CPT

## 2019-12-23 PROCEDURE — 82962 GLUCOSE BLOOD TEST: CPT

## 2019-12-23 PROCEDURE — 74011000250 HC RX REV CODE- 250: Performed by: INTERNAL MEDICINE

## 2019-12-23 PROCEDURE — 74011636637 HC RX REV CODE- 636/637: Performed by: INTERNAL MEDICINE

## 2019-12-23 PROCEDURE — 84132 ASSAY OF SERUM POTASSIUM: CPT

## 2019-12-23 PROCEDURE — 74011636637 HC RX REV CODE- 636/637: Performed by: HOSPITALIST

## 2019-12-23 PROCEDURE — C9113 INJ PANTOPRAZOLE SODIUM, VIA: HCPCS | Performed by: INTERNAL MEDICINE

## 2019-12-23 PROCEDURE — 74011250636 HC RX REV CODE- 250/636: Performed by: HOSPITALIST

## 2019-12-23 PROCEDURE — 83735 ASSAY OF MAGNESIUM: CPT

## 2019-12-23 PROCEDURE — 70450 CT HEAD/BRAIN W/O DYE: CPT

## 2019-12-23 RX ORDER — POTASSIUM CHLORIDE 7.45 MG/ML
10 INJECTION INTRAVENOUS
Status: DISCONTINUED | OUTPATIENT
Start: 2019-12-23 | End: 2019-12-23

## 2019-12-23 RX ORDER — CIPROFLOXACIN 250 MG/1
250 TABLET, FILM COATED ORAL 2 TIMES DAILY
Qty: 6 TAB | Refills: 0 | Status: SHIPPED | OUTPATIENT
Start: 2019-12-23 | End: 2019-12-26

## 2019-12-23 RX ORDER — POTASSIUM CHLORIDE 7.45 MG/ML
10 INJECTION INTRAVENOUS
Status: COMPLETED | OUTPATIENT
Start: 2019-12-23 | End: 2019-12-23

## 2019-12-23 RX ORDER — POTASSIUM CHLORIDE AND SODIUM CHLORIDE 900; 300 MG/100ML; MG/100ML
INJECTION, SOLUTION INTRAVENOUS CONTINUOUS
Status: DISCONTINUED | OUTPATIENT
Start: 2019-12-23 | End: 2019-12-23 | Stop reason: HOSPADM

## 2019-12-23 RX ADMIN — CEFTRIAXONE SODIUM 1 G: 1 INJECTION, POWDER, FOR SOLUTION INTRAMUSCULAR; INTRAVENOUS at 12:37

## 2019-12-23 RX ADMIN — POTASSIUM CHLORIDE 10 MEQ: 7.46 INJECTION, SOLUTION INTRAVENOUS at 11:34

## 2019-12-23 RX ADMIN — AMLODIPINE BESYLATE 5 MG: 5 TABLET ORAL at 09:16

## 2019-12-23 RX ADMIN — METOCLOPRAMIDE 5 MG: 5 INJECTION, SOLUTION INTRAMUSCULAR; INTRAVENOUS at 11:33

## 2019-12-23 RX ADMIN — POTASSIUM CHLORIDE 10 MEQ: 7.46 INJECTION, SOLUTION INTRAVENOUS at 08:18

## 2019-12-23 RX ADMIN — DEXTROSE MONOHYDRATE 250 ML: 100 INJECTION, SOLUTION INTRAVENOUS at 04:30

## 2019-12-23 RX ADMIN — INSULIN LISPRO 3 UNITS: 100 INJECTION, SOLUTION INTRAVENOUS; SUBCUTANEOUS at 12:36

## 2019-12-23 RX ADMIN — SODIUM CHLORIDE AND POTASSIUM CHLORIDE: 9; 2.98 INJECTION, SOLUTION INTRAVENOUS at 05:33

## 2019-12-23 RX ADMIN — METOCLOPRAMIDE 5 MG: 5 INJECTION, SOLUTION INTRAMUSCULAR; INTRAVENOUS at 05:36

## 2019-12-23 RX ADMIN — HEPARIN SODIUM 5000 UNITS: 5000 INJECTION INTRAVENOUS; SUBCUTANEOUS at 09:16

## 2019-12-23 RX ADMIN — POTASSIUM CHLORIDE 10 MEQ: 7.46 INJECTION, SOLUTION INTRAVENOUS at 10:28

## 2019-12-23 RX ADMIN — POTASSIUM CHLORIDE 10 MEQ: 7.46 INJECTION, SOLUTION INTRAVENOUS at 06:57

## 2019-12-23 RX ADMIN — METOCLOPRAMIDE 5 MG: 5 INJECTION, SOLUTION INTRAMUSCULAR; INTRAVENOUS at 00:18

## 2019-12-23 RX ADMIN — HEPARIN SODIUM 5000 UNITS: 5000 INJECTION INTRAVENOUS; SUBCUTANEOUS at 00:18

## 2019-12-23 RX ADMIN — SODIUM CHLORIDE 40 MG: 9 INJECTION, SOLUTION INTRAMUSCULAR; INTRAVENOUS; SUBCUTANEOUS at 09:16

## 2019-12-23 RX ADMIN — HEPARIN SODIUM 5000 UNITS: 5000 INJECTION INTRAVENOUS; SUBCUTANEOUS at 17:10

## 2019-12-23 RX ADMIN — POTASSIUM CHLORIDE 10 MEQ: 7.46 INJECTION, SOLUTION INTRAVENOUS at 05:40

## 2019-12-23 RX ADMIN — INSULIN LISPRO 2 UNITS: 100 INJECTION, SOLUTION INTRAVENOUS; SUBCUTANEOUS at 17:09

## 2019-12-23 RX ADMIN — POTASSIUM CHLORIDE 10 MEQ: 7.46 INJECTION, SOLUTION INTRAVENOUS at 09:19

## 2019-12-23 RX ADMIN — METOCLOPRAMIDE 5 MG: 5 INJECTION, SOLUTION INTRAMUSCULAR; INTRAVENOUS at 17:10

## 2019-12-23 NOTE — PROGRESS NOTES
0756   Pt is awake,alert, oriented x4. In bed eating breakfast.  Denies nausea or vomiting. Pt's potassium runs in progress. 0914  Resting in bed. Lungs are clear. Abdomen soft with active BS.   11:37 AM   Dr Audra Koyanagi changed order for runs of potassium. Pt to take total of 6 runs. Potassium level  to be rechecked  2 hrs after last potassium. Pt resting in bed.   1604    Resting in bed. Family at bedside. K+ level 3.7. Pt expressed desire to go home. Denies pain, nausea or vomiting. Dfr Audra Koyanagi was made aware. 1740   INT removed. Discharge instructions given. Dual AVS reviewed with Willow Ortiz RN. All medications reviewed individually with patient. Opportunities for questions and concerns provided. Patient's arm band appropriately discarded. 1809  Pt discharged per wheelchair accompanied by sister.

## 2019-12-23 NOTE — DISCHARGE SUMMARY
Discharge Summary    Patient: Lisbeth Carroll MRN: [id number]  CSN: [id number]    YOB: 1985  Age: 37 y.o.   Sex: female    DOA: 12/21/2019 LOS:  LOS: 2 days   Discharge Date:      Primary Care Provider:  Gerson Duenas MD    Admission Diagnoses: Intractable nausea and vomiting [R11.2]    Discharge Diagnoses:    Problem List as of 12/23/2019 Never Reviewed          Codes Class Noted - Resolved    Intractable nausea and vomiting ICD-10-CM: R11.2  ICD-9-CM: 536.2  12/21/2019 - Present        Abdominal pain, generalized ICD-10-CM: R10.84  ICD-9-CM: 789.07  12/21/2019 - Present        Hx of diabetic gastroparesis ICD-10-CM: Z86.39  ICD-9-CM: V12.29  12/21/2019 - Present        Type 2 diabetes mellitus with hyperglycemia, with long-term current use of insulin ([facility].) ICD-10-CM: E11.65, Z79.4  ICD-9-CM: 250.00, 790.29, V58.67  12/21/2019 - Present        Hordeolum externum of right upper eyelid ICD-10-CM: H00.011  ICD-9-CM: 373.11  11/2/2019 - Present        Chest pain ICD-10-CM: R07.9  ICD-9-CM: 786.50  11/1/2019 - Present        Anemia ICD-10-CM: D64.9  ICD-9-CM: 285.9  11/1/2019 - Present        Hyperglycemia ICD-10-CM: R73.9  ICD-9-CM: 790.29  11/1/2019 - Present        Acute renal failure superimposed on stage 3 chronic kidney disease ([facility].) ICD-10-CM: N17.9, N18.3  ICD-9-CM: 584.9, 585.3  11/1/2019 - Present        Chronic anemia ICD-10-CM: D64.9  ICD-9-CM: 285.9  6/7/2019 - Present        Gastroparesis ICD-10-CM: K31.84  ICD-9-CM: 536.3  6/6/2019 - Present        Dehydration ICD-10-CM: E86.0  ICD-9-CM: 276.51  6/6/2019 - Present        Leukocytosis ICD-10-CM: U57.407  ICD-9-CM: 288.60  6/6/2019 - Present        Epigastric pain ICD-10-CM: R10.13  ICD-9-CM: 789.06  6/6/2019 - Present        Lactic acid acidosis ICD-10-CM: E87.2  ICD-9-CM: 276.2  6/6/2019 - Present        Acute kidney injury ([facility] ICD-10-CM: N17.9  ICD-9-CM: 584.9  6/6/2019 - Present        Intractable vomiting with nausea ICD-10-CM: R11.2  ICD-9-CM: 536.2  6/6/2019 - Present        Uncontrolled type 2 diabetes mellitus with hyperglycemia (HCC) ICD-10-CM: E11.65  ICD-9-CM: 250.02  6/6/2019 - Present        UTI (urinary tract infection) ICD-10-CM: N39.0  ICD-9-CM: 599.0  6/6/2019 - Present              Discharge Medications:     Current Discharge Medication List      START taking these medications    Details   ciprofloxacin HCl (CIPRO) 250 mg tablet Take 1 Tab by mouth two (2) times a day for 3 days. Qty: 6 Tab, Refills: 0         CONTINUE these medications which have NOT CHANGED    Details   famotidine (PEPCID PO) Take  by mouth. insulin glargine (LANTUS U-100 INSULIN) 100 unit/mL injection 30 Units by SubCUTAneous route nightly. Indications: type 2 diabetes mellitus      amLODIPine (NORVASC) 5 mg tablet Take 1 Tab by mouth daily. Qty: 30 Tab, Refills: 0      metoclopramide HCl (REGLAN PO) Take  by mouth. insulin admin. supplies (INPEN, FOR NOVOLOG, SC) 10-12 Units by SubCUTAneous route Before breakfast, lunch, and dinner. STOP taking these medications       loperamide HCl (IMODIUM PO) Comments:   Reason for Stopping:               Discharge Condition: Good    Procedures : None    Consults: None      PHYSICAL EXAM   Visit Vitals  /87 (BP 1 Location: Left arm, BP Patient Position: At rest)   Pulse 85   Temp 98 °F (36.7 °C)   Resp 18   Ht 5' 4.02\" (1.626 m)   Wt 62.1 kg (136 lb 14.5 oz)   SpO2 98%   BMI 23.49 kg/m²     General: Awake, cooperative, no acute distress    HEENT: NC, Atraumatic. PERRLA, EOMI. Anicteric sclerae. Lungs:  CTA Bilaterally. No Wheezing/Rhonchi/Rales. Heart:  Regular  rhythm,  No murmur, No Rubs, No Gallops  Abdomen: Soft, Non distended, Non tender. +Bowel sounds,   Extremities: No c/c/e  Psych:   Not anxious or agitated. Neurologic:  No acute neurological deficits.                                      Admission HPI :   Brie Bass is a 37 y.o. female being admitted to the hospital with persitant abd pain and nausea. She has not been taking her insulin in last few days. She is co increasing abd pain -- epigastric with nausea and vomiting. She has h/o gastroparesis and not taking her [facility] [place] Course :   Ms. Asha García was admitted to medical floor, she did not had any acute events during hospitalization. Nausea/vomiting-  Secondary to gastroparesis, she was started on IV Reglan. And given IV fluids. Her nausea vomiting resolved. She is advised to be compliant on her Reglan. Follow-up with PCP. Insulin-dependent diabetes mellitus-  Uncontrolled, she was started on Lantus, pre-meal, sliding scale insulin. She is advised to be compliant on her insulin. Follow-up with her PCP. UTI-  UA with evidence of UTI. Urine culture showed growth of E. coli. She was initially started on ceftriaxone. At discharge we will switch to oral ciprofloxacin. CKD stage III-  Monitor her renal function. Hypokalemia-  Replaced. Patient is eager to go home she wants to go home for [place]. At this time she is medically stable to be discharged to follow-up with her PCP. Activity: Activity as tolerated    Diet: Diabetic Diet    Follow-up: PCP    Disposition: home    Minutes spent on discharge: 45       Labs: Results:       Chemistry Recent Labs     12/23/19  1415 12/23/19  0334 12/21/19  1151   GLU  --  48* 226*   NA  --  141 141   K 3.7 2.8* 3.7   CL  --  110 110   CO2  --  22 21   BUN  --  23* 27*   CREA  --  2.42* 2.52*   CA  --  7.8* 8.8   AGAP  --  9 10   BUCR  --  10* 11*   AP  --  127* 158*   TP  --  5.9* 7.0   ALB  --  2.4* 2.8*   GLOB  --  3.5 4.2*   AGRAT  --  0.7* 0.7*      CBC w/Diff Recent Labs     12/23/19  0334 12/21/19  1151   WBC 12.9 12.8   RBC 2.85* 3.46*   HGB 7.9* 9.8*   HCT 23.8* 29.6*    385   GRANS 57 78*   LYMPH 33 17*   EOS 2 1      Cardiac Enzymes No results for input(s): CPK, CKND1, TESS in the last 72 hours.     No lab exists for component: [place] Coagulation No results for input(s): PTP, INR, APTT, INREXT in the last 72 hours. Lipid Panel No results found for: CHOL, CHOLPOCT, CHOLX, CHLST, CHOLV, 415413, HDL, HDLP, LDL, LDLC, DLDLP, 427713, VLDLC, VLDL, TGLX, TRIGL, TRIGP, TGLPOCT, CHHD, CHHDX   BNP No results for input(s): BNPP in the last 72 hours. Liver Enzymes Recent Labs     12/23/19  0334   TP 5.9*   ALB 2.4*   *   SGOT 12      Thyroid Studies No results found for: T4, T3U, TSH, TSHEXT         Significant Diagnostic Studies: Xr Abd (kub)    Result Date: 12/21/2019  Abdomen, single view COMPARISON: Radiographs June 6, 2019; CT scan of the same date INDICATION: Abdominal pain, nausea and vomiting FINDINGS: Supine AP view the abdomen on two separate exposures obtained. The included bowel gas pattern is nonobstructive and nonspecific. Fallopian tube surgical clips project over the pelvis. Phlebolith along the anterolateral left pelvic rim as previously. No acute osseous abnormality. Lung bases clear. Impression: Nonobstructive bowel gas pattern. No evidence of acute abnormality. Ct Head Wo Cont    Result Date: 12/23/2019  EXAM: CT head INDICATION: Visual disturbance. COMPARISON: None. TECHNIQUE: Axial CT imaging of the head was performed without intravenous contrast. Dose reduction techniques used: automated exposure control, adjustment of the mAs and/or kVp according to patient size, and iterative reconstruction techniques. _______________ FINDINGS: BRAIN AND POSTERIOR FOSSA: The sulci, folia, ventricles and basal cisterns are within normal limits for the patient's age. There is no intracranial hemorrhage, mass effect, or midline shift. There are no areas of abnormal parenchymal attenuation. EXTRA-AXIAL SPACES AND MENINGES: There are no abnormal extra-axial fluid collections. CALVARIUM: Intact. SINUSES: Clear. OTHER: None. _______________     IMPRESSION: No acute intracranial abnormalities.     Ct Abd Pelv Wo Cont    Result Date: 12/21/2019  EXAM: CT of the abdomen and pelvis INDICATION: Abdominal pain, urinary tract infection, vomiting COMPARISON: June 6, 2019 TECHNIQUE: Axial CT imaging of the abdomen and pelvis was performed without intravenous contrast. Multiplanar reformats were generated. One or more dose reduction techniques were used on this CT: automated exposure control, adjustment of the mAs and/or kVp according to patient size, and iterative reconstruction techniques. The specific techniques used on this CT exam have been documented in the patient's electronic medical record. Digital Imaging and Communications in Medicine (DICOM) format image data are available to nonaffiliated external healthcare facilities or entities on a secure, media free, reciprocally searchable basis with patient authorization for at least a 12-month period after this study. _______________ FINDINGS: LOWER CHEST: Unremarkable. LIVER, BILIARY: Liver is normal. No biliary dilation. Gallbladder is unremarkable. PANCREAS: Normal. SPLEEN: Normal. ADRENALS: Normal. KIDNEYS/URETERS/BLADDER: No evidence of hydronephrosis involving either kidney. Mild edematous appearance of the left kidney with somewhat asymmetric perinephric fat stranding noted. No evidence of nephrolithiasis. No distal ureteral or urinary bladder stone. Urinary bladder normal in CT appearance. PELVIC ORGANS: Bilateral fallopian tube surgical clips present. Uterus unremarkable in CT appearance. VASCULATURE: Unremarkable LYMPH NODES: No enlarged lymph nodes. GASTROINTESTINAL TRACT: No bowel dilation or wall thickening. No morphology of bowel obstruction. No free intraperitoneal gas. The appendix is surgically absent. BONES: No acute or aggressive osseous abnormalities identified. OTHER: None. _______________     IMPRESSION: 1. No evidence of hydronephrosis or urolithiasis. 2. Somewhat edematous appearance to the left kidney with mild perinephric stranding.  On this unenhanced examination, these findings may reflect changes related to pyelitis and/or pyelonephritis. 3. Normal caliber small and large bowel. No bowel obstruction. Prior appendectomy. No results found for this or any previous visit. Please note that this dictation was completed with [employer], the computer voice recognition software. Quite often unanticipated grammatical, syntax, homophones, and other interpretive errors are inadvertently transcribed by the computer software. Please disregard these errors. Please excuse any errors that have escaped final proofreading.      CC: Andrew Johnson MD

## 2019-12-23 NOTE — PROGRESS NOTES
2350 patient reported visual disturbance, stated she can only look up and having difficulty changing direction. Denies feeling light headed, or blurred vision. Upon assessment patient was able to change eyesight direction from up and down and side ways. 2355 talked to Dr. Monroe Perez about patients order for CT of head  stat      0425 received a critical lab results from [facility]. 0430 Gave D10 IV, reschecked BS after 15mins - 218     0501 Dr. Monroe Perez made aware of critical lab results for potassium 2.8 and Glucose of 48. Orders received for 10 runs of potassium chloride 10meq IV, change current IV  into 0.9% NaCl  with KCL 40 meq/l at 50ml/hr. magnesium blood draw and to repeat potassium draw after potassium IV runs. 2730 Bedside and Verbal shift change report given to Danya Ayala RN (oncoming nurse) by Arlyn Valle RN   (offgoing nurse). Report included the following information SBAR, ED Summary, Procedure Summary, Intake/Output, MAR, Recent Results and Cardiac Rhythm NSR.

## 2019-12-23 NOTE — PROGRESS NOTES
NUTRITION UPDATE    - increase kcal as 1200kcal does not meet estimated needs  Will increase to DM 1800kcal     Marco Torres RD  PAGER:  [phone]

## 2019-12-23 NOTE — PROGRESS NOTES
Plan: Home with family assistance, pt will need transportation scheduled for hospital discharge [phone] through her Medicaid insurance provider for pt and boyfriend to her home. Chart reviewed, met with pt after rounding with MD. No needs identified, pt will follow up with MD as recommended when discharged. No other needs at this time, CM remains available.

## 2019-12-23 NOTE — DIABETES MGMT
GLYCEMIC CONTROL PROGRESS NOTE:    - chart reviewed, known h/o poorly managed T2DM since birth of last child in 2009 (pt confirmed)  - last admission June 2019 for DKA  - BG out of target range non-ICU: 110-180 mg/dL  - TDD 71 (60 Lantus + 11 units corrective coverage)  - pt admitted to this writer missing injections and feeling overwhelmed with 4 injections a day  - recommend d/c on PTA DM regimen, with PCP follow up  - GC will provide pt with some alternative options for MDI to discuss with PCP  Noted:  - hypoglycemic episode on AM labs, protocol followed  - recommend decrease basal insulin dose to home regimen and initiate mealtime insuliin   *Lantus 30 units daily      Recent Glucose Results:   Lab Results   Component Value Date/Time    GLU 48 (LL) 12/23/2019 03:34 AM    GLUCPOC 153 (H) 12/23/2019 12:09 PM    GLUCPOC 133 (H) 12/23/2019 07:23 AM    GLUCPOC 218 (H) 12/23/2019 04:48 AM     Wilbert Roa MS, RN, CDE  Glycemic Control Team  [phone]  Pager [phone] (-TH 8:00-4:30P)  *After Hours pager [phone]

## 2019-12-23 NOTE — DIABETES MGMT
Diabetes Patient/Family Education Record  Factors That  May Influence Patients Ability  to Learn or  Comply with Recommendations   []   Language barrier    []   Cultural needs   [x]   Motivation    []   Cognitive limitation    []   Physical   []   Education    []   Physiological factors   []   Hearing/vision/speaking impairment   []   Buddhism beliefs    []   Financial factors   []  Other:   []  No factors identified at this time.      Person Instructed:   [x]   Patient   []   Family   [x]  Other     Preference for Learning:   [x]   Verbal   []   Written   []  Demonstration     Level of Comprehension & Competence:    []  Good                                      [x] Fair                                     []  Poor                             [x]  Needs Reinforcement   []  Teachback completed    Education Component:   [x]  Medication management, including how to administer insulin (if appropriate) and potential medication interactions    []  Nutritional management -obtain usual meal pattern   []  Exercise   []  Signs, symptoms, and treatment of hyperglycemia and hypoglycemia   [] Prevention, recognition and treatment of hyperglycemia and hypoglycemia   [x]  Importance of blood glucose monitoring and how to obtain a blood glucose meter    []  Instruction on use of the blood glucose meter   [x]  Discuss the importance of HbA1C monitoring    []  Sick day guidelines   []  Proper use and disposal of lancets, needles, syringes or insulin pens (if appropriate)   []  Potential long-term complications (retinopathy, kidney disease, neuropathy, foot care)   [] Information about whom to contact in case of emergency or for more information    [x]  Goal:  Patient/family will demonstrate understanding of Diabetes Self Management Skills by: (date) 2/28_______  Plan for post-discharge education or self-management support:    [] Outpatient class schedule provided            [] Patient Declined    [] Scheduled for outpatient classes (date) _______  Verify:  Does patient understand how diabetes medications work? ________yes____________________  Does patient know what their most recent A1c is? _________________yes__________________  Does patient monitor glucose at home? ____________________________yes_______________  Does patient have difficulty obtaining diabetes medications or testing supplies? ______no___________         Jessica Lambert MS, RN, CDE  Glycemic Control Team  [phone]  Pager [phone] (M-TH 8:00-4:30P)  *After Hours pager [phone]

## 2019-12-28 ENCOUNTER — HOSPITAL ENCOUNTER (INPATIENT)
Age: 34
LOS: 2 days | Discharge: HOME | DRG: 048 | End: 2019-12-31
Attending: EMERGENCY MEDICINE | Admitting: FAMILY MEDICINE
Payer: MEDICAID

## 2019-12-28 DIAGNOSIS — R11.2 INTRACTABLE NAUSEA AND VOMITING: Primary | ICD-10-CM

## 2019-12-28 DIAGNOSIS — N18.30 CKD (CHRONIC KIDNEY DISEASE) STAGE 3, GFR 30-59 ML/MIN (HCC): ICD-10-CM

## 2019-12-28 DIAGNOSIS — R10.84 ABDOMINAL PAIN, GENERALIZED: ICD-10-CM

## 2019-12-28 DIAGNOSIS — D72.825 BANDEMIA: ICD-10-CM

## 2019-12-28 DIAGNOSIS — Z86.39 HX OF DIABETIC GASTROPARESIS: ICD-10-CM

## 2019-12-28 LAB — GLUCOSE BLD STRIP.AUTO-MCNC: 248 MG/DL (ref 70–110)

## 2019-12-28 PROCEDURE — 96374 THER/PROPH/DIAG INJ IV PUSH: CPT

## 2019-12-28 PROCEDURE — 99285 EMERGENCY DEPT VISIT HI MDM: CPT

## 2019-12-28 PROCEDURE — 96372 THER/PROPH/DIAG INJ SC/IM: CPT

## 2019-12-28 PROCEDURE — 96361 HYDRATE IV INFUSION ADD-ON: CPT

## 2019-12-28 PROCEDURE — 96375 TX/PRO/DX INJ NEW DRUG ADDON: CPT

## 2019-12-28 PROCEDURE — 82962 GLUCOSE BLOOD TEST: CPT

## 2019-12-28 PROCEDURE — 99284 EMERGENCY DEPT VISIT MOD MDM: CPT

## 2019-12-29 ENCOUNTER — APPOINTMENT (OUTPATIENT)
Dept: CT IMAGING | Age: 34
DRG: 048 | End: 2019-12-29
Attending: HOSPITALIST
Payer: MEDICAID

## 2019-12-29 ENCOUNTER — APPOINTMENT (OUTPATIENT)
Dept: GENERAL RADIOLOGY | Age: 34
DRG: 048 | End: 2019-12-29
Attending: EMERGENCY MEDICINE
Payer: MEDICAID

## 2019-12-29 PROBLEM — K31.84 DIABETIC GASTROPARALYSIS (HCC): Status: ACTIVE | Noted: 2019-12-29

## 2019-12-29 PROBLEM — E11.43 DIABETIC GASTROPARALYSIS (HCC): Status: ACTIVE | Noted: 2019-12-29

## 2019-12-29 LAB
ALBUMIN SERPL-MCNC: 3 G/DL (ref 3.4–5)
ALBUMIN/GLOB SERPL: 0.7 {RATIO} (ref 0.8–1.7)
ALP SERPL-CCNC: 148 U/L (ref 45–117)
ALT SERPL-CCNC: 12 U/L (ref 13–56)
AMPHET UR QL SCN: NEGATIVE
ANION GAP SERPL CALC-SCNC: 8 MMOL/L (ref 3–18)
APPEARANCE UR: CLEAR
AST SERPL-CCNC: 11 U/L (ref 10–38)
BACTERIA URNS QL MICRO: ABNORMAL /HPF
BARBITURATES UR QL SCN: NEGATIVE
BASOPHILS # BLD: 0 K/UL (ref 0–0.1)
BASOPHILS NFR BLD: 0 % (ref 0–2)
BENZODIAZ UR QL: NEGATIVE
BILIRUB SERPL-MCNC: 0.3 MG/DL (ref 0.2–1)
BILIRUB UR QL: NEGATIVE
BUN SERPL-MCNC: 27 MG/DL (ref 7–18)
BUN/CREAT SERPL: 12 (ref 12–20)
CALCIUM SERPL-MCNC: 8.6 MG/DL (ref 8.5–10.1)
CANNABINOIDS UR QL SCN: NEGATIVE
CHLORIDE SERPL-SCNC: 108 MMOL/L (ref 100–111)
CO2 SERPL-SCNC: 24 MMOL/L (ref 21–32)
COCAINE UR QL SCN: NEGATIVE
COLOR UR: YELLOW
CREAT SERPL-MCNC: 2.19 MG/DL (ref 0.6–1.3)
DIFFERENTIAL METHOD BLD: ABNORMAL
EOSINOPHIL # BLD: 0.2 K/UL (ref 0–0.4)
EOSINOPHIL NFR BLD: 1 % (ref 0–5)
EPITH CASTS URNS QL MICRO: ABNORMAL /LPF (ref 0–5)
ERYTHROCYTE [DISTWIDTH] IN BLOOD BY AUTOMATED COUNT: 15.5 % (ref 11.6–14.5)
GLOBULIN SER CALC-MCNC: 4.2 G/DL (ref 2–4)
GLUCOSE BLD STRIP.AUTO-MCNC: 133 MG/DL (ref 70–110)
GLUCOSE BLD STRIP.AUTO-MCNC: 247 MG/DL (ref 70–110)
GLUCOSE BLD STRIP.AUTO-MCNC: 343 MG/DL (ref 70–110)
GLUCOSE BLD STRIP.AUTO-MCNC: 353 MG/DL (ref 70–110)
GLUCOSE BLD STRIP.AUTO-MCNC: 368 MG/DL (ref 70–110)
GLUCOSE SERPL-MCNC: 272 MG/DL (ref 74–99)
GLUCOSE UR STRIP.AUTO-MCNC: >1000 MG/DL
HCG SERPL QL: NEGATIVE
HCG UR QL: NEGATIVE
HCT VFR BLD AUTO: 26.5 % (ref 35–45)
HDSCOM,HDSCOM: NORMAL
HEMOCCULT STL QL: POSITIVE
HGB BLD-MCNC: 8.8 G/DL (ref 12–16)
HGB UR QL STRIP: ABNORMAL
KETONES UR QL STRIP.AUTO: ABNORMAL MG/DL
LACTATE BLD-SCNC: 0.86 MMOL/L (ref 0.4–2)
LEUKOCYTE ESTERASE UR QL STRIP.AUTO: NEGATIVE
LIPASE SERPL-CCNC: 238 U/L (ref 73–393)
LYMPHOCYTES # BLD: 1.6 K/UL (ref 0.9–3.6)
LYMPHOCYTES NFR BLD: 8 % (ref 21–52)
MCH RBC QN AUTO: 28.1 PG (ref 24–34)
MCHC RBC AUTO-ENTMCNC: 33.2 G/DL (ref 31–37)
MCV RBC AUTO: 84.7 FL (ref 74–97)
METHADONE UR QL: NEGATIVE
MONOCYTES # BLD: 1 K/UL (ref 0.05–1.2)
MONOCYTES NFR BLD: 5 % (ref 3–10)
NEUTS SEG # BLD: 16.9 K/UL (ref 1.8–8)
NEUTS SEG NFR BLD: 86 % (ref 40–73)
NITRITE UR QL STRIP.AUTO: NEGATIVE
OPIATES UR QL: NEGATIVE
PCP UR QL: NEGATIVE
PH UR STRIP: 6.5 [PH] (ref 5–8)
PLATELET # BLD AUTO: 471 K/UL (ref 135–420)
PMV BLD AUTO: 8.5 FL (ref 9.2–11.8)
POTASSIUM SERPL-SCNC: 3.7 MMOL/L (ref 3.5–5.5)
PROT SERPL-MCNC: 7.2 G/DL (ref 6.4–8.2)
PROT UR STRIP-MCNC: 300 MG/DL
RBC # BLD AUTO: 3.13 M/UL (ref 4.2–5.3)
RBC #/AREA URNS HPF: ABNORMAL /HPF (ref 0–5)
SODIUM SERPL-SCNC: 140 MMOL/L (ref 136–145)
SP GR UR REFRACTOMETRY: 1.02 (ref 1–1.03)
UROBILINOGEN UR QL STRIP.AUTO: 0.2 EU/DL (ref 0.2–1)
WBC # BLD AUTO: 19.8 K/UL (ref 4.6–13.2)
WBC URNS QL MICRO: ABNORMAL /HPF (ref 0–5)

## 2019-12-29 PROCEDURE — 74011250637 HC RX REV CODE- 250/637: Performed by: HOSPITALIST

## 2019-12-29 PROCEDURE — 74011250636 HC RX REV CODE- 250/636: Performed by: FAMILY MEDICINE

## 2019-12-29 PROCEDURE — 84703 CHORIONIC GONADOTROPIN ASSAY: CPT

## 2019-12-29 PROCEDURE — 85025 COMPLETE CBC W/AUTO DIFF WBC: CPT

## 2019-12-29 PROCEDURE — 83690 ASSAY OF LIPASE: CPT

## 2019-12-29 PROCEDURE — 74022 RADEX COMPL AQT ABD SERIES: CPT

## 2019-12-29 PROCEDURE — 82962 GLUCOSE BLOOD TEST: CPT

## 2019-12-29 PROCEDURE — 96372 THER/PROPH/DIAG INJ SC/IM: CPT

## 2019-12-29 PROCEDURE — 82272 OCCULT BLD FECES 1-3 TESTS: CPT

## 2019-12-29 PROCEDURE — C9113 INJ PANTOPRAZOLE SODIUM, VIA: HCPCS | Performed by: FAMILY MEDICINE

## 2019-12-29 PROCEDURE — 74176 CT ABD & PELVIS W/O CONTRAST: CPT

## 2019-12-29 PROCEDURE — 82271 OCCULT BLOOD OTHER SOURCES: CPT

## 2019-12-29 PROCEDURE — 83605 ASSAY OF LACTIC ACID: CPT

## 2019-12-29 PROCEDURE — 81001 URINALYSIS AUTO W/SCOPE: CPT

## 2019-12-29 PROCEDURE — 74011250636 HC RX REV CODE- 250/636: Performed by: EMERGENCY MEDICINE

## 2019-12-29 PROCEDURE — 74011636637 HC RX REV CODE- 636/637: Performed by: HOSPITALIST

## 2019-12-29 PROCEDURE — 74011000250 HC RX REV CODE- 250: Performed by: FAMILY MEDICINE

## 2019-12-29 PROCEDURE — 96374 THER/PROPH/DIAG INJ IV PUSH: CPT

## 2019-12-29 PROCEDURE — 96375 TX/PRO/DX INJ NEW DRUG ADDON: CPT

## 2019-12-29 PROCEDURE — 80307 DRUG TEST PRSMV CHEM ANLYZR: CPT

## 2019-12-29 PROCEDURE — 65270000029 HC RM PRIVATE

## 2019-12-29 PROCEDURE — 74011250637 HC RX REV CODE- 250/637: Performed by: FAMILY MEDICINE

## 2019-12-29 PROCEDURE — 96361 HYDRATE IV INFUSION ADD-ON: CPT

## 2019-12-29 PROCEDURE — 80053 COMPREHEN METABOLIC PANEL: CPT

## 2019-12-29 PROCEDURE — 81025 URINE PREGNANCY TEST: CPT

## 2019-12-29 PROCEDURE — 77030040361 HC SLV COMPR DVT MDII -B

## 2019-12-29 RX ORDER — HALOPERIDOL 5 MG/ML
2 INJECTION INTRAMUSCULAR ONCE
Status: COMPLETED | OUTPATIENT
Start: 2019-12-29 | End: 2019-12-29

## 2019-12-29 RX ORDER — INSULIN GLARGINE 100 [IU]/ML
20 INJECTION, SOLUTION SUBCUTANEOUS DAILY
Status: DISCONTINUED | OUTPATIENT
Start: 2019-12-29 | End: 2019-12-31

## 2019-12-29 RX ORDER — INSULIN LISPRO 100 [IU]/ML
INJECTION, SOLUTION INTRAVENOUS; SUBCUTANEOUS
Status: DISCONTINUED | OUTPATIENT
Start: 2019-12-29 | End: 2019-12-31 | Stop reason: HOSPADM

## 2019-12-29 RX ORDER — INSULIN LISPRO 100 [IU]/ML
INJECTION, SOLUTION INTRAVENOUS; SUBCUTANEOUS EVERY 6 HOURS
Status: DISCONTINUED | OUTPATIENT
Start: 2019-12-29 | End: 2019-12-29

## 2019-12-29 RX ORDER — METOCLOPRAMIDE 5 MG/1
10 TABLET ORAL
Status: DISCONTINUED | OUTPATIENT
Start: 2019-12-29 | End: 2019-12-29 | Stop reason: DRUGHIGH

## 2019-12-29 RX ORDER — HEPARIN SODIUM 5000 [USP'U]/ML
5000 INJECTION, SOLUTION INTRAVENOUS; SUBCUTANEOUS EVERY 8 HOURS
Status: DISCONTINUED | OUTPATIENT
Start: 2019-12-29 | End: 2019-12-29

## 2019-12-29 RX ORDER — PROCHLORPERAZINE EDISYLATE 5 MG/ML
5 INJECTION INTRAMUSCULAR; INTRAVENOUS
Status: DISCONTINUED | OUTPATIENT
Start: 2019-12-29 | End: 2019-12-31

## 2019-12-29 RX ORDER — SODIUM CHLORIDE 9 MG/ML
100 INJECTION, SOLUTION INTRAVENOUS CONTINUOUS
Status: DISCONTINUED | OUTPATIENT
Start: 2019-12-29 | End: 2019-12-31 | Stop reason: HOSPADM

## 2019-12-29 RX ORDER — METOCLOPRAMIDE 5 MG/1
5 TABLET ORAL
Status: DISCONTINUED | OUTPATIENT
Start: 2019-12-29 | End: 2019-12-31 | Stop reason: HOSPADM

## 2019-12-29 RX ORDER — DIPHENHYDRAMINE HYDROCHLORIDE 50 MG/ML
25 INJECTION, SOLUTION INTRAMUSCULAR; INTRAVENOUS ONCE
Status: COMPLETED | OUTPATIENT
Start: 2019-12-29 | End: 2019-12-29

## 2019-12-29 RX ORDER — MAGNESIUM SULFATE 100 %
4 CRYSTALS MISCELLANEOUS AS NEEDED
Status: DISCONTINUED | OUTPATIENT
Start: 2019-12-29 | End: 2019-12-31 | Stop reason: HOSPADM

## 2019-12-29 RX ORDER — AMLODIPINE BESYLATE 5 MG/1
5 TABLET ORAL DAILY
Status: DISCONTINUED | OUTPATIENT
Start: 2019-12-29 | End: 2019-12-29

## 2019-12-29 RX ORDER — AMLODIPINE BESYLATE 5 MG/1
5 TABLET ORAL DAILY
Status: DISCONTINUED | OUTPATIENT
Start: 2019-12-29 | End: 2019-12-31 | Stop reason: HOSPADM

## 2019-12-29 RX ADMIN — PROCHLORPERAZINE EDISYLATE 5 MG: 5 INJECTION INTRAMUSCULAR; INTRAVENOUS at 10:49

## 2019-12-29 RX ADMIN — SODIUM CHLORIDE 100 ML/HR: 900 INJECTION, SOLUTION INTRAVENOUS at 19:18

## 2019-12-29 RX ADMIN — INSULIN GLARGINE 20 UNITS: 100 INJECTION, SOLUTION SUBCUTANEOUS at 10:40

## 2019-12-29 RX ADMIN — SODIUM CHLORIDE 40 MG: 9 INJECTION, SOLUTION INTRAMUSCULAR; INTRAVENOUS; SUBCUTANEOUS at 06:55

## 2019-12-29 RX ADMIN — INSULIN LISPRO 10 UNITS: 100 INJECTION, SOLUTION INTRAVENOUS; SUBCUTANEOUS at 11:46

## 2019-12-29 RX ADMIN — INSULIN LISPRO 4 UNITS: 100 INJECTION, SOLUTION INTRAVENOUS; SUBCUTANEOUS at 15:53

## 2019-12-29 RX ADMIN — DIPHENHYDRAMINE HYDROCHLORIDE 25 MG: 50 INJECTION, SOLUTION INTRAMUSCULAR; INTRAVENOUS at 02:29

## 2019-12-29 RX ADMIN — SODIUM CHLORIDE 1000 ML: 900 INJECTION, SOLUTION INTRAVENOUS at 06:56

## 2019-12-29 RX ADMIN — CEFTRIAXONE 1 G: 1 INJECTION, POWDER, FOR SOLUTION INTRAMUSCULAR; INTRAVENOUS at 06:55

## 2019-12-29 RX ADMIN — SODIUM CHLORIDE 1000 ML: 900 INJECTION, SOLUTION INTRAVENOUS at 02:28

## 2019-12-29 RX ADMIN — AMLODIPINE BESYLATE 5 MG: 5 TABLET ORAL at 10:41

## 2019-12-29 RX ADMIN — SODIUM CHLORIDE 100 ML/HR: 900 INJECTION, SOLUTION INTRAVENOUS at 05:37

## 2019-12-29 RX ADMIN — PROCHLORPERAZINE EDISYLATE 5 MG: 5 INJECTION INTRAMUSCULAR; INTRAVENOUS at 02:29

## 2019-12-29 RX ADMIN — HALOPERIDOL LACTATE 2 MG: 5 INJECTION INTRAMUSCULAR at 02:29

## 2019-12-29 RX ADMIN — METOCLOPRAMIDE 10 MG: 5 TABLET ORAL at 11:46

## 2019-12-29 NOTE — PROGRESS NOTES
Reason for Readmission:     Chart reviewed as CM on call; patient is a 37 yr old female recently admitted to [facility] 12/21-12/23; past hx of poorly controlled diabetes, gastroparesis and intractable N/V; brought back to ED w/ c/o intractable N/V and diarrhea for the last 2 days. RRAT Score/Risk Level:     Low/ 11    Is a Care Conference indicated:  no      Did you attend your follow up appointment (s): If not, why not:          Resources/supports as identified by patient/family:   Readmitted to facility prior to seeing MD       Top Challenges facing patient (as identified by patient/family and CM): Finances/Medication cost?     Medicaid  Transportation      Previous admission, transportation arranged through [employer] system or lack thereof? Boyfriend at bedside  Living arrangements? Lives w/ boyfriend     Self-care/ADLs/Cognition? Alert and orientd       Current Advanced Directive/Advance Care Plan:  Full code           Plan for utilizing home health:   none             Transition of Care Plan:    Based on readmission, the patient's previous Plan of Care   has been evaluated and/or modified.  The current Transition of Care Plan is:      Rule out acute pyelonephritis, stabilize blood sugar, arrange for follow up diabetic management if appropriate

## 2019-12-29 NOTE — ROUTINE PROCESS
Bedside shift change report given to Vinny Hernández RN (oncoming nurse) by Germaine Pittman RN (offgoing nurse). Report included the following information SBAR, Kardex, ED Summary, Intake/Output, MAR, Recent Results and Cardiac Rhythm NSR.

## 2019-12-29 NOTE — PROGRESS NOTES
2734 - Patient arrives to unit at this time. Admission completed at this time. Patient is A/O x 4, RA. Denies chest pain and SOB. SCD compression device applied bilaterally. Denies numbness/tingling. Pain 9/10 to abdomen yet resting comfortably appearing to fall asleep during completion of required documents. Pt stated that she had a BM in her brief and was unable to get up to change because her legs hurt and unable to wipe herself because she could not see it. Pt had to have incontinence care performed. Educated pt that the next time she needs to use the BR that staff will assist her to [facility]. Educated pt that it is not good to continue to use a brief, also stool/urine samples are needed, pt verbalized understanding. Pt verbalized understanding, no concerns voiced. Call bell within reach, bed in lowest position. Pt encouraged to call for assistance.

## 2019-12-29 NOTE — PROGRESS NOTES
Ct abdomen ordered   Possible acute pyelonephritis-on rocephin now, need UA . DM , put lantus and ssi, start feeding

## 2019-12-29 NOTE — ED PROVIDER NOTES
EMERGENCY DEPARTMENT HISTORY AND PHYSICAL EXAM    Date: 12/28/2019  Patient Name: Lisbeth Carroll    History of Presenting Illness     Chief Complaint   Patient presents with    Nausea    Vomiting    High Blood Sugar         History Provided By: Patient    Additional History (Context):   4:04 AM  Lisbeth Carroll is a 37 y.o. female with PMHX of diabetic gastroparesis and chronic abdominal pain who presents to the emergency department C/O current nausea and vomiting as well as belly pain. She has a long history of poorly controlled type 2 diabetes recent hospital stay. She returns today for recurrent nausea and vomiting as well as diarrhea. States she has been taking medications for her urinary tract infection and also that she been reporting compliance with her medication. She has no fevers or chills, no urinary frequency or urgency. She has no dysuria she also denies chest pain or shortness of breath. Social History  She denies smoking or drinking also the use of illicit drugs. This will include marijuana. Family History  Positive for diabetes    PCP: Gerson Duenas MD    Current Outpatient Medications   Medication Sig Dispense Refill    amLODIPine (NORVASC) 5 mg tablet Take 1 Tab by mouth daily. 30 Tab 0    famotidine (PEPCID PO) Take  by mouth.  insulin glargine (LANTUS U-100 INSULIN) 100 unit/mL injection 30 Units by SubCUTAneous route nightly. Indications: type 2 diabetes mellitus      metoclopramide HCl (REGLAN PO) Take  by mouth.  insulin admin. supplies (INPEN, FOR NOVOLOG, SC) 10-12 Units by SubCUTAneous route Before breakfast, lunch, and dinner.          Past History     Past Medical History:  Past Medical History:   Diagnosis Date    Diabetes ([address]     Gastroparesis     Gastroparesis     Hypertension     UTI (urinary tract infection)        Past Surgical History:  Past Surgical History:   Procedure Laterality Date    HX APPENDECTOMY      HX GYN      tubal ligation, C Section       Family History:  History reviewed. No pertinent family history. Social History:  Social History     Tobacco Use    Smoking status: Never Smoker    Smokeless tobacco: Never Used   Substance Use Topics    Alcohol use: Never     Frequency: Never    Drug use: Not Currently       Allergies:  No Known Allergies      Review of Systems   Review of Systems   Constitutional: Negative for chills, diaphoresis, fever and unexpected weight change. HENT: Positive for congestion and sore throat. Negative for drooling, ear pain, rhinorrhea, tinnitus and trouble swallowing. She admits to feeling congestion and sore throat after vomiting. Eyes: Negative for photophobia, pain, redness and visual disturbance. Respiratory: Negative for cough, choking, chest tightness, shortness of breath, wheezing and stridor. Cardiovascular: Negative for chest pain, palpitations and leg swelling. Gastrointestinal: Positive for abdominal pain, diarrhea, nausea and vomiting. Negative for abdominal distention, anal bleeding, blood in stool and constipation. Endocrine: Negative for cold intolerance, heat intolerance, polydipsia and polyuria. Genitourinary: Negative for difficulty urinating, dysuria, flank pain, frequency, hematuria and urgency. Musculoskeletal: Negative for arthralgias, back pain and neck pain. Skin: Negative for color change, rash and wound. Allergic/Immunologic: Negative for immunocompromised state. Neurological: Negative for dizziness, seizures, syncope, speech difficulty, light-headedness and headaches. Hematological: Does not bruise/bleed easily. Psychiatric/Behavioral: Negative for agitation, behavioral problems, hallucinations, self-injury and suicidal ideas. The patient is not hyperactive.           Physical Exam     Vitals:    12/28/19 2309   BP: 169/84   Pulse: 92   Resp: 14   Temp: 98.7 °F (37.1 °C)   SpO2: 100%   Weight: 62.1 kg (137 lb)   Height: 5' 4\" (1.626 m) Physical Exam  Vitals signs and nursing note reviewed. Constitutional:       General: She is not in acute distress. Appearance: She is well-developed. She is not diaphoretic. Comments: She is uncomfortable appearing tearful and appears quite anxious. HENT:      Head: Normocephalic and atraumatic. Right Ear: External ear normal.      Left Ear: External ear normal.      Mouth/Throat:      Pharynx: No oropharyngeal exudate. Eyes:      General: No scleral icterus. Conjunctiva/sclera: Conjunctivae normal.      Pupils: Pupils are equal, round, and reactive to light. Comments: No pallor   Neck:      Musculoskeletal: Normal range of motion and neck supple. Thyroid: No thyromegaly. Vascular: No JVD. Trachea: No tracheal deviation. Cardiovascular:      Rate and Rhythm: Normal rate and regular rhythm. Heart sounds: Normal heart sounds. Pulmonary:      Effort: Pulmonary effort is normal. No respiratory distress. Breath sounds: Normal breath sounds. No stridor. Abdominal:      General: Bowel sounds are normal. There is no distension. Palpations: Abdomen is soft. Tenderness: There is no tenderness. There is no guarding or rebound. Musculoskeletal: Normal range of motion. General: No tenderness. Comments: No soft tissue injuries   Lymphadenopathy:      Cervical: No cervical adenopathy. Skin:     General: Skin is warm and dry. Findings: No erythema or rash. Comments: There are some scars and dark tissue to the backs of her hands and fingers. Neurological:      Mental Status: She is alert and oriented to person, place, and time. Cranial Nerves: No cranial nerve deficit. Coordination: Coordination normal.      Deep Tendon Reflexes: Reflexes are normal and symmetric. Reflexes normal.   Psychiatric:         Attention and Perception: Attention normal.         Mood and Affect: Mood is anxious.          Speech: Speech is delayed. Behavior: Behavior normal.         Thought Content: Thought content normal.         Judgment: Judgment normal.             Diagnostic Study Results     Labs -     Recent Results (from the past 12 hour(s))   GLUCOSE, POC    Collection Time: 12/28/19 11:15 PM   Result Value Ref Range    Glucose (POC) 248 (H) 70 - 110 mg/dL   CBC WITH AUTOMATED DIFF    Collection Time: 12/29/19 12:06 AM   Result Value Ref Range    WBC 19.8 (H) 4.6 - 13.2 K/uL    RBC 3.13 (L) 4.20 - 5.30 M/uL    HGB 8.8 (L) 12.0 - 16.0 g/dL    HCT 26.5 (L) 35.0 - 45.0 %    MCV 84.7 74.0 - 97.0 FL    MCH 28.1 24.0 - 34.0 PG    MCHC 33.2 31.0 - 37.0 g/dL    RDW 15.5 (H) 11.6 - 14.5 %    PLATELET 817 (H) 105 - 420 K/uL    MPV 8.5 (L) 9.2 - 11.8 FL    NEUTROPHILS 86 (H) 40 - 73 %    LYMPHOCYTES 8 (L) 21 - 52 %    MONOCYTES 5 3 - 10 %    EOSINOPHILS 1 0 - 5 %    BASOPHILS 0 0 - 2 %    ABS. NEUTROPHILS 16.9 (H) 1.8 - 8.0 K/UL    ABS. LYMPHOCYTES 1.6 0.9 - 3.6 K/UL    ABS. MONOCYTES 1.0 0.05 - 1.2 K/UL    ABS. EOSINOPHILS 0.2 0.0 - 0.4 K/UL    ABS. BASOPHILS 0.0 0.0 - 0.1 K/UL    DF AUTOMATED     METABOLIC PANEL, COMPREHENSIVE    Collection Time: 12/29/19 12:06 AM   Result Value Ref Range    Sodium 140 136 - 145 mmol/L    Potassium 3.7 3.5 - 5.5 mmol/L    Chloride 108 100 - 111 mmol/L    CO2 24 21 - 32 mmol/L    Anion gap 8 3.0 - 18 mmol/L    Glucose 272 (H) 74 - 99 mg/dL    BUN 27 (H) 7.0 - 18 MG/DL    Creatinine 2.19 (H) 0.6 - 1.3 MG/DL    BUN/Creatinine ratio 12 12 - 20      GFR est AA 31 (L) >60 ml/min/1.73m2    GFR est non-AA 25 (L) >60 ml/min/1.73m2    Calcium 8.6 8.5 - 10.1 MG/DL    Bilirubin, total 0.3 0.2 - 1.0 MG/DL    ALT (SGPT) 12 (L) 13 - 56 U/L    AST (SGOT) 11 10 - 38 U/L    Alk.  phosphatase 148 (H) 45 - 117 U/L    Protein, total 7.2 6.4 - 8.2 g/dL    Albumin 3.0 (L) 3.4 - 5.0 g/dL    Globulin 4.2 (H) 2.0 - 4.0 g/dL    A-G Ratio 0.7 (L) 0.8 - 1.7     HCG QL SERUM    Collection Time: 12/29/19 12:06 AM   Result Value Ref Range HCG, Ql. NEGATIVE  NEG         Radiologic Studies -   No orders to display     CT Results  (Last 48 hours)    None        CXR Results  (Last 48 hours)    None          Medications given in the ED-  Medications - No data to display      Medical Decision Making   I am the first provider for this patient. I reviewed the vital signs, available nursing notes, past medical history, past surgical history, family history and social history. Vital Signs-Reviewed the patient's vital signs. Pulse Oximetry Analysis -99 % on room air  Records Reviewed: NURSING NOTES AND PREVIOUS MEDICAL RECORDS    Provider Notes (Medical Decision Making): This seems to be a clear case of recurrent gastroparesis Meding however with probable psychiatric component. She is very tearful and anxious and at one time I even saw her with hands in her mouth but she denied that she was self inducing emesis only that she was trying to clear the vomit and the taste from her throat. She may be malingering however I did see her actively vomiting without provocative maneuvers. Procedures:  Procedures    ED Course:   1:25 AM: Initial assessment performed. The patients presenting problems have been discussed, and they are in agreement with the care plan formulated and outlined with them. I have encouraged them to ask questions as they arise throughout their visit. Diagnosis and Disposition     Critical Care Time: 0    Core Measures:  For Hospitalized Patients:    1. Hospitalization Decision Time:  The decision to hospitalize the patient was made by Rachel Stanley MD   at 3:45 Am on 12/29/2019    2. Aspirin: Aspirin was not given because the patient is a bleeding risk    7:16 PM  Patient is being admitted to the hospital by Dr. Lorenzo Larsen. The results of their tests and reasons for their admission have been discussed with them and/or available family.  They convey agreement and understanding for the need to be admitted and for their admission diagnosis. CONDITIONS ON ADMISSION:  Sepsis is not present at the time of admission. Deep Vein Thrombosis is not present at the time of admission. Thrombosis is not present at the time of admission. Urinary Tract Infection is not present at the time of admission. Pneumonia is not present at the time of admission. MRSA is not present at the time of admission. Wound infection is not present at the time of admission. Pressure Ulcer is not present at the time of admission. CLINICAL IMPRESSION:    1. Intractable nausea and vomiting    2. Abdominal pain, generalized    3. Hx of diabetic gastroparesis    4. Bandemia    5. CKD (chronic kidney disease) stage 3, GFR 30-59 ml/min ([facility])      _______________________________    This note was partially transcribed via voice recognition software. Although efforts have been made to catch any discrepancies, it may contain sound alike words, grammatical errors, or nonsensical words.

## 2019-12-29 NOTE — PROGRESS NOTES
Pharmacy Renal Dosing Service    The following medication: Metoclopramide was automatically dose-adjusted per [facility] P[facility]T [facility] Protocol, with respect to renal function. Dose adjusted to: Metoclopramide 5 mg po q6h prn nausea / vomiting. Pt Weight:   Wt Readings from Last 1 Encounters:   12/28/19 62.1 kg (137 lb)         Previous Regimen   Metoclopramide 10 mg PO q6h prn N/V   Serum Creatinine Lab Results   Component Value Date/Time    Creatinine 2.19 (H) 12/29/2019 12:06 AM       Creatinine Clearance Estimated Creatinine Clearance: 30.7 mL/min (A) (based on SCr of 2.19 mg/dL (H)). BUN Lab Results   Component Value Date/Time    BUN 27 (H) 12/29/2019 12:06 AM           Pharmacy to continue to monitor patient daily. Will make dosage adjustments based upon changing renal function.   Signed Francesca Correia information:  [phone]

## 2019-12-29 NOTE — ED NOTES
Pt in stretcher with finger in mouth pt states \" I need to throw up and it wont come out unless I do it\" pt has brown emesis all over hospital gown pt also has 200 ml of emesis in green bag.

## 2019-12-29 NOTE — ROUTINE PROCESS
TRANSFER - IN REPORT:    Verbal report received from ARIN Florence RN(name) on Scott Argue  being received from ED. Report consisted of patients Situation, Background, Assessment and   Recommendations(SBAR). Information from the following report(s) SBAR, Kardex, OR Summary, Intake/Output and MAR was reviewed with the receiving nurse. Opportunity for questions and clarification was provided. Assessment to be completed upon patients arrival to unit and care assumed.

## 2019-12-29 NOTE — ROUTINE PROCESS
Bedside and Verbal shift change report given to ELIZABETH Roe by Sena Aceves RN. Report included the following information SBAR, Kardex, OR Summary, Intake/Output and MAR.

## 2019-12-29 NOTE — ED TRIAGE NOTES
Pt arrives via ems stretcher with c\o abd pain x 4 days, pt sts she was recently dc from hospital for same pain, pt with n/v, per ems pt BGL was 250 en route

## 2019-12-29 NOTE — H&P
History & Physical    Patient: Rhett Mera MRN: [id number]  CSN: [id number]    YOB: 1985  Age: 37 y.o. Sex: female      DOA: 12/28/2019  Primary Care Provider:  Jessenia Doherty MD      Assessment/Plan     Patient Active Problem List   Diagnosis Code    Gastroparesis K31.84    Dehydration E86.0    Leukocytosis D72.829    Epigastric pain R10.13    Lactic acid acidosis E87.2    Acute kidney injury ([facility] N17.9    Intractable vomiting with nausea R11.2    Uncontrolled type 2 diabetes mellitus with hyperglycemia ([facility]) E11.65    UTI (urinary tract infection) N39.0    Chronic anemia D64.9    Chest pain R07.9    Anemia D64.9    Hyperglycemia R73.9    Acute renal failure superimposed on stage 3 chronic kidney disease (HCC) N17.9, N18.3    Hordeolum externum of right upper eyelid H00.011    Intractable nausea and vomiting R11.2    Abdominal pain, generalized R10.84    Hx of diabetic gastroparesis Z86.39    Type 2 diabetes mellitus with hyperglycemia, with long-term current use of insulin ([facility]) E11.65, Z79.4    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 She is defecating on herself as per EMS and ER staff so she is currently wearing a diaper (poor hygiene may be a cause for recurrent UTI).     -check gastric contents for occult blood  -UA to r/o UTI/pyelo as a cause of N/V  -drug screen given extremely poor dentition  -lipase ordered  -reglan IV prn nausea  -NPO until N/V is controlled, slowly advance diet    Prophylaxis: SCDs, protonix IV, hold heparin SQ until occult blood neg from gastric contents    Estimated length of stay : 2 nights    Monroe Stalling, MD Nino  --------------------------------------------------------------------------------------------------------------------------------------------------------------------------------------------  CC: N/V       HPI:     Basim Gustafson is a 37 y.o. female who has a hx of poorly controlled DMT2 and recent hospital stay earlier this week for gastroparesis due to reglan noncompliance who presents with intractable N/V and diarrhea for the past 2 days. Reports she took all of her cipro given to her for a UTI during her hospital stay and she states she has been compliant with reglan. She has been keeping fluids down today but not able to eat. No CP, SOB. No fever or dysuria. Past Medical History:   Diagnosis Date    Diabetes ([facility]     Gastroparesis     Gastroparesis     Hypertension     UTI (urinary tract infection)        Past Surgical History:   Procedure Laterality Date    HX APPENDECTOMY      HX GYN      tubal ligation, C Section       History reviewed. No pertinent family history. Social History     Socioeconomic History    Marital status: SINGLE     Spouse name: Not on file    Number of children: Not on file    Years of education: Not on file    Highest education level: Not on file   Tobacco Use    Smoking status: Never Smoker    Smokeless tobacco: Never Used   Substance and Sexual Activity    Alcohol use: Never     Frequency: Never    Drug use: Not Currently       Prior to Admission medications    Medication Sig Start Date End Date Taking? Authorizing Provider   amLODIPine (NORVASC) 5 mg tablet Take 1 Tab by mouth daily. 11/4/19  Yes Jessica Fu MD   famotidine (PEPCID PO) Take  by mouth. Yes Other, MD Tameka   insulin glargine (LANTUS U-100 INSULIN) 100 unit/mL injection 30 Units by SubCUTAneous route nightly. Indications: type 2 diabetes mellitus    Other, MD Tameka   insulin admin. supplies (INPEN, FOR NOVOLOG, SC) 10-12 Units by SubCUTAneous route Before breakfast, lunch, and dinner. Other, MD Tameka       No Known Allergies    Review of Systems  Gen: No fever, chills, malaise, weight loss/gain. Heent: No headache, rhinorrhea, epistaxis, ear pain, hearing loss, sinus pain, neck pain/stiffness, sore throat. Heart: No chest pain, palpitations, STEVENS, pnd, or orthopnea. Resp: No cough, hemoptysis, wheezing and shortness of breath. GI: +nausea, vomiting, diarrhea; no constipation, melena or hematochezia. : No urinary obstruction, dysuria or hematuria. Derm: No rash, new skin lesion or pruritis. Musc/skeletal: no bone or joint complaints. Vasc: No edema, cyanosis or claudication. Endo: No heat/cold intolerance, no polyuria,polydipsia or polyphagia. Neuro: No unilateral weakness, numbness, tingling. No seizures. Heme: No easy bruising or bleeding. Physical Exam:     Physical Exam:  Visit Vitals  /90   Pulse 97   Temp 98.2 °F (36.8 °C)   Resp 18   Ht 5' 4\" (1.626 m)   Wt 62.1 kg (137 lb)   SpO2 99%   BMI 23.52 kg/m²      O2 Device: Room air    Temp (24hrs), Av.5 °F (36.9 °C), Min:98.2 °F (36.8 °C), Max:98.7 °F (37.1 °C)     1901 -  0700  In: 1000 [I.V.:1000]  Out: -    No intake/output data recorded. General:  Eyes closed, disheveled with poor hygiene, cooperative, no distress. Head:  Normocephalic, without obvious abnormality, atraumatic. Eyes:  Conjunctivae/corneas clear, sclera anicteric. Mouth: Terrible dentition with all of her top teeth missing and bottom teeth are black    Neck: Supple, symmetrical, trachea midline. Lungs:   Clear to auscultation bilaterally. Heart:  Regular rate and rhythm, S1, S2 normal, no murmur, click, rub or gallop. Abdomen: Soft, diffusely tender pasha over epigastric region. Bowel sounds hypoactive. No rebound tenderness. No masses,  No organomegaly. Extremities: Extremities normal, atraumatic, no cyanosis or edema. Capillary refill normal.   Pulses: 2+ and symmetric all extremities.    Skin: Skin color pink, turgor increased. No rashes or lesions   Neurologic: No focal motor or sensory deficit.       Labs Reviewed:    All lab results for the last 24 hours reviewed.  Recent Results (from the past 24 hour(s))   GLUCOSE, POC    Collection Time: 12/28/19 11:15 PM   Result Value Ref Range    Glucose (POC) 248 (H) 70 - 110 mg/dL   CBC WITH AUTOMATED DIFF    Collection Time: 12/29/19 12:06 AM   Result Value Ref Range    WBC 19.8 (H) 4.6 - 13.2 K/uL    RBC 3.13 (L) 4.20 - 5.30 M/uL    HGB 8.8 (L) 12.0 - 16.0 g/dL    HCT 26.5 (L) 35.0 - 45.0 %    MCV 84.7 74.0 - 97.0 FL    MCH 28.1 24.0 - 34.0 PG    MCHC 33.2 31.0 - 37.0 g/dL    RDW 15.5 (H) 11.6 - 14.5 %    PLATELET 471 (H) 135 - 420 K/uL    MPV 8.5 (L) 9.2 - 11.8 FL    NEUTROPHILS 86 (H) 40 - 73 %    LYMPHOCYTES 8 (L) 21 - 52 %    MONOCYTES 5 3 - 10 %    EOSINOPHILS 1 0 - 5 %    BASOPHILS 0 0 - 2 %    ABS. NEUTROPHILS 16.9 (H) 1.8 - 8.0 K/UL    ABS. LYMPHOCYTES 1.6 0.9 - 3.6 K/UL    ABS. MONOCYTES 1.0 0.05 - 1.2 K/UL    ABS. EOSINOPHILS 0.2 0.0 - 0.4 K/UL    ABS. BASOPHILS 0.0 0.0 - 0.1 K/UL    DF AUTOMATED     METABOLIC PANEL, COMPREHENSIVE    Collection Time: 12/29/19 12:06 AM   Result Value Ref Range    Sodium 140 136 - 145 mmol/L    Potassium 3.7 3.5 - 5.5 mmol/L    Chloride 108 100 - 111 mmol/L    CO2 24 21 - 32 mmol/L    Anion gap 8 3.0 - 18 mmol/L    Glucose 272 (H) 74 - 99 mg/dL    BUN 27 (H) 7.0 - 18 MG/DL    Creatinine 2.19 (H) 0.6 - 1.3 MG/DL    BUN/Creatinine ratio 12 12 - 20      GFR est AA 31 (L) >60 ml/min/1.73m2    GFR est non-AA 25 (L) >60 ml/min/1.73m2    Calcium 8.6 8.5 - 10.1 MG/DL    Bilirubin, total 0.3 0.2 - 1.0 MG/DL    ALT (SGPT) 12 (L) 13 - 56 U/L    AST (SGOT) 11 10 - 38 U/L    Alk. phosphatase 148 (H) 45 - 117 U/L    Protein, total 7.2 6.4 - 8.2 g/dL    Albumin 3.0 (L) 3.4 - 5.0 g/dL    Globulin 4.2 (H) 2.0 - 4.0 g/dL    A-G Ratio 0.7 (L) 0.8 - 1.7     HCG QL SERUM    Collection Time: 12/29/19 12:06 AM   Result Value Ref Range    HCG, Ql.  NEGATIVE  NEG     POC LACTIC ACID    Collection Time: 12/29/19  2:30 AM   Result Value Ref Range    Lactic Acid (POC) 0.86 0.40 - 2.00 mmol/L

## 2019-12-29 NOTE — PROGRESS NOTES
Summary -- Pt comfortable throughout shift. CC intermittent nausea with small infrequent clear emesis that responds well to prn regimen. Fecal occult test positive; Dr. Perdomo Setting aware and modified diet from full liquid to clear liquid following results. Gastric contents occult test is send out lab and not yet available. Pt received abd CT this morning, suggestive of pyelonephritis. Pt has strong appetite; tolerating clear liquids without exacerbation of symptoms. Patient Vitals for the past 12 hrs:   Temp Pulse Resp BP SpO2   12/29/19 1542 98.5 °F (36.9 °C) (!) 101 18 135/67 99 %   12/29/19 1134 98.1 °F (36.7 °C) (!) 104 18 158/87 99 %   12/29/19 0755 98 °F (36.7 °C) 100 18 148/69 98 %     Bedside shift change report given to Etelvina ENCARNACION RN (oncoming nurse) by Kristen Cool RN (offgoing nurse). Report included the following information SBAR, Kardex, ED Summary, Intake/Output, MAR, Recent Results and Cardiac Rhythm NSR and ST in low 100s.

## 2019-12-29 NOTE — ED NOTES
Pt c/o \" I messed myself\" pt has smear of stool and small amount of urine on chux pad. Pt cleaned and placed in diaper.

## 2019-12-29 NOTE — ROUTINE PROCESS
TRANSFER - OUT REPORT:    Verbal report given to [facility] RN(name) on Jay Huynh  being transferred to Medical (unit) for routine progression of care       Report consisted of patients Situation, Background, Assessment and   Recommendations(SBAR). Information from the following report(s) SBAR, ED Summary, Procedure Summary, Intake/Output, MAR, Recent Results and Cardiac Rhythm normal sinus was reviewed with the receiving nurse. Lines:   Peripheral IV 12/29/19 Left Antecubital (Active)   Site Assessment Clean, dry, & intact 12/29/2019 12:06 AM   Phlebitis Assessment 0 12/29/2019 12:06 AM   Infiltration Assessment 0 12/29/2019 12:06 AM   Dressing Status Clean, dry, & intact; Occlusive 12/29/2019 12:06 AM   Dressing Type Tape;Transparent 12/29/2019 12:06 AM   Hub Color/Line Status Pink;Flushed 12/29/2019 12:06 AM   Action Taken Blood drawn 12/29/2019 12:06 AM        Opportunity for questions and clarification was provided.       Patient transported with:   Monitor  Tech

## 2019-12-30 PROBLEM — R19.5 OCCULT BLOOD POSITIVE STOOL: Status: ACTIVE | Noted: 2019-12-30

## 2019-12-30 LAB
ALBUMIN SERPL-MCNC: 2.3 G/DL (ref 3.4–5)
ALBUMIN/GLOB SERPL: 0.8 {RATIO} (ref 0.8–1.7)
ALP SERPL-CCNC: 99 U/L (ref 45–117)
ALT SERPL-CCNC: 8 U/L (ref 13–56)
ANION GAP SERPL CALC-SCNC: 7 MMOL/L (ref 3–18)
AST SERPL-CCNC: 9 U/L (ref 10–38)
BILIRUB SERPL-MCNC: 0.3 MG/DL (ref 0.2–1)
BUN SERPL-MCNC: 30 MG/DL (ref 7–18)
BUN/CREAT SERPL: 13 (ref 12–20)
CALCIUM SERPL-MCNC: 7.7 MG/DL (ref 8.5–10.1)
CHLORIDE SERPL-SCNC: 109 MMOL/L (ref 100–111)
CO2 SERPL-SCNC: 24 MMOL/L (ref 21–32)
CREAT SERPL-MCNC: 2.34 MG/DL (ref 0.6–1.3)
ERYTHROCYTE [DISTWIDTH] IN BLOOD BY AUTOMATED COUNT: 15.5 % (ref 11.6–14.5)
GASTROCULT GAST QL: POSITIVE
GLOBULIN SER CALC-MCNC: 2.9 G/DL (ref 2–4)
GLUCOSE BLD STRIP.AUTO-MCNC: 166 MG/DL (ref 70–110)
GLUCOSE BLD STRIP.AUTO-MCNC: 177 MG/DL (ref 70–110)
GLUCOSE BLD STRIP.AUTO-MCNC: 179 MG/DL (ref 70–110)
GLUCOSE BLD STRIP.AUTO-MCNC: 189 MG/DL (ref 70–110)
GLUCOSE BLD STRIP.AUTO-MCNC: 219 MG/DL (ref 70–110)
GLUCOSE SERPL-MCNC: 164 MG/DL (ref 74–99)
HCT VFR BLD AUTO: 20.6 % (ref 35–45)
HCT VFR BLD AUTO: 23.7 % (ref 35–45)
HGB BLD-MCNC: 6.8 G/DL (ref 12–16)
HGB BLD-MCNC: 7.7 G/DL (ref 12–16)
MCH RBC QN AUTO: 28.2 PG (ref 24–34)
MCHC RBC AUTO-ENTMCNC: 33 G/DL (ref 31–37)
MCV RBC AUTO: 85.5 FL (ref 74–97)
PH GAST: 3 [PH] (ref 1.5–3.5)
PLATELET # BLD AUTO: 383 K/UL (ref 135–420)
PMV BLD AUTO: 8.9 FL (ref 9.2–11.8)
POTASSIUM SERPL-SCNC: 3.6 MMOL/L (ref 3.5–5.5)
PROT SERPL-MCNC: 5.2 G/DL (ref 6.4–8.2)
RBC # BLD AUTO: 2.41 M/UL (ref 4.2–5.3)
RETICS/RBC NFR AUTO: 1.6 % (ref 0.5–2.3)
SODIUM SERPL-SCNC: 140 MMOL/L (ref 136–145)
WBC # BLD AUTO: 13.1 K/UL (ref 4.6–13.2)

## 2019-12-30 PROCEDURE — 80053 COMPREHEN METABOLIC PANEL: CPT

## 2019-12-30 PROCEDURE — 74011000250 HC RX REV CODE- 250: Performed by: FAMILY MEDICINE

## 2019-12-30 PROCEDURE — C9113 INJ PANTOPRAZOLE SODIUM, VIA: HCPCS | Performed by: FAMILY MEDICINE

## 2019-12-30 PROCEDURE — 74011250636 HC RX REV CODE- 250/636: Performed by: EMERGENCY MEDICINE

## 2019-12-30 PROCEDURE — 65270000029 HC RM PRIVATE

## 2019-12-30 PROCEDURE — 74011250637 HC RX REV CODE- 250/637: Performed by: FAMILY MEDICINE

## 2019-12-30 PROCEDURE — 74011636637 HC RX REV CODE- 636/637: Performed by: HOSPITALIST

## 2019-12-30 PROCEDURE — 74011250637 HC RX REV CODE- 250/637: Performed by: HOSPITALIST

## 2019-12-30 PROCEDURE — 85018 HEMOGLOBIN: CPT

## 2019-12-30 PROCEDURE — 85045 AUTOMATED RETICULOCYTE COUNT: CPT

## 2019-12-30 PROCEDURE — 36415 COLL VENOUS BLD VENIPUNCTURE: CPT

## 2019-12-30 PROCEDURE — 74011250636 HC RX REV CODE- 250/636: Performed by: HOSPITALIST

## 2019-12-30 PROCEDURE — 85027 COMPLETE CBC AUTOMATED: CPT

## 2019-12-30 PROCEDURE — 82962 GLUCOSE BLOOD TEST: CPT

## 2019-12-30 PROCEDURE — 74011000250 HC RX REV CODE- 250: Performed by: HOSPITALIST

## 2019-12-30 PROCEDURE — C9113 INJ PANTOPRAZOLE SODIUM, VIA: HCPCS | Performed by: HOSPITALIST

## 2019-12-30 PROCEDURE — 86677 HELICOBACTER PYLORI ANTIBODY: CPT

## 2019-12-30 PROCEDURE — 74011250636 HC RX REV CODE- 250/636: Performed by: FAMILY MEDICINE

## 2019-12-30 RX ORDER — ATROPINE SULFATE 0.1 MG/ML
0.5 INJECTION INTRAVENOUS
Status: CANCELLED | OUTPATIENT
Start: 2019-12-30 | End: 2019-12-31

## 2019-12-30 RX ORDER — EPINEPHRINE 0.1 MG/ML
1 INJECTION INTRACARDIAC; INTRAVENOUS
Status: CANCELLED | OUTPATIENT
Start: 2019-12-30 | End: 2019-12-31

## 2019-12-30 RX ORDER — DEXTROMETHORPHAN/PSEUDOEPHED 2.5-7.5/.8
1.2 DROPS ORAL
Status: CANCELLED | OUTPATIENT
Start: 2019-12-30

## 2019-12-30 RX ORDER — SODIUM CHLORIDE 0.9 % (FLUSH) 0.9 %
5-40 SYRINGE (ML) INJECTION AS NEEDED
Status: CANCELLED | OUTPATIENT
Start: 2019-12-30

## 2019-12-30 RX ORDER — DIPHENHYDRAMINE HYDROCHLORIDE 50 MG/ML
50 INJECTION, SOLUTION INTRAMUSCULAR; INTRAVENOUS ONCE
Status: CANCELLED | OUTPATIENT
Start: 2019-12-30 | End: 2019-12-30

## 2019-12-30 RX ORDER — ACETAMINOPHEN 325 MG/1
650 TABLET ORAL
Status: DISCONTINUED | OUTPATIENT
Start: 2019-12-30 | End: 2019-12-31 | Stop reason: HOSPADM

## 2019-12-30 RX ORDER — SODIUM CHLORIDE 0.9 % (FLUSH) 0.9 %
5-40 SYRINGE (ML) INJECTION EVERY 8 HOURS
Status: CANCELLED | OUTPATIENT
Start: 2019-12-30

## 2019-12-30 RX ADMIN — AMLODIPINE BESYLATE 5 MG: 5 TABLET ORAL at 08:36

## 2019-12-30 RX ADMIN — SODIUM CHLORIDE 40 MG: 9 INJECTION, SOLUTION INTRAMUSCULAR; INTRAVENOUS; SUBCUTANEOUS at 21:12

## 2019-12-30 RX ADMIN — INSULIN GLARGINE 20 UNITS: 100 INJECTION, SOLUTION SUBCUTANEOUS at 08:35

## 2019-12-30 RX ADMIN — INSULIN LISPRO 2 UNITS: 100 INJECTION, SOLUTION INTRAVENOUS; SUBCUTANEOUS at 08:34

## 2019-12-30 RX ADMIN — PROCHLORPERAZINE EDISYLATE 5 MG: 5 INJECTION INTRAMUSCULAR; INTRAVENOUS at 06:40

## 2019-12-30 RX ADMIN — ACETAMINOPHEN 650 MG: 325 TABLET ORAL at 23:42

## 2019-12-30 RX ADMIN — SODIUM CHLORIDE 100 ML/HR: 900 INJECTION, SOLUTION INTRAVENOUS at 06:16

## 2019-12-30 RX ADMIN — SODIUM CHLORIDE 40 MG: 9 INJECTION, SOLUTION INTRAMUSCULAR; INTRAVENOUS; SUBCUTANEOUS at 08:34

## 2019-12-30 RX ADMIN — INSULIN LISPRO 2 UNITS: 100 INJECTION, SOLUTION INTRAVENOUS; SUBCUTANEOUS at 18:07

## 2019-12-30 RX ADMIN — METOCLOPRAMIDE HYDROCHLORIDE 5 MG: 5 TABLET ORAL at 10:52

## 2019-12-30 RX ADMIN — CEFTRIAXONE 1 G: 1 INJECTION, POWDER, FOR SOLUTION INTRAMUSCULAR; INTRAVENOUS at 06:17

## 2019-12-30 RX ADMIN — INSULIN LISPRO 4 UNITS: 100 INJECTION, SOLUTION INTRAVENOUS; SUBCUTANEOUS at 22:00

## 2019-12-30 RX ADMIN — METOCLOPRAMIDE HYDROCHLORIDE 5 MG: 5 TABLET ORAL at 01:57

## 2019-12-30 RX ADMIN — SODIUM CHLORIDE 100 ML/HR: 900 INJECTION, SOLUTION INTRAVENOUS at 17:55

## 2019-12-30 RX ADMIN — PROCHLORPERAZINE EDISYLATE 5 MG: 5 INJECTION INTRAMUSCULAR; INTRAVENOUS at 13:24

## 2019-12-30 RX ADMIN — INSULIN LISPRO 2 UNITS: 100 INJECTION, SOLUTION INTRAVENOUS; SUBCUTANEOUS at 12:22

## 2019-12-30 NOTE — DIABETES MGMT
Diabetes Patient/Family Education Record  Factors That  May Influence Patients Ability  to Learn or  Comply with Recommendations   []   Language barrier    []   Cultural needs   [x]   Motivation    []   Cognitive limitation    []   Physical   []   Education    []   Physiological factors   []   Hearing/vision/speaking impairment   []   Hinduism beliefs    []   Financial factors   []  Other:   []  No factors identified at this time.      Person Instructed:   [x]   Patient   []   Family   [x]  Other     Preference for Learning:   [x]   Verbal   []   Written   []  Demonstration     Level of Comprehension & Competence:    []  Good                                      [] Fair                                     [x]  Poor                             [x]  Needs Reinforcement   []  Teachback completed    Education Component: importance of BG control and dietary changes r/t gastroparesis   [x]  Medication management, including confirmation of home regimen    []  Nutritional management -obtain usual meal pattern   []  Exercise   []  Signs, symptoms, and treatment of hyperglycemia and hypoglycemia   [] Prevention, recognition and treatment of hyperglycemia and hypoglycemia   [x]  Importance of blood glucose monitoring    []  Instruction on use of the blood glucose meter   [x]  Discuss the importance of HbA1C monitoring    []  Sick day guidelines   []  Proper use and disposal of lancets, needles, syringes or insulin pens (if appropriate)   []  Potential long-term complications (retinopathy, kidney disease, neuropathy, foot care)   [] Information about whom to contact in case of emergency or for more information    [x]  Goal:  Patient/family will demonstrate understanding of Diabetes Self Management Skills by: (date) _3/31______  Plan for post-discharge education or self-management support:    [] Outpatient class schedule provided            [] Patient Declined    [] Scheduled for outpatient classes (date) _______  Verify:  Does patient understand how diabetes medications work? ____________________________  Does patient know what their most recent A1c is? ___________________________________  Does patient monitor glucose at home? ___________________________________________  Does patient have difficulty obtaining diabetes medications or testing supplies? _________________       Ariane Rabago MS, RN, CDE  Glycemic Control Team  [phone]  Pager [phone] (M-TH 8:00-4:30P)  *After Hours pager [phone]

## 2019-12-30 NOTE — CDMP QUERY
Pt admitted with gastroparesis and has anemia documented. Please further specify type and acuity of anemia in the medical record.       Anemia due to acute blood loss   Anemia due to chronic blood loss   Anemia due to iron deficiency   Anemia due to chronic disease, please specify disease   Other, please specify   Clinically unable to determine    The medical record reflects the following:  Risk Factors: gastroparesis, DM, occult positive  Clinical Indicators: hgb 8.8, 6.8, 7.7  Treatment: receiving serial CBC, transfused    Thank you,  Fela Banks [address] [place] [address], [facility]  [phone]

## 2019-12-30 NOTE — PROGRESS NOTES
Compazine given for complaint of nausea without vomiting, Reglan not due at this time. Ambulated without assist to bathroom, voided to clear  Yellow urine. Denied pain    0720 Bedside and Verbal shift change report given to ELIZABETH Jesus/CURT Boss RN (oncoming nurse) by Perez Woods RN   (offgoing nurse). Report included the following information SBAR, Kardex, Intake/Output, MAR and Recent Results.

## 2019-12-30 NOTE — PROGRESS NOTES
CM met with pt and her significant other at bedside. Transition of care plan would be home with family assistance, pt will need transportation scheduled for hospital discharge ([phone]) through her Medicaid insurance provider for pt and boyfriend to her home. No other transition of care needs have been identified at this time. CM to continue to follow and assist.    Care Management Interventions  Mode of Transport at Discharge:  Other (see comment)(Family)  Transition of Care Consult (CM Consult): Discharge Planning  Health Maintenance Reviewed: Yes  Current Support Network: Family Lives Nearby  The Plan for Transition of Care is Related to the Following Treatment Goals : home with physician follow up   Discharge Location  Discharge Placement: Home with family assistance\

## 2019-12-30 NOTE — PROGRESS NOTES
Shift Summary: patient rested through out the shift. Denies pain or SOB. Patient reported feeling nauseated gave reglan prn with relief. Call bell within reach. 9029 Verbal shift change report given to Nini Leonardo RN (oncoming nurse) by Aliene Dandy, RN   (offgoing nurse). Report included the following information SBAR, ED Summary, Intake/Output, MAR and Recent Results.

## 2019-12-30 NOTE — ROUTINE PROCESS
Received report from Anya Jauregui RN alongside Fred Parson RN. Precepting for Shelley Sanchez today.

## 2019-12-30 NOTE — PROGRESS NOTES
Father Sukumar Marie visited with   Lucy Fair, who is a 37 y.o.,female. Father Sukumar Marie provided [place]mo. Father Arzakias Frank provided Hardeep Umair. Chaplains will continue to follow and will provide pastoral care on an as needed/requested basis.  recommends bedside caregivers page  on duty if patient shows signs of acute spiritual or emotional distress.      Fr Trang Miranda, [address] [facility] [place] [facility] [place] Office

## 2019-12-30 NOTE — PROGRESS NOTES
Hospitalist Progress Note-critical care note     Patient: Fab Lin MRN: [id number]  CSN: [id number]    YOB: 1985  Age: 37 y.o. Sex: female    DOA: 12/28/2019 LOS:  LOS: 1 day            Chief complaint: Anemia, diabetes gastroparesis, uncontrolled diabetes    Assessment/Plan         Hospital Problems  Date Reviewed: 12/29/2019          Codes Class Noted POA    Occult blood positive stool ICD-10-CM: R19.5  ICD-9-CM: 792.1  12/30/2019 Unknown        Diabetic gastroparalysis ([facility] ICD-10-CM: E11.43, K31.84  ICD-9-CM: 250.60, 536.3  12/29/2019 Yes        * (Principal) Intractable nausea and vomiting ICD-10-CM: R11.2  ICD-9-CM: 536.2  12/21/2019 Yes        Anemia ICD-10-CM: D64.9  ICD-9-CM: 285.9  11/1/2019 Yes        Uncontrolled type 2 diabetes mellitus with hyperglycemia ([facility] ICD-10-CM: E11.65  ICD-9-CM: 250.02  6/6/2019 Yes              Nausea vomiting, gastroparesis  Still cannot tolerate food. Continue IV rehydration. Symptom treatment  CT abdomen questional pyelo-nephritis, but UA is clear, continue Rocephin for empiric treatment    Anemia, occult stool positive. Hemoglobin drop. Case discussed with Dr. Marianne Grimes will see pt   Continue IV hydration and PPI       uncontrolled diabetes with hyperglycemia  Continue educated patient  Continue Lantus 20, sliding scale    Subjective, still not feeling good. Nausea or vomiting. No black stool, never have EGD done    RN hemoglobin drop   was at bedside. All questions has been answered. 35 total min's spent on patient care including >50% on counseling/coordinating care. Discussed the above assessments. also discussed labs, medications and hospital course    Disposition :tbd,   Review of systems:    General: No fevers or chills. Cardiovascular: No chest pain or pressure. No palpitations. Pulmonary: No shortness of breath. Gastrointestinal: nausea, vomiting.      Vital signs/Intake and Output:  Visit Vitals  /70 (BP 1 Location: Right arm, BP Patient Position: At rest)   Pulse 94   Temp 98.6 °F (37 °C)   Resp 16   Ht 5' 4\" (1.626 m)   Wt 62.1 kg (137 lb)   SpO2 99%   BMI 23.52 kg/m²     Current Shift:  No intake/output data recorded. Last three shifts:  12/28 1901 - 12/30 0700  In: 3601 [P.O.:100; I.V.:3501]  Out: 200 [Urine:770]    Physical Exam:  General: WD, WN. Alert, cooperative, no acute distress    HEENT: NC, Atraumatic. PERRLA, anicteric sclerae. Lungs: CTA Bilaterally. No Wheezing/Rhonchi/Rales. Heart:  Regular  rhythm,  No murmur, No Rubs, No Gallops  Abdomen: Soft, Non distended, Non tender.  +Bowel sounds,   Extremities: No c/c/e  Psych:   Not anxious or agitated. Neurologic:  No acute neurological deficit. Labs: Results:       Chemistry Recent Labs     12/30/19  0426 12/29/19  0006   * 272*    140   K 3.6 3.7    108   CO2 24 24   BUN 30* 27*   CREA 2.34* 2.19*   CA 7.7* 8.6   AGAP 7 8   BUCR 13 12   AP 99 148*   TP 5.2* 7.2   ALB 2.3* 3.0*   GLOB 2.9 4.2*   AGRAT 0.8 0.7*      CBC w/Diff Recent Labs     12/30/19  1000 12/30/19  0426 12/29/19  0006   WBC  --  13.1 19.8*   RBC  --  2.41* 3.13*   HGB 7.7* 6.8* 8.8*   HCT 23.7* 20.6* 26.5*   PLT  --  383 471*   GRANS  --   --  86*   LYMPH  --   --  8*   EOS  --   --  1      Cardiac Enzymes No results for input(s): CPK, CKND1, TESS in the last 72 hours. No lab exists for component: CKRMB, TROIP   Coagulation No results for input(s): PTP, INR, APTT, INREXT, INREXT in the last 72 hours. Lipid Panel No results found for: CHOL, CHOLPOCT, CHOLX, CHLST, CHOLV, 653877, HDL, HDLP, LDL, LDLC, DLDLP, 722818, VLDLC, VLDL, TGLX, TRIGL, TRIGP, TGLPOCT, CHHD, CHHDX   BNP No results for input(s): BNPP in the last 72 hours.    Liver Enzymes Recent Labs     12/30/19  0426   TP 5.2*   ALB 2.3*   AP 99   SGOT 9*      Thyroid Studies No results found for: T4, T3U, TSH, TSHEXT, TSHEXT     Procedures/imaging: see electronic medical records for all procedures/Xrays and details which were not copied into this note but were reviewed prior to creation of Eden Mcintosh MD

## 2019-12-30 NOTE — DIABETES MGMT
GLYCEMIC CONTROL PROGRESS NOTE:    - known h/o poorly managed T2DM since birth of last child in 2009 (pt confirmed)  - pt with recent discharge [facility]  - BG out of target range non-ICU: 110-180 mg/dL  - TDD = 34 (20 Lantus + 14 units - Humalog Normal Insulin Sensitivity Corrective Coverage)  - FBG out of target range, recommend monitor trends and make adjustments as needed    Recent Glucose Results:   Lab Results   Component Value Date/Time     (H) 12/30/2019 04:26 AM    GLUCPOC 166 (H) 12/30/2019 04:20 PM    GLUCPOC 189 (H) 12/30/2019 10:53 AM    GLUCPOC 177 (H) 12/30/2019 07:04 AM     Atul Quezada MS, RN, CDE  Glycemic Control Team  [phone]  Pager [phone] (M-TH 8:00-4:30P)  *After Hours pager [phone]

## 2019-12-31 VITALS
RESPIRATION RATE: 15 BRPM | BODY MASS INDEX: 23.37 KG/M2 | HEIGHT: 64 IN | SYSTOLIC BLOOD PRESSURE: 142 MMHG | HEART RATE: 90 BPM | TEMPERATURE: 98.5 F | OXYGEN SATURATION: 100 % | WEIGHT: 136.91 LBS | DIASTOLIC BLOOD PRESSURE: 73 MMHG

## 2019-12-31 LAB
ALBUMIN SERPL-MCNC: 2.5 G/DL (ref 3.4–5)
ALBUMIN/GLOB SERPL: 0.8 {RATIO} (ref 0.8–1.7)
ALP SERPL-CCNC: 107 U/L (ref 45–117)
ALT SERPL-CCNC: 9 U/L (ref 13–56)
ANION GAP SERPL CALC-SCNC: 9 MMOL/L (ref 3–18)
AST SERPL-CCNC: 13 U/L (ref 10–38)
BILIRUB SERPL-MCNC: 0.3 MG/DL (ref 0.2–1)
BUN SERPL-MCNC: 22 MG/DL (ref 7–18)
BUN/CREAT SERPL: 10 (ref 12–20)
CALCIUM SERPL-MCNC: 7.8 MG/DL (ref 8.5–10.1)
CHLORIDE SERPL-SCNC: 110 MMOL/L (ref 100–111)
CO2 SERPL-SCNC: 23 MMOL/L (ref 21–32)
CREAT SERPL-MCNC: 2.16 MG/DL (ref 0.6–1.3)
ERYTHROCYTE [DISTWIDTH] IN BLOOD BY AUTOMATED COUNT: 14.7 % (ref 11.6–14.5)
FOLATE SERPL-MCNC: 12.1 NG/ML (ref 3.1–17.5)
GLOBULIN SER CALC-MCNC: 3.3 G/DL (ref 2–4)
GLUCOSE BLD STRIP.AUTO-MCNC: 100 MG/DL (ref 70–110)
GLUCOSE BLD STRIP.AUTO-MCNC: 123 MG/DL (ref 70–110)
GLUCOSE BLD STRIP.AUTO-MCNC: 123 MG/DL (ref 70–110)
GLUCOSE BLD STRIP.AUTO-MCNC: 181 MG/DL (ref 70–110)
GLUCOSE SERPL-MCNC: 63 MG/DL (ref 74–99)
HCT VFR BLD AUTO: 25.6 % (ref 35–45)
HGB BLD-MCNC: 8.3 G/DL (ref 12–16)
IRON SATN MFR SERPL: 20 %
IRON SERPL-MCNC: 48 UG/DL (ref 50–175)
MCH RBC QN AUTO: 28.6 PG (ref 24–34)
MCHC RBC AUTO-ENTMCNC: 32.4 G/DL (ref 31–37)
MCV RBC AUTO: 88.3 FL (ref 74–97)
PLATELET # BLD AUTO: 435 K/UL (ref 135–420)
PMV BLD AUTO: 9.4 FL (ref 9.2–11.8)
POTASSIUM SERPL-SCNC: 3 MMOL/L (ref 3.5–5.5)
PROT SERPL-MCNC: 5.8 G/DL (ref 6.4–8.2)
RBC # BLD AUTO: 2.9 M/UL (ref 4.2–5.3)
SODIUM SERPL-SCNC: 142 MMOL/L (ref 136–145)
TIBC SERPL-MCNC: 239 UG/DL (ref 250–450)
TSH SERPL DL<=0.05 MIU/L-ACNC: 2.09 UIU/ML (ref 0.36–3.74)
VIT B12 SERPL-MCNC: 314 PG/ML (ref 211–911)
WBC # BLD AUTO: 11.1 K/UL (ref 4.6–13.2)

## 2019-12-31 PROCEDURE — 88305 TISSUE EXAM BY PATHOLOGIST: CPT

## 2019-12-31 PROCEDURE — 0DB78ZX EXCISION OF STOMACH, PYLORUS, VIA NATURAL OR ARTIFICIAL OPENING ENDOSCOPIC, DIAGNOSTIC: ICD-10-PCS | Performed by: INTERNAL MEDICINE

## 2019-12-31 PROCEDURE — 85027 COMPLETE CBC AUTOMATED: CPT

## 2019-12-31 PROCEDURE — 74011250636 HC RX REV CODE- 250/636: Performed by: INTERNAL MEDICINE

## 2019-12-31 PROCEDURE — 84443 ASSAY THYROID STIM HORMONE: CPT

## 2019-12-31 PROCEDURE — 76040000007: Performed by: INTERNAL MEDICINE

## 2019-12-31 PROCEDURE — 74011250637 HC RX REV CODE- 250/637: Performed by: HOSPITALIST

## 2019-12-31 PROCEDURE — 0DB68ZX EXCISION OF STOMACH, VIA NATURAL OR ARTIFICIAL OPENING ENDOSCOPIC, DIAGNOSTIC: ICD-10-PCS | Performed by: INTERNAL MEDICINE

## 2019-12-31 PROCEDURE — 77030040361 HC SLV COMPR DVT MDII -B: Performed by: INTERNAL MEDICINE

## 2019-12-31 PROCEDURE — 74011000250 HC RX REV CODE- 250: Performed by: HOSPITALIST

## 2019-12-31 PROCEDURE — C9113 INJ PANTOPRAZOLE SODIUM, VIA: HCPCS | Performed by: HOSPITALIST

## 2019-12-31 PROCEDURE — 80053 COMPREHEN METABOLIC PANEL: CPT

## 2019-12-31 PROCEDURE — 74011000250 HC RX REV CODE- 250: Performed by: FAMILY MEDICINE

## 2019-12-31 PROCEDURE — 82607 VITAMIN B-12: CPT

## 2019-12-31 PROCEDURE — 77010033678 HC OXYGEN DAILY

## 2019-12-31 PROCEDURE — 74011636637 HC RX REV CODE- 636/637: Performed by: HOSPITALIST

## 2019-12-31 PROCEDURE — 74011250636 HC RX REV CODE- 250/636: Performed by: FAMILY MEDICINE

## 2019-12-31 PROCEDURE — 36415 COLL VENOUS BLD VENIPUNCTURE: CPT

## 2019-12-31 PROCEDURE — 82962 GLUCOSE BLOOD TEST: CPT

## 2019-12-31 PROCEDURE — 83540 ASSAY OF IRON: CPT

## 2019-12-31 PROCEDURE — 74011250636 HC RX REV CODE- 250/636: Performed by: HOSPITALIST

## 2019-12-31 PROCEDURE — G0500 MOD SEDAT ENDO SERVICE >5YRS: HCPCS | Performed by: INTERNAL MEDICINE

## 2019-12-31 RX ORDER — NALOXONE HYDROCHLORIDE 0.4 MG/ML
0.4 INJECTION, SOLUTION INTRAMUSCULAR; INTRAVENOUS; SUBCUTANEOUS
Status: DISCONTINUED | OUTPATIENT
Start: 2019-12-31 | End: 2019-12-31 | Stop reason: HOSPADM

## 2019-12-31 RX ORDER — FENTANYL CITRATE 50 UG/ML
100 INJECTION, SOLUTION INTRAMUSCULAR; INTRAVENOUS
Status: DISCONTINUED | OUTPATIENT
Start: 2019-12-31 | End: 2019-12-31 | Stop reason: HOSPADM

## 2019-12-31 RX ORDER — FLUMAZENIL 0.1 MG/ML
0.2 INJECTION INTRAVENOUS
Status: DISCONTINUED | OUTPATIENT
Start: 2019-12-31 | End: 2019-12-31 | Stop reason: HOSPADM

## 2019-12-31 RX ORDER — INSULIN GLARGINE 100 [IU]/ML
5 INJECTION, SOLUTION SUBCUTANEOUS DAILY
Status: DISCONTINUED | OUTPATIENT
Start: 2019-12-31 | End: 2019-12-31 | Stop reason: HOSPADM

## 2019-12-31 RX ORDER — METOCLOPRAMIDE HYDROCHLORIDE 5 MG/ML
5 INJECTION INTRAMUSCULAR; INTRAVENOUS EVERY 6 HOURS
Status: DISCONTINUED | OUTPATIENT
Start: 2019-12-31 | End: 2019-12-31 | Stop reason: HOSPADM

## 2019-12-31 RX ORDER — PANTOPRAZOLE SODIUM 40 MG/1
40 TABLET, DELAYED RELEASE ORAL 2 TIMES DAILY
Qty: 60 TAB | Refills: 0 | Status: SHIPPED | OUTPATIENT
Start: 2019-12-31

## 2019-12-31 RX ORDER — POTASSIUM CHLORIDE 20 MEQ/1
40 TABLET, EXTENDED RELEASE ORAL 2 TIMES DAILY
Status: DISCONTINUED | OUTPATIENT
Start: 2019-12-31 | End: 2019-12-31 | Stop reason: HOSPADM

## 2019-12-31 RX ORDER — MIDAZOLAM HYDROCHLORIDE 1 MG/ML
.25-5 INJECTION, SOLUTION INTRAMUSCULAR; INTRAVENOUS
Status: DISCONTINUED | OUTPATIENT
Start: 2019-12-31 | End: 2019-12-31 | Stop reason: HOSPADM

## 2019-12-31 RX ORDER — INSULIN GLARGINE 100 [IU]/ML
5 INJECTION, SOLUTION SUBCUTANEOUS DAILY
Status: DISCONTINUED | OUTPATIENT
Start: 2020-01-01 | End: 2019-12-31

## 2019-12-31 RX ORDER — SODIUM CHLORIDE 9 MG/ML
1000 INJECTION, SOLUTION INTRAVENOUS CONTINUOUS
Status: DISPENSED | OUTPATIENT
Start: 2019-12-31 | End: 2019-12-31

## 2019-12-31 RX ORDER — METOCLOPRAMIDE 5 MG/1
5 TABLET ORAL
Qty: 8 TAB | Refills: 0 | Status: SHIPPED | OUTPATIENT
Start: 2019-12-31 | End: 2020-01-02

## 2019-12-31 RX ADMIN — AMLODIPINE BESYLATE 5 MG: 5 TABLET ORAL at 10:32

## 2019-12-31 RX ADMIN — POTASSIUM CHLORIDE 40 MEQ: 1500 TABLET, EXTENDED RELEASE ORAL at 10:32

## 2019-12-31 RX ADMIN — SODIUM CHLORIDE 100 ML/HR: 900 INJECTION, SOLUTION INTRAVENOUS at 13:47

## 2019-12-31 RX ADMIN — METOCLOPRAMIDE 5 MG: 5 INJECTION, SOLUTION INTRAMUSCULAR; INTRAVENOUS at 05:00

## 2019-12-31 RX ADMIN — SODIUM CHLORIDE 100 ML/HR: 900 INJECTION, SOLUTION INTRAVENOUS at 02:01

## 2019-12-31 RX ADMIN — CEFTRIAXONE 1 G: 1 INJECTION, POWDER, FOR SOLUTION INTRAMUSCULAR; INTRAVENOUS at 06:23

## 2019-12-31 RX ADMIN — SODIUM CHLORIDE 100 ML/HR: 900 INJECTION, SOLUTION INTRAVENOUS at 09:11

## 2019-12-31 RX ADMIN — SODIUM CHLORIDE 40 MG: 9 INJECTION, SOLUTION INTRAMUSCULAR; INTRAVENOUS; SUBCUTANEOUS at 10:32

## 2019-12-31 RX ADMIN — INSULIN GLARGINE 5 UNITS: 100 INJECTION, SOLUTION SUBCUTANEOUS at 13:46

## 2019-12-31 NOTE — H&P
REPORT TO PRIMARY NURSE JESUS MANUEL CARVAJAL, AWAITING TRANSPORTATION TO RETURN TO FLOOR. PATIENT WITH NONLABORED RESPIRATIONS RESPONSIVE TO VERBAL STIMULI. VSS.

## 2019-12-31 NOTE — CONSULTS
[id number] [facility]    Name:  Gato Luque  MR#:   [id number]  :  1985  ACCOUNT #:  [de-identified]  DATE OF SERVICE:  2019    HISTORY OF PRESENT ILLNESS:  A 64-year-old female was admitted yesterday because of intractable nausea and vomiting that started yesterday. She claims that she has been vomiting all her food, and once, there was some brownish liquid. The patient has been having intermittent nausea and vomiting for the past two years attributed to gastroparesis. She claims that she had an upper endoscopy done about couple of years ago, where she was told that there was nothing serious. She has been taking Pepcid 20 mg once a day and Reglan 10 mg in the morning. Her weight has been fluctuating. Usually, she has regular bowel movement everyday, but recently, she had a mild diarrhea, and she noticed there was some blood on the tissue only, but never in the stool. She denies ever having any melena. She denies taking any NSAIDs or aspirin. She does not smoke or drink alcohol. She claims that she used to drink soft drinks but stopped them. She has been diabetic type 2 for the past six years with bad control. She has peripheral neuropathy in her feet, and also, she has chronic kidney disease from poorly controlled diabetes mellitus. She has five pregnancies and deliveries including one was a . She has also appendectomy. FAMILY HISTORY:  Her sister has leukemia. Father and mother have diabetes and hypertension. There is no family history of cancer, Crohn's, or colitis. She denies having any abdominal pain. She denies also having any heartburns or dysphagia, but she claims that during every pregnancy that she had she had severe GERD. She denies having any shortness of breath, but did admit to sometimes having some chest pain. There is no stroke, paralysis, numbness, or loss of consciousness.   Her menstruation has been regular, but heavy in the last six months with passage of large clots lasting for almost a week every time. She has previous tubal ligation. She has not had this problem investigated by GYN. SOCIAL HISTORY:  She is living with her . She does not smoke or drink. ALLERGIES:  NO KNOWN DRUG ALLERGIES. MEDICATIONS AT HOME:  Mostly Pepcid 20 mg, insulin Lantus, amlodipine 5 mg, and Reglan 10 mg every morning. PHYSICAL EXAMINATION:  GENERAL:  We have a 24-year-old  female who appears to be in no distress. She looks much older than her true age. She is lying comfortably in bed. VITAL SIGNS:  She appears to be overweight, but she weighs only 137 pounds. Temperature 99, pulse 99 to 94, blood pressure 138/71, breathing 16, saturation 100% on room air. SKIN:  Remarkable for few tattoos. HEENT:  She appears to be pale. The conjunctivae are also pale. The sclerae are anicteric. The pupils are equal and reactive to light. The oropharyngeal cavity, she has a very poor dental hygiene with severe teeth decay. Her mucous membranes are pale and moist.  NECK:  Supple. No palpable mass, no enlarged thyroid. LUNGS:  Clear to auscultation. HEART:  The cardiac rhythm is regular. S1, S2 normal.  No murmur. ABDOMEN:  Rounded, but very soft, nontender. No mass or organomegaly. No succussion splash. Bowel sounds are normal.  EXTREMITIES:  Unremarkable. No pedal edema. No clubbing. No tremor. NEUROLOGIC:  No focal neurological finding. She is alert and oriented. Moves all her four extremities. LABORATORY DATA:  Blood Tests: We have a hemoglobin of 6.8 yesterday with normal MCV and MCH. Her hemoglobin was 7.9 on 12/23; 9.8 on 12/21; but also was 6.4 on 11/02/2019. WBC was 13.8, platelets 300, and 53% neutrophils. Urinalysis showed more than 1000 glucose. Complete Metabolic Panel:  Yesterday, the BUN was 27, creatinine 2.19, and today, they are 30 and 2.34. They were 23 and 2.42 on 12/23. Normal LFTs.   Normal lipase. Beta-hCG is negative. CT scan of the abdomen and pelvis without contrast showed the appearance of right renal perinephric stranding that may be related to acute pyelonephritis, mild perinephritic stranding within the left kidney, has not significantly changed in comparison to 12/21/2019. There was no mention of any bowel dilatation or wall thickening. No abnormalities of her pelvic organs. Occult blood in the stools was positive this time, but was negative on 11/01/2019. In conclusion, this is a 24-year-old female with poorly controlled diabetes mellitus that has been going on for 6 years, on insulin. It is mostly complicated with peripheral neuropathy, acute on chronic kidney disease, presented with re-occurring nausea and vomiting that has been going on for few years. It appears to be related to diabetic gastroparesis. The patient, however, is not treated properly. She needs to be on PPI for life, and probably, the dose should be increased to twice daily during the flare-up. She also needs to take her Reglan more frequently when she gets flare-ups and increase it up to four times a day, but as her problem settles down, then she can reduce her dose to once a day as she was doing before. I explained to her that Reglan has side effects and we tried to minimize the dose as much as possible, but sometimes, we have no choice except to take it when things are really bad. I also advised her to stay on low fat and low roughage diet. I explained to her the reason behind it. I told her that she needs to eat food in the form of smoothies or purees. She appears to have blood losses through her menstruation with menorrhea for the past six months, but probably also from through her stomach as she probably has erosive esophagitis. The patient used to have a lot of heartburns in her younger age, but now, she does not feel it probably because she already became numb to it.   I would like to do tomorrow an endoscopy to check her stomach. The dose of Protonix has been put at twice a day intravenously. We may want to give her some Reglan and see what happens. I advised her that she has to watch her diet, exercise, and take care of her diabetes. She has severely decayed teeth that need some attention. I advised her to see GYN specialist regarding her heavy menstruation. Further notes will follow. MD DI [email or web address]  D:  12/30/2019 20:40  T:  12/30/2019 23:33  JOB #:  [id number]  CC:   MD Nery Pritchard.  Belle Galeano MD

## 2019-12-31 NOTE — ROUTINE PROCESS
Shift summary no new events. Pt remains on cleat liquid diet and still c/o  N/V. Pt taking compazine injection 5mg and reglan tablet 5mg for N/V. Pt positive for blood in stool and vomit. Consult to gastroenterology. H/H improve from 6.8 to 7.7 over the course of the morning.

## 2019-12-31 NOTE — ROUTINE PROCESS
1932 Bedside and Verbal shift change report given to Rima Gasca RN (oncoming nurse) by CURT Boss RN (offgoing nurse). Report included the following information SBAR, Kardex, Intake/Output, MAR, Accordion and Cardiac Rhythm NSR.     2342 C/O 8/10 H/A unrelieved by dim lighting, Dr Luz Marina Persaud notified new order for PRN Tylenol 650 mg q. 6 hr via telephone with read back. PRN administered will follow up on effectiveness. Batteries also replaced in telemetry monitor. 0148 Leads to telemetry monitor replaced     0249 Pt C/O having hot flashes, covers removed and ac cut on. When attempted to access temperature could not get a reading, pt covered up and heat cut back on .     0349 Re-attempted temp again with no success. 0530 Oral and axillary temp attempted with disposable thermometer with a reading of 93.0 verified by an groin temp. Dr. Luz Marina Persaud made aware order given to verify rectally. 6461 Rectal temp of 94.0, Pt currently has an extra blanket with the heat on and door open to circulate air. No C/O pain or distress at current time will continue to monitor. 4587 Bedside and Verbal shift change report given to KRYSTINA Gauthier RN (oncoming nurse) by Rima Gasca RN (offgoing nurse). Report included the following information SBAR, Kardex, Intake/Output, MAR and Recent Results.

## 2019-12-31 NOTE — PROCEDURES
[facility]  Esophagogastroduodenoscopy Procedure Report  _______________________________________________________  Patient: Myrna Mendieta  Attending Physician: Amaya Hatch MD    Patient ID: [id number]                                     Referring Physician: [unfilled]      Exam Date: 12/31/2019   _______________________________________________________      Introduction: A 37 y.o. Female, presents for an Esophagogastroduodenoscopy Procedure    Indication: Insuline dependent diabetes mellitus x 6 years iron deficiency anemia to 6.8 came with profuse nausea and vomiting lasting for two days. : Amaya Hatch MD    Sedation:    Versed 5 mg IV, fentanyl 100 mcg IV  Procedure Details:  After infomed consent was obtained for the procedure, with all risks and benefits of procedure explained the patient was taken to the endoscopy suite and placed in the left lateral decubitus position. Following sequential administration of sedation as per above, the endoscope was inserted into the mouth and advanced under direct vision to second portion of the duodenum. A careful inspection was made as the gastroscope was withdrawn, including a retroflexed view of the proximal stomach; findings and interventions are described below. ASA status: II      FINDINGS:  HYPOPHARYNX AND LARYNX: Normal  Esophagus: Normal proximal esophagus. Multiple long strips of erosive esophagitis starting from the Z line and going up to 33 cm with free reflux of gastric juice into the distal esophagus. No Hiatal hernia. Diaphragmatic pinch located at 40 cm. Stomach: Moderate to large amount of liquid food retention in a large not completely atonic stomach compatible with diabetic gastroparesis. Normal, cardia, fundus, body, lesser curvature, incisura, antrum and pylorus. Mild diffuse gastritis. biopsies are taken.    Duodenum:   The bulb, second portions and major papilla are normal     Therapies:    biopsy of stomach body, antrum    Specimen: One           Complications:   None    EBL:  Minimal    IMPRESSION: diabetic gastroparesis with presence of moderate amount of liquid in the stomach which is large. There is mild diffuse gastritis. biopsies are taken. There are multiple strips of erosive esophagitis starting from the Z line located at 40 cm and going up to 33 cm. RECOMMENDATIONS: -Acid suppression with a proton pump inhibitor such as Protonix 40 mg twice daily. Add for now Reglan 5 to 10 mg IV every 6 hours on regular basis if she gets better then we may be able to switch to oral., -Await pathology. , -Follow symptoms. , -Full liquid diet and advance to low roughage and low fat diet if well tolerated. , -Follow up with me., -No NSAIDS, Keeps the head of the bed elevated by 6 to 8 inches at all time.     Assistant: none    Renetta Alford MD  12/31/2019  9:12 AM

## 2019-12-31 NOTE — PROGRESS NOTES
Received SBAR from night shift RN. Patient resting quietly in bed, no complaint of pain/nausea. Family at bedside. 0482 patient taken down for EGD.     1003 patient arrived back on floor. Alert and oriented x 4, slightly drowsy. Patient and visitor instructed to call if she needs to get out of bed until she feels steady on her feet. Patient ambulating to restroom without difficulty. Patient tolerating her full liquid diet. I have reviewed discharge instructions with the patient. The patient verbalized understanding. Patient taken down to main entrance via wheelchair, family taking her home.

## 2019-12-31 NOTE — PROGRESS NOTES
Talk with the patient after procedure. She tolerated diet very well, no nausea no vomiting. She would like to be discharged today. Recommended patient gradual increase diet. If patient still on clear liquid recommended give herself Lantus 15 units with a sliding scale. Gradually increase Lantus and pre-meal insulin according to her diet she indicated a verbal understanding. Also recommended no driving today.

## 2019-12-31 NOTE — PROGRESS NOTES
Anticipate pt will transition home within the next 24-48 hours  home with family assistance, pt will need transportation scheduled for hospital discharge [phone] through her Medicaid insurance provider for pt and boyfriend to her home. No other transition of care needs have been identified at this time. CM to continue to follow and assist.    Care Management Interventions  Mode of Transport at Discharge:  Other (see comment)(Family)  Transition of Care Consult (CM Consult): Discharge Planning  Health Maintenance Reviewed: Yes  Current Support Network: Family Lives Nearby  The Plan for Transition of Care is Related to the Following Treatment Goals : home with physician follow up   Discharge Location  Discharge Placement: Home with family assistance

## 2019-12-31 NOTE — ROUTINE PROCESS
Bedside shift change report given to A Ayo Cortes, ALENA and Rachael Paulino (oncoming nurse) by Siva Sparks RN and Cris Adam RN (offgoing nurse).  Report included the following information SBAR, Kardex, ED Summary, Intake/Output, MAR, Recent Results and Cardiac Rhythm NSR and ST.

## 2019-12-31 NOTE — DIABETES MGMT
GLYCEMIC CONTROL PROGRESS NOTE:    - pt provided with gastroparesis diet education  - this writer stressed the importance of adherence to home insulin regimen for improved BG control    Recent Glucose Results:   Lab Results   Component Value Date/Time    GLU 63 (L) 12/31/2019 04:00 AM    GLUCPOC 123 (H) 12/31/2019 04:05 PM    GLUCPOC 123 (H) 12/31/2019 11:15 AM    GLUCPOC 181 (H) 12/31/2019 07:16 AM     Goldy Davis MS, RN, CDE  Glycemic Control Team  [phone]  Pager [phone] (M-TH 8:00-4:30P)  *After Hours pager [phone]

## 2019-12-31 NOTE — DISCHARGE INSTRUCTIONS
Patient Education        Gastroparesis: Care Instructions  Your Care Instructions    When you have gastroparesis, your stomach takes a lot longer to empty. This delay can cause belly pain, bloating, and belching. It also can cause hiccups, heartburn, nausea or vomiting. You may not feel like eating. These symptoms may come and go. They most often occur during and after meals. You may feel full after only a few bites of food. This condition occurs when the nerves to the stomach don't work properly. Diabetes is the most common cause of this nerve damage. Gastroparesis can make it harder to control your blood sugar levels. But keeping your blood sugar levels under control may help with your symptoms. Parkinson's disease, stroke, and some medicines can also cause this condition. Home treatment can often help. Follow-up care is a key part of your treatment and safety. Be sure to make and go to all appointments, and call your doctor if you are having problems. It's also a good idea to know your test results and keep a list of the medicines you take. How can you care for yourself at home? · Eat several small meals each day rather than three large meals. · Eat foods that are low in fiber and fat. · If your doctor suggests it, take medicines that help the stomach empty more quickly. These are called motility agents. When should you call for help? Call your doctor now or seek immediate medical care if:    · You are vomiting.     · You have new or worse belly pain.     · You have a fever.     · You cannot pass stools or gas.    Watch closely for changes in your health, and be sure to contact your doctor if you have any problems. Where can you learn more? Go to http://www.gray.com/. Enter M106 in the search box to learn more about \"Gastroparesis: Care Instructions. \"  Current as of: November 7, 2018  Content Version: 12.2  © 8902-6962 [employer].  Care instructions adapted under license by [address] (which disclaims liability or warranty for this information). If you have questions about a medical condition or this instruction, always ask your healthcare professional. [address] any warranty or liability for your use of this information. Patient Education        Nausea and Vomiting: Care Instructions  Your Care Instructions    When you are nauseated, you may feel weak and sweaty and notice a lot of saliva in your mouth. Nausea often leads to vomiting. Most of the time you do not need to worry about nausea and vomiting, but they can be signs of other illnesses. Two common causes of nausea and vomiting are stomach flu and food poisoning. Nausea and vomiting from viral stomach flu will usually start to improve within 24 hours. Nausea and vomiting from food poisoning may last from 12 to 48 hours. The doctor has checked you carefully, but problems can develop later. If you notice any problems or new symptoms, get medical treatment right away. Follow-up care is a key part of your treatment and safety. Be sure to make and go to all appointments, and call your doctor if you are having problems. It's also a good idea to know your test results and keep a list of the medicines you take. How can you care for yourself at home? · To prevent dehydration, drink plenty of fluids, enough so that your urine is light yellow or clear like water. Choose water and other caffeine-free clear liquids until you feel better. If you have kidney, heart, or liver disease and have to limit fluids, talk with your doctor before you increase the amount of fluids you drink. · Rest in bed until you feel better. · When you are able to eat, try clear soups, mild foods, and liquids until all symptoms are gone for 12 to 48 hours. Other good choices include dry toast, crackers, cooked cereal, and gelatin dessert, such as Jell-O. When should you call for help?   Call 911 anytime you think you may need emergency care. For example, call if:    · You passed out (lost consciousness).    Call your doctor now or seek immediate medical care if:    · You have symptoms of dehydration, such as:  ? Dry eyes and a dry mouth. ? Passing only a little dark urine. ? Feeling thirstier than usual.     · You have new or worsening belly pain.     · You have a new or higher fever.     · You vomit blood or what looks like coffee grounds.    Watch closely for changes in your health, and be sure to contact your doctor if:    · You have ongoing nausea and vomiting.     · Your vomiting is getting worse.     · Your vomiting lasts longer than 2 days.     · You are not getting better as expected. Where can you learn more? Go to http://www.gray.com/. Enter 25 [id number] in the search box to learn more about \"Nausea and Vomiting: Care Instructions. \"  Current as of: June 26, 2019  Content Version: 12.2  © 8017-3447 [email or web address]. Care instructions adapted under license by [employer] (which disclaims liability or warranty for this information). If you have questions about a medical condition or this instruction, always ask your healthcare professional. [place] [address] any warranty or liability for your use of this information.

## 2019-12-31 NOTE — DISCHARGE SUMMARY
Discharge Summary    Patient: Kylah Garcia MRN: [id number]  CSN: [id number]    YOB: 1985  Age: 37 y.o. Sex: female    DOA: 12/28/2019 LOS:  LOS: 2 days   Discharge Date:      Primary Care Provider:  Constantino Aponte MD    Admission Diagnoses: Intractable nausea and vomiting [R11.2]  Diabetic gastroparalysis ([facility] [E11.43, K31.84]    Discharge Diagnoses:    Hospital Problems  Date Reviewed: 12/29/2019          Codes Class Noted POA    Occult blood positive stool ICD-10-CM: R19.5  ICD-9-CM: 792.1  12/30/2019 Unknown        Diabetic gastroparalysis ([facility] ICD-10-CM: E11.43, K31.84  ICD-9-CM: 250.60, 536.3  12/29/2019 Yes        * (Principal) Intractable nausea and vomiting ICD-10-CM: R11.2  ICD-9-CM: 536.2  12/21/2019 Yes        Anemia ICD-10-CM: D64.9  ICD-9-CM: 285.9  11/1/2019 Yes        Uncontrolled type 2 diabetes mellitus with hyperglycemia ([facility] ICD-10-CM: E11.65  ICD-9-CM: 250.02  6/6/2019 Yes              Discharge Condition: stable     Discharge Medications:     Current Discharge Medication List      START taking these medications    Details   metoclopramide HCl (REGLAN) 5 mg tablet Take 1 Tab by mouth every six (6) hours as needed for Other (n/v) for up to 2 days. Qty: 8 Tab, Refills: 0      pantoprazole (PROTONIX) 40 mg tablet Take 1 Tab by mouth two (2) times a day. Qty: 60 Tab, Refills: 0         CONTINUE these medications which have NOT CHANGED    Details   amLODIPine (NORVASC) 5 mg tablet Take 1 Tab by mouth daily. Qty: 30 Tab, Refills: 0      insulin glargine (LANTUS U-100 INSULIN) 100 unit/mL injection 30 Units by SubCUTAneous route nightly. Indications: type 2 diabetes mellitus      insulin admin. supplies (INPEN, FOR NOVOLOG, SC) 10-12 Units by SubCUTAneous route Before breakfast, lunch, and dinner.          STOP taking these medications       famotidine (PEPCID PO) Comments:   Reason for Stopping:               Procedures : upper egd     Consults: Gastroenterology      PHYSICAL EXAM   Visit Vitals  /73   Pulse 90   Temp 98.5 °F (36.9 °C)   Resp 15   Ht 5' 4\" (1.626 m)   Wt 62.1 kg (136 lb 14.5 oz)   SpO2 100%   BMI 23.50 kg/m²     General: Awake, cooperative, no acute distress    HEENT: NC, Atraumatic. PERRLA, EOMI. Anicteric sclerae. Lungs:  CTA Bilaterally. No Wheezing/Rhonchi/Rales. Heart:  Regular  rhythm,  No murmur, No Rubs, No Gallops  Abdomen: Soft, Non distended, Non tender. +Bowel sounds,   Extremities: No c/c/e  Psych:   Not anxious or agitated. Neurologic:  No acute neurological deficits. Admission HPI :     Des Gavin is a 37 y.o. female who has a hx of poorly controlled DMT2 and recent hospital stay earlier this week for gastroparesis due to reglan noncompliance who presents with intractable N/V and diarrhea for the past 2 days. Reports she took all of her cipro given to her for a UTI during her hospital stay and she states she has been compliant with reglan. She has been keeping fluids down today but not able to eat. No CP, SOB. No fever or dysuria. Hospital Course :   Des Gavin is a 37 y.o. female who has a hx of poorly controlled DMT2 was admitted due to n/v-diabetic gastroparesis. reglan and iv hydration were given. Her occult stool positive. GI was consulted. Upper EGD was done.;  diabetic gastroparesis with presence of moderate amount of liquid in the stomach which is large. There is mild diffuse gastritis. biopsies are taken. There are multiple strips of erosive esophagitis starting from the Z line located at 40 cm and going up to 33 cm. Gi recommended ppi twice a day. Before discharge, she tolerated diet well. She had hypoglycemia during her stay. Recommended patient gradual increase diet. If patient still on clear liquid recommended give herself Lantus 15 units with a sliding scale.   Gradually increase Lantus and pre-meal insulin according to her diet she indicated a verbal understanding.  Also recommended no driving today.    Discharge planning discussed with patient and family, agree with the plan and no questions and concerns at this point.     UA was  Clear for infection.  no pyelonephritis indicated.     Activity: Activity as tolerated, no driving today     Diet: Clear liquids, advance as tolerated    Follow-up: PCP and iggy Vaca     Disposition: home     Minutes spent on discharge: 45 min       Labs: Results:       Chemistry Recent Labs     12/31/19  0400 12/30/19  0426 12/29/19  0006   GLU 63* 164* 272*    140 140   K 3.0* 3.6 3.7    109 108   CO2 23 24 24   BUN 22* 30* 27*   CREA 2.16* 2.34* 2.19*   CA 7.8* 7.7* 8.6   AGAP 9 7 8   BUCR 10* 13 12    99 148*   TP 5.8* 5.2* 7.2   ALB 2.5* 2.3* 3.0*   GLOB 3.3 2.9 4.2*   AGRAT 0.8 0.8 0.7*      CBC w/Diff Recent Labs     12/31/19  0400 12/30/19  1000 12/30/19  0426 12/29/19  0006   WBC 11.1  --  13.1 19.8*   RBC 2.90*  --  2.41* 3.13*   HGB 8.3* 7.7* 6.8* 8.8*   HCT 25.6* 23.7* 20.6* 26.5*   *  --  383 471*   GRANS  --   --   --  86*   LYMPH  --   --   --  8*   EOS  --   --   --  1      Cardiac Enzymes No results for input(s): CPK, CKND1, TESS in the last 72 hours.    No lab exists for component: CKRMB, TROIP   Coagulation No results for input(s): PTP, INR, APTT, INREXT in the last 72 hours.    Lipid Panel No results found for: CHOL, CHOLPOCT, CHOLX, CHLST, CHOLV, 955840, HDL, HDLP, LDL, LDLC, DLDLP, 268841, VLDLC, VLDL, TGLX, TRIGL, TRIGP, TGLPOCT, CHHD, CHHDX   BNP No results for input(s): BNPP in the last 72 hours.   Liver Enzymes Recent Labs     12/31/19  0400   TP 5.8*   ALB 2.5*      SGOT 13      Thyroid Studies Lab Results   Component Value Date/Time    TSH 2.09 12/31/2019 04:00 AM            Significant Diagnostic Studies: Xr Abd (kub)    Result Date: 12/21/2019  Abdomen, single view COMPARISON: Radiographs June 6, 2019; CT scan of the same date INDICATION: Abdominal pain, nausea and  vomiting FINDINGS: Supine AP view the abdomen on two separate exposures obtained. The included bowel gas pattern is nonobstructive and nonspecific. Fallopian tube surgical clips project over the pelvis. Phlebolith along the anterolateral left pelvic rim as previously. No acute osseous abnormality. Lung bases clear. Impression: Nonobstructive bowel gas pattern. No evidence of acute abnormality. Xr Abd Acute W 1 V Chest    Result Date: 12/29/2019  EXAM: ABDOMINAL RADIOGRAPHS WITH PA VIEW OF THE CHEST CLINICAL INDICATION/HISTORY: Abdominal pain -Additional: None COMPARISON: None TECHNIQUE: Supine and upright views of abdomen and PA and decubitus views of the chest _______________ FINDINGS: ABDOMEN:    Bowel Gas Pattern: Unremarkable. No bowel dilation. Soft Tissues: Unremarkable. Bones: Intact and normally aligned. Additional findings: None. CHEST:    Heart and Mediastinum: No appreciable cardiomegaly. Remaining mediastinal contours within normal limits. Lungs and pleural spaces: Clear. No consolidation, mass or effusion. Bones and soft tissues: Unremarkable. _______________     IMPRESSION: No significant abnormality. Ct Head Wo Cont    Result Date: 12/23/2019  EXAM: CT head INDICATION: Visual disturbance. COMPARISON: None. TECHNIQUE: Axial CT imaging of the head was performed without intravenous contrast. Dose reduction techniques used: automated exposure control, adjustment of the mAs and/or kVp according to patient size, and iterative reconstruction techniques. _______________ FINDINGS: BRAIN AND POSTERIOR FOSSA: The sulci, folia, ventricles and basal cisterns are within normal limits for the patient's age. There is no intracranial hemorrhage, mass effect, or midline shift. There are no areas of abnormal parenchymal attenuation. EXTRA-AXIAL SPACES AND MENINGES: There are no abnormal extra-axial fluid collections. CALVARIUM: Intact. SINUSES: Clear.  OTHER: None. _______________ IMPRESSION: No acute intracranial abnormalities. Ct Abd Pelv Wo Cont    Result Date: 12/29/2019  EXAM: CT of the abdomen and pelvis INDICATION: Nausea and vomiting COMPARISON: December 21, 2019 TECHNIQUE: Axial CT imaging of the abdomen and pelvis was performed without intravenous contrast. Multiplanar reformats were generated. One or more dose reduction techniques were used on this CT: automated exposure control, adjustment of the mAs and/or kVp according to patient size, and iterative reconstruction techniques. The specific techniques used on this CT exam have been documented in the patient's electronic medical record. Digital Imaging and Communications in Medicine (DICOM) format image data are available to nonaffiliated external healthcare facilities or entities on a secure, media free, reciprocally searchable basis with patient authorization for at least a 12 month period after this study. _______________ FINDINGS: LOWER CHEST: Unremarkable. LIVER, BILIARY: Liver is normal. No biliary dilation. Gallbladder is unremarkable. PANCREAS: Normal. SPLEEN: Normal. ADRENALS: Normal. KIDNEYS: There is persistent left perinephric stranding. Perinephric stranding within the right kidney is more apparent when compared to the previous study. No hydronephrosis, hydroureter, or urinary calculus. LYMPH NODES: No enlarged lymph nodes. GASTROINTESTINAL TRACT: No bowel dilation or wall thickening. The appendix is surgically absent. PELVIC ORGANS: Unremarkable. VASCULATURE: Unremarkable. BONES: No acute or aggressive osseous abnormalities identified. OTHER: None. _______________     IMPRESSION: Interval development of right kidney perinephric stranding that may be related to acute pyelonephritis. Mild perinephric stranding within the left kidney has not significantly changed.  Recommend correlation with urinalysis    Ct Abd Pelv Wo Cont    Result Date: 12/21/2019  EXAM: CT of the abdomen and pelvis INDICATION: Abdominal pain, urinary tract infection, vomiting COMPARISON: June 6, 2019 TECHNIQUE: Axial CT imaging of the abdomen and pelvis was performed without intravenous contrast. Multiplanar reformats were generated. One or more dose reduction techniques were used on this CT: automated exposure control, adjustment of the mAs and/or kVp according to patient size, and iterative reconstruction techniques. The specific techniques used on this CT exam have been documented in the patient's electronic medical record. Digital Imaging and Communications in Medicine (DICOM) format image data are available to nonaffiliated external healthcare facilities or entities on a secure, media free, reciprocally searchable basis with patient authorization for at least a 12-month period after this study. _______________ FINDINGS: LOWER CHEST: Unremarkable. LIVER, BILIARY: Liver is normal. No biliary dilation. Gallbladder is unremarkable. PANCREAS: Normal. SPLEEN: Normal. ADRENALS: Normal. KIDNEYS/URETERS/BLADDER: No evidence of hydronephrosis involving either kidney. Mild edematous appearance of the left kidney with somewhat asymmetric perinephric fat stranding noted. No evidence of nephrolithiasis. No distal ureteral or urinary bladder stone. Urinary bladder normal in CT appearance. PELVIC ORGANS: Bilateral fallopian tube surgical clips present. Uterus unremarkable in CT appearance. VASCULATURE: Unremarkable LYMPH NODES: No enlarged lymph nodes. GASTROINTESTINAL TRACT: No bowel dilation or wall thickening. No morphology of bowel obstruction. No free intraperitoneal gas. The appendix is surgically absent. BONES: No acute or aggressive osseous abnormalities identified. OTHER: None. _______________     IMPRESSION: 1. No evidence of hydronephrosis or urolithiasis. 2. Somewhat edematous appearance to the left kidney with mild perinephric stranding. On this unenhanced examination, these findings may reflect changes related to pyelitis and/or pyelonephritis.  3. Normal caliber small and large bowel. No bowel obstruction. Prior appendectomy.              [facility]     CC: Clark Avila MD -likely BPV given past hx, doubt cardiac etiology at this time   -EKG showed sinus rhythm with occasional PVC, no acute ischemic changes, CE neg x 2  -Continue Meclozine, tele monitoring

## 2020-01-01 ENCOUNTER — HOSPITAL ENCOUNTER (EMERGENCY)
Age: 35
Discharge: HOME | End: 2020-01-01
Attending: EMERGENCY MEDICINE
Payer: MEDICAID

## 2020-01-01 VITALS
RESPIRATION RATE: 18 BRPM | OXYGEN SATURATION: 99 % | TEMPERATURE: 97.8 F | HEART RATE: 96 BPM | DIASTOLIC BLOOD PRESSURE: 85 MMHG | HEIGHT: 64 IN | WEIGHT: 137 LBS | BODY MASS INDEX: 23.39 KG/M2 | SYSTOLIC BLOOD PRESSURE: 146 MMHG

## 2020-01-01 DIAGNOSIS — E11.43 DIABETIC GASTROPARALYSIS (HCC): Primary | ICD-10-CM

## 2020-01-01 DIAGNOSIS — R11.2 NAUSEA AND VOMITING, INTRACTABILITY OF VOMITING NOT SPECIFIED, UNSPECIFIED VOMITING TYPE: ICD-10-CM

## 2020-01-01 DIAGNOSIS — K31.84 DIABETIC GASTROPARALYSIS (HCC): Primary | ICD-10-CM

## 2020-01-01 LAB
ALBUMIN SERPL-MCNC: 2.7 G/DL (ref 3.4–5)
ALBUMIN/GLOB SERPL: 0.8 {RATIO} (ref 0.8–1.7)
ALP SERPL-CCNC: 114 U/L (ref 45–117)
ALT SERPL-CCNC: 12 U/L (ref 13–56)
ANION GAP SERPL CALC-SCNC: 6 MMOL/L (ref 3–18)
AST SERPL-CCNC: 23 U/L (ref 10–38)
BASOPHILS # BLD: 0 K/UL (ref 0–0.1)
BASOPHILS NFR BLD: 0 % (ref 0–2)
BILIRUB SERPL-MCNC: 0.2 MG/DL (ref 0.2–1)
BUN SERPL-MCNC: 18 MG/DL (ref 7–18)
BUN/CREAT SERPL: 8 (ref 12–20)
CALCIUM SERPL-MCNC: 7.7 MG/DL (ref 8.5–10.1)
CHLORIDE SERPL-SCNC: 111 MMOL/L (ref 100–111)
CO2 SERPL-SCNC: 22 MMOL/L (ref 21–32)
CREAT SERPL-MCNC: 2.14 MG/DL (ref 0.6–1.3)
DIFFERENTIAL METHOD BLD: ABNORMAL
EOSINOPHIL # BLD: 0.2 K/UL (ref 0–0.4)
EOSINOPHIL NFR BLD: 1 % (ref 0–5)
ERYTHROCYTE [DISTWIDTH] IN BLOOD BY AUTOMATED COUNT: 15 % (ref 11.6–14.5)
GLOBULIN SER CALC-MCNC: 3.6 G/DL (ref 2–4)
GLUCOSE SERPL-MCNC: 87 MG/DL (ref 74–99)
HCT VFR BLD AUTO: 23.8 % (ref 35–45)
HGB BLD-MCNC: 7.9 G/DL (ref 12–16)
LYMPHOCYTES # BLD: 2.3 K/UL (ref 0.9–3.6)
LYMPHOCYTES NFR BLD: 19 % (ref 21–52)
MAGNESIUM SERPL-MCNC: 1.6 MG/DL (ref 1.6–2.6)
MCH RBC QN AUTO: 28.3 PG (ref 24–34)
MCHC RBC AUTO-ENTMCNC: 33.2 G/DL (ref 31–37)
MCV RBC AUTO: 85.3 FL (ref 74–97)
MONOCYTES # BLD: 1.1 K/UL (ref 0.05–1.2)
MONOCYTES NFR BLD: 10 % (ref 3–10)
NEUTS SEG # BLD: 8.2 K/UL (ref 1.8–8)
NEUTS SEG NFR BLD: 70 % (ref 40–73)
PLATELET # BLD AUTO: 403 K/UL (ref 135–420)
PMV BLD AUTO: 8.4 FL (ref 9.2–11.8)
POTASSIUM SERPL-SCNC: 3.7 MMOL/L (ref 3.5–5.5)
PROT SERPL-MCNC: 6.3 G/DL (ref 6.4–8.2)
RBC # BLD AUTO: 2.79 M/UL (ref 4.2–5.3)
SODIUM SERPL-SCNC: 139 MMOL/L (ref 136–145)
WBC # BLD AUTO: 11.8 K/UL (ref 4.6–13.2)

## 2020-01-01 PROCEDURE — 96375 TX/PRO/DX INJ NEW DRUG ADDON: CPT

## 2020-01-01 PROCEDURE — 85025 COMPLETE CBC W/AUTO DIFF WBC: CPT

## 2020-01-01 PROCEDURE — 83735 ASSAY OF MAGNESIUM: CPT

## 2020-01-01 PROCEDURE — 99285 EMERGENCY DEPT VISIT HI MDM: CPT

## 2020-01-01 PROCEDURE — 74011250636 HC RX REV CODE- 250/636: Performed by: EMERGENCY MEDICINE

## 2020-01-01 PROCEDURE — 80053 COMPREHEN METABOLIC PANEL: CPT

## 2020-01-01 PROCEDURE — 96374 THER/PROPH/DIAG INJ IV PUSH: CPT

## 2020-01-01 RX ORDER — DIPHENHYDRAMINE HYDROCHLORIDE 50 MG/ML
50 INJECTION, SOLUTION INTRAMUSCULAR; INTRAVENOUS ONCE
Status: COMPLETED | OUTPATIENT
Start: 2020-01-01 | End: 2020-01-01

## 2020-01-01 RX ORDER — HALOPERIDOL 5 MG/ML
3 INJECTION INTRAMUSCULAR ONCE
Status: COMPLETED | OUTPATIENT
Start: 2020-01-01 | End: 2020-01-01

## 2020-01-01 RX ORDER — PROCHLORPERAZINE EDISYLATE 5 MG/ML
10 INJECTION INTRAMUSCULAR; INTRAVENOUS
Status: DISCONTINUED | OUTPATIENT
Start: 2020-01-01 | End: 2020-01-01

## 2020-01-01 RX ORDER — PROMETHAZINE HYDROCHLORIDE 25 MG/1
25 SUPPOSITORY RECTAL
Qty: 12 SUPPOSITORY | Refills: 0 | Status: SHIPPED | OUTPATIENT
Start: 2020-01-01 | End: 2020-01-08

## 2020-01-01 RX ORDER — PROCHLORPERAZINE EDISYLATE 5 MG/ML
10 INJECTION INTRAMUSCULAR; INTRAVENOUS
Status: COMPLETED | OUTPATIENT
Start: 2020-01-01 | End: 2020-01-01

## 2020-01-01 RX ADMIN — DIPHENHYDRAMINE HYDROCHLORIDE 50 MG: 50 INJECTION, SOLUTION INTRAMUSCULAR; INTRAVENOUS at 00:35

## 2020-01-01 RX ADMIN — PROCHLORPERAZINE EDISYLATE 10 MG: 5 INJECTION INTRAMUSCULAR; INTRAVENOUS at 00:38

## 2020-01-01 RX ADMIN — HALOPERIDOL LACTATE 3 MG: 5 INJECTION INTRAMUSCULAR at 00:35

## 2020-01-01 RX ADMIN — SODIUM CHLORIDE 1000 ML: 900 INJECTION, SOLUTION INTRAVENOUS at 02:11

## 2020-01-01 NOTE — ED NOTES
I have reviewed discharge instructions with the patient. The patient verbalized understanding. Patient armband removed and shredded. Pt in NAD at this time, no further needs expressed.   Pt discharged to lobby and nursing sup is calling clarissa

## 2020-01-01 NOTE — ED TRIAGE NOTES
Arrives via ems with c/o gastroparesis and vomiting. Pt was admit and d/c from the facility yesterday for the same issue.

## 2020-01-01 NOTE — ED PROVIDER NOTES
EMERGENCY DEPARTMENT HISTORY AND PHYSICAL EXAM    Date: 1/1/2020  Patient Name: Rudy Jensen    History of Presenting Illness     Chief Complaint   Patient presents with    Vomiting       History Provided By: Patient    Additional History (Context):     32:73 AM    Rudy Jensen is a 37 y.o. female with pertinent PMHx of DM, HTN, and gastroparesis presenting via EMS to the ED c/o acute onset of epigastric abdominal pain x ~1 hour. Pt notes associated symptoms of nausea and vomiting. Pt states that she was discharged yesterday at ~1700 after admission for gastroparesis and was discharged with Protonix, Reglan, and abx. Pt had erosive esophagitis seen on endoscopy at her recent hospital visit. She states she was feeling normal at discharge and was able to eat. Pt denies any new drug allergies. PCP: Rafiq Booth MD  Pt does not smoke tobacco, drink EtOH excessively, or do illicit drugs. There are no other complaints, changes, or physical findings at this time. Current Facility-Administered Medications   Medication Dose Route Frequency Provider Last Rate Last Dose    sodium chloride 0.9 % bolus infusion 1,000 mL  1,000 mL IntraVENous ONCE Neda Chadwick MD 1,000 mL/hr at 01/01/20 0211 1,000 mL at 01/01/20 0211     Current Outpatient Medications   Medication Sig Dispense Refill    promethazine (PHENERGAN) 25 mg suppository Insert 1 Suppository into rectum every six (6) hours as needed for Nausea for up to 7 days. 12 Suppository 0    metoclopramide HCl (REGLAN) 5 mg tablet Take 1 Tab by mouth every six (6) hours as needed for Other (n/v) for up to 2 days. 8 Tab 0    pantoprazole (PROTONIX) 40 mg tablet Take 1 Tab by mouth two (2) times a day. 60 Tab 0    amLODIPine (NORVASC) 5 mg tablet Take 1 Tab by mouth daily. 30 Tab 0    insulin glargine (LANTUS U-100 INSULIN) 100 unit/mL injection 30 Units by SubCUTAneous route nightly. Indications: type 2 diabetes mellitus      insulin admin.  supplies (INPEN, FOR [facility]) 10-12 Units by SubCUTAneous route Before breakfast, lunch, and dinner. Past History     Past Medical History:  Past Medical History:   Diagnosis Date    Diabetes ([address]     Gastroparesis     Gastroparesis     Hypertension     UTI (urinary tract infection)        Past Surgical History:  Past Surgical History:   Procedure Laterality Date    HX APPENDECTOMY      HX GYN      tubal ligation, C Section       Family History:  History reviewed. No pertinent family history. Social History:  Social History     Tobacco Use    Smoking status: Never Smoker    Smokeless tobacco: Never Used   Substance Use Topics    Alcohol use: Never     Frequency: Never    Drug use: Not Currently       Allergies:  No Known Allergies      Review of Systems   Review of Systems   Constitutional: Negative. Cardiovascular: Negative. Gastrointestinal: Positive for abdominal pain (epigastric), nausea and vomiting. Genitourinary: Negative. All other systems reviewed and are negative. Physical Exam     Vitals:    01/01/20 0100 01/01/20 0130 01/01/20 0145 01/01/20 0215   BP: (!) 149/94 117/70 127/74 132/78   Pulse:       Resp:       Temp:       SpO2: 99% 100% 98% 99%   Weight:       Height:         Physical Exam  Vitals signs and nursing note reviewed. Constitutional:       Appearance: She is well-developed. Comments: Chronically ill appearing, nontoxic   HENT:      Head: Normocephalic and atraumatic. Right Ear: External ear normal.      Left Ear: External ear normal.      Mouth/Throat:      Mouth: Mucous membranes are dry. Pharynx: No oropharyngeal exudate. Comments: Poor enamel  Eyes:      General: No scleral icterus. Conjunctiva/sclera: Conjunctivae normal.      Pupils: Pupils are equal, round, and reactive to light. Comments: No pallor   Neck:      Musculoskeletal: Normal range of motion and neck supple. Thyroid: No thyromegaly. Vascular: No JVD. Trachea: No tracheal deviation. Cardiovascular:      Rate and Rhythm: Normal rate and regular rhythm. Heart sounds: Normal heart sounds. Pulmonary:      Effort: Pulmonary effort is normal. No respiratory distress. Breath sounds: Normal breath sounds. No stridor. Abdominal:      General: Bowel sounds are decreased. Palpations: Abdomen is soft. Tenderness: There is no tenderness. There is no guarding or rebound. Comments: Protuberant abdomen   Musculoskeletal: Normal range of motion. General: No tenderness. Comments: No soft tissue injuries   Lymphadenopathy:      Cervical: No cervical adenopathy. Skin:     General: Skin is warm and dry. Findings: No erythema or rash. Neurological:      Mental Status: She is alert and oriented to person, place, and time. Cranial Nerves: No cranial nerve deficit. Coordination: Coordination normal.      Deep Tendon Reflexes: Reflexes are normal and symmetric. Reflexes normal.   Psychiatric:         Behavior: Behavior normal.         Thought Content: Thought content normal.         Judgment: Judgment normal.         Diagnostic Study Results     Labs -     Recent Results (from the past 12 hour(s))   GLUCOSE, POC    Collection Time: 12/31/19  4:05 PM   Result Value Ref Range    Glucose (POC) 123 (H) 70 - 110 mg/dL   CBC WITH AUTOMATED DIFF    Collection Time: 01/01/20 12:10 AM   Result Value Ref Range    WBC 11.8 4.6 - 13.2 K/uL    RBC 2.79 (L) 4.20 - 5.30 M/uL    HGB 7.9 (L) 12.0 - 16.0 g/dL    HCT 23.8 (L) 35.0 - 45.0 %    MCV 85.3 74.0 - 97.0 FL    MCH 28.3 24.0 - 34.0 PG    MCHC 33.2 31.0 - 37.0 g/dL    RDW 15.0 (H) 11.6 - 14.5 %    PLATELET 678 910 - 088 K/uL    MPV 8.4 (L) 9.2 - 11.8 FL    NEUTROPHILS 70 40 - 73 %    LYMPHOCYTES 19 (L) 21 - 52 %    MONOCYTES 10 3 - 10 %    EOSINOPHILS 1 0 - 5 %    BASOPHILS 0 0 - 2 %    ABS. NEUTROPHILS 8.2 (H) 1.8 - 8.0 K/UL    ABS. LYMPHOCYTES 2.3 0.9 - 3.6 K/UL    ABS.  MONOCYTES 1.1 0.05 - 1.2 K/UL    ABS. EOSINOPHILS 0.2 0.0 - 0.4 K/UL    ABS. BASOPHILS 0.0 0.0 - 0.1 K/UL    DF AUTOMATED     METABOLIC PANEL, COMPREHENSIVE    Collection Time: 01/01/20 12:10 AM   Result Value Ref Range    Sodium 139 136 - 145 mmol/L    Potassium 3.7 3.5 - 5.5 mmol/L    Chloride 111 100 - 111 mmol/L    CO2 22 21 - 32 mmol/L    Anion gap 6 3.0 - 18 mmol/L    Glucose 87 74 - 99 mg/dL    BUN 18 7.0 - 18 MG/DL    Creatinine 2.14 (H) 0.6 - 1.3 MG/DL    BUN/Creatinine ratio 8 (L) 12 - 20      GFR est AA 32 (L) >60 ml/min/1.73m2    GFR est non-AA 26 (L) >60 ml/min/1.73m2    Calcium 7.7 (L) 8.5 - 10.1 MG/DL    Bilirubin, total 0.2 0.2 - 1.0 MG/DL    ALT (SGPT) 12 (L) 13 - 56 U/L    AST (SGOT) 23 10 - 38 U/L    Alk. phosphatase 114 45 - 117 U/L    Protein, total 6.3 (L) 6.4 - 8.2 g/dL    Albumin 2.7 (L) 3.4 - 5.0 g/dL    Globulin 3.6 2.0 - 4.0 g/dL    A-G Ratio 0.8 0.8 - 1.7     MAGNESIUM    Collection Time: 01/01/20 12:10 AM   Result Value Ref Range    Magnesium 1.6 1.6 - 2.6 mg/dL       Radiologic Studies -   No orders to display       Medical Decision Making   I am the first provider for this patient. I reviewed the vital signs, available nursing notes, past medical history, past surgical history, family history and social history. Vital Signs-Reviewed the patient's vital signs. Pulse Oximetry Analysis - 100% on RA     Cardiac Monitor:  Rate: 97 bpm  Rhythm: NSR    Records Reviewed: Nursing Notes and Old Medical Records    Provider Notes (Medical Decision Making): Recurrent sxs of diabetic gastroparesis. She has had multiple drug screens which are negative. Most recent testing was without significant fluid changes. Will check today for electrolyte disturbance. She was discharged with a K of 3. Will provide symptomatic treatment and discuss with hospitalist to determine inpatient vs outpatient management.     PROCEDURES:  Procedures    MEDICATIONS GIVEN IN THE ED:  Medications   sodium chloride 0.9 % bolus infusion 1,000 mL (1,000 mL IntraVENous New Bag 1/1/20 0211)   diphenhydrAMINE (BENADRYL) injection 50 mg (50 mg IntraVENous Given 1/1/20 0035)   haloperidol lactate (HALDOL) injection 3 mg (3 mg IntraVENous Given 1/1/20 0035)   prochlorperazine (COMPAZINE) injection 10 mg (10 mg IntraVENous Given 1/1/20 0038)        ED COURSE:   12:21 AM   Initial assessment performed. PROGRESS NOTE:  2:47 AM  Pt and/or family have been updated on their results. Pt and/or pt's family are aware of the plan of care and are in agreement. Written by JOSE R Glover, as dictated by Liyah Fortune MD.      CONSULT NOTE:   2:57 AM  Basilio Hathaway. Alberto Fortune MD spoke with Andrei Dennis MD,   Specialty: Hospitalist  Discussed pt's hx, disposition, and available diagnostic and imaging results. Reviewed care plans. Consultant agrees that OP management with suppositories is appropriate. Written by JOSE R Hair, as dictated by Liyah Fortune MD.      Diagnosis and Disposition     DISCHARGE NOTE:  3:01 AM  The patient is ready for discharge. The patient's signs, symptoms, diagnosis, and discharge instructions have been discussed and the patient and/or family has conveyed their understanding. The patient and/or family is to follow up as recommended or return to the ER should their symptoms worsen. Plan has been discussed and the patient and/or family is in agreement. Written by JOSE R Glover, as dictated by Liyah Fortune MD.     CLINICAL IMPRESSION:  1. Diabetic gastroparalysis ([address]    2. Nausea and vomiting, intractability of vomiting not specified, unspecified vomiting type        PLAN:  1. D/C Home  2. Current Discharge Medication List      START taking these medications    Details   promethazine (PHENERGAN) 25 mg suppository Insert 1 Suppository into rectum every six (6) hours as needed for Nausea for up to 7 days.   Qty: 12 Suppository, Refills: 0         CONTINUE these medications which have NOT CHANGED    Details   metoclopramide HCl (REGLAN) 5 mg tablet Take 1 Tab by mouth every six (6) hours as needed for Other (n/v) for up to 2 days. Qty: 8 Tab, Refills: 0      pantoprazole (PROTONIX) 40 mg tablet Take 1 Tab by mouth two (2) times a day. Qty: 60 Tab, Refills: 0      amLODIPine (NORVASC) 5 mg tablet Take 1 Tab by mouth daily. Qty: 30 Tab, Refills: 0      insulin glargine (LANTUS U-100 INSULIN) 100 unit/mL injection 30 Units by SubCUTAneous route nightly. Indications: type 2 diabetes mellitus      insulin admin. supplies (INPEN, FOR [facility] [place]) 10-12 Units by SubCUTAneous route Before breakfast, lunch, and dinner. 3.   Follow-up Information     Follow up With Specialties Details Why Contact Info    Lennox Hauser, MD [facility] Schedule an appointment as soon as possible for a visit for GI follow up [phone] [facility] [phone]      Jesica Chacon MD Gastroenterology Schedule an appointment as soon as possible for a visit for primary care follow up [id number] [facility] EMERGENCY DEPT Emergency Medicine  As needed, If symptoms worsen [address] [address] [phone]        _______________________________    Attestations: This note is prepared by Dareen Cheadle, acting as Scribe for [place]. Yvette Taylor MD.    [employer]. Yvette Taylor MD:  The scribe's documentation has been prepared under my direction and personally reviewed by me in its entirety.  I confirm that the note above accurately reflects all work, treatment, procedures, and medical decision making performed by me.  _______________________________

## 2020-01-01 NOTE — DISCHARGE INSTRUCTIONS
Patient Education        Gastroparesis: Care Instructions  Your Care Instructions    When you have gastroparesis, your stomach takes a lot longer to empty. This delay can cause belly pain, bloating, and belching. It also can cause hiccups, heartburn, nausea or vomiting. You may not feel like eating. These symptoms may come and go. They most often occur during and after meals. You may feel full after only a few bites of food. This condition occurs when the nerves to the stomach don't work properly. Diabetes is the most common cause of this nerve damage. Gastroparesis can make it harder to control your blood sugar levels. But keeping your blood sugar levels under control may help with your symptoms. Parkinson's disease, stroke, and some medicines can also cause this condition. Home treatment can often help. Follow-up care is a key part of your treatment and safety. Be sure to make and go to all appointments, and call your doctor if you are having problems. It's also a good idea to know your test results and keep a list of the medicines you take. How can you care for yourself at home? · Eat several small meals each day rather than three large meals. · Eat foods that are low in fiber and fat. · If your doctor suggests it, take medicines that help the stomach empty more quickly. These are called motility agents. When should you call for help? Call your doctor now or seek immediate medical care if:    · You are vomiting.     · You have new or worse belly pain.     · You have a fever.     · You cannot pass stools or gas.    Watch closely for changes in your health, and be sure to contact your doctor if you have any problems. Where can you learn more? Go to http://anita-chino.info/. Enter M106 in the search box to learn more about \"Gastroparesis: Care Instructions. \"  Current as of: November 7, 2018  Content Version: 12.2  © 9893-4501 [employer].  Care instructions adapted under license by [address] (which disclaims liability or warranty for this information). If you have questions about a medical condition or this instruction, always ask your healthcare professional. [address] any warranty or liability for your use of this information.

## 2020-01-02 LAB
H PYLORI IGA SER-ACNC: >35 UNITS (ref 0–8.9)
H PYLORI IGG SER IA-ACNC: 6.08 INDEX VALUE (ref 0–0.79)

## 2020-02-02 ENCOUNTER — HOSPITAL ENCOUNTER (EMERGENCY)
Age: 35
Discharge: HOME | End: 2020-02-03
Attending: EMERGENCY MEDICINE
Payer: MEDICAID

## 2020-02-02 DIAGNOSIS — N17.9 AKI (ACUTE KIDNEY INJURY) (HCC): ICD-10-CM

## 2020-02-02 DIAGNOSIS — K31.84 GASTROPARESIS: ICD-10-CM

## 2020-02-02 DIAGNOSIS — R11.2 NAUSEA AND VOMITING, INTRACTABILITY OF VOMITING NOT SPECIFIED, UNSPECIFIED VOMITING TYPE: Primary | ICD-10-CM

## 2020-02-02 DIAGNOSIS — E86.0 DEHYDRATION: ICD-10-CM

## 2020-02-02 PROCEDURE — 99284 EMERGENCY DEPT VISIT MOD MDM: CPT

## 2020-02-02 PROCEDURE — 96375 TX/PRO/DX INJ NEW DRUG ADDON: CPT

## 2020-02-02 PROCEDURE — 96374 THER/PROPH/DIAG INJ IV PUSH: CPT

## 2020-02-02 PROCEDURE — 96361 HYDRATE IV INFUSION ADD-ON: CPT

## 2020-02-03 VITALS
OXYGEN SATURATION: 100 % | SYSTOLIC BLOOD PRESSURE: 176 MMHG | BODY MASS INDEX: 23.05 KG/M2 | HEART RATE: 87 BPM | WEIGHT: 135 LBS | TEMPERATURE: 98.4 F | HEIGHT: 64 IN | RESPIRATION RATE: 16 BRPM | DIASTOLIC BLOOD PRESSURE: 91 MMHG

## 2020-02-03 VITALS
DIASTOLIC BLOOD PRESSURE: 76 MMHG | TEMPERATURE: 98.5 F | SYSTOLIC BLOOD PRESSURE: 148 MMHG | OXYGEN SATURATION: 100 % | HEART RATE: 90 BPM | WEIGHT: 135 LBS | RESPIRATION RATE: 16 BRPM | HEIGHT: 64 IN | BODY MASS INDEX: 23.05 KG/M2

## 2020-02-03 DIAGNOSIS — G24.02 DYSTONIC DRUG REACTION: Primary | ICD-10-CM

## 2020-02-03 LAB
ALBUMIN SERPL-MCNC: 3 G/DL (ref 3.4–5)
ALBUMIN SERPL-MCNC: 3 G/DL (ref 3.4–5)
ALBUMIN/GLOB SERPL: 0.7 {RATIO} (ref 0.8–1.7)
ALBUMIN/GLOB SERPL: 0.7 {RATIO} (ref 0.8–1.7)
ALP SERPL-CCNC: 133 U/L (ref 45–117)
ALP SERPL-CCNC: 133 U/L (ref 45–117)
ALT SERPL-CCNC: 6 U/L (ref 13–56)
ALT SERPL-CCNC: 9 U/L (ref 13–56)
ANION GAP SERPL CALC-SCNC: 8 MMOL/L (ref 3–18)
ANION GAP SERPL CALC-SCNC: 9 MMOL/L (ref 3–18)
APPEARANCE UR: ABNORMAL
APPEARANCE UR: CLEAR
AST SERPL-CCNC: 17 U/L (ref 10–38)
AST SERPL-CCNC: 9 U/L (ref 10–38)
BACTERIA URNS QL MICRO: ABNORMAL /HPF
BACTERIA URNS QL MICRO: ABNORMAL /HPF
BASOPHILS # BLD: 0.1 K/UL (ref 0–0.1)
BASOPHILS # BLD: 0.1 K/UL (ref 0–0.1)
BASOPHILS NFR BLD: 0 % (ref 0–2)
BASOPHILS NFR BLD: 1 % (ref 0–2)
BILIRUB SERPL-MCNC: 0.4 MG/DL (ref 0.2–1)
BILIRUB SERPL-MCNC: 0.4 MG/DL (ref 0.2–1)
BILIRUB UR QL: NEGATIVE
BILIRUB UR QL: NEGATIVE
BUN SERPL-MCNC: 30 MG/DL (ref 7–18)
BUN SERPL-MCNC: 33 MG/DL (ref 7–18)
BUN/CREAT SERPL: 10 (ref 12–20)
BUN/CREAT SERPL: 10 (ref 12–20)
CALCIUM SERPL-MCNC: 8.2 MG/DL (ref 8.5–10.1)
CALCIUM SERPL-MCNC: 8.2 MG/DL (ref 8.5–10.1)
CHLORIDE SERPL-SCNC: 108 MMOL/L (ref 100–111)
CHLORIDE SERPL-SCNC: 109 MMOL/L (ref 100–111)
CO2 SERPL-SCNC: 21 MMOL/L (ref 21–32)
CO2 SERPL-SCNC: 23 MMOL/L (ref 21–32)
COLOR UR: YELLOW
COLOR UR: YELLOW
CREAT SERPL-MCNC: 2.88 MG/DL (ref 0.6–1.3)
CREAT SERPL-MCNC: 3.15 MG/DL (ref 0.6–1.3)
DIFFERENTIAL METHOD BLD: ABNORMAL
DIFFERENTIAL METHOD BLD: ABNORMAL
EOSINOPHIL # BLD: 0.1 K/UL (ref 0–0.4)
EOSINOPHIL # BLD: 0.1 K/UL (ref 0–0.4)
EOSINOPHIL NFR BLD: 1 % (ref 0–5)
EOSINOPHIL NFR BLD: 1 % (ref 0–5)
EPITH CASTS URNS QL MICRO: ABNORMAL /LPF (ref 0–5)
EPITH CASTS URNS QL MICRO: ABNORMAL /LPF (ref 0–5)
ERYTHROCYTE [DISTWIDTH] IN BLOOD BY AUTOMATED COUNT: 14.6 % (ref 11.6–14.5)
ERYTHROCYTE [DISTWIDTH] IN BLOOD BY AUTOMATED COUNT: 14.8 % (ref 11.6–14.5)
GLOBULIN SER CALC-MCNC: 4.1 G/DL (ref 2–4)
GLOBULIN SER CALC-MCNC: 4.4 G/DL (ref 2–4)
GLUCOSE SERPL-MCNC: 129 MG/DL (ref 74–99)
GLUCOSE SERPL-MCNC: 138 MG/DL (ref 74–99)
GLUCOSE UR STRIP.AUTO-MCNC: 100 MG/DL
GLUCOSE UR STRIP.AUTO-MCNC: 250 MG/DL
HCG SERPL QL: NEGATIVE
HCG UR QL: NEGATIVE
HCT VFR BLD AUTO: 26.3 % (ref 35–45)
HCT VFR BLD AUTO: 28.9 % (ref 35–45)
HGB BLD-MCNC: 8.5 G/DL (ref 12–16)
HGB BLD-MCNC: 9.2 G/DL (ref 12–16)
HGB UR QL STRIP: ABNORMAL
HGB UR QL STRIP: ABNORMAL
KETONES UR QL STRIP.AUTO: 15 MG/DL
KETONES UR QL STRIP.AUTO: 15 MG/DL
LEUKOCYTE ESTERASE UR QL STRIP.AUTO: NEGATIVE
LEUKOCYTE ESTERASE UR QL STRIP.AUTO: NEGATIVE
LIPASE SERPL-CCNC: 150 U/L (ref 73–393)
LIPASE SERPL-CCNC: 161 U/L (ref 73–393)
LYMPHOCYTES # BLD: 1.7 K/UL (ref 0.9–3.6)
LYMPHOCYTES # BLD: 2.5 K/UL (ref 0.9–3.6)
LYMPHOCYTES NFR BLD: 17 % (ref 21–52)
LYMPHOCYTES NFR BLD: 20 % (ref 21–52)
MAGNESIUM SERPL-MCNC: 2.1 MG/DL (ref 1.6–2.6)
MCH RBC QN AUTO: 27.4 PG (ref 24–34)
MCH RBC QN AUTO: 27.6 PG (ref 24–34)
MCHC RBC AUTO-ENTMCNC: 31.8 G/DL (ref 31–37)
MCHC RBC AUTO-ENTMCNC: 32.3 G/DL (ref 31–37)
MCV RBC AUTO: 85.4 FL (ref 74–97)
MCV RBC AUTO: 86 FL (ref 74–97)
MONOCYTES # BLD: 0.5 K/UL (ref 0.05–1.2)
MONOCYTES # BLD: 0.9 K/UL (ref 0.05–1.2)
MONOCYTES NFR BLD: 5 % (ref 3–10)
MONOCYTES NFR BLD: 7 % (ref 3–10)
NEUTS SEG # BLD: 8 K/UL (ref 1.8–8)
NEUTS SEG # BLD: 8.9 K/UL (ref 1.8–8)
NEUTS SEG NFR BLD: 72 % (ref 40–73)
NEUTS SEG NFR BLD: 76 % (ref 40–73)
NITRITE UR QL STRIP.AUTO: NEGATIVE
NITRITE UR QL STRIP.AUTO: NEGATIVE
PH UR STRIP: 6.5 [PH] (ref 5–8)
PH UR STRIP: 8 [PH] (ref 5–8)
PLATELET # BLD AUTO: 381 K/UL (ref 135–420)
PLATELET # BLD AUTO: 394 K/UL (ref 135–420)
PMV BLD AUTO: 9.2 FL (ref 9.2–11.8)
PMV BLD AUTO: 9.4 FL (ref 9.2–11.8)
POTASSIUM SERPL-SCNC: 3.9 MMOL/L (ref 3.5–5.5)
POTASSIUM SERPL-SCNC: 4.2 MMOL/L (ref 3.5–5.5)
PROT SERPL-MCNC: 7.1 G/DL (ref 6.4–8.2)
PROT SERPL-MCNC: 7.4 G/DL (ref 6.4–8.2)
PROT UR STRIP-MCNC: 300 MG/DL
PROT UR STRIP-MCNC: >1000 MG/DL
RBC # BLD AUTO: 3.08 M/UL (ref 4.2–5.3)
RBC # BLD AUTO: 3.36 M/UL (ref 4.2–5.3)
RBC #/AREA URNS HPF: ABNORMAL /HPF (ref 0–5)
RBC #/AREA URNS HPF: ABNORMAL /HPF (ref 0–5)
SODIUM SERPL-SCNC: 138 MMOL/L (ref 136–145)
SODIUM SERPL-SCNC: 140 MMOL/L (ref 136–145)
SP GR UR REFRACTOMETRY: 1.01 (ref 1–1.03)
SP GR UR REFRACTOMETRY: 1.02 (ref 1–1.03)
UROBILINOGEN UR QL STRIP.AUTO: 0.2 EU/DL (ref 0.2–1)
UROBILINOGEN UR QL STRIP.AUTO: 0.2 EU/DL (ref 0.2–1)
WBC # BLD AUTO: 10.3 K/UL (ref 4.6–13.2)
WBC # BLD AUTO: 12.6 K/UL (ref 4.6–13.2)
WBC URNS QL MICRO: ABNORMAL /HPF (ref 0–5)
WBC URNS QL MICRO: ABNORMAL /HPF (ref 0–5)

## 2020-02-03 PROCEDURE — 74011250636 HC RX REV CODE- 250/636: Performed by: PHYSICIAN ASSISTANT

## 2020-02-03 PROCEDURE — 81025 URINE PREGNANCY TEST: CPT

## 2020-02-03 PROCEDURE — 96374 THER/PROPH/DIAG INJ IV PUSH: CPT

## 2020-02-03 PROCEDURE — 99284 EMERGENCY DEPT VISIT MOD MDM: CPT

## 2020-02-03 PROCEDURE — 81001 URINALYSIS AUTO W/SCOPE: CPT

## 2020-02-03 PROCEDURE — 85025 COMPLETE CBC W/AUTO DIFF WBC: CPT

## 2020-02-03 PROCEDURE — 83735 ASSAY OF MAGNESIUM: CPT

## 2020-02-03 PROCEDURE — C9113 INJ PANTOPRAZOLE SODIUM, VIA: HCPCS | Performed by: PHYSICIAN ASSISTANT

## 2020-02-03 PROCEDURE — 83690 ASSAY OF LIPASE: CPT

## 2020-02-03 PROCEDURE — 96375 TX/PRO/DX INJ NEW DRUG ADDON: CPT

## 2020-02-03 PROCEDURE — 80053 COMPREHEN METABOLIC PANEL: CPT

## 2020-02-03 PROCEDURE — 87086 URINE CULTURE/COLONY COUNT: CPT

## 2020-02-03 PROCEDURE — 74011250636 HC RX REV CODE- 250/636: Performed by: EMERGENCY MEDICINE

## 2020-02-03 PROCEDURE — 84703 CHORIONIC GONADOTROPIN ASSAY: CPT

## 2020-02-03 RX ORDER — PROCHLORPERAZINE EDISYLATE 5 MG/ML
10 INJECTION INTRAMUSCULAR; INTRAVENOUS
Status: COMPLETED | OUTPATIENT
Start: 2020-02-03 | End: 2020-02-03

## 2020-02-03 RX ORDER — DIPHENHYDRAMINE HYDROCHLORIDE 50 MG/ML
50 INJECTION, SOLUTION INTRAMUSCULAR; INTRAVENOUS ONCE
Status: COMPLETED | OUTPATIENT
Start: 2020-02-03 | End: 2020-02-03

## 2020-02-03 RX ORDER — ONDANSETRON 4 MG/1
4 TABLET, ORALLY DISINTEGRATING ORAL
Qty: 20 TAB | Refills: 0 | Status: SHIPPED | OUTPATIENT
Start: 2020-02-03

## 2020-02-03 RX ORDER — PANTOPRAZOLE SODIUM 40 MG/10ML
40 INJECTION, POWDER, LYOPHILIZED, FOR SOLUTION INTRAVENOUS
Status: COMPLETED | OUTPATIENT
Start: 2020-02-03 | End: 2020-02-03

## 2020-02-03 RX ORDER — LORAZEPAM 2 MG/ML
1 INJECTION INTRAMUSCULAR
Status: COMPLETED | OUTPATIENT
Start: 2020-02-03 | End: 2020-02-03

## 2020-02-03 RX ADMIN — PANTOPRAZOLE SODIUM 40 MG: 40 INJECTION, POWDER, FOR SOLUTION INTRAVENOUS at 01:27

## 2020-02-03 RX ADMIN — LORAZEPAM 1 MG: 2 INJECTION INTRAMUSCULAR; INTRAVENOUS at 14:14

## 2020-02-03 RX ADMIN — SODIUM CHLORIDE 1000 ML: 900 INJECTION, SOLUTION INTRAVENOUS at 01:27

## 2020-02-03 RX ADMIN — SODIUM CHLORIDE 1000 ML: 900 INJECTION, SOLUTION INTRAVENOUS at 14:13

## 2020-02-03 RX ADMIN — PROCHLORPERAZINE EDISYLATE 10 MG: 5 INJECTION INTRAMUSCULAR; INTRAVENOUS at 01:27

## 2020-02-03 RX ADMIN — DIPHENHYDRAMINE HYDROCHLORIDE 50 MG: 50 INJECTION, SOLUTION INTRAMUSCULAR; INTRAVENOUS at 14:15

## 2020-02-03 NOTE — ED NOTES
Pt told where to  prescribed medication. Pt given verbal and written d/c instructions.  pt ambulated from ED in NAD

## 2020-02-03 NOTE — ED TRIAGE NOTES
Triage: pt states that she is having involuntary clenching of jaw and is unable to look straight ahead. Pt states that her eyes keep looking up and she cannot look forward. Occular movements intact. Pt able to track with eyes, makes eye contact with RN. Pt seen earlier today in ED for gastroparesis.

## 2020-02-03 NOTE — ED PROVIDER NOTES
EMERGENCY DEPARTMENT HISTORY AND PHYSICAL EXAM    Date: 2/3/2020  Patient Name: Rosa Peoples    History of Presenting Illness     Chief Complaint   Patient presents with    Other     jaw/teeth clenching; pt states that she cannot look straight. History Provided By: Patient    0:95 PM  Rosa Peoples is a 37 y.o. female with PMHX of insulin-dependent diabetes, hypertension, gastroparesis who presents to the emergency department C/O a normal eye and jaw movements. Per patient she was seen in this emergency department last night for gastroparesis flare. She reports after she went home her eyes kept looking up into the right and her jaw Clenching. She also notes that her nausea and vomiting and abdominal she denies any fever, head injury, new medications. She is unsure what medication she received in the emergency department last night. On review of the EMR it appears that she received Compazine last night. PCP: Linda Waggoner MD    Current Outpatient Medications   Medication Sig Dispense Refill    amLODIPine (NORVASC) 5 mg tablet Take 1 Tab by mouth daily. 30 Tab 0    insulin glargine (LANTUS U-100 INSULIN) 100 unit/mL injection 30 Units by SubCUTAneous route nightly. Indications: type 2 diabetes mellitus      ondansetron (ZOFRAN ODT) 4 mg disintegrating tablet Take 1 Tab by mouth every eight (8) hours as needed for Nausea or Vomiting. 20 Tab 0    pantoprazole (PROTONIX) 40 mg tablet Take 1 Tab by mouth two (2) times a day. 60 Tab 0    insulin admin. supplies (IN[place]EN, FOR [facility] [place]) 10-12 Units by SubCUTAneous route Before breakfast, lunch, and dinner.          Past History     Past Medical History:  Past Medical History:   Diagnosis Date    Diabetes ([address]     Gastroparesis     Gastroparesis     Hypertension     UTI (urinary tract infection)        Past Surgical History:  Past Surgical History:   Procedure Laterality Date    HX APPENDECTOMY      HX GYN      tubal ligation, C Section Family History:  History reviewed. No pertinent family history. Social History:  Social History     Tobacco Use    Smoking status: Never Smoker    Smokeless tobacco: Never Used   Substance Use Topics    Alcohol use: Never     Frequency: Never    Drug use: Not Currently       Allergies: Allergies   Allergen Reactions    Compazine [Prochlorperazine] Other (comments)     Dystonic Reaction         Review of Systems   Review of Systems   Constitutional: Negative for fever. Eyes:        Abnormal eye movements   Respiratory: Negative for shortness of breath. Gastrointestinal: Positive for abdominal pain, nausea and vomiting. Neurological:        Jaw spasms   All other systems reviewed and are negative.         Physical Exam     Vitals:    02/03/20 1515 02/03/20 1600 02/03/20 1645 02/03/20 1650   BP: 156/86 140/63 162/79 155/67   Pulse:       Resp:       Temp:       SpO2: 100% 100% 100% 100%   Weight:       Height:         Physical Exam    Nursing notes and vital signs reviewed    Constitutional: Non toxic appearing, mild distress  Head: Normocephalic, Atraumatic  Eyes: Pupils are equal, round, and reactive to light, EOMI, but eyes quickly returned to the right and upward gaze  Neck: Supple  Cardiovascular: Regular rate and rhythm, no murmurs, rubs, or gallops  Chest: Normal work of breathing and chest excursion bilaterally  Lungs: Clear to ausculation bilaterally  Abdomen: Soft, non tender, non distended  Back: No evidence of trauma or deformity  Extremities: No evidence of trauma or deformity  Skin: Warm and dry, normal cap refill  Neuro: Alert and appropriate, CN intact but jaws tense, normal speech, strength and sensation full and symmetric bilaterally, normal gait, normal coordination  Psychiatric: Normal mood and affect      Diagnostic Study Results     Labs -     Recent Results (from the past 12 hour(s))   CBC WITH AUTOMATED DIFF    Collection Time: 02/03/20  2:17 PM   Result Value Ref Range WBC 12.6 4.6 - 13.2 K/uL    RBC 3.08 (L) 4.20 - 5.30 M/uL    HGB 8.5 (L) 12.0 - 16.0 g/dL    HCT 26.3 (L) 35.0 - 45.0 %    MCV 85.4 74.0 - 97.0 FL    MCH 27.6 24.0 - 34.0 PG    MCHC 32.3 31.0 - 37.0 g/dL    RDW 14.8 (H) 11.6 - 14.5 %    PLATELET 883 644 - 860 K/uL    MPV 9.2 9.2 - 11.8 FL    NEUTROPHILS 72 40 - 73 %    LYMPHOCYTES 20 (L) 21 - 52 %    MONOCYTES 7 3 - 10 %    EOSINOPHILS 1 0 - 5 %    BASOPHILS 0 0 - 2 %    ABS. NEUTROPHILS 8.9 (H) 1.8 - 8.0 K/UL    ABS. LYMPHOCYTES 2.5 0.9 - 3.6 K/UL    ABS. MONOCYTES 0.9 0.05 - 1.2 K/UL    ABS. EOSINOPHILS 0.1 0.0 - 0.4 K/UL    ABS. BASOPHILS 0.1 0.0 - 0.1 K/UL    DF AUTOMATED     METABOLIC PANEL, COMPREHENSIVE    Collection Time: 02/03/20  2:17 PM   Result Value Ref Range    Sodium 138 136 - 145 mmol/L    Potassium 4.2 3.5 - 5.5 mmol/L    Chloride 108 100 - 111 mmol/L    CO2 21 21 - 32 mmol/L    Anion gap 9 3.0 - 18 mmol/L    Glucose 129 (H) 74 - 99 mg/dL    BUN 30 (H) 7.0 - 18 MG/DL    Creatinine 2.88 (H) 0.6 - 1.3 MG/DL    BUN/Creatinine ratio 10 (L) 12 - 20      GFR est AA 22 (L) >60 ml/min/1.73m2    GFR est non-AA 19 (L) >60 ml/min/1.73m2    Calcium 8.2 (L) 8.5 - 10.1 MG/DL    Bilirubin, total 0.4 0.2 - 1.0 MG/DL    ALT (SGPT) 9 (L) 13 - 56 U/L    AST (SGOT) 17 10 - 38 U/L    Alk.  phosphatase 133 (H) 45 - 117 U/L    Protein, total 7.4 6.4 - 8.2 g/dL    Albumin 3.0 (L) 3.4 - 5.0 g/dL    Globulin 4.4 (H) 2.0 - 4.0 g/dL    A-G Ratio 0.7 (L) 0.8 - 1.7     LIPASE    Collection Time: 02/03/20  2:17 PM   Result Value Ref Range    Lipase 161 73 - 393 U/L   MAGNESIUM    Collection Time: 02/03/20  2:17 PM   Result Value Ref Range    Magnesium 2.1 1.6 - 2.6 mg/dL   HCG QL SERUM    Collection Time: 02/03/20  2:17 PM   Result Value Ref Range    HCG, Ql. NEGATIVE  NEG     URINALYSIS W/ RFLX MICROSCOPIC    Collection Time: 02/03/20  4:43 PM   Result Value Ref Range    Color YELLOW      Appearance CLEAR      Specific gravity 1.014 1.005 - 1.030      pH (UA) 8.0 5.0 - 8.0 Protein 300 (A) NEG mg/dL    Glucose 100 (A) NEG mg/dL    Ketone 15 (A) NEG mg/dL    Bilirubin NEGATIVE  NEG      Blood SMALL (A) NEG      Urobilinogen 0.2 0.2 - 1.0 EU/dL    Nitrites NEGATIVE  NEG      Leukocyte Esterase NEGATIVE  NEG         Radiologic Studies -   No orders to display     CT Results  (Last 48 hours)    None        CXR Results  (Last 48 hours)    None          Medications given in the ED-  Medications   diphenhydrAMINE (BENADRYL) injection 50 mg (50 mg IntraVENous Given 2/3/20 1415)   LORazepam (ATIVAN) injection 1 mg (1 mg IntraVENous Given 2/3/20 1414)   sodium chloride 0.9 % bolus infusion 1,000 mL (1,000 mL IntraVENous New Bag 2/3/20 1413)         Medical Decision Making   I am the first provider for this patient. I reviewed the vital signs, available nursing notes, past medical history, past surgical history, family history and social history. Vital Signs-Reviewed the patient's vital signs. Records Reviewed: Nursing Notes    Provider Notes (Medical Decision Making): Destinee Watson is a  [facility] y.o. female presenting with history and exam consistent with a dystonic reaction. Patient was given Compazine last night which is likely what precipitated it. Symptoms resolved with Benadryl and Ativan here and she is tolerating p.o. Labs are improved from last night. Counseled patient to avoid Compazine in the future. Plan for discharge with early primary care follow-up and return precautions. Patient understands and agrees with this plan. Procedures:  Procedures    ED Course:   2:46 PM  Dystonic symptoms in her eyes and face have subsided after receiving Benadryl and Ativan      Diagnosis and Disposition     Critical Care: None    DISCHARGE NOTE:    Phyllis Chong's  results have been reviewed with her. She has been counseled regarding her diagnosis, treatment, and plan.   She verbally conveys understanding and agreement of the signs, symptoms, diagnosis, treatment and prognosis and additionally agrees to follow up as discussed. She also agrees with the care-plan and conveys that all of her questions have been answered. I have also provided discharge instructions for her that include: educational information regarding their diagnosis and treatment, and list of reasons why they would want to return to the ED prior to their follow-up appointment, should her condition change. She has been provided with education for proper emergency department utilization. CLINICAL IMPRESSION:    1. Dystonic drug reaction        PLAN:  1. D/C Home  2. Current Discharge Medication List        3. Follow-up Information     Follow up With Specialties Details Why Contact Info    Raymond Felty, MD [facility] Schedule an appointment as soon as possible for a visit  [phone] [facility] [address]      [facility] EMERGENCY DEPT Emergency Medicine  If symptoms worsen [address] [address]  [phone]        _______________________________      Please note that this dictation was completed with [employer], the computer voice recognition software. Quite often unanticipated grammatical, syntax, homophones, and other interpretive errors are inadvertently transcribed by the computer software. Please disregard these errors. Please excuse any errors that have escaped final proofreading.

## 2020-02-03 NOTE — ED PROVIDER NOTES
EMERGENCY DEPARTMENT HISTORY AND PHYSICAL EXAM    Date: 2/2/2020  Patient Name: Tyson Brantley    History of Presenting Illness     Chief Complaint   Patient presents with    Nausea    Vomiting         History Provided By: Patient    Tyson Brantley is a 37 y.o. female with PMHX of insulin-dependent diabetes, pretension, gastroparesis who presents to the emergency department C/O abdominal pain. Associated sxs include nausea and vomiting. She reports 24 hours of diffuse abdominal pain with nausea and vomiting. Patient states that she was seen in this facility a little over a month ago for her gastroparesis and nausea and vomiting. Patient states that she had endoscopy she was prescribed antibiotics. States she just started to take those medicines today. She believes it is amoxicillin. Patient states that she was already having the nausea and vomiting before starting the antibiotics. Pt denies fever, diarrhea, known sick contacts, and any other sxs or complaints. PCP: Chelsie Meyer MD    Current Facility-Administered Medications   Medication Dose Route Frequency Provider Last Rate Last Dose    sodium chloride 0.9 % bolus infusion 1,000 mL  1,000 mL IntraVENous ONCE Nabil Teixeira PA 1,000 mL/hr at 02/03/20 0127 1,000 mL at 02/03/20 0127    sodium chloride 0.9 % bolus infusion 1,000 mL  1,000 mL IntraVENous ONCE Cater, Lafonda Nyhan, PA         Current Outpatient Medications   Medication Sig Dispense Refill    ondansetron (ZOFRAN ODT) 4 mg disintegrating tablet Take 1 Tab by mouth every eight (8) hours as needed for Nausea or Vomiting. 20 Tab 0    pantoprazole (PROTONIX) 40 mg tablet Take 1 Tab by mouth two (2) times a day. 60 Tab 0    amLODIPine (NORVASC) 5 mg tablet Take 1 Tab by mouth daily. 30 Tab 0    insulin glargine (LANTUS U-100 INSULIN) 100 unit/mL injection 30 Units by SubCUTAneous route nightly. Indications: type 2 diabetes mellitus      insulin admin.  supplies (INPEN, FOR NOVOLOG, SC) 10-12 Units by SubCUTAneous route Before breakfast, lunch, and dinner. Past History     Past Medical History:  Past Medical History:   Diagnosis Date    Diabetes ([address]     Gastroparesis     Gastroparesis     Hypertension     UTI (urinary tract infection)        Past Surgical History:  Past Surgical History:   Procedure Laterality Date    HX APPENDECTOMY      HX GYN      tubal ligation, C Section       Family History:  History reviewed. No pertinent family history. Social History:  Social History     Tobacco Use    Smoking status: Never Smoker    Smokeless tobacco: Never Used   Substance Use Topics    Alcohol use: Never     Frequency: Never    Drug use: Not Currently       Allergies:  No Known Allergies      Review of Systems   Review of Systems   Constitutional: Negative for fever. Cardiovascular: Negative for chest pain. Gastrointestinal: Positive for abdominal pain, nausea and vomiting. Negative for diarrhea. All other systems reviewed and are negative. Physical Exam     Vitals:    02/02/20 2341   BP: (!) 177/110   Pulse: 88   Resp: 16   Temp: 98.5 °F (36.9 °C)   SpO2: 100%   Weight: 61.2 kg (135 lb)   Height: 5' 4\" (1.626 m)     Physical Exam  Vitals signs and nursing note reviewed. Constitutional:       General: She is not in acute distress. Appearance: Normal appearance. HENT:      Head: Normocephalic and atraumatic. Nose: Nose normal.      Mouth/Throat:      Mouth: Mucous membranes are dry. Comments: Poor oral hygiene  Eyes:      Extraocular Movements: Extraocular movements intact. Conjunctiva/sclera: Conjunctivae normal.      Pupils: Pupils are equal, round, and reactive to light. Neck:      Musculoskeletal: Normal range of motion and neck supple. Cardiovascular:      Rate and Rhythm: Normal rate and regular rhythm. Pulmonary:      Effort: Pulmonary effort is normal.      Breath sounds: Normal breath sounds.    Abdominal:      Palpations: Abdomen is soft.      Tenderness: There is abdominal tenderness. Musculoskeletal: Normal range of motion. Skin:     General: Skin is warm and dry. Capillary Refill: Capillary refill takes less than 2 seconds. Neurological:      General: No focal deficit present. Mental Status: She is alert and oriented to person, place, and time. Psychiatric:         Mood and Affect: Mood normal.         Behavior: Behavior normal.           Diagnostic Study Results     Labs -     Recent Results (from the past 12 hour(s))   CBC WITH AUTOMATED DIFF    Collection Time: 02/03/20  1:15 AM   Result Value Ref Range    WBC 10.3 4.6 - 13.2 K/uL    RBC 3.36 (L) 4.20 - 5.30 M/uL    HGB 9.2 (L) 12.0 - 16.0 g/dL    HCT 28.9 (L) 35.0 - 45.0 %    MCV 86.0 74.0 - 97.0 FL    MCH 27.4 24.0 - 34.0 PG    MCHC 31.8 31.0 - 37.0 g/dL    RDW 14.6 (H) 11.6 - 14.5 %    PLATELET 913 322 - 712 K/uL    MPV 9.4 9.2 - 11.8 FL    NEUTROPHILS 76 (H) 40 - 73 %    LYMPHOCYTES 17 (L) 21 - 52 %    MONOCYTES 5 3 - 10 %    EOSINOPHILS 1 0 - 5 %    BASOPHILS 1 0 - 2 %    ABS. NEUTROPHILS 8.0 1.8 - 8.0 K/UL    ABS. LYMPHOCYTES 1.7 0.9 - 3.6 K/UL    ABS. MONOCYTES 0.5 0.05 - 1.2 K/UL    ABS. EOSINOPHILS 0.1 0.0 - 0.4 K/UL    ABS. BASOPHILS 0.1 0.0 - 0.1 K/UL    DF AUTOMATED     METABOLIC PANEL, COMPREHENSIVE    Collection Time: 02/03/20  1:15 AM   Result Value Ref Range    Sodium 140 136 - 145 mmol/L    Potassium 3.9 3.5 - 5.5 mmol/L    Chloride 109 100 - 111 mmol/L    CO2 23 21 - 32 mmol/L    Anion gap 8 3.0 - 18 mmol/L    Glucose 138 (H) 74 - 99 mg/dL    BUN 33 (H) 7.0 - 18 MG/DL    Creatinine 3.15 (H) 0.6 - 1.3 MG/DL    BUN/Creatinine ratio 10 (L) 12 - 20      GFR est AA 20 (L) >60 ml/min/1.73m2    GFR est non-AA 17 (L) >60 ml/min/1.73m2    Calcium 8.2 (L) 8.5 - 10.1 MG/DL    Bilirubin, total 0.4 0.2 - 1.0 MG/DL    ALT (SGPT) 6 (L) 13 - 56 U/L    AST (SGOT) 9 (L) 10 - 38 U/L    Alk.  phosphatase 133 (H) 45 - 117 U/L    Protein, total 7.1 6.4 - 8.2 g/dL Albumin 3.0 (L) 3.4 - 5.0 g/dL    Globulin 4.1 (H) 2.0 - 4.0 g/dL    A-G Ratio 0.7 (L) 0.8 - 1.7     URINALYSIS W/ RFLX MICROSCOPIC    Collection Time: 02/03/20  1:15 AM   Result Value Ref Range    Color YELLOW      Appearance CLOUDY      Specific gravity 1.017 1.005 - 1.030      pH (UA) 6.5 5.0 - 8.0      Protein >1,000 (A) NEG mg/dL    Glucose 250 (A) NEG mg/dL    Ketone 15 (A) NEG mg/dL    Bilirubin NEGATIVE  NEG      Blood SMALL (A) NEG      Urobilinogen 0.2 0.2 - 1.0 EU/dL    Nitrites NEGATIVE  NEG      Leukocyte Esterase NEGATIVE  NEG     URINE MICROSCOPIC ONLY    Collection Time: 02/03/20  1:15 AM   Result Value Ref Range    WBC 3 to 6 0 - 5 /hpf    RBC 2 to 4 0 - 5 /hpf    Epithelial cells 2+ 0 - 5 /lpf    Bacteria 2+ (A) NEG /hpf   LIPASE    Collection Time: 02/03/20  1:15 AM   Result Value Ref Range    Lipase 150 73 - 393 U/L   HCG URINE, QL. - POC    Collection Time: 02/03/20  1:24 AM   Result Value Ref Range    Pregnancy test,urine (POC) NEGATIVE  NEG         Radiologic Studies -   No orders to display     CT Results  (Last 48 hours)    None        CXR Results  (Last 48 hours)    None          Medications given in the ED-  Medications   sodium chloride 0.9 % bolus infusion 1,000 mL (1,000 mL IntraVENous New Bag 2/3/20 0127)   sodium chloride 0.9 % bolus infusion 1,000 mL (has no administration in time range)   pantoprazole (PROTONIX) injection 40 mg (40 mg IntraVENous Given 2/3/20 0127)   prochlorperazine (COMPAZINE) injection 10 mg (10 mg IntraVENous Given 2/3/20 0127)         Medical Decision Making   I am the first provider for this patient. I reviewed the vital signs, available nursing notes, past medical history, past surgical history, family history and social history. Vital Signs-Reviewed the patient's vital signs. Pulse Oximetry Analysis - 100% on RA     Records Reviewed: Nursing Notes    Procedures:  Procedures    ED Course:   12:15 AM   Initial assessment performed.  The patients presenting problems have been discussed, and they are in agreement with the care plan formulated and outlined with them. I have encouraged them to ask questions as they arise throughout their visit. 2:16 AM  Patient is feeling better  Reviewed labs and case with Dr. Nan Noonan ED attending. Patient with nausea vomiting and LESLIE. Creatinine bumped from 2-3. Believe is from dehydration. Patient feeling better at this time benign abdominal exam she is tolerating p.o. Will plan to give another liter bolus of fluids. Patient continues to do well plan for discharge with antiemetics and follow-up with PCP and GI. Discussed possibility of admission with patient. Patient states that she would prefer to go home    Discussion: 37 y.o. female history of insulin-dependent diabetes gastroparesis complaining 24 hours of nausea vomiting without fevers. Patient is afebrile with appropriate vital signs benign abdominal exam laboratory findings significant for LESLIE creatinine bumped from 2-3 with increased BUN. Likely dehydration. 2 L of fluids and antiemetics given patient feeling better and tolerating p.o. at the time of discharge. Plan for antiemetics with PCP and GI follow-up with strict return precautions discussed. Diagnosis and Disposition       DISCHARGE NOTE:  Phyllis Chong's  results have been reviewed with her. She has been counseled regarding her diagnosis, treatment, and plan. She verbally conveys understanding and agreement of the signs, symptoms, diagnosis, treatment and prognosis and additionally agrees to follow up as discussed. She also agrees with the care-plan and conveys that all of her questions have been answered. I have also provided discharge instructions for her that include: educational information regarding their diagnosis and treatment, and list of reasons why they would want to return to the ED prior to their follow-up appointment, should her condition change.  She has been provided with education for proper emergency department utilization. CLINICAL IMPRESSION:    1. Nausea and vomiting, intractability of vomiting not specified, unspecified vomiting type    2. LESLIE (acute kidney injury) ([address]    3. Dehydration    4. Gastroparesis        PLAN:  1. D/C Home  2. Current Discharge Medication List      START taking these medications    Details   ondansetron (ZOFRAN ODT) 4 mg disintegrating tablet Take 1 Tab by mouth every eight (8) hours as needed for Nausea or Vomiting. Qty: 20 Tab, Refills: 0           3. Follow-up Information     Follow up With Specialties Details Why Contact Info    Mike Hidalgo MD [facility] Schedule an appointment as soon as possible for a visit  [phone] [facility]  Deondre Granado MD Gastroenterology Schedule an appointment as soon as possible for a visit  [address] [facility] EMERGENCY DEPT Emergency Medicine  As needed, If symptoms worsen [address] Deondre Granado [id number] [phone]                  Please note that this dictation was completed with [employer], the [employer] voice recognition software. Quite often unanticipated grammatical, syntax, homophones, and other interpretive errors are inadvertently transcribed by the computer software. Please disregard these errors. Please excuse any errors that have escaped final proofreading.

## 2020-02-03 NOTE — DISCHARGE INSTRUCTIONS
Patient Education        Nausea and Vomiting: Care Instructions  Your Care Instructions    When you are nauseated, you may feel weak and sweaty and notice a lot of saliva in your mouth. Nausea often leads to vomiting. Most of the time you do not need to worry about nausea and vomiting, but they can be signs of other illnesses. Two common causes of nausea and vomiting are stomach flu and food poisoning. Nausea and vomiting from viral stomach flu will usually start to improve within 24 hours. Nausea and vomiting from food poisoning may last from 12 to 48 hours. The doctor has checked you carefully, but problems can develop later. If you notice any problems or new symptoms, get medical treatment right away. Follow-up care is a key part of your treatment and safety. Be sure to make and go to all appointments, and call your doctor if you are having problems. It's also a good idea to know your test results and keep a list of the medicines you take. How can you care for yourself at home? · To prevent dehydration, drink plenty of fluids, enough so that your urine is light yellow or clear like water. Choose water and other caffeine-free clear liquids until you feel better. If you have kidney, heart, or liver disease and have to limit fluids, talk with your doctor before you increase the amount of fluids you drink. · Rest in bed until you feel better. · When you are able to eat, try clear soups, mild foods, and liquids until all symptoms are gone for 12 to 48 hours. Other good choices include dry toast, crackers, cooked cereal, and gelatin dessert, such as Jell-O. When should you call for help? Call 911 anytime you think you may need emergency care. For example, call if:    · You passed out (lost consciousness).    Call your doctor now or seek immediate medical care if:    · You have symptoms of dehydration, such as:  ? Dry eyes and a dry mouth. ? Passing only a little dark urine. ?  Feeling thirstier than usual.   · You have new or worsening belly pain.     · You have a new or higher fever.     · You vomit blood or what looks like coffee grounds.    Watch closely for changes in your health, and be sure to contact your doctor if:    · You have ongoing nausea and vomiting.     · Your vomiting is getting worse.     · Your vomiting lasts longer than 2 days.     · You are not getting better as expected. Where can you learn more? Go to http://anita-chino.info/. Enter 25 [id number] in the search box to learn more about \"Nausea and Vomiting: Care Instructions. \"  Current as of: June 26, 2019  Content Version: 12.2  © 2906-7601 [employer]. Care instructions adapted under license by [employer] (which disclaims liability or warranty for this information). If you have questions about a medical condition or this instruction, always ask your healthcare professional. [address] any warranty or liability for your use of this information. Patient Education        Oral Rehydration: Care Instructions  Your Care Instructions    Dehydration occurs when your body loses too much water. This can happen if you do not drink enough fluids or lose a lot of fluid due to diarrhea, vomiting, or sweating. Being dehydrated can cause health problems and can even be life-threatening. To replace lost fluids, you need to drink liquid that contains special chemicals called electrolytes. Electrolytes keep your body working well. Plain water does not have electrolytes. You also need to rest to prevent more fluid loss. Replacing water and electrolytes (oral rehydration) completely takes about 36 hours. But you should feel better within a few hours. Follow-up care is a key part of your treatment and safety. Be sure to make and go to all appointments, and call your doctor if you are having problems.  It's also a good idea to know your test results and keep a list of the medicines you take.  How can you care for yourself at home? · Take frequent sips of a drink such as Gatorade, Powerade, or other rehydration drinks that your doctor suggests. These replace both fluid and important chemicals (electrolytes) you need for balance in your blood. · Drink 2 quarts of cool liquid over 2 to 4 hours. You should have at least 10 glasses of liquid a day to replace lost fluid. If you have kidney, heart, or liver disease and have to limit fluids, talk with your doctor before you increase the amount of fluids you drink. · Make your own drink. Measure everything carefully. The drink may not work well or may even be harmful if the amounts are off. ? 1 quart water  ? ½ teaspoon salt  ? 6 teaspoons sugar  · Do not drink liquid with caffeine, such as coffee and prabhakar. · Do not drink any alcohol. It can make you dehydrated. · Drink plenty of fluids, enough so that your urine is light yellow or clear like water. If you have kidney, heart, or liver disease and have to limit fluids, talk with your doctor before you increase the amount of fluids you drink. When should you call for help? Call 911 anytime you think you may need emergency care. For example, call if:    · You have signs of severe dehydration, such as:  ? You are confused or unable to stay awake.  ? You passed out (lost consciousness).    Call your doctor now or seek immediate medical care if:    · You still have signs of dehydration. You have sunken eyes and a dry mouth, and you pass only a little dark urine.     · You are dizzy or lightheaded, or you feel like you may faint.     · You are not able to keep down fluids.    Watch closely for changes in your health, and be sure to contact your doctor if:    · You do not get better as expected. Where can you learn more? Go to http://anita-chino.info/. Enter I040 in the search box to learn more about \"Oral Rehydration: Care Instructions. \"  Current as of: June 26, 2019  Content Version: 12.2  © 7222-9646 [employer]. Care instructions adapted under license by [employer] (which disclaims liability or warranty for this information). If you have questions about a medical condition or this instruction, always ask your healthcare professional. [address] any warranty or liability for your use of this information.

## 2020-02-03 NOTE — ED TRIAGE NOTES
Pt arrives c/o N/V x24hrs. Pt states that she started amoxicillin earlier today for a lower abd infection.

## 2020-02-03 NOTE — DISCHARGE INSTRUCTIONS
Patient Education        Gastroparesis: Care Instructions  Your Care Instructions    When you have gastroparesis, your stomach takes a lot longer to empty. This delay can cause belly pain, bloating, and belching. It also can cause hiccups, heartburn, nausea or vomiting. You may not feel like eating. These symptoms may come and go. They most often occur during and after meals. You may feel full after only a few bites of food. This condition occurs when the nerves to the stomach don't work properly. Diabetes is the most common cause of this nerve damage. Gastroparesis can make it harder to control your blood sugar levels. But keeping your blood sugar levels under control may help with your symptoms. Parkinson's disease, stroke, and some medicines can also cause this condition. Home treatment can often help. Follow-up care is a key part of your treatment and safety. Be sure to make and go to all appointments, and call your doctor if you are having problems. It's also a good idea to know your test results and keep a list of the medicines you take. How can you care for yourself at home? · Eat several small meals each day rather than three large meals. · Eat foods that are low in fiber and fat. · If your doctor suggests it, take medicines that help the stomach empty more quickly. These are called motility agents. When should you call for help? Call your doctor now or seek immediate medical care if:    · You are vomiting.     · You have new or worse belly pain.     · You have a fever.     · You cannot pass stools or gas.    Watch closely for changes in your health, and be sure to contact your doctor if you have any problems. Where can you learn more? Go to http://anita-chino.info/. Enter M106 in the search box to learn more about \"Gastroparesis: Care Instructions. \"  Current as of: November 7, 2018  Content Version: 12.2  © 4224-9387 [employer].  Care instructions adapted under license by [address] (which disclaims liability or warranty for this information). If you have questions about a medical condition or this instruction, always ask your healthcare professional. [address] any warranty or liability for your use of this information.

## 2020-02-04 LAB
BACTERIA SPEC CULT: NORMAL
SERVICE CMNT-IMP: NORMAL

## 2020-02-08 ENCOUNTER — APPOINTMENT (OUTPATIENT)
Dept: GENERAL RADIOLOGY | Age: 35
End: 2020-02-08
Attending: EMERGENCY MEDICINE
Payer: MEDICAID

## 2020-02-08 ENCOUNTER — HOSPITAL ENCOUNTER (EMERGENCY)
Age: 35
Discharge: HOME | End: 2020-02-08
Attending: EMERGENCY MEDICINE
Payer: MEDICAID

## 2020-02-08 ENCOUNTER — HOSPITAL ENCOUNTER (EMERGENCY)
Age: 35
Discharge: HOME | End: 2020-02-09
Attending: EMERGENCY MEDICINE
Payer: MEDICAID

## 2020-02-08 VITALS
RESPIRATION RATE: 20 BRPM | WEIGHT: 127 LBS | SYSTOLIC BLOOD PRESSURE: 173 MMHG | DIASTOLIC BLOOD PRESSURE: 82 MMHG | HEIGHT: 64 IN | TEMPERATURE: 99 F | OXYGEN SATURATION: 100 % | BODY MASS INDEX: 21.68 KG/M2 | HEART RATE: 100 BPM

## 2020-02-08 DIAGNOSIS — R11.10 RECURRENT VOMITING: Primary | ICD-10-CM

## 2020-02-08 DIAGNOSIS — R11.2 NAUSEA AND VOMITING, INTRACTABILITY OF VOMITING NOT SPECIFIED, UNSPECIFIED VOMITING TYPE: Primary | ICD-10-CM

## 2020-02-08 DIAGNOSIS — N28.9 RENAL INSUFFICIENCY: ICD-10-CM

## 2020-02-08 DIAGNOSIS — E87.6 HYPOKALEMIA: ICD-10-CM

## 2020-02-08 DIAGNOSIS — Z86.39 HISTORY OF DIABETIC GASTROPARESIS: ICD-10-CM

## 2020-02-08 DIAGNOSIS — E13.43 GASTROPARESIS DUE TO SECONDARY DIABETES (HCC): ICD-10-CM

## 2020-02-08 LAB
ALBUMIN SERPL-MCNC: 3.3 G/DL (ref 3.4–5)
ALBUMIN/GLOB SERPL: 0.8 {RATIO} (ref 0.8–1.7)
ALP SERPL-CCNC: 147 U/L (ref 45–117)
ALT SERPL-CCNC: 10 U/L (ref 13–56)
ANION GAP SERPL CALC-SCNC: 10 MMOL/L (ref 3–18)
APPEARANCE UR: CLEAR
AST SERPL-CCNC: 14 U/L (ref 10–38)
BACTERIA URNS QL MICRO: ABNORMAL /HPF
BASOPHILS # BLD: 0.1 K/UL (ref 0–0.1)
BASOPHILS NFR BLD: 1 % (ref 0–2)
BILIRUB SERPL-MCNC: 0.4 MG/DL (ref 0.2–1)
BILIRUB UR QL: NEGATIVE
BUN SERPL-MCNC: 27 MG/DL (ref 7–18)
BUN/CREAT SERPL: 10 (ref 12–20)
CALCIUM SERPL-MCNC: 8.8 MG/DL (ref 8.5–10.1)
CHLORIDE SERPL-SCNC: 110 MMOL/L (ref 100–111)
CO2 SERPL-SCNC: 21 MMOL/L (ref 21–32)
COLOR UR: YELLOW
CREAT SERPL-MCNC: 2.79 MG/DL (ref 0.6–1.3)
DIFFERENTIAL METHOD BLD: ABNORMAL
EOSINOPHIL # BLD: 0.1 K/UL (ref 0–0.4)
EOSINOPHIL NFR BLD: 1 % (ref 0–5)
EPITH CASTS URNS QL MICRO: ABNORMAL /LPF (ref 0–5)
ERYTHROCYTE [DISTWIDTH] IN BLOOD BY AUTOMATED COUNT: 14.9 % (ref 11.6–14.5)
GLOBULIN SER CALC-MCNC: 4.4 G/DL (ref 2–4)
GLUCOSE BLD STRIP.AUTO-MCNC: 333 MG/DL (ref 70–110)
GLUCOSE SERPL-MCNC: 177 MG/DL (ref 74–99)
GLUCOSE UR STRIP.AUTO-MCNC: 500 MG/DL
HCG SERPL QL: NEGATIVE
HCT VFR BLD AUTO: 29.1 % (ref 35–45)
HGB BLD-MCNC: 9.4 G/DL (ref 12–16)
HGB UR QL STRIP: ABNORMAL
KETONES UR QL STRIP.AUTO: 15 MG/DL
LEUKOCYTE ESTERASE UR QL STRIP.AUTO: NEGATIVE
LIPASE SERPL-CCNC: 173 U/L (ref 73–393)
LYMPHOCYTES # BLD: 2.3 K/UL (ref 0.9–3.6)
LYMPHOCYTES NFR BLD: 19 % (ref 21–52)
MAGNESIUM SERPL-MCNC: 2.1 MG/DL (ref 1.6–2.6)
MCH RBC QN AUTO: 27.4 PG (ref 24–34)
MCHC RBC AUTO-ENTMCNC: 32.3 G/DL (ref 31–37)
MCV RBC AUTO: 84.8 FL (ref 74–97)
MONOCYTES # BLD: 0.5 K/UL (ref 0.05–1.2)
MONOCYTES NFR BLD: 4 % (ref 3–10)
NEUTS SEG # BLD: 9.5 K/UL (ref 1.8–8)
NEUTS SEG NFR BLD: 75 % (ref 40–73)
NITRITE UR QL STRIP.AUTO: NEGATIVE
PH UR STRIP: 7 [PH] (ref 5–8)
PLATELET # BLD AUTO: 422 K/UL (ref 135–420)
PMV BLD AUTO: 9.5 FL (ref 9.2–11.8)
POTASSIUM SERPL-SCNC: 3.3 MMOL/L (ref 3.5–5.5)
PROT SERPL-MCNC: 7.7 G/DL (ref 6.4–8.2)
PROT UR STRIP-MCNC: 300 MG/DL
RBC # BLD AUTO: 3.43 M/UL (ref 4.2–5.3)
RBC #/AREA URNS HPF: ABNORMAL /HPF (ref 0–5)
SODIUM SERPL-SCNC: 141 MMOL/L (ref 136–145)
SP GR UR REFRACTOMETRY: 1.01 (ref 1–1.03)
TROPONIN I SERPL-MCNC: <0.02 NG/ML (ref 0–0.04)
UROBILINOGEN UR QL STRIP.AUTO: 0.2 EU/DL (ref 0.2–1)
WBC # BLD AUTO: 12.4 K/UL (ref 4.6–13.2)
WBC URNS QL MICRO: ABNORMAL /HPF (ref 0–5)

## 2020-02-08 PROCEDURE — 96374 THER/PROPH/DIAG INJ IV PUSH: CPT

## 2020-02-08 PROCEDURE — 74011250637 HC RX REV CODE- 250/637: Performed by: EMERGENCY MEDICINE

## 2020-02-08 PROCEDURE — 74018 RADEX ABDOMEN 1 VIEW: CPT

## 2020-02-08 PROCEDURE — 96361 HYDRATE IV INFUSION ADD-ON: CPT

## 2020-02-08 PROCEDURE — 85025 COMPLETE CBC W/AUTO DIFF WBC: CPT

## 2020-02-08 PROCEDURE — 83690 ASSAY OF LIPASE: CPT

## 2020-02-08 PROCEDURE — 84484 ASSAY OF TROPONIN QUANT: CPT

## 2020-02-08 PROCEDURE — 80053 COMPREHEN METABOLIC PANEL: CPT

## 2020-02-08 PROCEDURE — 96372 THER/PROPH/DIAG INJ SC/IM: CPT

## 2020-02-08 PROCEDURE — 82962 GLUCOSE BLOOD TEST: CPT

## 2020-02-08 PROCEDURE — 83735 ASSAY OF MAGNESIUM: CPT

## 2020-02-08 PROCEDURE — 74011000250 HC RX REV CODE- 250: Performed by: EMERGENCY MEDICINE

## 2020-02-08 PROCEDURE — 81001 URINALYSIS AUTO W/SCOPE: CPT

## 2020-02-08 PROCEDURE — C9113 INJ PANTOPRAZOLE SODIUM, VIA: HCPCS | Performed by: EMERGENCY MEDICINE

## 2020-02-08 PROCEDURE — 74011250636 HC RX REV CODE- 250/636: Performed by: EMERGENCY MEDICINE

## 2020-02-08 PROCEDURE — 99284 EMERGENCY DEPT VISIT MOD MDM: CPT

## 2020-02-08 PROCEDURE — 71045 X-RAY EXAM CHEST 1 VIEW: CPT

## 2020-02-08 PROCEDURE — 99285 EMERGENCY DEPT VISIT HI MDM: CPT

## 2020-02-08 PROCEDURE — 96375 TX/PRO/DX INJ NEW DRUG ADDON: CPT

## 2020-02-08 PROCEDURE — 84703 CHORIONIC GONADOTROPIN ASSAY: CPT

## 2020-02-08 PROCEDURE — 93005 ELECTROCARDIOGRAM TRACING: CPT

## 2020-02-08 RX ORDER — NALBUPHINE HYDROCHLORIDE 10 MG/ML
10 INJECTION, SOLUTION INTRAMUSCULAR; INTRAVENOUS; SUBCUTANEOUS
Status: COMPLETED | OUTPATIENT
Start: 2020-02-08 | End: 2020-02-08

## 2020-02-08 RX ORDER — DEXTROSE, SODIUM CHLORIDE, AND POTASSIUM CHLORIDE 5; .9; .15 G/100ML; G/100ML; G/100ML
500 INJECTION INTRAVENOUS CONTINUOUS
Status: DISCONTINUED | OUTPATIENT
Start: 2020-02-08 | End: 2020-02-08 | Stop reason: HOSPADM

## 2020-02-08 RX ORDER — ONDANSETRON 2 MG/ML
4 INJECTION INTRAMUSCULAR; INTRAVENOUS ONCE
Status: COMPLETED | OUTPATIENT
Start: 2020-02-08 | End: 2020-02-08

## 2020-02-08 RX ORDER — KETOROLAC TROMETHAMINE 15 MG/ML
15 INJECTION, SOLUTION INTRAMUSCULAR; INTRAVENOUS
Status: COMPLETED | OUTPATIENT
Start: 2020-02-08 | End: 2020-02-08

## 2020-02-08 RX ORDER — PANTOPRAZOLE SODIUM 40 MG/10ML
40 INJECTION, POWDER, LYOPHILIZED, FOR SOLUTION INTRAVENOUS
Status: COMPLETED | OUTPATIENT
Start: 2020-02-08 | End: 2020-02-08

## 2020-02-08 RX ORDER — METOCLOPRAMIDE HYDROCHLORIDE 5 MG/ML
10 INJECTION INTRAMUSCULAR; INTRAVENOUS
Status: COMPLETED | OUTPATIENT
Start: 2020-02-08 | End: 2020-02-08

## 2020-02-08 RX ORDER — DIPHENHYDRAMINE HYDROCHLORIDE 50 MG/ML
25 INJECTION, SOLUTION INTRAMUSCULAR; INTRAVENOUS ONCE
Status: COMPLETED | OUTPATIENT
Start: 2020-02-08 | End: 2020-02-08

## 2020-02-08 RX ORDER — POTASSIUM CHLORIDE 750 MG/1
10 CAPSULE, EXTENDED RELEASE ORAL DAILY
Qty: 5 CAP | Refills: 0 | Status: SHIPPED | OUTPATIENT
Start: 2020-02-08

## 2020-02-08 RX ORDER — ONDANSETRON 4 MG/1
4 TABLET, ORALLY DISINTEGRATING ORAL
Qty: 9 TAB | Refills: 0 | Status: SHIPPED | OUTPATIENT
Start: 2020-02-08 | End: 2020-02-11

## 2020-02-08 RX ORDER — METOCLOPRAMIDE 5 MG/1
5 TABLET ORAL
Qty: 12 TAB | Refills: 0 | Status: SHIPPED | OUTPATIENT
Start: 2020-02-08 | End: 2020-02-11

## 2020-02-08 RX ADMIN — ONDANSETRON 4 MG: 2 INJECTION INTRAMUSCULAR; INTRAVENOUS at 10:39

## 2020-02-08 RX ADMIN — SODIUM CHLORIDE 1000 ML: 900 INJECTION, SOLUTION INTRAVENOUS at 10:39

## 2020-02-08 RX ADMIN — METOCLOPRAMIDE 10 MG: 5 INJECTION, SOLUTION INTRAMUSCULAR; INTRAVENOUS at 11:25

## 2020-02-08 RX ADMIN — DIPHENHYDRAMINE HYDROCHLORIDE 25 MG: 50 INJECTION, SOLUTION INTRAMUSCULAR; INTRAVENOUS at 11:24

## 2020-02-08 RX ADMIN — PANTOPRAZOLE SODIUM 40 MG: 40 INJECTION, POWDER, FOR SOLUTION INTRAVENOUS at 15:20

## 2020-02-08 RX ADMIN — KETOROLAC TROMETHAMINE 15 MG: 15 INJECTION, SOLUTION INTRAMUSCULAR; INTRAVENOUS at 10:39

## 2020-02-08 RX ADMIN — LIDOCAINE HYDROCHLORIDE 40 ML: 20 SOLUTION ORAL; TOPICAL at 15:20

## 2020-02-08 RX ADMIN — NALBUPHINE HYDROCHLORIDE 10 MG: 10 INJECTION, SOLUTION INTRAMUSCULAR; INTRAVENOUS; SUBCUTANEOUS at 15:20

## 2020-02-08 RX ADMIN — DEXTROSE MONOHYDRATE, SODIUM CHLORIDE, AND POTASSIUM CHLORIDE 500 ML/HR: 50; 9; 1.49 INJECTION, SOLUTION INTRAVENOUS at 12:30

## 2020-02-08 RX ADMIN — SODIUM CHLORIDE 1000 ML: 900 INJECTION, SOLUTION INTRAVENOUS at 11:25

## 2020-02-08 NOTE — DISCHARGE INSTRUCTIONS
Patient Education        Learning About Insulin Pens  What is an insulin pen? An insulin pen is a device for giving insulin shots. It looks like a pen. Inside the pen is a needle and a cartridge filled with insulin. You can set the dose of insulin with a dial on the outside of the pen. You use the pen to give the insulin shot (injection). Both disposable and reusable insulin pens are available. With a disposable pen, a set amount of insulin comes in the pen ready to use. When the insulin is used up, you throw the pen away. You use a new pen the next time you need insulin. With a reusable pen, you don't throw the pen away. Instead, you reload the pen with a pre-measured cartridge of insulin. When the insulin is used up, you insert a new cartridge into the pen. Disposable and reusable pens both need a new needle with each shot. The needles come in different lengths and widths. [place] needles will prevent injecting into the muscle, especially in children or people who are lean. Thinner-width needles reduce the pricking sensation. Width is measured by gauge. The higher the number, the thinner the needle. Why do some people prefer pens? · Most people find that insulin pens are easier to use than a bottle and syringe. · Many people feel less pain (or no pain) with the smaller insulin pen needle, compared to a syringe needle. · Insulin pens may help you give yourself more accurate doses. When you draw insulin into a syringe, you must carefully measure so that you don't get too much or too little. But with a pen, you set a dial for the amount of insulin you want, and then you push the button. · Insulin pens may work better than syringes for people who don't see well or who have problems like arthritis that make it harder to use a syringe. · Using an insulin pen draws less attention from others. You can give yourself insulin with fewer people noticing.   · You don't need to carry insulin bottles and syringes everywhere you go. An insulin pen fits into a pocket or purse. What should you know about insulin pens? Each pen delivers a different brand and type (or types) of insulin. Some deliver rapid-acting insulin. Others deliver long-acting insulin. And some pens deliver a mixture of both in one shot. Pens have different colored labels, cartridge holders, or dosing knobs. Many pens have special features. For example, some pens have springs so that it takes less force to deliver a dose of insulin. Other pens have signals you can hear that let you know the insulin has been delivered. Some have memory to show the amount and time of the last dose. How do you use an insulin pen? 1. For a reusable pen, put the insulin cartridge into the pen. Disposable pens already have an insulin cartridge. Follow the directions for how to screw a new needle onto your pen. Before using cloudy insulin, such as NPH and premixed insulin, gently roll the pen between your palms 10 times, then tip the pen up and down 10 times. Do not shake the pen. The insulin should look milky white. 2. Remove the outer cap from the needle. Keep this cap to use later. 3. Remove the inner cover from the needle. Be careful not to prick yourself. 4. Before each shot, prime the needle. Priming removes air from the needle. Turn the dose knob to 2 units. Hold your pen with the needle pointing up. Tap the cartridge smith gently to move any air bubbles to the top. Push the injection button all the way in. Watch for a stream or drop of insulin to come out of the needle. If it does not, repeat this step again. 5. Clean the area of skin where you will give the shot. If you use alcohol to clean the skin, let it dry. Use a different spot each time you inject insulin. That's because using the same spot every time can cause bumps or pits to form in the skin.  For example, inject your insulin above your belly button, then the next time use your upper thigh, and then the next time inject below your belly button. 6. Turn the dose knob to the number of units of insulin you need to inject. Push the needle into your skin. Most people can inject using a 90-degree angle and without pinching the skin. Adults and children who are very lean and people who use longer needles may need to pinch the skin to avoid injecting into muscle. 7. Put your thumb on the injection button and push it in until it stops. Keep the pen in your skin. Hold the dose knob in for 10 seconds (or to the number that the  recommends). Then pull the needle out of your skin. Do not rub the area. 8. Put only the outer cap back over the needle. The thin inner cover is harder to put back on, and you could stick yourself. 9. After covering the needle with the outer cap, unscrew the needle and throw it away in a sharps container or other solid plastic container. You can get a sharps container at your drugstore. 10. Always read the insulin package information that tells the best way to store your insulin pen and insulin cartridges. In general, unopened insulin for pens will last longer if it is kept in the refrigerator. After insulin is opened, most manufacturers say to store it at room temperature. Don't share insulin pens with anyone else who uses insulin. Even when the needle is changed, an insulin pen can carry bacteria or blood that can make another person sick. Where can you learn more? Go to http://anita-chino.info/. Enter M910 in the search box to learn more about \"Learning About Insulin Pens. \"  Current as of: April 16, 2019  Content Version: 12.2  © 1692-2737 [employer]. Care instructions adapted under license by [employer] (which disclaims liability or warranty for this information).  If you have questions about a medical condition or this instruction, always ask your healthcare professional. Vanessa Grayson disclaims any warranty or liability for your use of this information. Patient Education        Home Blood Glucose Test: About This Test  What is it? A home blood glucose test measures the amount of a type of sugar, called glucose, in your blood. Why is this test done? People who have diabetes need to check the amount of glucose in their blood. A home blood glucose test is an easy way to test your blood at home or when you are away from home. The results help you know when to take action to keep your blood glucose levels in a target range. How can you prepare for the test?  · Check the expiration date on the bottle of testing strips. Do not use test strips that have . · Match the code number on the testing strips bottle with the number on the meter. If the numbers do not match, follow the directions with the meter for changing the code number. What happens before the test?  The supplies you will need for testing blood glucose include:  · A blood glucose meter. · Testing strips. These are made to be used with a specific model of meter. · Sugar control solutions. Some meters require a specific solution. Many new meters are made to operate without a control solution. · Short needles called lancets for pricking your skin. · A pen-sized smith for the lancet (lancet device), which positions the lancet and controls how deeply it goes into your skin. · Clean cotton balls. These are used to stop the bleeding from the testing site. What happens during the test?  A home blood glucose test involves pricking your finger, palm, or forearm with a lancet to collect a drop of blood. The blood drop is placed on a test strip, which you insert into the blood glucose meter. The instructions for testing are slightly different for each blood glucose meter model. Follow the instructions that came with your meter. · Wash your hands with warm, soapy water. Dry them well with a clean towel.  You may also use an alcohol wipe to clean your finger or other site, but make sure your hands are dry before the test.  · Insert a clean lancet into the lancet device. · Remove a test strip from the test strip bottle. Replace the lid immediately to keep moisture away from the other strips. · Follow the instructions that came with your meter to get it ready. · Use the lancet device to stick the side of your fingertip with the lancet. Do not stick the tip of your finger. Some blood sugar meters use lancet devices that take the blood sample from other sites, such as the palm of the hand or the forearm. But the finger is usually the most accurate place to test blood sugar. · Put a drop of blood on the correct spot on the test strip. · Apply pressure with a clean cotton ball to stop the bleeding. · Follow the directions that came with the meter to get the results. · Write down the results and the time that you tested your blood. Some meters will store the results for you. What else should you know about the test?  The American Diabetes Association (ADA) recommends that you stay within the following blood glucose level ranges. But depending on your health, you and your doctor may set a different range for you. For nonpregnant adults with diabetes  · 80 milligrams per deciliter (mg/dL) to 130 mg/dL before a meal  · Less than 180 mg/dL 1 to 2 hours after a meal  For women who have diabetes related to pregnancy (gestational diabetes)  · 95 mg/dL or less before breakfast  · 120 to 140 mg/dL (or lower) 1 to 2 hours after a meal  How long does the test take? · The blood glucose meter will show the results of the test in a minute or less. Where can you learn more? Go to http://anita-chino.info/. Enter J404 in the search box to learn more about \"Home Blood Glucose Test: About This Test.\"  Current as of: April 16, 2019  Content Version: 12.2  © 9752-8823 [employer].  Care instructions adapted under license by [employer] (which disclaims liability or warranty for this information). If you have questions about a medical condition or this instruction, always ask your healthcare professional. [address] any warranty or liability for your use of this information. Patient Education        Nausea and Vomiting: Care Instructions  Your Care Instructions    When you are nauseated, you may feel weak and sweaty and notice a lot of saliva in your mouth. Nausea often leads to vomiting. Most of the time you do not need to worry about nausea and vomiting, but they can be signs of other illnesses. Two common causes of nausea and vomiting are stomach flu and food poisoning. Nausea and vomiting from viral stomach flu will usually start to improve within 24 hours. Nausea and vomiting from food poisoning may last from 12 to 48 hours. The doctor has checked you carefully, but problems can develop later. If you notice any problems or new symptoms, get medical treatment right away. Follow-up care is a key part of your treatment and safety. Be sure to make and go to all appointments, and call your doctor if you are having problems. It's also a good idea to know your test results and keep a list of the medicines you take. How can you care for yourself at home? · To prevent dehydration, drink plenty of fluids, enough so that your urine is light yellow or clear like water. Choose water and other caffeine-free clear liquids until you feel better. If you have kidney, heart, or liver disease and have to limit fluids, talk with your doctor before you increase the amount of fluids you drink. · Rest in bed until you feel better. · When you are able to eat, try clear soups, mild foods, and liquids until all symptoms are gone for 12 to 48 hours. Other good choices include dry toast, crackers, cooked cereal, and gelatin dessert, such as Jell-O. When should you call for help? Call 911 anytime you think you may need emergency care.  For example, call if:    · You passed out (lost consciousness).    Call your doctor now or seek immediate medical care if:    · You have symptoms of dehydration, such as:  ? Dry eyes and a dry mouth. ? Passing only a little dark urine. ? Feeling thirstier than usual.     · You have new or worsening belly pain.     · You have a new or higher fever.     · You vomit blood or what looks like coffee grounds.    Watch closely for changes in your health, and be sure to contact your doctor if:    · You have ongoing nausea and vomiting.     · Your vomiting is getting worse.     · Your vomiting lasts longer than 2 days.     · You are not getting better as expected. Where can you learn more? Go to http://anita-chino.info/. Enter 25 780484 in the search box to learn more about \"Nausea and Vomiting: Care Instructions. \"  Current as of: June 26, 2019  Content Version: 12.2  © 5215-9422 "[employer] Care instructions adapted under license by [employer] (which disclaims liability or warranty for this information). If you have questions about a medical condition or this instruction, always ask your healthcare professional. [place] [address] any warranty or liability for your use of this information. Patient Education        Hypokalemia: Care Instructions  Your Care Instructions    Hypokalemia (say \"eq-zy-dbt-CARRILLO-greyson-uh\") is a low level of potassium. The heart, muscles, kidneys, and nervous system all need potassium to work well. This problem has many different causes. Kidney problems, diet, and medicines like diuretics and laxatives can cause it. So can vomiting or diarrhea. In some cases, cancer is the cause. Your doctor may do tests to find the cause of your low potassium levels. You may need medicines to bring your potassium levels back to normal. You may also need regular blood tests to check your potassium.   If you have very low potassium, you may need intravenous (IV) medicines. You also may need tests to check the electrical activity of your heart. Heart problems caused by low potassium levels can be very serious. Follow-up care is a key part of your treatment and safety. Be sure to make and go to all appointments, and call your doctor if you are having problems. It's also a good idea to know your test results and keep a list of the medicines you take. How can you care for yourself at home? · If your doctor recommends it, eat foods that have a lot of potassium. These include fresh fruits, juices, and vegetables. They also include nuts, beans, and milk. · Be safe with medicines. If your doctor prescribes medicines or potassium supplements, take them exactly as directed. Call your doctor if you have any problems with your medicines. · Get your potassium levels tested as often as your doctor tells you. When should you call for help? Call 911 anytime you think you may need emergency care. For example, call if:    · You feel like your heart is missing beats. Heart problems caused by low potassium can cause death.     · You passed out (lost consciousness).     · You have a seizure.    Call your doctor now or seek immediate medical care if:    · You feel weak or unusually tired.     · You have severe arm or leg cramps.     · You have tingling or numbness.     · You feel sick to your stomach, or you vomit.     · You have belly cramps.     · You feel bloated or constipated.     · You have to urinate a lot.     · You feel very thirsty most of the time.     · You are dizzy or lightheaded, or you feel like you may faint.     · You feel depressed, or you lose touch with reality.    Watch closely for changes in your health, and be sure to contact your doctor if:    · You do not get better as expected. Where can you learn more? Go to http://anita-chino.info/.   Enter G358 in the search box to learn more about \"Hypokalemia: Care Instructions. \"  Current as of: November 6, 2018  Content Version: 12.2  © 8333-8561 [employer]. Care instructions adapted under license by [employer] (which disclaims liability or warranty for this information). If you have questions about a medical condition or this instruction, always ask your healthcare professional. [address] any warranty or liability for your use of this information. Patient Education        Kidney Disease and Diabetes: Care Instructions  Your Care Instructions    When you have diabetes, your body cannot make enough insulin or use it the way it should. Your body needs insulin to help sugar move from the blood to the cells. Without it, your blood sugar gets too high. High blood sugar damages your kidneys and makes it hard for them to filter blood. This causes fluid and waste to build up in your blood. If you have diabetes, it is very important to keep your blood sugar in your target range. There are many steps you can take to do this. If you can control your blood sugar, you will have the best chance to slow or stop damage to your kidneys. Follow-up care is a key part of your treatment and safety. Be sure to make and go to all appointments, and call your doctor if you are having problems. It's also a good idea to know your test results and keep a list of the medicines you take. How can you care for yourself at home? To control your diabetes and slow or stop damage to your kidneys  · Keep your blood sugar in your target range. The American Diabetes Association recommends a hemoglobin A1c (Hb A1c) target level of less than 7%. Talk to your doctor about your target. The lower your A1c, the better your chance of stopping kidney damage. · Keep your blood pressure in your target range. Doctors recommend specific types of blood pressure medicines for people who have diabetes and kidney disease.  Examples include ACE inhibitors and angiotensin II receptor blockers (ARBs). Your doctor may have you take one of these even if you don't have high blood pressure. · Take all of your medicines. You may have several. For example, you may take medicines for diabetes, cholesterol, and blood pressure. It's very important to take all of them just as your doctor tells you to and to keep taking them. · Make good food choices. Follow an eating plan that is best for your diabetes and your kidneys. You may want to work with a dietitian to make a plan. A good plan will make sure that you spread carbohydrate throughout the day. It will also make sure that you get the right amount of salt (sodium), fluids, and protein. · Stay at a healthy weight. If you need help to lose weight, talk to your doctor or dietitian. Even small changes can make a difference. Try to be aware of your portion sizes, eat more fruits and vegetables, and add some activity to your daily routine. · Exercise. Get at least 30 minutes of activity on most days of the week. Walking is a great exercise that most people can do. Being more active can help you control your blood sugar and stay at a healthy weight. It also can help you lower cholesterol and blood pressure. To improve your kidney health  · Lower your cholesterol. Keep your LDL less than 100 mg/dL and HDL levels more than 40 mg/dL for men and 50 mg/dL for women. If you have high cholesterol, your doctor may prescribe medicine. He or she may also tell you to eat less saturated fat. · Follow your treatment plan. Check your blood sugar as many times a day as your doctor recommends. Go to all of your follow-up appointments, and be sure to have all the tests your doctor orders. Call your doctor if you think you are having a problem with your medicines. · Take a low-dose aspirin every day if your doctor suggests it. Most doctors believe this can reduce the risk of heart disease and stroke.  Your risk of these diseases is much greater than your risk of kidney failure. · Avoid tobacco. Do not smoke or use other tobacco products. If you need help quitting, talk to your doctor about stop-smoking programs and medicines. These can increase your chances of quitting for good. When should you call for help? Call 911 anytime you think you may need emergency care. For example, call if:    · You passed out (lost consciousness).    Call your doctor now or seek immediate medical care if:    · You have new or worse nausea and vomiting.     · You have much less urine than normal, or you have no urine.     · You are feeling confused or cannot think clearly.     · You have new or more blood in your urine.     · You have new swelling.     · You are dizzy or lightheaded, or you feel like you may faint.    Watch closely for changes in your health, and be sure to contact your doctor if:    · You do not get better as expected. Where can you learn more? Go to http://anita-chino.info/. Enter C726 in the search box to learn more about \"Kidney Disease and Diabetes: Care Instructions. \"  Current as of: October 31, 2018  Content Version: 12.2  © 3549-8267 [employer]. Care instructions adapted under license by [employer] (which disclaims liability or warranty for this information). If you have questions about a medical condition or this instruction, always ask your healthcare professional. [place] [address] any warranty or liability for your use of this information. Patient Education        Learning About Low Blood Sugar (Hypoglycemia) in Diabetes  What is low blood sugar (hypoglycemia)? Hypoglycemia means that your blood sugar is low and your body (especially your brain) is not getting enough fuel. If you have diabetes, your blood sugar can go too low if you take too much of some diabetes medicines.  It can also go too low if you miss a meal. And it can happen if you exercise too hard without eating enough food. Some medicines used to treat other health problems can cause low blood sugar too. What are the symptoms? Symptoms of low blood sugar can start quickly. It may take just 10 to 15 minutes. If you have had diabetes for many years, you may not realize that your blood sugar is low until it drops very low. · If your blood sugar level drops below 70 (mild low blood sugar), you may feel tired, anxious, dizzy, weak, shaky, or sweaty. You may have a fast heartbeat or blurry vision. · If your blood sugar level continues to drop (usually below 40), your behavior may change. You may feel more irritable. You may find it hard to concentrate or talk. And you may feel unsteady when you stand or walk. You may become too weak or confused to eat something with sugar to raise your blood sugar level. · If your blood sugar level drops very low (usually below 20), you may pass out (lose consciousness). Or you may have a seizure or stroke. If you have symptoms of severe low blood sugar, you need to get medical care right away. If you had a low blood sugar level during the night, you may wake up tired or with a headache. Or you may sweat so much during the night that your pajamas or sheets are damp when you wake up. How is low blood sugar treated? You can treat low blood sugar by eating or drinking something that has 15 grams of carbohydrate. These should be quick-sugar foods. Check your blood sugar level again 15 minutes after having a quick-sugar food to make sure your level is getting back to your target range.   Here are examples of quick-sugar foods that have 15 grams of carbohydrate:  · 3 to 4 glucose tablets  · 1 tube of glucose gel  · Hard candy (such as 3 Jolly Ranchers or 5 to 7 Life Savers)  · 1 tablespoon honey  · 2 tablespoons of raisins  · ½ cup to ¾ cup (4 to 6 ounces) of fruit juice or regular (not diet) soda  · 1 tablespoon of sugar  · 1 cup of fat-free milk  If you have problems with severe low blood sugar, someone else may have to give you a shot of glucagon. This is a hormone that raises blood sugar levels quickly. How can you prevent low blood sugar? You can take steps to prevent low blood sugar. · Follow your treatment plan. Take your insulin or other diabetes medicine exactly as your doctor prescribed it. Talk with your doctor if you're having low blood sugar often. Your medicine may need to be adjusted if it's causing your low blood sugar. · Check your blood sugar levels often. This helps you find early changes before an emergency happens. · Keep a quick-sugar food with you in case your blood sugar level drops low. · Eat small meals more often so that you don't get too hungry between meals. Don't skip meals. · Balance extra exercise with eating more. Check your blood sugar and learn how it changes after exercise. If your blood sugar stays at a normal level, you may not need to eat after you exercise. · Limit how much alcohol you drink. Alcohol can make low blood sugar go even lower. Don't drink alcohol if you have problems recognizing the early signs of low blood sugar. · Keep a diary of your symptoms. This helps you learn when changes in your body may signal low blood sugar. And keep track of how often you have low blood sugar, including when you last ate and what you ate. This will help you learn what causes your blood sugar to drop. · Learn about diabetes and low blood sugar. Support groups or a diabetes education center can help you understand how medicines, diet, and exercise affect your blood sugar levels. Since low blood sugar levels can quickly become an emergency, be sure to wear medical alert jewelry, such as a medical alert bracelet. This is to let people know you have diabetes so they can get help for you. You can buy this at most drugstores. And make sure your family, friends, and coworkers know the symptoms of low blood sugar.  Teach them what to do to get your sugar level up.  Follow-up care is a key part of your treatment and safety. Be sure to make and go to all appointments, and call your doctor if you are having problems. It's also a good idea to know your test results and keep a list of the medicines you take. Where can you learn more? Go to http://anita-chino.info/. Enter Y403 in the search box to learn more about \"Learning About Low Blood Sugar (Hypoglycemia) in Diabetes. \"  Current as of: April 16, 2019  Content Version: 12.2  © 6098-6269 [employer]. Care instructions adapted under license by [employer] (which disclaims liability or warranty for this information). If you have questions about a medical condition or this instruction, always ask your healthcare professional. [address] any warranty or liability for your use of this information.

## 2020-02-08 NOTE — ED PROVIDER NOTES
EMERGENCY DEPARTMENT HISTORY AND PHYSICAL EXAM    Date: 2/8/2020  Patient Name: Kelechi Valdez    History of Presenting Illness     Chief Complaint   Patient presents with    Vomiting         History Provided By: Patient    Chief Complaint: vomiting, abdominal pain  Duration: Days  Timing:  Acute  Location: abdomen and chest  Quality: Sharp  Severity: Severe  Modifying Factors: eating  Associated Symptoms: denies any other associated signs or symptoms    Additional History (Context):   72:16 AM  Kelechi Valdez is a 37 y.o. female with PMHX of gastroparesis and type 2 diabetes who presents to the emergency department C/O of pain and vomiting. Associated sxs include chest burning. Pt denies fevers, and any other sxs or complaints. Patient had recent hospitalization little over week ago for gastroparesis and was released. She had re-onset of abdominal pain vomiting yesterday. She's had more than 10 episodes of vomiting since then no fevers. She has abdominal cramping and chest pain. Her symptoms are worse when she tries to eat or drink. PCP: Raheem Penaloza MD    Current Facility-Administered Medications   Medication Dose Route Frequency Provider Last Rate Last Dose    dextrose 5% - 0.9% NaCl with KCl 20 mEq/L infusion  500 mL/hr IntraVENous CONTINUOUS Samanta De La Paz  mL/hr at 02/08/20 1230 500 mL/hr at 02/08/20 1230     Current Outpatient Medications   Medication Sig Dispense Refill    metoclopramide HCl (REGLAN) 5 mg tablet Take 1 Tab by mouth every six (6) hours as needed for Nausea for up to 3 days. 12 Tab 0    potassium chloride SA (MICRO-K) 10 mEq capsule Take 1 Cap by mouth daily. 5 Cap 0    ondansetron (ZOFRAN ODT) 4 mg disintegrating tablet Take 1 Tab by mouth every eight (8) hours as needed for Nausea or Vomiting. 20 Tab 0    pantoprazole (PROTONIX) 40 mg tablet Take 1 Tab by mouth two (2) times a day. 60 Tab 0    amLODIPine (NORVASC) 5 mg tablet Take 1 Tab by mouth daily.  30 Tab 0  insulin glargine (LANTUS U-100 INSULIN) 100 unit/mL injection 30 Units by SubCUTAneous route nightly. Indications: type 2 diabetes mellitus      insulin admin. supplies (INPEN, [employer] [place]) 10-12 Units by SubCUTAneous route Before breakfast, lunch, and dinner. Past History     Past Medical History:  Past Medical History:   Diagnosis Date    Diabetes ([address]     Gastroparesis     Gastroparesis     Hypertension     UTI (urinary tract infection)        Past Surgical History:  Past Surgical History:   Procedure Laterality Date    HX APPENDECTOMY      HX GYN      tubal ligation, C Section       Family History:  History reviewed. No pertinent family history. Social History:  Social History     Tobacco Use    Smoking status: Never Smoker    Smokeless tobacco: Never Used   Substance Use Topics    Alcohol use: Never     Frequency: Never    Drug use: Not Currently       Allergies: Allergies   Allergen Reactions    Compazine [Prochlorperazine] Other (comments)     Dystonic Reaction         Review of Systems   Review of Systems   Constitutional: Negative for activity change, appetite change, fever and unexpected weight change. HENT: Negative for congestion and sore throat. Eyes: Negative for pain and redness. Respiratory: Negative for cough and shortness of breath. Cardiovascular: Positive for chest pain. Negative for palpitations. Gastrointestinal: Positive for abdominal pain, nausea and vomiting. Negative for diarrhea. Endocrine: Negative for polydipsia and polyuria. Genitourinary: Negative for difficulty urinating and dysuria. Musculoskeletal: Negative for back pain and neck pain. Skin: Negative for pallor and rash. Neurological: Negative for weakness, light-headedness and headaches. All other systems reviewed and are negative.       Physical Exam     Vitals:    02/08/20 0944 02/08/20 1152 02/08/20 1524   BP: (!) 196/92 181/89 173/82   Pulse: 99 98 100   Resp: 20 20 20 Temp: 99 °F (37.2 °C)     SpO2: 100%  100%   Weight: 57.6 kg (127 lb)     Height: 5' 4\" (1.626 m)       Physical Exam  Vitals signs and nursing note reviewed. Constitutional:       Appearance: She is well-developed. She is ill-appearing. HENT:      Head: Normocephalic and atraumatic. Right Ear: Tympanic membrane and external ear normal.      Left Ear: Tympanic membrane and external ear normal.      Nose: Nose normal.      Mouth/Throat:      Mouth: Mucous membranes are dry. Comments: Poor dentition with halitosis  Eyes:      Extraocular Movements: Extraocular movements intact. Conjunctiva/sclera: Conjunctivae normal.      Pupils: Pupils are equal, round, and reactive to light. Neck:      Musculoskeletal: Normal range of motion and neck supple. Vascular: No JVD. Trachea: No tracheal deviation. Cardiovascular:      Rate and Rhythm: Normal rate and regular rhythm. Heart sounds: Normal heart sounds. No murmur. No friction rub. No gallop. Pulmonary:      Effort: Pulmonary effort is normal. No respiratory distress. Breath sounds: Normal breath sounds. No wheezing or rales. Abdominal:      General: There is no distension. Palpations: Abdomen is soft. There is no mass. Tenderness: There is abdominal tenderness. There is no guarding or rebound. Comments: Decreased bowel sounds with diffuse upper abdominal tenderness   Musculoskeletal: Normal range of motion. General: No tenderness. Skin:     General: Skin is warm and dry. Capillary Refill: Capillary refill takes less than 2 seconds. Findings: No rash. Neurological:      General: No focal deficit present. Mental Status: She is alert and oriented to person, place, and time. Cranial Nerves: No cranial nerve deficit. Deep Tendon Reflexes: Reflexes are normal and symmetric.    Psychiatric:         Mood and Affect: Mood normal.         Behavior: Behavior normal.           Diagnostic Study Results     Labs -     Recent Results (from the past 24 hour(s))   CBC WITH AUTOMATED DIFF    Collection Time: 02/08/20 10:20 AM   Result Value Ref Range    WBC 12.4 4.6 - 13.2 K/uL    RBC 3.43 (L) 4.20 - 5.30 M/uL    HGB 9.4 (L) 12.0 - 16.0 g/dL    HCT 29.1 (L) 35.0 - 45.0 %    MCV 84.8 74.0 - 97.0 FL    MCH 27.4 24.0 - 34.0 PG    MCHC 32.3 31.0 - 37.0 g/dL    RDW 14.9 (H) 11.6 - 14.5 %    PLATELET 235 (H) 219 - 420 K/uL    MPV 9.5 9.2 - 11.8 FL    NEUTROPHILS 75 (H) 40 - 73 %    LYMPHOCYTES 19 (L) 21 - 52 %    MONOCYTES 4 3 - 10 %    EOSINOPHILS 1 0 - 5 %    BASOPHILS 1 0 - 2 %    ABS. NEUTROPHILS 9.5 (H) 1.8 - 8.0 K/UL    ABS. LYMPHOCYTES 2.3 0.9 - 3.6 K/UL    ABS. MONOCYTES 0.5 0.05 - 1.2 K/UL    ABS. EOSINOPHILS 0.1 0.0 - 0.4 K/UL    ABS. BASOPHILS 0.1 0.0 - 0.1 K/UL    DF AUTOMATED     METABOLIC PANEL, COMPREHENSIVE    Collection Time: 02/08/20 10:20 AM   Result Value Ref Range    Sodium 141 136 - 145 mmol/L    Potassium 3.3 (L) 3.5 - 5.5 mmol/L    Chloride 110 100 - 111 mmol/L    CO2 21 21 - 32 mmol/L    Anion gap 10 3.0 - 18 mmol/L    Glucose 177 (H) 74 - 99 mg/dL    BUN 27 (H) 7.0 - 18 MG/DL    Creatinine 2.79 (H) 0.6 - 1.3 MG/DL    BUN/Creatinine ratio 10 (L) 12 - 20      GFR est AA 23 (L) >60 ml/min/1.73m2    GFR est non-AA 19 (L) >60 ml/min/1.73m2    Calcium 8.8 8.5 - 10.1 MG/DL    Bilirubin, total 0.4 0.2 - 1.0 MG/DL    ALT (SGPT) 10 (L) 13 - 56 U/L    AST (SGOT) 14 10 - 38 U/L    Alk.  phosphatase 147 (H) 45 - 117 U/L    Protein, total 7.7 6.4 - 8.2 g/dL    Albumin 3.3 (L) 3.4 - 5.0 g/dL    Globulin 4.4 (H) 2.0 - 4.0 g/dL    A-G Ratio 0.8 0.8 - 1.7     TROPONIN I    Collection Time: 02/08/20 10:20 AM   Result Value Ref Range    Troponin-I, QT <0.02 0.0 - 0.045 NG/ML   LIPASE    Collection Time: 02/08/20 10:20 AM   Result Value Ref Range    Lipase 173 73 - 393 U/L   HCG QL SERUM    Collection Time: 02/08/20 10:20 AM   Result Value Ref Range    HCG, Ql. NEGATIVE  NEG     MAGNESIUM    Collection Time: 02/08/20 10:20 AM   Result Value Ref Range    Magnesium 2.1 1.6 - 2.6 mg/dL   EKG, 12 LEAD, INITIAL    Collection Time: 02/08/20 10:45 AM   Result Value Ref Range    Ventricular Rate 97 BPM    Atrial Rate 97 BPM    P-R Interval 144 ms    QRS Duration 80 ms    Q-T Interval 396 ms    QTC Calculation (Bezet) 502 ms    Calculated P Axis 56 degrees    Calculated R Axis 68 degrees    Calculated T Axis 30 degrees    Diagnosis       Normal sinus rhythm  Prolonged QT  Abnormal ECG  When compared with ECG of 01-NOV-2019 13:55,  No significant change was found     URINALYSIS W/ RFLX MICROSCOPIC    Collection Time: 02/08/20 12:00 PM   Result Value Ref Range    Color YELLOW      Appearance CLEAR      Specific gravity 1.013 1.005 - 1.030      pH (UA) 7.0 5.0 - 8.0      Protein 300 (A) NEG mg/dL    Glucose 500 (A) NEG mg/dL    Ketone 15 (A) NEG mg/dL    Bilirubin NEGATIVE  NEG      Blood MODERATE (A) NEG      Urobilinogen 0.2 0.2 - 1.0 EU/dL    Nitrites NEGATIVE  NEG      Leukocyte Esterase NEGATIVE  NEG     URINE MICROSCOPIC ONLY    Collection Time: 02/08/20 12:00 PM   Result Value Ref Range    WBC 0 to 3 0 - 5 /hpf    RBC 4 to 10 0 - 5 /hpf    Epithelial cells FEW 0 - 5 /lpf    Bacteria FEW (A) NEG /hpf       Radiologic Studies -  XR ABD (KUB)   Final Result   IMPRESSION:      No bowel obstruction or other acute findings seen. XR CHEST PORT   Final Result   IMPRESSION:      No active cardiopulmonary disease. CT Results  (Last 48 hours)    None        CXR Results  (Last 48 hours)               02/08/20 1139  XR CHEST PORT Final result    Impression:  IMPRESSION:       No active cardiopulmonary disease. Narrative:  EXAM: CHEST RADIOGRAPH, SINGLE VIEW       CLINICAL INDICATION/HISTORY: Chest pain       COMPARISON: 12/29/2019       TECHNIQUE: Portable frontal view of the chest was obtained.        _______________       FINDINGS:       SUPPORT DEVICES: None.        HEART AND MEDIASTINUM: Cardiomediastinal silhouette appears within normal   limits. Normal caliber thoracic aorta. No central vascular congestion. LUNGS AND PLEURAL SPACES: Lungs are well aerated with no confluent airspace   opacity. No pleural effusion or pneumothorax. BONY THORAX AND SOFT TISSUES: No acute osseous abnormality. _______________                 Medications given in the ED-  Medications   dextrose 5% - 0.9% NaCl with KCl 20 mEq/L infusion (500 mL/hr IntraVENous New Bag 2/8/20 1230)   sodium chloride 0.9 % bolus infusion 1,000 mL (0 mL IntraVENous IV Completed 2/8/20 1100)   ondansetron (ZOFRAN) injection 4 mg (4 mg IntraVENous Given 2/8/20 1039)   ketorolac (TORADOL) injection 15 mg (15 mg IntraVENous Given 2/8/20 1039)   sodium chloride 0.9 % bolus infusion 1,000 mL (0 mL IntraVENous IV Completed 2/8/20 1151)   metoclopramide HCl (REGLAN) injection 10 mg (10 mg IntraVENous Given 2/8/20 1125)   diphenhydrAMINE (BENADRYL) injection 25 mg (25 mg IntraVENous Given 2/8/20 1124)   nalbuphine (NUBAIN) injection 10 mg (10 mg IntraVENous Given 2/8/20 1520)   pantoprazole (PROTONIX) injection 40 mg (40 mg IntraVENous Given 2/8/20 1520)   mylanta/viscous lidocaine (GI COCKTAIL) (40 mL Oral Given 2/8/20 1520)         Medical Decision Making   I am the first provider for this patient. I reviewed the vital signs, available nursing notes, past medical history, past surgical history, family history and social history. Vital Signs-Reviewed the patient's vital signs. EKG interpretation: (Preliminary)  10:45  Sinus rhythm at 97 with normal axis prolonged QT and no ST-T wave changes suggestive of ischemia  ECG read by Slime Paulino MD    Records Reviewed: Nursing Notes    Provider Notes (Medical Decision Making):   DDx-gastroparesis, GERD, gastritis, dehydration, electrode abnormality    Procedures:  Procedures    ED Course:   Initial assessment performed.  The patients presenting problems have been discussed, and they are in agreement with the care plan formulated and outlined with them. I have encouraged them to ask questions as they arise throughout their visit. 14:10  Patient does not feel any better she has persistent complaints of abdominal pain and nausea. She says she has chest burning. Diagnosis and Disposition   DISCUSSION:  The patient was stable emergency department improved after IV normal saline hydration, Benadryl, Reglan, Protonix, Toradol, Nubain and GI cocktail. She had decreased epigastric and chest burning. Labs remarkable for leukocytosis, hypokalemia, elevated BUN/creatinine, anemia. Patient was given potassium replacement. ECG and troponin showed no evidence acute coronary syndrome or arrhythmia. CXR and KUB were unremarkable. Patient advised to follow-up with PCP for further evaluation. Patient given prescription of Reglan and potassium. Patient advised to return to the emergency department she develops recurrent pain, vomiting, fevers or shortness of breath. DISCHARGE NOTE:  Eladio Garcia  results have been reviewed with her. She has been counseled regarding her diagnosis, treatment, and plan. She verbally conveys understanding and agreement of the signs, symptoms, diagnosis, treatment and prognosis and additionally agrees to follow up as discussed. She also agrees with the care-plan and conveys that all of her questions have been answered. I have also provided discharge instructions for her that include: educational information regarding their diagnosis and treatment, and list of reasons why they would want to return to the ED prior to their follow-up appointment, should her condition change. She has been provided with education for proper emergency department utilization. CLINICAL IMPRESSION:    1. Nausea and vomiting, intractability of vomiting not specified, unspecified vomiting type    2. History of diabetic gastroparesis    3. Hypokalemia    4. [facility]DM ([facility] dependent diabetes mellitus) [facility]    5. Renal insufficiency        PLAN:  1. D/C Home  2. Current Discharge Medication List      START taking these medications    Details   metoclopramide HCl (REGLAN) 5 mg tablet Take 1 Tab by mouth every six (6) hours as needed for Nausea for up to 3 days. Qty: 12 Tab, Refills: 0      potassium chloride SA (MICRO-K) 10 mEq capsule Take 1 Cap by mouth daily. Qty: 5 Cap, Refills: 0           3. Follow-up Information     Follow up With Specialties Details Why Contact Info    Kylie Rey MD Family Practice Call in 2 days For further evaluation [phone] [facility]  [phone]                Please note that this dictation was completed with [employer], the computer voice recognition software. Quite often unanticipated grammatical, syntax, homophones, and other interpretive errors are inadvertently transcribed by the computer software. Please disregard these errors. Please excuse any errors that have escaped final proofreading.

## 2020-02-09 ENCOUNTER — HOSPITAL ENCOUNTER (OUTPATIENT)
Age: 35
Setting detail: OBSERVATION
Discharge: HOME | End: 2020-02-11
Attending: EMERGENCY MEDICINE | Admitting: FAMILY MEDICINE
Payer: MEDICAID

## 2020-02-09 VITALS
SYSTOLIC BLOOD PRESSURE: 140 MMHG | OXYGEN SATURATION: 100 % | DIASTOLIC BLOOD PRESSURE: 78 MMHG | RESPIRATION RATE: 17 BRPM | HEART RATE: 99 BPM | TEMPERATURE: 98.2 F

## 2020-02-09 DIAGNOSIS — R11.2 INTRACTABLE NAUSEA AND VOMITING: ICD-10-CM

## 2020-02-09 DIAGNOSIS — Z79.4 TYPE 2 DIABETES MELLITUS WITH HYPERGLYCEMIA, WITH LONG-TERM CURRENT USE OF INSULIN (HCC): ICD-10-CM

## 2020-02-09 DIAGNOSIS — E11.65 TYPE 2 DIABETES MELLITUS WITH HYPERGLYCEMIA, WITH LONG-TERM CURRENT USE OF INSULIN (HCC): ICD-10-CM

## 2020-02-09 DIAGNOSIS — E11.43 DIABETIC GASTROPARALYSIS (HCC): Primary | ICD-10-CM

## 2020-02-09 DIAGNOSIS — K31.84 DIABETIC GASTROPARALYSIS (HCC): Primary | ICD-10-CM

## 2020-02-09 DIAGNOSIS — R10.9 RECURRENT ABDOMINAL PAIN: ICD-10-CM

## 2020-02-09 LAB
ALBUMIN SERPL-MCNC: 3 G/DL (ref 3.4–5)
ALBUMIN/GLOB SERPL: 0.8 {RATIO} (ref 0.8–1.7)
ALP SERPL-CCNC: 139 U/L (ref 45–117)
ALT SERPL-CCNC: 10 U/L (ref 13–56)
ANION GAP SERPL CALC-SCNC: 8 MMOL/L (ref 3–18)
AST SERPL-CCNC: 16 U/L (ref 10–38)
BASOPHILS # BLD: 0 K/UL (ref 0–0.1)
BASOPHILS NFR BLD: 0 % (ref 0–2)
BILIRUB SERPL-MCNC: 0.3 MG/DL (ref 0.2–1)
BUN SERPL-MCNC: 25 MG/DL (ref 7–18)
BUN/CREAT SERPL: 8 (ref 12–20)
CALCIUM SERPL-MCNC: 8.3 MG/DL (ref 8.5–10.1)
CHLORIDE SERPL-SCNC: 107 MMOL/L (ref 100–111)
CO2 SERPL-SCNC: 23 MMOL/L (ref 21–32)
CREAT SERPL-MCNC: 3.14 MG/DL (ref 0.6–1.3)
DIFFERENTIAL METHOD BLD: ABNORMAL
EOSINOPHIL # BLD: 0.1 K/UL (ref 0–0.4)
EOSINOPHIL NFR BLD: 1 % (ref 0–5)
ERYTHROCYTE [DISTWIDTH] IN BLOOD BY AUTOMATED COUNT: 14.5 % (ref 11.6–14.5)
GLOBULIN SER CALC-MCNC: 3.9 G/DL (ref 2–4)
GLUCOSE SERPL-MCNC: 201 MG/DL (ref 74–99)
HCT VFR BLD AUTO: 26.1 % (ref 35–45)
HGB BLD-MCNC: 8.6 G/DL (ref 12–16)
LACTATE BLD-SCNC: 0.96 MMOL/L (ref 0.4–2)
LYMPHOCYTES # BLD: 2.4 K/UL (ref 0.9–3.6)
LYMPHOCYTES NFR BLD: 11 % (ref 21–52)
MAGNESIUM SERPL-MCNC: 1.8 MG/DL (ref 1.6–2.6)
MCH RBC QN AUTO: 28.5 PG (ref 24–34)
MCHC RBC AUTO-ENTMCNC: 33 G/DL (ref 31–37)
MCV RBC AUTO: 86.4 FL (ref 74–97)
MONOCYTES # BLD: 1.1 K/UL (ref 0.05–1.2)
MONOCYTES NFR BLD: 5 % (ref 3–10)
NEUTS SEG # BLD: 17.1 K/UL (ref 1.8–8)
NEUTS SEG NFR BLD: 83 % (ref 40–73)
PHOSPHATE SERPL-MCNC: 2.5 MG/DL (ref 2.5–4.9)
PLATELET # BLD AUTO: 393 K/UL (ref 135–420)
PMV BLD AUTO: 10.1 FL (ref 9.2–11.8)
POTASSIUM SERPL-SCNC: 3.2 MMOL/L (ref 3.5–5.5)
PROT SERPL-MCNC: 6.9 G/DL (ref 6.4–8.2)
RBC # BLD AUTO: 3.02 M/UL (ref 4.2–5.3)
SODIUM SERPL-SCNC: 138 MMOL/L (ref 136–145)
WBC # BLD AUTO: 20.7 K/UL (ref 4.6–13.2)

## 2020-02-09 PROCEDURE — 96375 TX/PRO/DX INJ NEW DRUG ADDON: CPT

## 2020-02-09 PROCEDURE — 96374 THER/PROPH/DIAG INJ IV PUSH: CPT

## 2020-02-09 PROCEDURE — 65270000029 HC RM PRIVATE

## 2020-02-09 PROCEDURE — 74011250636 HC RX REV CODE- 250/636: Performed by: EMERGENCY MEDICINE

## 2020-02-09 PROCEDURE — 80053 COMPREHEN METABOLIC PANEL: CPT

## 2020-02-09 PROCEDURE — 74011000258 HC RX REV CODE- 258: Performed by: EMERGENCY MEDICINE

## 2020-02-09 PROCEDURE — 85025 COMPLETE CBC W/AUTO DIFF WBC: CPT

## 2020-02-09 PROCEDURE — 87040 BLOOD CULTURE FOR BACTERIA: CPT

## 2020-02-09 PROCEDURE — 83735 ASSAY OF MAGNESIUM: CPT

## 2020-02-09 PROCEDURE — 99218 HC RM OBSERVATION: CPT

## 2020-02-09 PROCEDURE — 99285 EMERGENCY DEPT VISIT HI MDM: CPT

## 2020-02-09 PROCEDURE — 74011000250 HC RX REV CODE- 250: Performed by: EMERGENCY MEDICINE

## 2020-02-09 PROCEDURE — 83605 ASSAY OF LACTIC ACID: CPT

## 2020-02-09 PROCEDURE — 83690 ASSAY OF LIPASE: CPT

## 2020-02-09 PROCEDURE — 84100 ASSAY OF PHOSPHORUS: CPT

## 2020-02-09 PROCEDURE — 93005 ELECTROCARDIOGRAM TRACING: CPT

## 2020-02-09 PROCEDURE — 74011250637 HC RX REV CODE- 250/637: Performed by: EMERGENCY MEDICINE

## 2020-02-09 PROCEDURE — 96372 THER/PROPH/DIAG INJ SC/IM: CPT

## 2020-02-09 RX ORDER — INSULIN LISPRO 100 [IU]/ML
INJECTION, SOLUTION INTRAVENOUS; SUBCUTANEOUS EVERY 6 HOURS
Status: DISCONTINUED | OUTPATIENT
Start: 2020-02-10 | End: 2020-02-11 | Stop reason: HOSPADM

## 2020-02-09 RX ORDER — METOCLOPRAMIDE 5 MG/1
5 TABLET ORAL
Status: DISCONTINUED | OUTPATIENT
Start: 2020-02-09 | End: 2020-02-10

## 2020-02-09 RX ORDER — MAGNESIUM SULFATE 100 %
16 CRYSTALS MISCELLANEOUS AS NEEDED
Status: DISCONTINUED | OUTPATIENT
Start: 2020-02-09 | End: 2020-02-11 | Stop reason: HOSPADM

## 2020-02-09 RX ORDER — DEXTROSE MONOHYDRATE AND SODIUM CHLORIDE 5; .9 G/100ML; G/100ML
1000 INJECTION, SOLUTION INTRAVENOUS ONCE
Status: COMPLETED | OUTPATIENT
Start: 2020-02-09 | End: 2020-02-09

## 2020-02-09 RX ORDER — HALOPERIDOL 5 MG/ML
5 INJECTION INTRAMUSCULAR ONCE
Status: COMPLETED | OUTPATIENT
Start: 2020-02-09 | End: 2020-02-09

## 2020-02-09 RX ORDER — SODIUM CHLORIDE 9 MG/ML
100 INJECTION, SOLUTION INTRAVENOUS CONTINUOUS
Status: DISCONTINUED | OUTPATIENT
Start: 2020-02-09 | End: 2020-02-11 | Stop reason: HOSPADM

## 2020-02-09 RX ORDER — DEXTROSE MONOHYDRATE 100 MG/ML
125-250 INJECTION, SOLUTION INTRAVENOUS AS NEEDED
Status: DISCONTINUED | OUTPATIENT
Start: 2020-02-09 | End: 2020-02-11 | Stop reason: HOSPADM

## 2020-02-09 RX ORDER — AMLODIPINE BESYLATE 5 MG/1
5 TABLET ORAL DAILY
Status: DISCONTINUED | OUTPATIENT
Start: 2020-02-10 | End: 2020-02-11 | Stop reason: HOSPADM

## 2020-02-09 RX ORDER — DIPHENHYDRAMINE HYDROCHLORIDE 50 MG/ML
25 INJECTION, SOLUTION INTRAMUSCULAR; INTRAVENOUS
Status: COMPLETED | OUTPATIENT
Start: 2020-02-09 | End: 2020-02-09

## 2020-02-09 RX ORDER — FAMOTIDINE 10 MG/ML
20 INJECTION INTRAVENOUS
Status: COMPLETED | OUTPATIENT
Start: 2020-02-09 | End: 2020-02-09

## 2020-02-09 RX ORDER — DIPHENHYDRAMINE HYDROCHLORIDE 50 MG/ML
50 INJECTION, SOLUTION INTRAMUSCULAR; INTRAVENOUS ONCE
Status: COMPLETED | OUTPATIENT
Start: 2020-02-09 | End: 2020-02-09

## 2020-02-09 RX ADMIN — DIPHENHYDRAMINE HYDROCHLORIDE 50 MG: 50 INJECTION, SOLUTION INTRAMUSCULAR; INTRAVENOUS at 00:18

## 2020-02-09 RX ADMIN — PROMETHAZINE HYDROCHLORIDE 12.5 MG: 25 INJECTION, SOLUTION INTRAMUSCULAR; INTRAVENOUS at 21:20

## 2020-02-09 RX ADMIN — LIDOCAINE HYDROCHLORIDE 50 ML: 20 SOLUTION ORAL; TOPICAL at 21:19

## 2020-02-09 RX ADMIN — DIPHENHYDRAMINE HYDROCHLORIDE 25 MG: 50 INJECTION, SOLUTION INTRAMUSCULAR; INTRAVENOUS at 18:47

## 2020-02-09 RX ADMIN — DEXTROSE MONOHYDRATE AND SODIUM CHLORIDE 1000 ML/HR: 5; .9 INJECTION, SOLUTION INTRAVENOUS at 18:45

## 2020-02-09 RX ADMIN — HALOPERIDOL LACTATE 5 MG: 5 INJECTION INTRAMUSCULAR at 18:48

## 2020-02-09 RX ADMIN — HALOPERIDOL LACTATE 5 MG: 5 INJECTION INTRAMUSCULAR at 00:18

## 2020-02-09 RX ADMIN — FAMOTIDINE 20 MG: 10 INJECTION, SOLUTION INTRAVENOUS at 21:19

## 2020-02-09 NOTE — ED TRIAGE NOTES
Patient arrive via EMS for nausea and vomiting since this morning, blood glucose 175, a/ox4, speaking in full sentences

## 2020-02-09 NOTE — ED TRIAGE NOTES
Pt arrives via EMS c/o nausea, pt was seen at this facility earlier for same, discharged with prescriptions, did not get them filled.

## 2020-02-09 NOTE — ED PROVIDER NOTES
EMERGENCY DEPARTMENT HISTORY AND PHYSICAL EXAM    Date: 2/8/2020  Patient Name: Lawernce Cowden    History of Presenting Illness     Chief Complaint   Patient presents with    Nausea         History Provided By: Patient    Additional History (Context):   07:54 AM    Lawernce Cowden is a 37 y.o. female with PMHX of HTN, DM and Gastroparesis who presents to the emergency department via EMS C/O Nausea and abdominal pain. Pt was seen earlier this morning and diagnosed with N/V. Reglan and Micro-K were prescribed. Associated sxs include chest pain when breathing, N/V/D. Sxs are similar to earlier sxs. Pt denies any recent fever    Social History:  No smoke use. No alcohol use. No illicit drug use. Family History:  No pertinent history      PCP: Frances Gillette MD    Current Outpatient Medications   Medication Sig Dispense Refill    acetaminophen (TYLENOL) 500 mg tablet Take 2 Tabs by mouth every eight (8) hours as needed for Pain. 30 Tab 0    lidocaine (LIDODERM) 5 % Apply patch to the affected area for 12 hours a day and remove for 12 hours a day. 15 Each 0    ciprofloxacin HCl (CIPRO) 250 mg tablet Take 1 Tab by mouth two (2) times a day for 3 days. 6 Tab 0    potassium chloride SA (MICRO-K) 10 mEq capsule Take 1 Cap by mouth daily. 5 Cap 0    ondansetron (ZOFRAN ODT) 4 mg disintegrating tablet Take 1 Tab by mouth every eight (8) hours as needed for Nausea or Vomiting. 20 Tab 0    pantoprazole (PROTONIX) 40 mg tablet Take 1 Tab by mouth two (2) times a day. 60 Tab 0    amLODIPine (NORVASC) 5 mg tablet Take 1 Tab by mouth daily. 30 Tab 0    insulin glargine (LANTUS U-100 INSULIN) 100 unit/mL injection 30 Units by SubCUTAneous route nightly. Indications: type 2 diabetes mellitus      insulin admin. supplies [facility] [place] [place] 10-12 Units by SubCUTAneous route Before breakfast, lunch, and dinner.          Past History     Past Medical History:  Past Medical History:   Diagnosis Date    Diabetes ([facility] [address]     Gastroparesis     Gastroparesis     Hypertension     UTI (urinary tract infection)        Past Surgical History:  Past Surgical History:   Procedure Laterality Date    HX APPENDECTOMY      HX GYN      tubal ligation, C Section       Family History:  History reviewed. No pertinent family history. Social History:  Social History     Tobacco Use    Smoking status: Never Smoker    Smokeless tobacco: Never Used   Substance Use Topics    Alcohol use: Never     Frequency: Never    Drug use: Not Currently       Allergies: Allergies   Allergen Reactions    Compazine [Prochlorperazine] Other (comments)     Dystonic Reaction         Review of Systems   Review of Systems   Constitutional: Negative. Negative for chills, diaphoresis and fever. HENT: Negative. Negative for congestion, sore throat and trouble swallowing. Eyes: Negative. Negative for photophobia, pain and redness. Respiratory: Negative. Negative for cough, chest tightness, shortness of breath and wheezing. Cardiovascular: Positive for chest pain (Secondary to vomiting). Negative for palpitations. Gastrointestinal: Positive for abdominal pain, diarrhea, nausea and vomiting. Negative for blood in stool. Genitourinary: Negative for difficulty urinating, dysuria and frequency. Musculoskeletal: Negative. Negative for arthralgias, myalgias, neck pain and neck stiffness. Skin: Negative. Neurological: Negative. Negative for dizziness, tremors, seizures, syncope, speech difficulty, light-headedness and headaches. Psychiatric/Behavioral: Negative. Negative for confusion. The patient is not nervous/anxious. All other systems reviewed and are negative. Physical Exam     Vitals:    02/09/20 0120 02/09/20 0130 02/09/20 0230 02/09/20 0300   BP: 151/85 141/78 137/77 140/78   Pulse:       Resp:       Temp:       SpO2:         Physical Exam  Vitals signs and nursing note reviewed.    Constitutional:       General: She is not in acute distress. Appearance: She is well-developed. She is ill-appearing (Chronically ). She is not diaphoretic. HENT:      Head: Normocephalic and atraumatic. Right Ear: External ear normal.      Left Ear: External ear normal.      Mouth/Throat:      Pharynx: No oropharyngeal exudate. Comments: Halitosis, with extensive dental decay. Eyes:      General: No scleral icterus. Conjunctiva/sclera: Conjunctivae normal.      Pupils: Pupils are equal, round, and reactive to light. Comments: Mild pallor   Neck:      Musculoskeletal: Normal range of motion and neck supple. Thyroid: No thyromegaly. Vascular: No JVD. Trachea: No tracheal deviation. Comments:  No palpable lymph nodes. Cardiovascular:      Rate and Rhythm: Normal rate and regular rhythm. Heart sounds: Normal heart sounds. Comments: Not tachycardic   Pulmonary:      Effort: Pulmonary effort is normal. No respiratory distress. Breath sounds: Normal breath sounds. No stridor. Abdominal:      General: Bowel sounds are normal. There is no distension. Palpations: Abdomen is soft. Tenderness: There is abdominal tenderness. There is guarding. There is no rebound. Comments: Diffusely tender with voluntary guarding. Musculoskeletal: Normal range of motion. General: No tenderness. Lymphadenopathy:      Cervical: No cervical adenopathy. Skin:     General: Skin is warm and dry. Capillary Refill: Capillary refill takes 2 to 3 seconds. Findings: No erythema or rash. Comments: Slighty diminished skin turgor. Neurological:      General: No focal deficit present. Mental Status: She is alert and oriented to person, place, and time. Cranial Nerves: No cranial nerve deficit. Coordination: Coordination normal.      Deep Tendon Reflexes: Reflexes are normal and symmetric.  Reflexes normal.      Comments: Alert and Oriented x 4  [place] Coma Scale: 15 Psychiatric:         Behavior: Behavior normal.         Thought Content: Thought content normal.         Judgment: Judgment normal.      Comments: Flat affect  Cognition normal        Diagnostic Study Results     Labs -     Recent Results (from the past 12 hour(s))   URINALYSIS W/ RFLX MICROSCOPIC    Collection Time: 02/12/20 11:45 PM   Result Value Ref Range    Color YELLOW      Appearance CLEAR      Specific gravity 1.009 1.005 - 1.030      pH (UA) 6.0 5.0 - 8.0      Protein 300 (A) NEG mg/dL    Glucose 250 (A) NEG mg/dL    Ketone NEGATIVE  NEG mg/dL    Bilirubin NEGATIVE  NEG      Blood SMALL (A) NEG      Urobilinogen 0.2 0.2 - 1.0 EU/dL    Nitrites NEGATIVE  NEG      Leukocyte Esterase NEGATIVE  NEG     URINE MICROSCOPIC ONLY    Collection Time: 02/12/20 11:45 PM   Result Value Ref Range    WBC 4 to 10 0 - 5 /hpf    RBC 1 to 3 0 - 5 /hpf    Epithelial cells 1+ 0 - 5 /lpf    Bacteria RARE NEG /hpf       Radiologic Studies -   No orders to display     CT Results  (Last 48 hours)    None        CXR Results  (Last 48 hours)    None          Medications given in the ED-  Medications   haloperidol lactate (HALDOL) injection 5 mg (5 mg IntraMUSCular Given 2/9/20 0018)   diphenhydrAMINE (BENADRYL) injection 50 mg (50 mg IntraMUSCular Given 2/9/20 0018)         Medical Decision Making   I am the first provider for this patient. I reviewed the vital signs, available nursing notes, past medical history, past surgical history, family history and social history. Vital Signs-Reviewed the patient's vital signs. Pulse Oximetry Analysis - 100% on RA       Records Reviewed: NURSING NOTES AND PREVIOUS MEDICAL RECORDS    Provider Notes (Medical Decision Making): She returned with diabetic gastroparesis which is a frequent problem for her. However she was just seen today and released PM after getting two liters of fluid, Zofran, Toradol, Reglan, Potassium, Nubain, Protonix and GI cocktail. Her records show today that there were chronic proteins in her urine but only 15 mg/dl ketones. She has no DKA, normal blood sugar, LFT'S are unremarkable, Troponin is normal and not pregnant. Last CAT scan on 12/25/19 showed chronic perinephric stranding and normal KUB XR. Will continue with symptomatic treatment. Procedures:  Procedures    ED Course:   11:47 PM: Initial assessment performed. The patients presenting problems have been discussed, and they are in agreement with the care plan formulated and outlined with them. I have encouraged them to ask questions as they arise throughout their visit. Diagnosis and Disposition       DISCHARGE NOTE:  0:65 AM    Phyllis Chong's  results have been reviewed with her. She has been counseled regarding her diagnosis, treatment, and plan. She verbally conveys understanding and agreement of the signs, symptoms, diagnosis, treatment and prognosis and additionally agrees to follow up as discussed. She also agrees with the care-plan and conveys that all of her questions have been answered. I have also provided discharge instructions for her that include: educational information regarding their diagnosis and treatment, and list of reasons why they would want to return to the ED prior to their follow-up appointment, should her condition change. She has been provided with education for proper emergency department utilization. CLINICAL IMPRESSION:    1. Recurrent vomiting    2. Gastroparesis due to secondary diabetes ([facility] [address]        PLAN:  1. D/C Home  2. Discharge Medication List as of 2/9/2020  4:33 AM      CONTINUE these medications which have NOT CHANGED    Details   metoclopramide HCl (REGLAN) 5 mg tablet Take 1 Tab by mouth every six (6) hours as needed for Nausea for up to 3 days. , Print, Disp-12 Tab, R-0      potassium chloride SA (MICRO-K) 10 mEq capsule Take 1 Cap by mouth daily. , Print, Disp-5 Cap, R-0      !! ondansetron (ZOFRAN ODT) 4 mg disintegrating tablet Take 1 Tab by mouth every eight (8) hours as needed for Nausea. , Print, Disp-9 Tab, R-0      !! ondansetron (ZOFRAN ODT) 4 mg disintegrating tablet Take 1 Tab by mouth every eight (8) hours as needed for Nausea or Vomiting., Normal, Disp-20 Tab, R-0      pantoprazole (PROTONIX) 40 mg tablet Take 1 Tab by mouth two (2) times a day., Normal, Disp-60 Tab, R-0      amLODIPine (NORVASC) 5 mg tablet Take 1 Tab by mouth daily. , Normal, Disp-30 Tab, R-0      insulin glargine (LANTUS U-100 INSULIN) 100 unit/mL injection 30 Units by SubCUTAneous route nightly. Indications: type 2 diabetes mellitus, Historical Med      insulin admin. supplies (INPEN, FOR [facility], [place]) 10-12 Units by SubCUTAneous route Before breakfast, lunch, and dinner., Historical Med       !! - Potential duplicate medications found. Please discuss with provider. 3.   Follow-up Information     Follow up With Specialties Details Why Contact Info    Aspen Grimes MD [facility] Schedule an appointment as soon as possible for a visit in 2 days For primary care follow up [phone] EMERGENCY DEPT Emergency Medicine Go to As needed, if symptoms worsen [address] [place] [id number] [phone]        _______________________________    This note was partially transcribed via voice recognition software. Although efforts have been made to catch any discrepancies, it may contain sound alike words, grammatical errors, or nonsensical words. Attestations: This note is prepared by LUANNE , acting as Scribe for Clau. Thiago Jennings MD .    [employer]. Thiago Jennings MD :  The scribe's documentation has been prepared under my direction and personally reviewed by me in its entirety. I confirm that the note above accurately reflects all work, treatment, procedures, and medical decision making performed by me.

## 2020-02-09 NOTE — DISCHARGE INSTRUCTIONS
Patient Education        Diabetic Neuropathy: Care Instructions  Your Care Instructions    When you have diabetes, your blood sugar level may get too high. Over time, high blood sugar levels can damage nerves. This is called diabetic neuropathy. Nerve damage can cause pain, burning, tingling, and numbness and may leave you feeling weak. The feet are often affected. When you have nerve damage in your feet, you cannot feel your feet and toes as well as normal and may not notice cuts or sores. Even a small injury can lead to a serious infection. It is very important that you follow your doctor's advice on foot care. Sometimes diabetes damages nerves that help the body function. If this happens, your blood pressure, sweating, digestion, and urination might be affected. Your doctor may give you a target blood sugar level that is higher or lower than you are used to. Try to keep your blood sugar very close to this target level to prevent more damage. Follow-up care is a key part of your treatment and safety. Be sure to make and go to all appointments, and call your doctor if you are having problems. It's also a good idea to know your test results and keep a list of the medicines you take. How can you care for yourself at home? · Take your medicines exactly as prescribed. Call your doctor if you think you are having a problem with your medicine. It is very important that you take your insulin or diabetes pills as your doctor tells you. · Try to keep blood sugar at your target level. ? Eat a variety of healthy foods, with carbohydrate spread out in your meals. A dietitian can help you plan meals. ? Try to get at least 30 minutes of exercise on most days. ? Check your blood sugar as many times each day as your doctor recommends. · Take and record your blood pressure at home if your doctor tells you to. Learn the importance of the two measures of blood pressure (such as 130 over 80, or 130/80).  To take your blood pressure at home:  ? Ask your doctor to check your blood pressure monitor to be sure it is accurate and the cuff fits you. Also ask your doctor to watch you to make sure that you are using it right. ? Do not use medicine known to raise blood pressure (such as some nasal decongestant sprays) before taking your blood pressure. ? Avoid taking your blood pressure if you have just exercised or are nervous or upset. Rest at least 15 minutes before you take a reading. · Take pain medicines exactly as directed. ? If the doctor gave you a prescription medicine for pain, take it as prescribed. ? If you are not taking a prescription pain medicine, ask your doctor if you can take an over-the-counter medicine. · Do not smoke. Smoking can increase your chance for a heart attack or stroke. If you need help quitting, talk to your doctor about stop-smoking programs and medicines. These can increase your chances of quitting for good. · Limit alcohol to 2 drinks a day for men and 1 drink a day for women. Too much alcohol can cause health problems. · Eat small meals often, rather than 2 or 3 large meals a day. To care for your feet  · Prevent injury by wearing shoes at all times, even when you are indoors. · Do foot care as part of your daily routine. Wash your feet and then rub lotion on your feet, but not between your toes. Use a handheld mirror or magnifying mirror to inspect your feet for blisters, cuts, cracks, or sores. · Have your toenails trimmed and filed straight across. · Wear shoes and socks that fit well. Soft shoes that have good support and that fit well (such as tennis shoes) are best for your feet. · Check your shoes for any loose objects or rough edges before you put them on. · Ask your doctor to check your feet during each visit. Your doctor may notice a foot problem you have missed. · Get early treatment for any foot problem, even a minor one. When should you call for help?   Call your doctor now or seek immediate medical care if:    · You have symptoms of infection, such as:  ? Increased pain, swelling, warmth, or redness. ? Red streaks leading from the area. ? Pus draining from the area. ? A fever.     · You have new or worse numbness, pain, or tingling in any part of your body.    Watch closely for changes in your health, and be sure to contact your doctor if:    · You have a new problem with your feet, such as:  ? A new sore or ulcer. ? A break in the skin that is not healing after several days. ? Bleeding corns or calluses. ? An ingrown toenail.     · You do not get better as expected. Where can you learn more? Go to http://anita-chino.info/. Enter X502 in the search box to learn more about \"Diabetic Neuropathy: Care Instructions. \"  Current as of: April 16, 2019  Content Version: 12.2  © 2018-4891 [employer]. Care instructions adapted under license by [employer] (which disclaims liability or warranty for this information). If you have questions about a medical condition or this instruction, always ask your healthcare professional. [place] [address] any warranty or liability for your use of this information.

## 2020-02-10 ENCOUNTER — APPOINTMENT (OUTPATIENT)
Dept: CT IMAGING | Age: 35
End: 2020-02-10
Attending: EMERGENCY MEDICINE
Payer: MEDICAID

## 2020-02-10 ENCOUNTER — APPOINTMENT (OUTPATIENT)
Dept: GENERAL RADIOLOGY | Age: 35
End: 2020-02-10
Attending: FAMILY MEDICINE
Payer: MEDICAID

## 2020-02-10 LAB
AMPHET UR QL SCN: NEGATIVE
APPEARANCE UR: ABNORMAL
BACTERIA URNS QL MICRO: ABNORMAL /HPF
BARBITURATES UR QL SCN: NEGATIVE
BENZODIAZ UR QL: NEGATIVE
BILIRUB UR QL: NEGATIVE
CANNABINOIDS UR QL SCN: NEGATIVE
CK MB CFR SERPL CALC: 0.8 % (ref 0–4)
CK MB SERPL-MCNC: 1.4 NG/ML (ref 5–25)
CK SERPL-CCNC: 185 U/L (ref 26–192)
COCAINE UR QL SCN: NEGATIVE
COLOR UR: ABNORMAL
EPITH CASTS URNS QL MICRO: ABNORMAL /LPF (ref 0–5)
FLUAV AG NPH QL IA: NEGATIVE
FLUBV AG NOSE QL IA: NEGATIVE
GLUCOSE BLD STRIP.AUTO-MCNC: 135 MG/DL (ref 70–110)
GLUCOSE BLD STRIP.AUTO-MCNC: 141 MG/DL (ref 70–110)
GLUCOSE BLD STRIP.AUTO-MCNC: 142 MG/DL (ref 70–110)
GLUCOSE BLD STRIP.AUTO-MCNC: 169 MG/DL (ref 70–110)
GLUCOSE BLD STRIP.AUTO-MCNC: 204 MG/DL (ref 70–110)
GLUCOSE BLD STRIP.AUTO-MCNC: 376 MG/DL (ref 70–110)
GLUCOSE BLD STRIP.AUTO-MCNC: 380 MG/DL (ref 70–110)
GLUCOSE UR STRIP.AUTO-MCNC: 500 MG/DL
HDSCOM,HDSCOM: NORMAL
HGB UR QL STRIP: ABNORMAL
KETONES UR QL STRIP.AUTO: NEGATIVE MG/DL
LEUKOCYTE ESTERASE UR QL STRIP.AUTO: ABNORMAL
LIPASE SERPL-CCNC: 202 U/L (ref 73–393)
METHADONE UR QL: NEGATIVE
NITRITE UR QL STRIP.AUTO: NEGATIVE
OPIATES UR QL: NEGATIVE
PCP UR QL: NEGATIVE
PH UR STRIP: 7.5 [PH] (ref 5–8)
PROT UR STRIP-MCNC: >1000 MG/DL
RBC #/AREA URNS HPF: ABNORMAL /HPF (ref 0–5)
SP GR UR REFRACTOMETRY: 1.01 (ref 1–1.03)
TROPONIN I SERPL-MCNC: 0.03 NG/ML (ref 0–0.04)
UROBILINOGEN UR QL STRIP.AUTO: 0.2 EU/DL (ref 0.2–1)
WBC URNS QL MICRO: >100 /HPF (ref 0–5)

## 2020-02-10 PROCEDURE — 96375 TX/PRO/DX INJ NEW DRUG ADDON: CPT

## 2020-02-10 PROCEDURE — 87086 URINE CULTURE/COLONY COUNT: CPT

## 2020-02-10 PROCEDURE — 74011000250 HC RX REV CODE- 250: Performed by: FAMILY MEDICINE

## 2020-02-10 PROCEDURE — 99218 HC RM OBSERVATION: CPT

## 2020-02-10 PROCEDURE — C9113 INJ PANTOPRAZOLE SODIUM, VIA: HCPCS | Performed by: FAMILY MEDICINE

## 2020-02-10 PROCEDURE — 74011250637 HC RX REV CODE- 250/637: Performed by: INTERNAL MEDICINE

## 2020-02-10 PROCEDURE — 74176 CT ABD & PELVIS W/O CONTRAST: CPT

## 2020-02-10 PROCEDURE — 96372 THER/PROPH/DIAG INJ SC/IM: CPT

## 2020-02-10 PROCEDURE — 71045 X-RAY EXAM CHEST 1 VIEW: CPT

## 2020-02-10 PROCEDURE — 74011250636 HC RX REV CODE- 250/636: Performed by: FAMILY MEDICINE

## 2020-02-10 PROCEDURE — 82550 ASSAY OF CK (CPK): CPT

## 2020-02-10 PROCEDURE — 81001 URINALYSIS AUTO W/SCOPE: CPT

## 2020-02-10 PROCEDURE — 87804 INFLUENZA ASSAY W/OPTIC: CPT

## 2020-02-10 PROCEDURE — 74011636637 HC RX REV CODE- 636/637: Performed by: FAMILY MEDICINE

## 2020-02-10 PROCEDURE — 74011250637 HC RX REV CODE- 250/637: Performed by: FAMILY MEDICINE

## 2020-02-10 PROCEDURE — 82962 GLUCOSE BLOOD TEST: CPT

## 2020-02-10 PROCEDURE — 87077 CULTURE AEROBIC IDENTIFY: CPT

## 2020-02-10 PROCEDURE — 74011000250 HC RX REV CODE- 250: Performed by: INTERNAL MEDICINE

## 2020-02-10 PROCEDURE — 65270000029 HC RM PRIVATE

## 2020-02-10 PROCEDURE — 36415 COLL VENOUS BLD VENIPUNCTURE: CPT

## 2020-02-10 PROCEDURE — 80307 DRUG TEST PRSMV CHEM ANLYZR: CPT

## 2020-02-10 PROCEDURE — 96376 TX/PRO/DX INJ SAME DRUG ADON: CPT

## 2020-02-10 RX ORDER — METOCLOPRAMIDE HYDROCHLORIDE 5 MG/ML
5 INJECTION INTRAMUSCULAR; INTRAVENOUS
Status: DISCONTINUED | OUTPATIENT
Start: 2020-02-10 | End: 2020-02-11

## 2020-02-10 RX ORDER — MORPHINE SULFATE 2 MG/ML
1 INJECTION, SOLUTION INTRAMUSCULAR; INTRAVENOUS
Status: DISCONTINUED | OUTPATIENT
Start: 2020-02-10 | End: 2020-02-11 | Stop reason: HOSPADM

## 2020-02-10 RX ORDER — HEPARIN SODIUM 5000 [USP'U]/ML
5000 INJECTION, SOLUTION INTRAVENOUS; SUBCUTANEOUS EVERY 8 HOURS
Status: DISCONTINUED | OUTPATIENT
Start: 2020-02-10 | End: 2020-02-11 | Stop reason: HOSPADM

## 2020-02-10 RX ORDER — ONDANSETRON 2 MG/ML
4 INJECTION INTRAMUSCULAR; INTRAVENOUS
Status: DISCONTINUED | OUTPATIENT
Start: 2020-02-10 | End: 2020-02-11 | Stop reason: HOSPADM

## 2020-02-10 RX ORDER — MORPHINE SULFATE 2 MG/ML
2 INJECTION, SOLUTION INTRAMUSCULAR; INTRAVENOUS
Status: DISCONTINUED | OUTPATIENT
Start: 2020-02-10 | End: 2020-02-11 | Stop reason: HOSPADM

## 2020-02-10 RX ADMIN — SODIUM CHLORIDE 40 MG: 9 INJECTION, SOLUTION INTRAMUSCULAR; INTRAVENOUS; SUBCUTANEOUS at 01:04

## 2020-02-10 RX ADMIN — MORPHINE SULFATE 2 MG: 2 INJECTION, SOLUTION INTRAMUSCULAR; INTRAVENOUS at 09:53

## 2020-02-10 RX ADMIN — MORPHINE SULFATE 1 MG: 2 INJECTION, SOLUTION INTRAMUSCULAR; INTRAVENOUS at 05:22

## 2020-02-10 RX ADMIN — SODIUM CHLORIDE 100 ML/HR: 900 INJECTION, SOLUTION INTRAVENOUS at 01:03

## 2020-02-10 RX ADMIN — MORPHINE SULFATE 2 MG: 2 INJECTION, SOLUTION INTRAMUSCULAR; INTRAVENOUS at 20:26

## 2020-02-10 RX ADMIN — LIDOCAINE HYDROCHLORIDE 40 ML: 20 SOLUTION ORAL; TOPICAL at 05:23

## 2020-02-10 RX ADMIN — LIDOCAINE HYDROCHLORIDE 40 ML: 20 SOLUTION ORAL; TOPICAL at 18:55

## 2020-02-10 RX ADMIN — HEPARIN SODIUM 5000 UNITS: 5000 INJECTION INTRAVENOUS; SUBCUTANEOUS at 11:42

## 2020-02-10 RX ADMIN — HEPARIN SODIUM 5000 UNITS: 5000 INJECTION INTRAVENOUS; SUBCUTANEOUS at 04:50

## 2020-02-10 RX ADMIN — SODIUM CHLORIDE 40 MG: 9 INJECTION, SOLUTION INTRAMUSCULAR; INTRAVENOUS; SUBCUTANEOUS at 09:53

## 2020-02-10 RX ADMIN — INSULIN LISPRO 10 UNITS: 100 INJECTION, SOLUTION INTRAVENOUS; SUBCUTANEOUS at 00:57

## 2020-02-10 RX ADMIN — LIDOCAINE HYDROCHLORIDE 40 ML: 20 SOLUTION ORAL; TOPICAL at 01:03

## 2020-02-10 RX ADMIN — AMLODIPINE BESYLATE 5 MG: 5 TABLET ORAL at 09:52

## 2020-02-10 RX ADMIN — SODIUM CHLORIDE 40 MG: 9 INJECTION, SOLUTION INTRAMUSCULAR; INTRAVENOUS; SUBCUTANEOUS at 20:26

## 2020-02-10 RX ADMIN — INSULIN LISPRO 2 UNITS: 100 INJECTION, SOLUTION INTRAVENOUS; SUBCUTANEOUS at 11:42

## 2020-02-10 RX ADMIN — SODIUM CHLORIDE 100 ML/HR: 900 INJECTION, SOLUTION INTRAVENOUS at 11:42

## 2020-02-10 RX ADMIN — SODIUM CHLORIDE 100 ML/HR: 900 INJECTION, SOLUTION INTRAVENOUS at 18:55

## 2020-02-10 RX ADMIN — HEPARIN SODIUM 5000 UNITS: 5000 INJECTION INTRAVENOUS; SUBCUTANEOUS at 18:54

## 2020-02-10 NOTE — H&P
History & Physical    Patient: Andrea Mayers MRN: [id number]  CSN: [id number]    YOB: 1985  Age: 37 y.o. Sex: female      DOA: 2/9/2020  Primary Care Provider:  Chaz King MD      Assessment/Plan     Patient Active Problem List   Diagnosis Code    Gastroparesis K31.84    Dehydration E86.0    Leukocytosis D72.829    Epigastric pain R10.13    Lactic acid acidosis E87.2    Acute kidney injury ([address] N17.9    Intractable vomiting with nausea R11.2    Uncontrolled type 2 diabetes mellitus with hyperglycemia (HCC) E11.65    UTI (urinary tract infection) N39.0    Chronic anemia D64.9    Chest pain R07.9    Anemia D64.9    Hyperglycemia R73.9    Acute renal failure superimposed on stage 3 chronic kidney disease (HCC) N17.9, N18.3    Hordeolum externum of right upper eyelid H00.011    Intractable nausea and vomiting R11.2    Abdominal pain, generalized R10.84    Hx of diabetic gastroparesis Z86.39    Type 2 diabetes mellitus with hyperglycemia, with long-term current use of insulin ([facility]) E11.65, Z79.4    Diabetic gastroparalysis ([facility]) E11.43, K31.84    Occult blood positive stool R19.5    Recurrent abdominal pain R10.9     38 y/o female w/ DMT2 and recurrent diabetic gastroparesis admitted for leukocytosis and recurrent abdominal pain. Her lactic acid is normal and there is no source of infection. She has mild LESLIE on CKD likely due to dehydration.  CT abdomen/pelvis negative for a source of infection.    -trend CBC  -repeat CXR  -check flu swab  -f/u blood and urine cx  -antiemetics prn  -IVF (switch from D5 as this caused hyperglycemia)  -SSI    Prophylaxis: SCDs, protonix BID, heparin SQ    Estimated length of stay : 2 nights    MD Juni Owensist  ----------------------------------------------------------------------------------------------------------------------------------------------------------------------------------------------  CC: abdominal pain, vomiting       HPI:     Jasen Miller is a 37 y.o. female who has PMHx of DMT2 and gastroparesis who presents multiple times to the ER this week with abdominal pain and N/V. Today her WBC was elevated but no new symptoms. No fever or dysuria. She reports just finishing 10 days of amoxicillin given to her by GI but she has no diarrhea. She has not been able to eat anything and keep it down for 3 days. Past Medical History:   Diagnosis Date    Diabetes ([address]     Gastroparesis     Gastroparesis     Hypertension     UTI (urinary tract infection)        Past Surgical History:   Procedure Laterality Date    HX APPENDECTOMY      HX GYN      tubal ligation, C Section       History reviewed. No pertinent family history. Social History     Socioeconomic History    Marital status: SINGLE     Spouse name: Not on file    Number of children: Not on file    Years of education: Not on file    Highest education level: Not on file   Tobacco Use    Smoking status: Never Smoker    Smokeless tobacco: Never Used   Substance and Sexual Activity    Alcohol use: Never     Frequency: Never    Drug use: Not Currently       Prior to Admission medications    Medication Sig Start Date End Date Taking? Authorizing Provider   metoclopramide HCl (REGLAN) 5 mg tablet Take 1 Tab by mouth every six (6) hours as needed for Nausea for up to 3 days. 2/8/20 2/11/20  Adryan Titus MD   potassium chloride SA (MICRO-K) 10 mEq capsule Take 1 Cap by mouth daily. 2/8/20   Adryan Titus MD   ondansetron (ZOFRAN ODT) 4 mg disintegrating tablet Take 1 Tab by mouth every eight (8) hours as needed for Nausea. 2/8/20   Adryan Titus MD   ondansetron (ZOFRAN ODT) 4 mg disintegrating tablet Take 1 Tab by mouth every eight (8) hours as needed for Nausea or Vomiting. 2/3/20   Phani Teixeira PA   pantoprazole (PROTONIX) 40 mg tablet Take 1 Tab by mouth two (2) times a day.  12/31/19   Jordan Hudson MD   amLODIPine (NORVASC) 5 mg tablet Take 1 Tab by mouth daily. 19   Siomara Blake MD   insulin glargine (LANTUS U-100 INSULIN) 100 unit/mL injection 30 Units by SubCUTAneous route nightly. Indications: type 2 diabetes mellitus    Tameka Balderas MD   insulin admin. supplies (INPEN, FOR NOVOLOG, SC) 10-12 Units by SubCUTAneous route Before breakfast, lunch, and dinner. Other, MD Tameka       Allergies   Allergen Reactions    Compazine [Prochlorperazine] Other (comments)     Dystonic Reaction       Review of Systems  Gen: No fever, chills, malaise, weight loss/gain. Heent: No headache, rhinorrhea, epistaxis, ear pain, hearing loss, sinus pain, neck pain/stiffness, sore throat. Heart: No chest pain, palpitations, STEVENS, pnd, or orthopnea. Resp: No cough, hemoptysis, wheezing and shortness of breath. GI: +nausea, vomiting, no diarrhea, constipation, melena or hematochezia. : No urinary obstruction, dysuria or hematuria. Derm: No rash, new skin lesion or pruritis. Musc/skeletal: no bone or joint complaints. Vasc: No edema, cyanosis or claudication. Endo: No heat/cold intolerance, no polyuria,polydipsia or polyphagia. Neuro: No unilateral weakness, numbness, tingling. No seizures. Heme: No easy bruising or bleeding. Physical Exam:     Physical Exam:  Visit Vitals  /66 (BP 1 Location: Left arm, BP Patient Position: At rest)   Pulse 89   Temp 98.6 °F (37 °C)   Resp 16   Ht 5' 4\" (1.626 m)   Wt 61 kg (134 lb 6.4 oz)   LMP 2020   SpO2 100%   BMI 23.07 kg/m²      O2 Device: Room air    Temp (24hrs), Av.4 °F (36.9 °C), Min:98 °F (36.7 °C), Max:98.6 °F (37 °C)    No intake/output data recorded. No intake/output data recorded. General:  Awake, cooperative, no distress. Head:  Normocephalic, without obvious abnormality, atraumatic. Eyes:  Conjunctivae/corneas clear, sclera anicteric, PERRL, EOMs intact. Neck: Supple, symmetrical, trachea midline, no adenopathy. Lungs:   Clear to auscultation bilaterally. Heart:  Regular rate and rhythm, S1, S2 normal, no murmur, click, rub or gallop. Abdomen: Soft, diffusely ttp, no rebound/guarding. Bowel sounds normal. No masses,  No organomegaly. No CVAT b/l   Extremities: Extremities normal, atraumatic, no cyanosis or edema. Capillary refill normal.   Pulses: 2+ and symmetric all extremities. Skin: Skin color pink, turgor normal. No rashes or lesions   Neurologic: No focal motor or sensory deficit. Labs Reviewed: All lab results for the last 24 hours reviewed. Recent Results (from the past 24 hour(s))   CBC WITH AUTOMATED DIFF    Collection Time: 02/09/20  6:15 PM   Result Value Ref Range    WBC 20.7 (H) 4.6 - 13.2 K/uL    RBC 3.02 (L) 4.20 - 5.30 M/uL    HGB 8.6 (L) 12.0 - 16.0 g/dL    HCT 26.1 (L) 35.0 - 45.0 %    MCV 86.4 74.0 - 97.0 FL    MCH 28.5 24.0 - 34.0 PG    MCHC 33.0 31.0 - 37.0 g/dL    RDW 14.5 11.6 - 14.5 %    PLATELET 262 171 - 522 K/uL    MPV 10.1 9.2 - 11.8 FL    NEUTROPHILS 83 (H) 40 - 73 %    LYMPHOCYTES 11 (L) 21 - 52 %    MONOCYTES 5 3 - 10 %    EOSINOPHILS 1 0 - 5 %    BASOPHILS 0 0 - 2 %    ABS. NEUTROPHILS 17.1 (H) 1.8 - 8.0 K/UL    ABS. LYMPHOCYTES 2.4 0.9 - 3.6 K/UL    ABS. MONOCYTES 1.1 0.05 - 1.2 K/UL    ABS. EOSINOPHILS 0.1 0.0 - 0.4 K/UL    ABS. BASOPHILS 0.0 0.0 - 0.1 K/UL    DF AUTOMATED     METABOLIC PANEL, COMPREHENSIVE    Collection Time: 02/09/20  6:15 PM   Result Value Ref Range    Sodium 138 136 - 145 mmol/L    Potassium 3.2 (L) 3.5 - 5.5 mmol/L    Chloride 107 100 - 111 mmol/L    CO2 23 21 - 32 mmol/L    Anion gap 8 3.0 - 18 mmol/L    Glucose 201 (H) 74 - 99 mg/dL    BUN 25 (H) 7.0 - 18 MG/DL    Creatinine 3.14 (H) 0.6 - 1.3 MG/DL    BUN/Creatinine ratio 8 (L) 12 - 20      GFR est AA 20 (L) >60 ml/min/1.73m2    GFR est non-AA 17 (L) >60 ml/min/1.73m2    Calcium 8.3 (L) 8.5 - 10.1 MG/DL    Bilirubin, total 0.3 0.2 - 1.0 MG/DL    ALT (SGPT) 10 (L) 13 - 56 U/L    AST (SGOT) 16 10 - 38 U/L    Alk.  phosphatase 139 (H) 45 - 117 U/L    Protein, total 6.9 6.4 - 8.2 g/dL    Albumin 3.0 (L) 3.4 - 5.0 g/dL    Globulin 3.9 2.0 - 4.0 g/dL    A-G Ratio 0.8 0.8 - 1.7     MAGNESIUM    Collection Time: 02/09/20  6:15 PM   Result Value Ref Range    Magnesium 1.8 1.6 - 2.6 mg/dL   PHOSPHORUS    Collection Time: 02/09/20  6:15 PM   Result Value Ref Range    Phosphorus 2.5 2.5 - 4.9 MG/DL   LIPASE    Collection Time: 02/09/20  6:15 PM   Result Value Ref Range    Lipase 202 73 - 393 U/L   EKG, 12 LEAD, INITIAL    Collection Time: 02/09/20  8:08 PM   Result Value Ref Range    Ventricular Rate 90 BPM    Atrial Rate 90 BPM    P-R Interval 128 ms    QRS Duration 80 ms    Q-T Interval 424 ms    QTC Calculation (Bezet) 518 ms    Calculated P Axis 52 degrees    Calculated R Axis 53 degrees    Calculated T Axis 56 degrees    Diagnosis       Normal sinus rhythm  Possible Left atrial enlargement  Prolonged QT  Abnormal ECG  When compared with ECG of 08-FEB-2020 10:45,  Nonspecific T wave abnormality no longer evident in Inferior leads     POC LACTIC ACID    Collection Time: 02/09/20 11:33 PM   Result Value Ref Range    Lactic Acid (POC) 0.96 0.40 - 2.00 mmol/L   GLUCOSE, POC    Collection Time: 02/10/20 12:56 AM   Result Value Ref Range    Glucose (POC) 376 (H) 70 - 110 mg/dL   GLUCOSE, POC    Collection Time: 02/10/20  1:00 AM   Result Value Ref Range    Glucose (POC) 380 (H) 70 - 110 mg/dL       Results   CT ABD PELV WO CONT (Accession [id number]) (Order [id number])   Allergies      Not Specified: Compazine [Prochlorperazine]   Exam Information     Status Exam Begun  Exam Ended    Final [99] 2/10/2020 00:19 2/10/2020 00:29   Result Information     Status: Final result (Exam End: 2/10/2020 00:29) Provider Status: Open   Study Result     _______________     EXAM: CT ABD PELV WO CONT     CLINICAL INDICATION/HISTORY: belly pain, leukocytosis  -Additional: None.     COMPARISON: CT of 12/29/2019.     TECHNIQUE:   Abdominopelvic CT examination was performed without oral contrast. Evaluation of  the bowel is limited in the absence of oral contrast. Intravenous contrast was  not administered, limiting evaluation of solid organs and vascular structures. Sagittal and coronal series were reformatted from the axial source images.     One or more dose reduction techniques were used on this CT: automated exposure  control, adjustment of the mAs and/or kVp according to patient size, and  iterative reconstruction techniques. The specific techniques used on this CT  exam have been documented in the patient's electronic medical record. Digital  Imaging and Communications in Medicine (DICOM) format image data are available  to nonaffiliated external healthcare facilities or entities on a secure, media  free, reciprocally searchable basis with patient authorization for at least a  12-month period after this study. _______________     FINDINGS:     VISUALIZED LUNG BASES and LOWER CHEST: Mild dependent hypoventilatory changes. Heart size is normal.     LIVER: Normal in size and contour.     GALLBLADDER and BILE DUCTS: No calcified gallstones or pericholecystic  inflammation. No intrahepatic or extrahepatic biliary ductal dilatation.     PANCREAS: Within normal limits.     SPLEEN: Within normal limits.     ADRENALS: Within normal limits.     KIDNEYS, URETERS, URINARY BLADDER: No hydronephrosis or calculi. Stable left  renal cyst inferiorly. Normal ureters and bladder.     REPRODUCTIVE ORGANS: Within normal limits.     STOMACH and BOWEL: Evaluation of the bowel is limited in the absence of oral  contrast. Bowel caliber is normal without evidence of obstruction or focal  inflammatory process. The appendix is not visualized, but no focal inflammatory  changes are seen in the pericecal region to suggest CT evidence of acute  appendicitis.     LYMPH NODES: No lymphadenopathy.     PERITONEUM/RETROPERITONEUM: No free fluid.  No extraluminal free air or drainable  collection.     VESSELS: Within normal limits.      BODY WALL: Within normal limits.     BONES: No acute or suspicious osseous findings.   _______________     IMPRESSION  IMPRESSION:     No acute inflammatory or obstructive process of the abdomen or pelvis.

## 2020-02-10 NOTE — CDMP QUERY
Patient admitted with abdominal pain , noted to have abnormal labs. Please document the significance of lab values :  
 
UA: leukocyte esterase moderate, WBC > 100, Bacteria 4+ Thank- you Imani Griffin RN [address] [place] [address] [phone]

## 2020-02-10 NOTE — PROGRESS NOTES
Reason for Admission:   abdominal pain, vomiting               RUR Score:    39%              Resources/supports as identified by patient/family:                   Top Challenges facing patient (as identified by patient/family and CM): Finances/Medication cost?                    Transportation? Support system or lack thereof? Living arrangements? Self-care/ADLs/Cognition? Current Advanced Directive/Advance Care Plan:  Not on file                          Plan for utilizing home health:  [facility]                   Transition of Care Plan:  [facility] and physician follow up vs physician follow up                Chart reviewed. Per H&P Meg Fernandes is a 37 y.o. female who has PMHx of DMT2 and gastroparesis who presents multiple times to the ER this week with abdominal pain and N/V. Today her WBC was elevated but no new symptoms. No fever or dysuria. She reports just finishing 10 days of amoxicillin given to her by GI but she has no diarrhea. She has not been able to eat anything and keep it down for 3 days. \"    1140:  CM and provider met with pt at bedside to discuss transition of care. Pt lives with her fiance and he will assist as needed. Pt is independent. Please encourage ambulation as appropriate to assist with identifying potential transition of care needs. Pt would benefit from [facility] upon discharge at the least. Anticipate pt will transition home within the next 24-48 hours. Pt may need transportation scheduled for hospital discharge [phone]) through her Medicaid insurance. CM to continue to follow and assist.      Care Management Interventions  PCP Verified by CM: Yes  Mode of Transport at Discharge: Other (see comment)(Medicaid taxi vs family)  Transition of Care Consult (CM Consult): Discharge Planning  Health Maintenance Reviewed: Yes  Current Support Network:  Other(lives with fiance)  Discharge Location  Discharge Placement: Home with family assistance(vs H2H)

## 2020-02-10 NOTE — ED NOTES
TRANSFER - OUT REPORT:    Verbal report given to Lul Landa RN(name) on Lisbeth Munoz  being transferred to (unit) for routine progression of care       Report consisted of patients Situation, Background, Assessment and   Recommendations(SBAR). Information from the following report(s) SBAR, ED Summary and MAR was reviewed with the receiving nurse. Lines:   Peripheral IV 02/09/20 Left Antecubital (Active)   Site Assessment Clean, dry, & intact 2/9/2020  6:15 PM   Phlebitis Assessment 0 2/9/2020  6:15 PM   Infiltration Assessment 0 2/9/2020  6:15 PM   Dressing Status Clean, dry, & intact 2/9/2020  6:15 PM   Dressing Type Tape;Transparent 2/9/2020  6:15 PM   Hub Color/Line Status Pink;Flushed;Patent 2/9/2020  6:15 PM   Action Taken Blood drawn 2/9/2020  6:15 PM        Opportunity for questions and clarification was provided.       Patient transported with:   [employer]

## 2020-02-10 NOTE — PROGRESS NOTES
0745 Bedside and verbal shift change report given to Arlet Sr RN (on coming nurse) by Carlos Manuel Wilkinson RN (off going nurse). Report included the following information SBAR, Kardex, OR Summary, Intake/Output and MAR.    1355 Bedside and verbal  report given by Arlet Sr RN (off going nurse) to Ban Robles RN(on coming nurse). Report included the following information SBAR, Kardex, OR Summary, Intake/Output and MAR.

## 2020-02-10 NOTE — PROGRESS NOTES
Shift Summary:  Patient admitted with abdominal pain, vomiting, and gastroparesis. Patient experienced abdominal pain rating at 9/10, in which, Dr. Ernesto Scherer ordered Morphine 1-2 mg IV prn. Patient received 1 mg IV which provided relief with GI cocktail. Denied n/v. Patient's accucheck with recheck 380. As per Dr. Ernesto Scherer, 10 units humalog given sq. Flu A&B negative. CXR completed at bedside. Vital signs stable, afebrile.

## 2020-02-10 NOTE — ED PROVIDER NOTES
EMERGENCY DEPARTMENT HISTORY AND PHYSICAL EXAM    Date: 2/9/2020  Patient Name: Timbo Carbajal    History of Presenting Illness     Chief Complaint   Patient presents with    Nausea    Vomiting         History Provided By: Patient    JoséM anuel Yuen is a 37 y.o. female with PMHX of diabetes, gastroparesis who presents to the emergency department C/O nausea. Patient reports she has been unable to keep food down. Reports trying to eat chicken and rice, eggs, and nagel without success. She is complaining of diffuse nausea. Patient was seen in ER two prior times in past day with similar complaint. She was discharged last night for symptoms related to gastroparesis. She denies new abdominal pain or symptoms. Has seen GI in past when admitted to hospital. Took zofran at home without relief.      PCP: Randall Richards MD    Current Facility-Administered Medications   Medication Dose Route Frequency Provider Last Rate Last Dose    mylanta/viscous lidocaine (GI COCKTAIL)  40 mL Oral ONCE Yan Macias MD        pantoprazole (PROTONIX) 40 mg in 0.9% sodium chloride 10 mL injection  40 mg IntraVENous Q24H Yan Macias MD        0.9% sodium chloride infusion  100 mL/hr IntraVENous CONTINUOUS Yan Macias MD        metoclopramide HCl (REGLAN) tablet 5 mg  5 mg Oral Q6H PRN Yan Macias MD        [START ON 2/10/2020] amLODIPine (NORVASC) tablet 5 mg  5 mg Oral DAILY MD Tera Ngo ON 2/10/2020] insulin lispro (HUMALOG) injection   SubCUTAneous Q6H Yan Macias MD        glucose chewable tablet 16 g  16 g Oral PRN Yan Macias MD        glucagon (GLUCAGEN) injection 1 mg  1 mg IntraMUSCular PRN Yan Macias MD        dextrose 10% infusion 125-250 mL  125-250 mL IntraVENous PRN Yan Macias MD         Current Outpatient Medications   Medication Sig Dispense Refill    metoclopramide HCl (REGLAN) 5 mg tablet Take 1 Tab by mouth every six (6) hours as needed for Nausea for up to 3 days. 12 Tab 0    potassium chloride SA (MICRO-K) 10 mEq capsule Take 1 Cap by mouth daily. 5 Cap 0    ondansetron (ZOFRAN ODT) 4 mg disintegrating tablet Take 1 Tab by mouth every eight (8) hours as needed for Nausea. 9 Tab 0    ondansetron (ZOFRAN ODT) 4 mg disintegrating tablet Take 1 Tab by mouth every eight (8) hours as needed for Nausea or Vomiting. 20 Tab 0    pantoprazole (PROTONIX) 40 mg tablet Take 1 Tab by mouth two (2) times a day. 60 Tab 0    amLODIPine (NORVASC) 5 mg tablet Take 1 Tab by mouth daily. 30 Tab 0    insulin glargine (LANTUS U-100 INSULIN) 100 unit/mL injection 30 Units by SubCUTAneous route nightly. Indications: type 2 diabetes mellitus      insulin admin. supplies (INPEN, FOR NOVOLOG, SC) 10-12 Units by SubCUTAneous route Before breakfast, lunch, and dinner. Past History     Past Medical History:  Past Medical History:   Diagnosis Date    Diabetes ([address]     Gastroparesis     Gastroparesis     Hypertension     UTI (urinary tract infection)        Past Surgical History:  Past Surgical History:   Procedure Laterality Date    HX APPENDECTOMY      HX GYN      tubal ligation, C Section       Family History:  History reviewed. No pertinent family history. Social History:  Social History     Tobacco Use    Smoking status: Never Smoker    Smokeless tobacco: Never Used   Substance Use Topics    Alcohol use: Never     Frequency: Never    Drug use: Not Currently       Allergies: Allergies   Allergen Reactions    Compazine [Prochlorperazine] Other (comments)     Dystonic Reaction         Review of Systems   Review of Systems   Constitutional: Positive for appetite change. Negative for chills and fever. HENT: Negative for congestion, rhinorrhea and sinus pain. Respiratory: Negative for cough and shortness of breath. Cardiovascular: Negative for chest pain and palpitations.    Gastrointestinal: Positive for nausea and vomiting. Negative for abdominal distention, abdominal pain, blood in stool and diarrhea. Genitourinary: Negative for difficulty urinating and dysuria. Musculoskeletal: Negative for back pain and neck pain. Skin: Negative for color change and rash. Neurological: Negative for dizziness and headaches. All other systems reviewed and are negative.         Physical Exam     Vitals:    02/09/20 1804 02/09/20 1930   BP: 151/75 123/70   Pulse: 92    Resp: 18    Temp: 98.5 °F (36.9 °C)    SpO2: 100% 100%   Weight: 57.6 kg (127 lb)    Height: 5' 4\" (1.626 m)      Physical Exam    Nursing notes and vital signs reviewed    Constitutional: Non toxic appearing, no acute distress, chronically ill appearing and poorly groomed  Head: Normocephalic, Atraumatic  Eyes: Pupils are equal, round, and reactive to light, EOMI  ENT: Poor dentition  Cardiovascular: Regular rate and rhythm, no murmurs, rubs, or gallops  Chest: Normal work of breathing and chest excursion bilaterally  Lungs: Clear to ausculation bilaterally  Abdomen: Soft, mild diffuse tendernesss without rebounding or guarding, non distended, normoactive bowel sounds  Extremities: No evidence of trauma or deformity, no LE edema  Skin: Warm and dry, normal cap refill  Neuro: Alert and appropriate, CN intact, normal speech, strength and sensation full and symmetric bilaterally, normal gait, normal coordination  Psychiatric: Normal mood and flat affect      Diagnostic Study Results     Labs -     Recent Results (from the past 12 hour(s))   CBC WITH AUTOMATED DIFF    Collection Time: 02/09/20  6:15 PM   Result Value Ref Range    WBC 20.7 (H) 4.6 - 13.2 K/uL    RBC 3.02 (L) 4.20 - 5.30 M/uL    HGB 8.6 (L) 12.0 - 16.0 g/dL    HCT 26.1 (L) 35.0 - 45.0 %    MCV 86.4 74.0 - 97.0 FL    MCH 28.5 24.0 - 34.0 PG    MCHC 33.0 31.0 - 37.0 g/dL    RDW 14.5 11.6 - 14.5 %    PLATELET 970 079 - 962 K/uL    MPV 10.1 9.2 - 11.8 FL    NEUTROPHILS 83 (H) 40 - 73 % LYMPHOCYTES 11 (L) 21 - 52 %    MONOCYTES 5 3 - 10 %    EOSINOPHILS 1 0 - 5 %    BASOPHILS 0 0 - 2 %    ABS. NEUTROPHILS 17.1 (H) 1.8 - 8.0 K/UL    ABS. LYMPHOCYTES 2.4 0.9 - 3.6 K/UL    ABS. MONOCYTES 1.1 0.05 - 1.2 K/UL    ABS. EOSINOPHILS 0.1 0.0 - 0.4 K/UL    ABS. BASOPHILS 0.0 0.0 - 0.1 K/UL    DF AUTOMATED     METABOLIC PANEL, COMPREHENSIVE    Collection Time: 02/09/20  6:15 PM   Result Value Ref Range    Sodium 138 136 - 145 mmol/L    Potassium 3.2 (L) 3.5 - 5.5 mmol/L    Chloride 107 100 - 111 mmol/L    CO2 23 21 - 32 mmol/L    Anion gap 8 3.0 - 18 mmol/L    Glucose 201 (H) 74 - 99 mg/dL    BUN 25 (H) 7.0 - 18 MG/DL    Creatinine 3.14 (H) 0.6 - 1.3 MG/DL    BUN/Creatinine ratio 8 (L) 12 - 20      GFR est AA 20 (L) >60 ml/min/1.73m2    GFR est non-AA 17 (L) >60 ml/min/1.73m2    Calcium 8.3 (L) 8.5 - 10.1 MG/DL    Bilirubin, total 0.3 0.2 - 1.0 MG/DL    ALT (SGPT) 10 (L) 13 - 56 U/L    AST (SGOT) 16 10 - 38 U/L    Alk.  phosphatase 139 (H) 45 - 117 U/L    Protein, total 6.9 6.4 - 8.2 g/dL    Albumin 3.0 (L) 3.4 - 5.0 g/dL    Globulin 3.9 2.0 - 4.0 g/dL    A-G Ratio 0.8 0.8 - 1.7     MAGNESIUM    Collection Time: 02/09/20  6:15 PM   Result Value Ref Range    Magnesium 1.8 1.6 - 2.6 mg/dL   PHOSPHORUS    Collection Time: 02/09/20  6:15 PM   Result Value Ref Range    Phosphorus 2.5 2.5 - 4.9 MG/DL   EKG, 12 LEAD, INITIAL    Collection Time: 02/09/20  8:08 PM   Result Value Ref Range    Ventricular Rate 90 BPM    Atrial Rate 90 BPM    P-R Interval 128 ms    QRS Duration 80 ms    Q-T Interval 424 ms    QTC Calculation (Bezet) 518 ms    Calculated P Axis 52 degrees    Calculated R Axis 53 degrees    Calculated T Axis 56 degrees    Diagnosis       Normal sinus rhythm  Possible Left atrial enlargement  Prolonged QT  Abnormal ECG  When compared with ECG of 08-FEB-2020 10:45,  Nonspecific T wave abnormality no longer evident in Inferior leads         Radiologic Studies -   CT ABD PELV WO CONT    (Results Pending)     CT Results  (Last 48 hours)    None        CXR Results  (Last 48 hours)               02/08/20 1139  XR CHEST PORT Final result    Impression:  IMPRESSION:       No active cardiopulmonary disease. Narrative:  EXAM: CHEST RADIOGRAPH, SINGLE VIEW       CLINICAL INDICATION/HISTORY: Chest pain       COMPARISON: 12/29/2019       TECHNIQUE: Portable frontal view of the chest was obtained.        _______________       FINDINGS:       SUPPORT DEVICES: None. HEART AND MEDIASTINUM: Cardiomediastinal silhouette appears within normal   limits. Normal caliber thoracic aorta. No central vascular congestion. LUNGS AND PLEURAL SPACES: Lungs are well aerated with no confluent airspace   opacity. No pleural effusion or pneumothorax. BONY THORAX AND SOFT TISSUES: No acute osseous abnormality.        _______________                 Medications given in the ED-  Medications   mylanta/viscous lidocaine (GI COCKTAIL) (has no administration in time range)   pantoprazole (PROTONIX) 40 mg in 0.9% sodium chloride 10 mL injection (has no administration in time range)   0.9% sodium chloride infusion (has no administration in time range)   metoclopramide HCl (REGLAN) tablet 5 mg (has no administration in time range)   amLODIPine (NORVASC) tablet 5 mg (has no administration in time range)   insulin lispro (HUMALOG) injection (has no administration in time range)   glucose chewable tablet 16 g (has no administration in time range)   glucagon (GLUCAGEN) injection 1 mg (has no administration in time range)   dextrose 10% infusion 125-250 mL (has no administration in time range)   dextrose 5% and 0.9% NaCl infusion (1,000 mL/hr IntraVENous New Bag 2/9/20 1845)   haloperidol lactate (HALDOL) injection 5 mg (5 mg IntraVENous Given 2/9/20 1848)   diphenhydrAMINE (BENADRYL) injection 25 mg (25 mg IntraVENous Given 2/9/20 1847)   mylanta/viscous lidocaine (GI COCKTAIL) (50 mL Oral Given 2/9/20 2119)   famotidine (PF) (PEPCID) injection 20 mg (20 mg IntraVENous Given 2/9/20 2119)   promethazine (PHENERGAN) 12.5 mg in 0.9% sodium chloride 50 mL IVPB (12.5 mg IntraVENous New Bag 2/9/20 2120)         Medical Decision Making   I am the first provider for this patient. I reviewed the vital signs, available nursing notes, past medical history, past surgical history, family history and social history. Vital Signs-Reviewed the patient's vital signs. Pulse Oximetry Analysis - 100% on RA     Cardiac Monitor:  Rate: 92 bpm  Rhythm: NSR    EKG interpretation: (Preliminary)  EKG read by Clau. Chai Craven MD at 11:00 PM   NSR; 90 BPM; QTC long at 518; Slight tremor    Records Reviewed: Nursing Notes, Old Medical Records, Previous electrocardiograms, Previous Radiology Studies and Previous Laboratory Studies    Provider Notes (Medical Decision Making): Dorothy Chinchilla is a 37 y.o. female with known history of gastroparesis and typical gastroparesis symptoms. She has reported reponse to haldol/reglan in past. Will give dose of D5NS, haldol, and benadryl. Will resend labs to evaluate for electrolyte abnormalities. She had recent thorough work up yesterday and does not appear to have significant change or improvement in her symptoms    Procedures:  Procedures    ED Course:   7:07 PM  Patient's presentation, labs/imaging and plan of care was reviewed with Dr Chai Craven as part of sign out. They will follow up on symptomatic relief and labs as part of the plan discussed with the patient. 10:48 PM  Discussed patient's history, exam, and available diagnostics results with Prosper Choudhury MD, internal medicine, who accepts the pt for admission to medical. Advised to obtain a CT ABD, cultures and blood cultures. Diagnosis and Disposition       Core Measures:  For Hospitalized Patients:    1. Hospitalization Decision Time:  The decision to hospitalize the patient was made by Clau. Chai Craven MD at 8:30 PM on 2/9/2020    2.  Aspirin: Aspirin was not given because the patient did not present with a stroke at the time of their Emergency Department evaluation    10:55 PM  Patient is being admitted to the hospital by Diamond Polo MD to medical. The results of their tests and reasons for their admission have been discussed with them and/or available family. They convey agreement and understanding for the need to be admitted and for their admission diagnosis. CONDITIONS ON ADMISSION:  Sepsis is not present at the time of admission. Deep Vein Thrombosis is not present at the time of admission. Thrombosis is not present at the time of admission. Urinary Tract Infection is not present at the time of admission. Pneumonia is not present at the time of admission. MRSA is not present at the time of admission. Wound infection is not present at the time of admission. Pressure Ulcer is not present at the time of admission. CLINICAL IMPRESSION:    1. Diabetic gastroparalysis ([address]    2. Intractable nausea and vomiting    3. Recurrent abdominal pain    4. Type 2 diabetes mellitus with hyperglycemia, with long-term current use of insulin ([address]          _______________________________      Please note that this dictation was completed with [employer], the computer voice recognition software. Quite often unanticipated grammatical, syntax, homophones, and other interpretive errors are inadvertently transcribed by the computer software. Please disregard these errors. Please excuse any errors that have escaped final proofreading. Attestation: This note is prepared in-part by Blanche Manuel, acting as Scribe for Ritchie Marquez. Chai Craven MD, after sign out note above.     Ritchie Marquez. Chai Craven MD: The scribe's documentation has been prepared under my direction and personally reviewed by me in its entirety. I confirm that the note above accurately reflects all work, treatment, procedures, and medical decision making performed by me.

## 2020-02-10 NOTE — ROUTINE PROCESS
Bedside and Verbal shift change report given to CURT Boss RN (oncoming nurse) by Elliott Carey (offgoing nurse). Report included the following information SBAR, Kardex, Intake/Output and MAR.

## 2020-02-10 NOTE — DIABETES MGMT
GLYCEMIC CONTROL PROGRESS NOTE:    - known h/o T2DM, since birth of last child in 2009, HbA1C not within recommended range for age + comorbids on basal/bolus home regimen  - BG out of target non-ICU: < 180 mg/dL  - TDD = 12 units - Humalog Normal Insulin Sensitivity Corrective Coverage  - monitor BG trends pt may benefit from basal/bolus regimen as diet progresses  - recent d/c in December 2019, at that time pt verbalized missing injections and feeling overwhelmed  - [facility] d/[facility] from [facility], pt told this writer she is taking insulin 3-4 times a day with BG ranges low to high 100s  - reports continued N & V at home  - reinforced the importance of Gastroparesis diet    Recent Glucose Results:   Lab Results   Component Value Date/Time    GLUCPOC 135 (H) 02/10/2020 04:14 PM    GLUCPOC 169 (H) 02/10/2020 10:44 AM    GLUCPOC 142 (H) 02/10/2020 07:29 AM     Zenia Briceño MS, RN, CDE  Glycemic Control Team  [phone]  Pager [phone] (M-TH 8:00-4:30P)  *After Hours pager [phone]

## 2020-02-11 VITALS
RESPIRATION RATE: 12 BRPM | WEIGHT: 134.4 LBS | TEMPERATURE: 99.1 F | HEART RATE: 88 BPM | BODY MASS INDEX: 22.94 KG/M2 | HEIGHT: 64 IN | SYSTOLIC BLOOD PRESSURE: 130 MMHG | DIASTOLIC BLOOD PRESSURE: 71 MMHG | OXYGEN SATURATION: 100 %

## 2020-02-11 LAB
ALBUMIN SERPL-MCNC: 2.2 G/DL (ref 3.4–5)
ALBUMIN/GLOB SERPL: 0.8 {RATIO} (ref 0.8–1.7)
ALP SERPL-CCNC: 100 U/L (ref 45–117)
ALT SERPL-CCNC: 7 U/L (ref 13–56)
ANION GAP SERPL CALC-SCNC: 5 MMOL/L (ref 3–18)
AST SERPL-CCNC: 11 U/L (ref 10–38)
ATRIAL RATE: 90 BPM
ATRIAL RATE: 97 BPM
BACTERIA SPEC CULT: ABNORMAL
BILIRUB SERPL-MCNC: 0.2 MG/DL (ref 0.2–1)
BUN SERPL-MCNC: 19 MG/DL (ref 7–18)
BUN/CREAT SERPL: 7 (ref 12–20)
CALCIUM SERPL-MCNC: 7.2 MG/DL (ref 8.5–10.1)
CALCULATED P AXIS, ECG09: 52 DEGREES
CALCULATED P AXIS, ECG09: 56 DEGREES
CALCULATED R AXIS, ECG10: 53 DEGREES
CALCULATED R AXIS, ECG10: 68 DEGREES
CALCULATED T AXIS, ECG11: 30 DEGREES
CALCULATED T AXIS, ECG11: 56 DEGREES
CHLORIDE SERPL-SCNC: 114 MMOL/L (ref 100–111)
CO2 SERPL-SCNC: 24 MMOL/L (ref 21–32)
CREAT SERPL-MCNC: 2.66 MG/DL (ref 0.6–1.3)
DIAGNOSIS, 93000: NORMAL
DIAGNOSIS, 93000: NORMAL
ERYTHROCYTE [DISTWIDTH] IN BLOOD BY AUTOMATED COUNT: 15.1 % (ref 11.6–14.5)
GLOBULIN SER CALC-MCNC: 2.8 G/DL (ref 2–4)
GLUCOSE BLD STRIP.AUTO-MCNC: 114 MG/DL (ref 70–110)
GLUCOSE BLD STRIP.AUTO-MCNC: 178 MG/DL (ref 70–110)
GLUCOSE BLD STRIP.AUTO-MCNC: 190 MG/DL (ref 70–110)
GLUCOSE SERPL-MCNC: 95 MG/DL (ref 74–99)
HCT VFR BLD AUTO: 22 % (ref 35–45)
HGB BLD-MCNC: 7.1 G/DL (ref 12–16)
MCH RBC QN AUTO: 27.5 PG (ref 24–34)
MCHC RBC AUTO-ENTMCNC: 32.3 G/DL (ref 31–37)
MCV RBC AUTO: 85.3 FL (ref 74–97)
P-R INTERVAL, ECG05: 128 MS
P-R INTERVAL, ECG05: 144 MS
PLATELET # BLD AUTO: 308 K/UL (ref 135–420)
PMV BLD AUTO: 8.9 FL (ref 9.2–11.8)
POTASSIUM SERPL-SCNC: 3.4 MMOL/L (ref 3.5–5.5)
PROT SERPL-MCNC: 5 G/DL (ref 6.4–8.2)
Q-T INTERVAL, ECG07: 396 MS
Q-T INTERVAL, ECG07: 424 MS
QRS DURATION, ECG06: 80 MS
QRS DURATION, ECG06: 80 MS
QTC CALCULATION (BEZET), ECG08: 502 MS
QTC CALCULATION (BEZET), ECG08: 518 MS
RBC # BLD AUTO: 2.58 M/UL (ref 4.2–5.3)
SERVICE CMNT-IMP: ABNORMAL
SODIUM SERPL-SCNC: 143 MMOL/L (ref 136–145)
VENTRICULAR RATE, ECG03: 90 BPM
VENTRICULAR RATE, ECG03: 97 BPM
WBC # BLD AUTO: 12.3 K/UL (ref 4.6–13.2)

## 2020-02-11 PROCEDURE — 74011250637 HC RX REV CODE- 250/637: Performed by: FAMILY MEDICINE

## 2020-02-11 PROCEDURE — 96376 TX/PRO/DX INJ SAME DRUG ADON: CPT

## 2020-02-11 PROCEDURE — 96375 TX/PRO/DX INJ NEW DRUG ADDON: CPT

## 2020-02-11 PROCEDURE — 96372 THER/PROPH/DIAG INJ SC/IM: CPT

## 2020-02-11 PROCEDURE — 74011250636 HC RX REV CODE- 250/636: Performed by: FAMILY MEDICINE

## 2020-02-11 PROCEDURE — 85027 COMPLETE CBC AUTOMATED: CPT

## 2020-02-11 PROCEDURE — 74011000250 HC RX REV CODE- 250: Performed by: FAMILY MEDICINE

## 2020-02-11 PROCEDURE — 80053 COMPREHEN METABOLIC PANEL: CPT

## 2020-02-11 PROCEDURE — C9113 INJ PANTOPRAZOLE SODIUM, VIA: HCPCS | Performed by: FAMILY MEDICINE

## 2020-02-11 PROCEDURE — 36415 COLL VENOUS BLD VENIPUNCTURE: CPT

## 2020-02-11 PROCEDURE — 74011250636 HC RX REV CODE- 250/636: Performed by: HOSPITALIST

## 2020-02-11 PROCEDURE — 99218 HC RM OBSERVATION: CPT

## 2020-02-11 PROCEDURE — 74011636637 HC RX REV CODE- 636/637: Performed by: FAMILY MEDICINE

## 2020-02-11 PROCEDURE — 74011000250 HC RX REV CODE- 250: Performed by: HOSPITALIST

## 2020-02-11 PROCEDURE — 82962 GLUCOSE BLOOD TEST: CPT

## 2020-02-11 RX ORDER — CIPROFLOXACIN 250 MG/1
250 TABLET, FILM COATED ORAL 2 TIMES DAILY
Qty: 6 TAB | Refills: 0 | Status: SHIPPED | OUTPATIENT
Start: 2020-02-11 | End: 2020-02-14

## 2020-02-11 RX ORDER — METOCLOPRAMIDE HYDROCHLORIDE 5 MG/ML
5 INJECTION INTRAMUSCULAR; INTRAVENOUS EVERY 6 HOURS
Status: DISCONTINUED | OUTPATIENT
Start: 2020-02-11 | End: 2020-02-11 | Stop reason: HOSPADM

## 2020-02-11 RX ADMIN — METOCLOPRAMIDE 5 MG: 5 INJECTION, SOLUTION INTRAMUSCULAR; INTRAVENOUS at 08:47

## 2020-02-11 RX ADMIN — CEFTRIAXONE 1 G: 1 INJECTION, POWDER, FOR SOLUTION INTRAMUSCULAR; INTRAVENOUS at 12:04

## 2020-02-11 RX ADMIN — HEPARIN SODIUM 5000 UNITS: 5000 INJECTION INTRAVENOUS; SUBCUTANEOUS at 03:48

## 2020-02-11 RX ADMIN — METOCLOPRAMIDE 5 MG: 5 INJECTION, SOLUTION INTRAMUSCULAR; INTRAVENOUS at 12:05

## 2020-02-11 RX ADMIN — INSULIN LISPRO 2 UNITS: 100 INJECTION, SOLUTION INTRAVENOUS; SUBCUTANEOUS at 12:05

## 2020-02-11 RX ADMIN — AMLODIPINE BESYLATE 5 MG: 5 TABLET ORAL at 08:46

## 2020-02-11 RX ADMIN — HEPARIN SODIUM 5000 UNITS: 5000 INJECTION INTRAVENOUS; SUBCUTANEOUS at 12:04

## 2020-02-11 RX ADMIN — INSULIN LISPRO 2 UNITS: 100 INJECTION, SOLUTION INTRAVENOUS; SUBCUTANEOUS at 00:35

## 2020-02-11 RX ADMIN — SODIUM CHLORIDE 40 MG: 9 INJECTION, SOLUTION INTRAMUSCULAR; INTRAVENOUS; SUBCUTANEOUS at 08:46

## 2020-02-11 RX ADMIN — SODIUM CHLORIDE 100 ML/HR: 900 INJECTION, SOLUTION INTRAVENOUS at 03:49

## 2020-02-11 NOTE — PROGRESS NOTES
Problem: Pain  Goal: *Control of Pain  Outcome: Progressing Towards Goal  Goal: *PALLIATIVE CARE:  Alleviation of Pain  Outcome: Progressing Towards Goal     Problem: Patient Education: Go to Patient Education Activity  Goal: Patient/Family Education  Outcome: Progressing Towards Goal     Problem: Falls - Risk of  Goal: *Absence of Falls  Description  Document Xavier Brown Fall Risk and appropriate interventions in the flowsheet.   Outcome: Progressing Towards Goal  Note: Fall Risk Interventions:                                Problem: Patient Education: Go to Patient Education Activity  Goal: Patient/Family Education  Outcome: Progressing Towards Goal

## 2020-02-11 NOTE — PROGRESS NOTES
CM and provider met with pt at bedside to discuss transition of care. Pt lives with her fiance and he will assist as needed. Pt is independent. Please encourage ambulation as appropriate to assist with identifying potential transition of care needs. Anticipate pt will transition home today with physician follow up. Pt and her fiance will need transportation scheduled for hospital discharge [phone]) through her Medicaid insurance. Provider has indicated [facility] is not needed at this time. No other transition of care needs have been identified. Care Management Interventions  PCP Verified by CM: Yes  Mode of Transport at Discharge: Other (see comment)(Medicaid taxi vs family)  Transition of Care Consult (CM Consult): Discharge Planning  Health Maintenance Reviewed: Yes  Current Support Network:  Other(lives with fiance)  The Plan for Transition of Care is Related to the Following Treatment Goals : home with physician follow up   Discharge Location  Discharge Placement: Home with family assistance

## 2020-02-11 NOTE — DIABETES MGMT
Diabetes Patient/Family Education Record  Factors That  May Influence Patients Ability  to Learn or  Comply with Recommendations   []   Language barrier    []   Cultural needs   [x]   Motivation    []   Cognitive limitation    []   Physical   []   Education    []   Physiological factors   []   Hearing/vision/speaking impairment   []   [employer] beliefs    []   Financial factors   []  Other:   []  No factors identified at this time.      Person Instructed:   [x]   Patient   [x]   Family   []  Other     Preference for Learning:   [x]   Verbal   []   Written   []  Demonstration     Level of Comprehension & Competence:    []  Good                                      [x] Fair                                     []  Poor                             [x]  Needs Reinforcement   []  Teachback completed    Education Component:   [x]  Medication management, including how to administer insulin (if appropriate) and potential medication interactions    [x]  Nutritional management -obtain usual meal pattern   []  Exercise   []  Signs, symptoms, and treatment of hyperglycemia and hypoglycemia   [] Prevention, recognition and treatment of hyperglycemia and hypoglycemia   [x]  Importance of blood glucose monitoring     []  Instruction on use of the blood glucose meter   [x]  Discuss the importance of HbA1C monitoring    []  Sick day guidelines   []  Proper use and disposal of lancets, needles, syringes or insulin pens (if appropriate)   []  Potential long-term complications (retinopathy, kidney disease, neuropathy, foot care)   [] Information about whom to contact in case of emergency or for more information    [x]  Goal:  Patient/family will demonstrate understanding of Diabetes Self Management Skills by: (date) __4/30_____  Plan for post-discharge education or self-management support:    [] Outpatient class schedule provided            [] Patient Declined    [] Scheduled for outpatient classes (date) _______  Verify:  Does patient understand how diabetes medications work? ______yes______________________  Does patient know what their most recent A1c is? _______________yes____________________  Does patient monitor glucose at home? __________________________yes_________________  Does patient have difficulty obtaining diabetes medications or testing supplies? _______no__________         Brook Ochoa MS, RN, CDE  Glycemic Control Team  [phone]  Pager [phone] (M-TH 8:00-4:30P)  *After Hours pager [phone]

## 2020-02-11 NOTE — DISCHARGE INSTRUCTIONS
Patient Education        Learning About High White Blood Cell Counts  What are white blood cells? White blood cells (leukocytes) help protect your body from infection. Normally, when germs get inside your body, your body makes more white blood cells that search for and destroy the germs. Less often, there are medical problems where the body may make a lot more white blood cells than it needs. What happens when you have a high white blood cell count? Your white blood cell count may be high because your body is fighting an infection. But other things can cause it, such as some medicines, burns, an illness, or other health problems. When your doctor sees that your white blood cell count is high, he or she will try to find out why, and then treat the cause. What are the symptoms? A high white blood cell count alone doesn't cause any symptoms. The symptoms you feel may come from the medical problem that your white blood cells are fighting. For example, if you have pneumonia, you may have a fever and trouble breathing. These are symptoms of pneumonia, not of a high white blood cell count. How is it treated? · Your doctor may do more tests to find the problem that's making your white blood cell count high. Once your doctor finds the problem, he or she may be able to treat it. · Part of your treatment may be telling your doctor if you feel worse. Watch your temperature, and call your doctor if your fever goes up and stays up. Follow-up care is a key part of your treatment and safety. Be sure to make and go to all appointments, and call your doctor if you are having problems. It's also a good idea to know your test results and keep a list of the medicines you take. Where can you learn more? Go to http://anita-chino.info/. Enter J015 in the search box to learn more about \"Learning About High White Blood Cell Counts. \"  Current as of: March 28, 2019  Content Version: 12.2  © 1252-2523 [employer]. Care instructions adapted under license by [employer] (which disclaims liability or warranty for this information). If you have questions about a medical condition or this instruction, always ask your healthcare professional. [address] any warranty or liability for your use of this information.

## 2020-02-11 NOTE — PROGRESS NOTES
1936  Bedside and Verbal shift change report given by DOUGIE Lyon,RN (off going nurse) to Elsi Rodríguez, ALENA (on coming). Report included the following information SBAR, Kardex, OR Summary, Intake/Output and MAR.     0743  Bedside and Verbal shift change report given to VIANCA Rivera (on coming nurse) by Elsi Rodríguez RN (off going). Report included the following information SBAR, Kardex, OR Summary, Intake/Output and MAR.

## 2020-02-11 NOTE — PROGRESS NOTES
Bedside and Verbal shift change report given to Selwyn Valle RN (oncoming nurse) by Aurelio Giang RN and Naga Fink RN (offgoing nurse). Report included the following information SBAR, Kardex, Intake/Output and MAR. Patient A/OX4. Patient in bed resting quietly. No complaints of pain or nausea at this time. Dr. Hieu Caicedo in to assess patient. States patient can order diabetic diet, if she tolerates that she may discharge. Discharge orders placed. 1230-Patient ambulated around nursing [facility] and [facility]. Tolerated well    Patient tolerated lunch. Discharging patient. Discharge nurse reviewed discharge instructions with the patient. The patient verbalized understanding.

## 2020-02-11 NOTE — DISCHARGE SUMMARY
Discharge Summary    Patient: Terrence Cabezas MRN: [id number]  CSN: [id number]    YOB: 1985  Age: 37 y.o.   Sex: female    DOA: 2/9/2020 LOS:  LOS: 2 days   Discharge Date: 2/11/2020     Primary Care Provider:  Lilly Mcdaniels MD    Admission Diagnoses: Recurrent abdominal pain [R10.9]  Gastroparesis [K31.84]    Discharge Diagnoses:    Problem List as of 2/11/2020 Date Reviewed: 12/29/2019          Codes Class Noted - Resolved    Recurrent abdominal pain ICD-10-CM: R10.9  ICD-9-CM: 789.00  2/9/2020 - Present        Occult blood positive stool ICD-10-CM: R19.5  ICD-9-CM: 792.1  12/30/2019 - Present        Diabetic gastroparalysis ([facility] ICD-10-CM: E11.43, K31.84  ICD-9-CM: 250.60, 536.3  12/29/2019 - Present        Intractable nausea and vomiting ICD-10-CM: R11.2  ICD-9-CM: 536.2  12/21/2019 - Present        Abdominal pain, generalized ICD-10-CM: R10.84  ICD-9-CM: 789.07  12/21/2019 - Present        Hx of diabetic gastroparesis ICD-10-CM: Z86.39  ICD-9-CM: V12.29  12/21/2019 - Present        Type 2 diabetes mellitus with hyperglycemia, with long-term current use of insulin ([facility] ICD-10-CM: E11.65, Z79.4  ICD-9-CM: 250.00, 790.29, V58.67  12/21/2019 - Present        Hordeolum externum of right upper eyelid ICD-10-CM: H00.011  ICD-9-CM: 373.11  11/2/2019 - Present        Chest pain ICD-10-CM: R07.9  ICD-9-CM: 786.50  11/1/2019 - Present        Anemia ICD-10-CM: D64.9  ICD-9-CM: 285.9  11/1/2019 - Present        Hyperglycemia ICD-10-CM: R73.9  ICD-9-CM: 790.29  11/1/2019 - Present        Acute renal failure superimposed on stage 3 chronic kidney disease ([facility] ICD-10-CM: N17.9, N18.3  ICD-9-CM: 584.9, 585.3  11/1/2019 - Present        Chronic anemia ICD-10-CM: D64.9  ICD-9-CM: 285.9  6/7/2019 - Present        Gastroparesis ICD-10-CM: K31.84  ICD-9-CM: 536.3  6/6/2019 - Present        Dehydration ICD-10-CM: E86.0  ICD-9-CM: 276.51  6/6/2019 - Present        * (Principal) Leukocytosis ICD-10-CM: J44.091  ICD-9-CM: 288.60  6/6/2019 - Present        Epigastric pain ICD-10-CM: R10.13  ICD-9-CM: 789.06  6/6/2019 - Present        Lactic acid acidosis ICD-10-CM: E87.2  ICD-9-CM: 276.2  6/6/2019 - Present        Acute kidney injury ([facility] ICD-10-CM: N17.9  ICD-9-CM: 584.9  6/6/2019 - Present        Intractable vomiting with nausea ICD-10-CM: R11.2  ICD-9-CM: 536.2  6/6/2019 - Present        Uncontrolled type 2 diabetes mellitus with hyperglycemia (HCC) ICD-10-CM: E11.65  ICD-9-CM: 250.02  6/6/2019 - Present        UTI (urinary tract infection) ICD-10-CM: N39.0  ICD-9-CM: 599.0  6/6/2019 - Present              Discharge Medications:     Discharge Medication List as of 2/11/2020 12:40 PM      START taking these medications    Details   ciprofloxacin HCl (CIPRO) 250 mg tablet Take 1 Tab by mouth two (2) times a day for 3 days. , Normal, Disp-6 Tab, R-0         CONTINUE these medications which have NOT CHANGED    Details   metoclopramide HCl (REGLAN) 5 mg tablet Take 1 Tab by mouth every six (6) hours as needed for Nausea for up to 3 days. , Print, Disp-12 Tab, R-0      potassium chloride SA (MICRO-K) 10 mEq capsule Take 1 Cap by mouth daily. , Print, Disp-5 Cap, R-0      ondansetron (ZOFRAN ODT) 4 mg disintegrating tablet Take 1 Tab by mouth every eight (8) hours as needed for Nausea or Vomiting., Normal, Disp-20 Tab, R-0      pantoprazole (PROTONIX) 40 mg tablet Take 1 Tab by mouth two (2) times a day., Normal, Disp-60 Tab, R-0      amLODIPine (NORVASC) 5 mg tablet Take 1 Tab by mouth daily. , Normal, Disp-30 Tab, R-0      insulin glargine (LANTUS U-100 INSULIN) 100 unit/mL injection 30 Units by SubCUTAneous route nightly. Indications: type 2 diabetes mellitus, Historical Med      insulin admin.  supplies (INPEN, FOR NOVOLOG, SC) 10-12 Units by SubCUTAneous route Before breakfast, lunch, and dinner., Historical Med             Discharge Condition: Good    Procedures : None    Consults: Gastroenterology      PHYSICAL EXAM Visit Vitals  /71 (BP Patient Position: Supine)   Pulse 88   Temp 99.1 °F (37.3 °C)   Resp 12   Ht 5' 4\" (1.626 m)   Wt 61 kg (134 lb 6.4 oz)   SpO2 100%   BMI 23.07 kg/m²     General: Awake, cooperative, no acute distress    HEENT: NC, Atraumatic. PERRLA, EOMI. Anicteric sclerae. Lungs:  CTA Bilaterally. No Wheezing/Rhonchi/Rales. Heart:  Regular  rhythm,  No murmur, No Rubs, No Gallops  Abdomen: Soft, Non distended, Non tender. +Bowel sounds,   Extremities: No c/c/e  Psych:   Not anxious or agitated. Neurologic:  No acute neurological deficits. Admission HPI :   Rolf Leary is a 37 y.o. female who has PMHx of DMT2 and gastroparesis who presents multiple times to the ER this week with abdominal pain and N/V. Today her WBC was elevated but no new symptoms. No fever or dysuria. She reports just finishing 10 days of amoxicillin given to her by GI but she has no diarrhea. She has not been able to eat anything and keep it down for 3 days.        Hospital Course :   Ms. Cathy Peñaloza was admitted to medical floor, she was seen by gastroenterology. Her CT scan of abdomen pelvis did not show any acute findings. She is regularly followed by gastroenterology. She has uncontrolled diabetes. She also has chronic gastroparesis. GI recommended continuing her on PPI and also gave her Reglan during hospitalization. She was recently treated for H. pylori. She had leukocytosis that resolved next day. She is now feeling much better and her diet was advanced, she is not tolerating diet. She will follow-up with GI.    UTI-  UA with evidence of UTI. Urine cultures with no growth to date. She was given ceftriaxone. Will discharge on Cipro to complete 3-day course. Chronic kidney disease-  Stage III, she is followed by nephrology. Her kidney disease is gradually progressing. She is advised to follow-up with nephrology earlier.     Activity: Activity as tolerated    Diet: Diabetic Diet    Follow-up: PCP, GI, nephrology    Disposition: home    Minutes spent on discharge: 45       Labs: Results:       Chemistry Recent Labs     02/11/20 0518 02/09/20  1815   GLU 95 201*    138   K 3.4* 3.2*   * 107   CO2 24 23   BUN 19* 25*   CREA 2.66* 3.14*   CA 7.2* 8.3*   AGAP 5 8   BUCR 7* 8*    139*   TP 5.0* 6.9   ALB 2.2* 3.0*   GLOB 2.8 3.9   AGRAT 0.8 0.8      CBC w/Diff Recent Labs     02/11/20 0518 02/09/20  1815   WBC 12.3 20.7*   RBC 2.58* 3.02*   HGB 7.1* 8.6*   HCT 22.0* 26.1*    393   GRANS  --  83*   LYMPH  --  11*   EOS  --  1      Cardiac Enzymes Recent Labs     02/10/20  0648      CKND1 0.8      Coagulation No results for input(s): PTP, INR, APTT, INREXT in the last 72 hours. Lipid Panel No results found for: CHOL, CHOLPOCT, CHOLX, CHLST, CHOLV, 654029, HDL, HDLP, LDL, LDLC, DLDLP, 573203, VLDLC, VLDL, TGLX, TRIGL, TRIGP, TGLPOCT, CHHD, CHHDX   BNP No results for input(s): BNPP in the last 72 hours. Liver Enzymes Recent Labs     02/11/20 0518   TP 5.0*   ALB 2.2*      SGOT 11      Thyroid Studies Lab Results   Component Value Date/Time    TSH 2.09 12/31/2019 04:00 AM            Significant Diagnostic Studies: Xr Abd (kub)    Result Date: 2/8/2020  EXAM: XR ABD (KUB) CLINICAL INDICATION/HISTORY: Abdominal pain COMPARISON: None. TECHNIQUE: Supine AP view of the abdomen was obtained. _______________ FINDINGS: Normal bowel gas pattern. No air distended bowel loops nor other evidence of bowel obstruction or bowel dilatation. The soft tissue planes and bony structures appear normal. Left and right tubal ligation clips. _______________     IMPRESSION: No bowel obstruction or other acute findings seen. Ct Abd Pelv Wo Cont    Result Date: 2/10/2020  _______________ EXAM: CT ABD PELV WO CONT CLINICAL INDICATION/HISTORY: belly pain, leukocytosis -Additional: None. COMPARISON: CT of 12/29/2019.  TECHNIQUE: Abdominopelvic CT examination was performed without oral contrast. Evaluation of the bowel is limited in the absence of oral contrast. Intravenous contrast was not administered, limiting evaluation of solid organs and vascular structures. Sagittal and coronal series were reformatted from the axial source images. One or more dose reduction techniques were used on this CT: automated exposure control, adjustment of the mAs and/or kVp according to patient size, and iterative reconstruction techniques. The specific techniques used on this CT exam have been documented in the patient's electronic medical record. Digital Imaging and Communications in Medicine (DICOM) format image data are available to nonaffiliated external healthcare facilities or entities on a secure, media free, reciprocally searchable basis with patient authorization for at least a 12-month period after this study. _______________ FINDINGS: VISUALIZED LUNG BASES and LOWER CHEST: Mild dependent hypoventilatory changes. Heart size is normal. LIVER: Normal in size and contour. GALLBLADDER and BILE DUCTS: No calcified gallstones or pericholecystic inflammation. No intrahepatic or extrahepatic biliary ductal dilatation. PANCREAS: Within normal limits. SPLEEN: Within normal limits. ADRENALS: Within normal limits. KIDNEYS, URETERS, URINARY BLADDER: No hydronephrosis or calculi. Stable left renal cyst inferiorly. Normal ureters and bladder. REPRODUCTIVE ORGANS: Within normal limits. STOMACH and BOWEL: Evaluation of the bowel is limited in the absence of oral contrast. Bowel caliber is normal without evidence of obstruction or focal inflammatory process. The appendix is not visualized, but no focal inflammatory changes are seen in the pericecal region to suggest CT evidence of acute appendicitis. LYMPH NODES: No lymphadenopathy. PERITONEUM/RETROPERITONEUM: No free fluid. No extraluminal free air or drainable collection. VESSELS: Within normal limits. BODY WALL: Within normal limits.  BONES: No acute or suspicious osseous findings. _______________     IMPRESSION: No acute inflammatory or obstructive process of the abdomen or pelvis. _______________     Jillyn Pin Chest Port    Result Date: 2/10/2020  EXAM: CHEST RADIOGRAPH, SINGLE VIEW CLINICAL INDICATION/HISTORY: Leukocytosis COMPARISON: Single view chest 2/8/2020 TECHNIQUE: Portable frontal view of the chest was obtained. _______________ FINDINGS: SUPPORT DEVICES: None. HEART AND MEDIASTINUM: Cardiomediastinal silhouette appears within normal limits. Normal caliber thoracic aorta. No central vascular congestion. LUNGS AND PLEURAL SPACES: Lungs are well aerated with no confluent airspace opacity. No pleural effusion or pneumothorax. BONY THORAX AND SOFT TISSUES: No acute osseous abnormality. _______________     IMPRESSION: No active cardiopulmonary disease. Xr Chest Port    Result Date: 2/8/2020  EXAM: CHEST RADIOGRAPH, SINGLE VIEW CLINICAL INDICATION/HISTORY: Chest pain COMPARISON: 12/29/2019 TECHNIQUE: Portable frontal view of the chest was obtained. _______________ FINDINGS: SUPPORT DEVICES: None. HEART AND MEDIASTINUM: Cardiomediastinal silhouette appears within normal limits. Normal caliber thoracic aorta. No central vascular congestion. LUNGS AND PLEURAL SPACES: Lungs are well aerated with no confluent airspace opacity. No pleural effusion or pneumothorax. BONY THORAX AND SOFT TISSUES: No acute osseous abnormality. _______________     IMPRESSION: No active cardiopulmonary disease. No results found for this or any previous visit. Please note that this dictation was completed with [employer], the [employer] voice recognition software. Quite often unanticipated grammatical, syntax, homophones, and other interpretive errors are inadvertently transcribed by the computer software. Please disregard these errors. Please excuse any errors that have escaped final proofreading.      CC: Jenna Silva MD

## 2020-02-11 NOTE — PROGRESS NOTES
Discharge instructions reviewed with the patient. Patient verbalized understanding and verified by teach back. All questions answered. IV discontinued, no redness, swelling or pain noted. Patient awaiting lunch,  is here for transportation home, with an ETA of 45min. Patient discharged off the unit via wheelchair.  Patient armband removed and shredded

## 2020-02-11 NOTE — CONSULTS
[id number] [facility]    Name:  Nicole Quick  MR#:   [id number]  :  1985  ACCOUNT #:  [de-identified]  DATE OF SERVICE:  02/10/2020    HISTORY OF PRESENT ILLNESS:  This is a 41-year-old female with type 2 diabetes mellitus evolving since age 32 about seven years ago, on insulin. Presented with lower abdominal pain, nausea and vomiting, dehydration. This patient is well known to have diabetic gastroparesis, and an upper endoscopy was done by myself on 2019 during a previous admission revealed erosive esophagitis going up over 7 cm, large amount of liquid retention in stomach. There was evidence also of diffuse H. pylori gastritis, confirmed by biopsy. This patient was treated over 10 days with double therapy including amoxicillin 1.5 g twice a day, metronidazole 500 mg b.i.d. and omeprazole 20 mg twice a day for 10 days. She just finished the treatment about 4 days ago, but in the last four days she started to have nausea and vomiting that occurs within one hour after eating or drinking any kind of liquids. Usually, she has regular bowel movement every day, but she claims that sometimes she gets diarrhea. There is no rectal bleeding or melena. There is no hematemesis. She complained of epigastric pain mostly after retching or having hiccups. She has no odynophagia. She claims she lost only a couple of pounds. She has been trying to eat more liquid diet. She denies taking any NSAIDs, smoking, or abusing alcohol. She is living with her mother and five children and her boyfriend. She does not work. PAST MEDICAL HISTORY:  1.  Diabetes mellitus type 2.  2.  She has peripheral neuropathy. 3.  Chronic kidney disease. 4.  She has severe poor dental hygiene. 5.  She has previous five pregnancies including one  and appendectomy. 6.  She had an UTI treated in 2019. MEDICATIONS AT HOME:  She was takin. Protonix 40 mg twice a day.   2.  Lantus 30 units at night. 3.  Potassium chloride 10 mEq. 4.  Zofran 4 mg as needed. 5.  Reglan 5 mg every six hours as needed. 6.  Norvasc 5 mg. ALLERGIES:  SHE IS ALLERGIC TO COMPAZINE. FAMILY HISTORY:  Her sister had leukemia. There is no cancer or active mitral valve disease. REVIEW OF SYSTEMS:  She denies having any major headaches, stroke, paralysis. She does have some numbness in the feet, but no weakness. She feels thirsty. There is no chest pain, shortness of breath, but does complain of epigastric pain. No rash. No fever. No chills. No shivers. No dysuria, hematuria, or burning sensation. She claims that presently she had been on her period. She missed her last one and this time her period has been slightly heavy, but usually it is not. She had a miscarriage in 03/2019. PHYSICAL EXAMINATION:  GENERAL:  We have a 76-year-old  female, who appears to be in no distress. She is not the best historian, but she is in no distress, alert and oriented, with 134 pounds. VITAL SIGNS:  Temperature 98.5, pulse 89, blood pressure 140/68, breathing 16, saturation 100% on room air. SKIN:  Remarkable for multiple tattoos. There are some scars of excoriations, but no active skin disease. HEENT:  Eyes are unremarkable. Pale conjunctivae. The sclerae are anicteric. The pupils are equal and reactive to light. Oropharyngeal cavity is dry and pale mucous membranes. Severely decayed teeth with extensive cavity and teeth decay. NECK:  Supple. No palpable mass. No enlarged thyroid. LUNGS:  Clear to auscultation. HEART:  Cardiac rhythm is regular. S1 and S2 normal.  No murmur. ABDOMEN:  Completely not distended, very soft. It is not tender. No mass or organomegaly. Bowel sounds are normal.  She has mid lower abdominal surgical scar. EXTREMITIES:  Unremarkable. No pedal edema. No clubbing. No tremor. NEUROLOGIC:  No focal neurological sign. She moves all her four extremities.     LABORATORY DATA:  Blood test:  We have a hemoglobin of 8.6 yesterday, was 9.4 on the 02/08 and 8.5 on 02/03. WBC at this time was increased to 14,000, 83% neutrophils, normal platelets, normal MCV and MCH. Urinalysis shows significant amount of wbc more than 100 with modest amount of leuko esterase and significant amount of bacteria, more than 500 glucose and more than 1000 protein. Her complete metabolic panel, we have the potassium of 3.2, BUN 25, creatinine 3.14, when it was 30 and 2.88 on 02/03. Her LFTs are normal except for albumin of 3, alkaline phosphatase 139. Normal lipase. Normal CPK, CPK-MB, troponin. Her hemoglobin A1c on 12/21 was 9.8, that was 13.4 on 06/06/2019. Her vitamin C13 and folic acid were normal.  Her iron saturation on 12/31 was 20, but was 7 on 11/01/2019. Occult blood in stools was negative on 11/01, but positive on 12/29/2019. CT scans of the abdomen and pelvis without contrast reveals no acute inflammatory obstructive process of the abdomen. The bowels were in normal caliber. The stomach was hard to evaluate because of the absence of the contrast.  No hydronephrosis or calculi. Pancreas was normal.    ASSESSMENT:  In conclusion, this is a 17-year-old female who has poorly controlled diabetes mellitus over seven years, on insulin. Presented with nausea, vomiting, and dehydration with acute-on-chronic kidney disease. Most likely she has simply diabetic gastroparesis. We need to continue her high-dose proton pump inhibitor. We have to check in about a month and a half or two months on the eradication of the Helicobacter pylori for which she took triple therapy just finished few days ago for 10 days. This for sure would help her gastroparesis. For now, there is no indication to repeat the endoscopy. I recommend just symptomatic treatment of her gastroparesis with low-roughage and low-fat diet, preferably for now just clear liquid diet until she is better, then we can increase it. I recommended to give her the Protonix 40 mg twice a day intravenously and also Reglan 5-10 mg intravenously every 6 hours around-the-clock until her stomach settle down and able to keep at least some soft food. I am concerned about her chronic iron deficiency anemia that she is still having for long time, although there is no obvious gastrointestinal bleeding at this time. For now, at one time we may need to do a colonoscopy, but I have the feeling that part of her anemia may be caused by her chronic kidney disease. There is significant proteinuria and I recommended this patient be seen by Nephrology for management, otherwise she will end with more progressive kidney disease. She has extremely poor dental hygiene that has to be taken care off. This patient could be followed as an outpatient. We could repeat her gastric emptying study from time to time to see how she is progressing, but mostly it is going to be clinical.  I explained this to the patient that she has to try to manage her symptoms as best she could at home. She has to keep good control of her diabetes and recommend for this that she be seen by a dietitian.       MD DI [email or web address]  D:  02/10/2020 14:36  T:  02/10/2020 19:27  JOB #:  [id number]  CC:  MD Mady Carr MD

## 2020-02-12 ENCOUNTER — HOSPITAL ENCOUNTER (EMERGENCY)
Age: 35
Discharge: HOME | End: 2020-02-13
Attending: EMERGENCY MEDICINE
Payer: MEDICAID

## 2020-02-12 DIAGNOSIS — M54.50 LUMBAR PAIN: Primary | ICD-10-CM

## 2020-02-12 PROCEDURE — 81001 URINALYSIS AUTO W/SCOPE: CPT

## 2020-02-12 PROCEDURE — 99284 EMERGENCY DEPT VISIT MOD MDM: CPT

## 2020-02-13 VITALS
OXYGEN SATURATION: 100 % | BODY MASS INDEX: 21.68 KG/M2 | HEART RATE: 89 BPM | HEIGHT: 64 IN | WEIGHT: 127 LBS | TEMPERATURE: 98.1 F | SYSTOLIC BLOOD PRESSURE: 135 MMHG | RESPIRATION RATE: 16 BRPM | DIASTOLIC BLOOD PRESSURE: 78 MMHG

## 2020-02-13 VITALS
HEIGHT: 64 IN | DIASTOLIC BLOOD PRESSURE: 83 MMHG | BODY MASS INDEX: 21.68 KG/M2 | OXYGEN SATURATION: 100 % | RESPIRATION RATE: 14 BRPM | HEART RATE: 88 BPM | WEIGHT: 127 LBS | SYSTOLIC BLOOD PRESSURE: 149 MMHG | TEMPERATURE: 99 F

## 2020-02-13 DIAGNOSIS — Z86.39 HISTORY OF DIABETIC GASTROPARESIS: ICD-10-CM

## 2020-02-13 DIAGNOSIS — G89.29 CHRONIC ABDOMINAL PAIN: Primary | ICD-10-CM

## 2020-02-13 DIAGNOSIS — R10.9 CHRONIC ABDOMINAL PAIN: Primary | ICD-10-CM

## 2020-02-13 LAB
ALBUMIN SERPL-MCNC: 2.7 G/DL (ref 3.4–5)
ALBUMIN/GLOB SERPL: 0.7 {RATIO} (ref 0.8–1.7)
ALP SERPL-CCNC: 158 U/L (ref 45–117)
ALT SERPL-CCNC: 11 U/L (ref 13–56)
ANION GAP SERPL CALC-SCNC: 7 MMOL/L (ref 3–18)
APPEARANCE UR: CLEAR
APPEARANCE UR: CLEAR
APTT PPP: 26.9 SEC (ref 23–36.4)
AST SERPL-CCNC: 17 U/L (ref 10–38)
BACTERIA URNS QL MICRO: ABNORMAL /HPF
BACTERIA URNS QL MICRO: NORMAL /HPF
BASOPHILS # BLD: 0 K/UL (ref 0–0.1)
BASOPHILS NFR BLD: 0 % (ref 0–2)
BILIRUB SERPL-MCNC: 0.1 MG/DL (ref 0.2–1)
BILIRUB UR QL: NEGATIVE
BILIRUB UR QL: NEGATIVE
BUN SERPL-MCNC: 23 MG/DL (ref 7–18)
BUN/CREAT SERPL: 9 (ref 12–20)
CALCIUM SERPL-MCNC: 8.1 MG/DL (ref 8.5–10.1)
CHLORIDE SERPL-SCNC: 108 MMOL/L (ref 100–111)
CO2 SERPL-SCNC: 25 MMOL/L (ref 21–32)
COLOR UR: YELLOW
COLOR UR: YELLOW
CREAT SERPL-MCNC: 2.68 MG/DL (ref 0.6–1.3)
DIFFERENTIAL METHOD BLD: ABNORMAL
EOSINOPHIL # BLD: 0.3 K/UL (ref 0–0.4)
EOSINOPHIL NFR BLD: 3 % (ref 0–5)
EPITH CASTS URNS QL MICRO: ABNORMAL /LPF (ref 0–5)
EPITH CASTS URNS QL MICRO: NORMAL /LPF (ref 0–5)
ERYTHROCYTE [DISTWIDTH] IN BLOOD BY AUTOMATED COUNT: 14.9 % (ref 11.6–14.5)
EST. AVERAGE GLUCOSE BLD GHB EST-MCNC: 186 MG/DL
GLOBULIN SER CALC-MCNC: 3.7 G/DL (ref 2–4)
GLUCOSE BLD STRIP.AUTO-MCNC: 158 MG/DL (ref 70–110)
GLUCOSE SERPL-MCNC: 151 MG/DL (ref 74–99)
GLUCOSE UR STRIP.AUTO-MCNC: 100 MG/DL
GLUCOSE UR STRIP.AUTO-MCNC: 250 MG/DL
HBA1C MFR BLD: 8.1 % (ref 4.2–5.6)
HCG SERPL QL: NEGATIVE
HCT VFR BLD AUTO: 24.2 % (ref 35–45)
HGB BLD-MCNC: 7.9 G/DL (ref 12–16)
HGB UR QL STRIP: ABNORMAL
HGB UR QL STRIP: ABNORMAL
INR PPP: 0.9 (ref 0.8–1.2)
KETONES UR QL STRIP.AUTO: NEGATIVE MG/DL
KETONES UR QL STRIP.AUTO: NEGATIVE MG/DL
LEUKOCYTE ESTERASE UR QL STRIP.AUTO: NEGATIVE
LEUKOCYTE ESTERASE UR QL STRIP.AUTO: NEGATIVE
LIPASE SERPL-CCNC: 225 U/L (ref 73–393)
LYMPHOCYTES # BLD: 3.5 K/UL (ref 0.9–3.6)
LYMPHOCYTES NFR BLD: 31 % (ref 21–52)
MAGNESIUM SERPL-MCNC: 2.1 MG/DL (ref 1.6–2.6)
MCH RBC QN AUTO: 27.7 PG (ref 24–34)
MCHC RBC AUTO-ENTMCNC: 32.6 G/DL (ref 31–37)
MCV RBC AUTO: 84.9 FL (ref 74–97)
MONOCYTES # BLD: 0.9 K/UL (ref 0.05–1.2)
MONOCYTES NFR BLD: 8 % (ref 3–10)
NEUTS SEG # BLD: 6.6 K/UL (ref 1.8–8)
NEUTS SEG NFR BLD: 58 % (ref 40–73)
NITRITE UR QL STRIP.AUTO: NEGATIVE
NITRITE UR QL STRIP.AUTO: NEGATIVE
PH UR STRIP: 6 [PH] (ref 5–8)
PH UR STRIP: 6 [PH] (ref 5–8)
PLATELET # BLD AUTO: 336 K/UL (ref 135–420)
PMV BLD AUTO: 9.1 FL (ref 9.2–11.8)
POTASSIUM SERPL-SCNC: 3.6 MMOL/L (ref 3.5–5.5)
PROT SERPL-MCNC: 6.4 G/DL (ref 6.4–8.2)
PROT UR STRIP-MCNC: 300 MG/DL
PROT UR STRIP-MCNC: 300 MG/DL
PROTHROMBIN TIME: 12.1 SEC (ref 11.5–15.2)
RBC # BLD AUTO: 2.85 M/UL (ref 4.2–5.3)
RBC #/AREA URNS HPF: ABNORMAL /HPF (ref 0–5)
RBC #/AREA URNS HPF: NORMAL /HPF (ref 0–5)
SODIUM SERPL-SCNC: 140 MMOL/L (ref 136–145)
SP GR UR REFRACTOMETRY: 1.01 (ref 1–1.03)
SP GR UR REFRACTOMETRY: 1.01 (ref 1–1.03)
UROBILINOGEN UR QL STRIP.AUTO: 0.2 EU/DL (ref 0.2–1)
UROBILINOGEN UR QL STRIP.AUTO: 0.2 EU/DL (ref 0.2–1)
WBC # BLD AUTO: 11.3 K/UL (ref 4.6–13.2)
WBC URNS QL MICRO: ABNORMAL /HPF (ref 0–5)
WBC URNS QL MICRO: NORMAL /HPF (ref 0–5)

## 2020-02-13 PROCEDURE — 74011250636 HC RX REV CODE- 250/636: Performed by: PHYSICIAN ASSISTANT

## 2020-02-13 PROCEDURE — C9113 INJ PANTOPRAZOLE SODIUM, VIA: HCPCS | Performed by: PHYSICIAN ASSISTANT

## 2020-02-13 PROCEDURE — 83036 HEMOGLOBIN GLYCOSYLATED A1C: CPT

## 2020-02-13 PROCEDURE — 96366 THER/PROPH/DIAG IV INF ADDON: CPT

## 2020-02-13 PROCEDURE — 96365 THER/PROPH/DIAG IV INF INIT: CPT

## 2020-02-13 PROCEDURE — 85610 PROTHROMBIN TIME: CPT

## 2020-02-13 PROCEDURE — 74011000250 HC RX REV CODE- 250: Performed by: EMERGENCY MEDICINE

## 2020-02-13 PROCEDURE — 96375 TX/PRO/DX INJ NEW DRUG ADDON: CPT

## 2020-02-13 PROCEDURE — 84703 CHORIONIC GONADOTROPIN ASSAY: CPT

## 2020-02-13 PROCEDURE — 99285 EMERGENCY DEPT VISIT HI MDM: CPT

## 2020-02-13 PROCEDURE — 83690 ASSAY OF LIPASE: CPT

## 2020-02-13 PROCEDURE — 81001 URINALYSIS AUTO W/SCOPE: CPT

## 2020-02-13 PROCEDURE — 80053 COMPREHEN METABOLIC PANEL: CPT

## 2020-02-13 PROCEDURE — 83735 ASSAY OF MAGNESIUM: CPT

## 2020-02-13 PROCEDURE — 85025 COMPLETE CBC W/AUTO DIFF WBC: CPT

## 2020-02-13 PROCEDURE — 74011250637 HC RX REV CODE- 250/637: Performed by: EMERGENCY MEDICINE

## 2020-02-13 PROCEDURE — 82962 GLUCOSE BLOOD TEST: CPT

## 2020-02-13 PROCEDURE — 85730 THROMBOPLASTIN TIME PARTIAL: CPT

## 2020-02-13 RX ORDER — LIDOCAINE 4 G/100G
1 PATCH TOPICAL EVERY 24 HOURS
Status: DISCONTINUED | OUTPATIENT
Start: 2020-02-13 | End: 2020-02-13 | Stop reason: HOSPADM

## 2020-02-13 RX ORDER — ACETAMINOPHEN 500 MG
1000 TABLET ORAL
Qty: 30 TAB | Refills: 0 | Status: SHIPPED | OUTPATIENT
Start: 2020-02-13

## 2020-02-13 RX ORDER — METOCLOPRAMIDE HYDROCHLORIDE 5 MG/ML
10 INJECTION INTRAMUSCULAR; INTRAVENOUS
Status: COMPLETED | OUTPATIENT
Start: 2020-02-13 | End: 2020-02-13

## 2020-02-13 RX ORDER — ACETAMINOPHEN 500 MG
1000 TABLET ORAL ONCE
Status: COMPLETED | OUTPATIENT
Start: 2020-02-13 | End: 2020-02-13

## 2020-02-13 RX ORDER — DIPHENHYDRAMINE HYDROCHLORIDE 50 MG/ML
25 INJECTION, SOLUTION INTRAMUSCULAR; INTRAVENOUS
Status: COMPLETED | OUTPATIENT
Start: 2020-02-13 | End: 2020-02-13

## 2020-02-13 RX ORDER — ACETAMINOPHEN 10 MG/ML
1000 INJECTION, SOLUTION INTRAVENOUS ONCE
Status: COMPLETED | OUTPATIENT
Start: 2020-02-13 | End: 2020-02-13

## 2020-02-13 RX ORDER — LIDOCAINE 50 MG/G
PATCH TOPICAL
Qty: 15 EACH | Refills: 0 | Status: SHIPPED | OUTPATIENT
Start: 2020-02-13 | End: 2020-02-20

## 2020-02-13 RX ORDER — METOCLOPRAMIDE 10 MG/1
10 TABLET ORAL
Qty: 12 TAB | Refills: 0 | OUTPATIENT
Start: 2020-02-13 | End: 2020-02-20

## 2020-02-13 RX ORDER — HALOPERIDOL 5 MG/ML
5 INJECTION INTRAMUSCULAR ONCE
Status: COMPLETED | OUTPATIENT
Start: 2020-02-13 | End: 2020-02-13

## 2020-02-13 RX ORDER — PANTOPRAZOLE SODIUM 40 MG/10ML
40 INJECTION, POWDER, LYOPHILIZED, FOR SOLUTION INTRAVENOUS
Status: COMPLETED | OUTPATIENT
Start: 2020-02-13 | End: 2020-02-13

## 2020-02-13 RX ADMIN — HALOPERIDOL LACTATE 5 MG: 5 INJECTION, SOLUTION INTRAMUSCULAR at 12:34

## 2020-02-13 RX ADMIN — DIPHENHYDRAMINE HYDROCHLORIDE 25 MG: 50 INJECTION, SOLUTION INTRAMUSCULAR; INTRAVENOUS at 12:33

## 2020-02-13 RX ADMIN — PANTOPRAZOLE SODIUM 40 MG: 40 INJECTION, POWDER, FOR SOLUTION INTRAVENOUS at 12:29

## 2020-02-13 RX ADMIN — METOCLOPRAMIDE 10 MG: 5 INJECTION, SOLUTION INTRAMUSCULAR; INTRAVENOUS at 12:31

## 2020-02-13 RX ADMIN — ACETAMINOPHEN 1000 MG: 10 INJECTION, SOLUTION INTRAVENOUS at 13:10

## 2020-02-13 RX ADMIN — SODIUM CHLORIDE 1000 ML: 900 INJECTION, SOLUTION INTRAVENOUS at 12:28

## 2020-02-13 RX ADMIN — ACETAMINOPHEN 1000 MG: 500 TABLET ORAL at 02:13

## 2020-02-13 NOTE — ED PROVIDER NOTES
EMERGENCY DEPARTMENT HISTORY AND PHYSICAL EXAM    Date: 2/12/2020  Patient Name: Lawernce Cowden    History of Presenting Illness     Chief Complaint   Patient presents with    LOW BACK PAIN         History Provided By: Patient    Devonte Andrew is a 37 y.o. female with PMHX of controlled diabetes, CKD, gastroparesis who presents to the emergency department C/O right lower back pain. She states after get home she started developing right lower back pain patient tried nothing states back pain is on her right flank and she is worried that is her kidneys. No fever, midline pain, bladder bowel incontinence, or difficulty ambulating. PCP: Frances Gillette MD    Current Facility-Administered Medications   Medication Dose Route Frequency Provider Last Rate Last Dose    acetaminophen (TYLENOL) tablet 1,000 mg  1,000 mg Oral ONCE Ognibene, Mliss Hodgkin, MD        lidocaine 4 % patch 1 Patch  1 Patch TransDERmal Q24H Babita Randall MD         Current Outpatient Medications   Medication Sig Dispense Refill    ciprofloxacin HCl (CIPRO) 250 mg tablet Take 1 Tab by mouth two (2) times a day for 3 days. 6 Tab 0    potassium chloride SA (MICRO-K) 10 mEq capsule Take 1 Cap by mouth daily. 5 Cap 0    ondansetron (ZOFRAN ODT) 4 mg disintegrating tablet Take 1 Tab by mouth every eight (8) hours as needed for Nausea or Vomiting. 20 Tab 0    pantoprazole (PROTONIX) 40 mg tablet Take 1 Tab by mouth two (2) times a day. 60 Tab 0    amLODIPine (NORVASC) 5 mg tablet Take 1 Tab by mouth daily. 30 Tab 0    insulin glargine (LANTUS U-100 INSULIN) 100 unit/mL injection 30 Units by SubCUTAneous route nightly. Indications: type 2 diabetes mellitus      insulin admin. supplies (INPEN, FOR NOVOLOG, SC) 10-12 Units by SubCUTAneous route Before breakfast, lunch, and dinner.          Past History     Past Medical History:  Past Medical History:   Diagnosis Date    Diabetes ([address]     Gastroparesis     Gastroparesis     Hypertension     UTI (urinary tract infection)        Past Surgical History:  Past Surgical History:   Procedure Laterality Date    HX APPENDECTOMY      HX GYN      tubal ligation, C Section       Family History:  History reviewed. No pertinent family history. Social History:  Social History     Tobacco Use    Smoking status: Never Smoker    Smokeless tobacco: Never Used   Substance Use Topics    Alcohol use: Never     Frequency: Never    Drug use: Not Currently       Allergies: Allergies   Allergen Reactions    Compazine [Prochlorperazine] Other (comments)     Dystonic Reaction         Review of Systems   Review of Systems   Constitutional: Negative for chills and fever. Respiratory: Negative for shortness of breath. Cardiovascular: Negative for chest pain. Gastrointestinal: Negative for abdominal pain. Genitourinary: Positive for flank pain. Negative for dysuria and hematuria. Musculoskeletal: Positive for back pain. All other systems reviewed and are negative. Physical Exam     Vitals:    02/12/20 2240   BP: 149/83   Pulse: 94   Resp: 14   Temp: 99 °F (37.2 °C)   SpO2: 100%   Weight: 57.6 kg (127 lb)   Height: 5' 4\" (1.626 m)     Physical Exam  Vitals signs and nursing note reviewed. Constitutional:       General: She is not in acute distress. Appearance: She is well-developed. She is not toxic-appearing. Comments: Poorly groomed     HENT:      Head: Normocephalic and atraumatic. Eyes:      Conjunctiva/sclera: Conjunctivae normal.      Pupils: Pupils are equal, round, and reactive to light. Neck:      Musculoskeletal: Normal range of motion and neck supple. Cardiovascular:      Rate and Rhythm: Normal rate and regular rhythm. Heart sounds: Normal heart sounds. Pulmonary:      Effort: Pulmonary effort is normal. No respiratory distress. Breath sounds: Normal breath sounds. No wheezing or rales. Chest:      Chest wall: No tenderness.    Abdominal: General: There is no distension. Palpations: Abdomen is soft. Tenderness: There is no abdominal tenderness. There is no guarding or rebound. Musculoskeletal: Normal range of motion. General: No tenderness. Thoracic back: She exhibits pain. She exhibits no bony tenderness. Lumbar back: She exhibits no bony tenderness. Back:    Lymphadenopathy:      Cervical: No cervical adenopathy. Skin:     General: Skin is warm and dry. Neurological:      Mental Status: She is alert and oriented to person, place, and time. Cranial Nerves: No cranial nerve deficit. Motor: No abnormal muscle tone. Coordination: Coordination normal.   Psychiatric:         Behavior: Behavior normal.           Diagnostic Study Results     Labs -     Recent Results (from the past 12 hour(s))   URINALYSIS W/ RFLX MICROSCOPIC    Collection Time: 02/12/20 11:45 PM   Result Value Ref Range    Color YELLOW      Appearance CLEAR      Specific gravity 1.009 1.005 - 1.030      pH (UA) 6.0 5.0 - 8.0      Protein 300 (A) NEG mg/dL    Glucose 250 (A) NEG mg/dL    Ketone NEGATIVE  NEG mg/dL    Bilirubin NEGATIVE  NEG      Blood SMALL (A) NEG      Urobilinogen 0.2 0.2 - 1.0 EU/dL    Nitrites NEGATIVE  NEG      Leukocyte Esterase NEGATIVE  NEG     URINE MICROSCOPIC ONLY    Collection Time: 02/12/20 11:45 PM   Result Value Ref Range    WBC 4 to 10 0 - 5 /hpf    RBC 1 to 3 0 - 5 /hpf    Epithelial cells 1+ 0 - 5 /lpf    Bacteria RARE NEG /hpf       Radiologic Studies -   No orders to display     CT Results  (Last 48 hours)    None        CXR Results  (Last 48 hours)    None          Medications given in the ED-  Medications   acetaminophen (TYLENOL) tablet 1,000 mg (has no administration in time range)   lidocaine 4 % patch 1 Patch (has no administration in time range)         Medical Decision Making   I am the first provider for this patient.     I reviewed the vital signs, available nursing notes, past medical history, past surgical history, family history and social history. Vital Signs-Reviewed the patient's vital signs. Pulse Oximetry Analysis - 100% on RA       Records Reviewed: Nursing Notes and Old Medical Records    Provider Notes (Medical Decision Making): Deneen Meckel is a 37 y.o. female well-known to the emergency department who presents with right lower flank pain. She had CT a couple days ago that was negative for renal stone or acute intra-abdominal pathology    Urine is negative for worsening UTI, she is currently on Cipro for UTI that was diagnosed during recent admission    She has no midline tenderness or concerning symptoms for epidural abscess which was considered given her history of poorly controlled diabetes    We will treat conservatively Tylenol and Lidoderm. Referred patient to follow-up with her primary care doctor. She has an appointment in one day. Procedures:  Procedures    ED Course:       Diagnosis and Disposition     Critical Care:    DISCHARGE NOTE:    [place]  results have been reviewed with her. She has been counseled regarding her diagnosis, treatment, and plan. She verbally conveys understanding and agreement of the signs, symptoms, diagnosis, treatment and prognosis and additionally agrees to follow up as discussed. She also agrees with the care-plan and conveys that all of her questions have been answered. I have also provided discharge instructions for her that include: educational information regarding their diagnosis and treatment, and list of reasons why they would want to return to the ED prior to their follow-up appointment, should her condition change. She has been provided with education for proper emergency department utilization. CLINICAL IMPRESSION:    1. Lumbar pain        PLAN:  1. D/C Home  2. Current Discharge Medication List        3.    Follow-up Information     Follow up With Specialties Details Why Contact Info    Christopher Malone MD Family Practice Go in 1 day  [id number] [facility]  [phone]          _______________________________      Please note that this dictation was completed with [employer], the computer voice recognition software. Quite often unanticipated grammatical, syntax, homophones, and other interpretive errors are inadvertently transcribed by the computer software. Please disregard these errors. Please excuse any errors that have escaped final proofreading.

## 2020-02-14 NOTE — ED PROVIDER NOTES
EMERGENCY DEPARTMENT HISTORY AND PHYSICAL EXAM    Date: 2/13/2020  Patient Name: Lawernce Cowden    History of Presenting Illness     Chief Complaint   Patient presents with    Vomiting    Abdominal Pain         History Provided By: Patient    Chief Complaint: abdominal pain    HPI(Context):   2:67 PM  Phyllis Villarreal is a 37 y.o. female with PMHX of gastroparesis, HTN, DMII, CKD, who presents to the emergency department C/O abdominal pain. Associated sxs include nausea and vomiting. Pt has hx of gastroparesis. Pain today feels similar. Sxs x 3 days with vomiting starting today. Pt denies fever, chills, diarrhea, chest pain, SOB, and any other sxs or complaints. Pt denies marijuana use. No ETOH. PCP: Frances Gillette MD    Current Outpatient Medications   Medication Sig Dispense Refill    metoclopramide HCl (REGLAN) 10 mg tablet Take 1 Tab by mouth every six (6) hours as needed for Nausea for up to 10 days. 12 Tab 0    acetaminophen (TYLENOL) 500 mg tablet Take 2 Tabs by mouth every eight (8) hours as needed for Pain. 30 Tab 0    lidocaine (LIDODERM) 5 % Apply patch to the affected area for 12 hours a day and remove for 12 hours a day. 15 Each 0    ciprofloxacin HCl (CIPRO) 250 mg tablet Take 1 Tab by mouth two (2) times a day for 3 days. 6 Tab 0    potassium chloride SA (MICRO-K) 10 mEq capsule Take 1 Cap by mouth daily. 5 Cap 0    ondansetron (ZOFRAN ODT) 4 mg disintegrating tablet Take 1 Tab by mouth every eight (8) hours as needed for Nausea or Vomiting. 20 Tab 0    pantoprazole (PROTONIX) 40 mg tablet Take 1 Tab by mouth two (2) times a day. 60 Tab 0    amLODIPine (NORVASC) 5 mg tablet Take 1 Tab by mouth daily. 30 Tab 0    insulin glargine (LANTUS U-100 INSULIN) 100 unit/mL injection 30 Units by SubCUTAneous route nightly. Indications: type 2 diabetes mellitus      insulin admin. supplies (INPEN, FOR NOVOLOG, SC) 10-12 Units by SubCUTAneous route Before breakfast, lunch, and dinner.          Past History     Past Medical History:  Past Medical History:   Diagnosis Date    Diabetes ([address]     Gastroparesis     Gastroparesis     Hypertension     UTI (urinary tract infection)        Past Surgical History:  Past Surgical History:   Procedure Laterality Date    HX APPENDECTOMY      HX GYN      tubal ligation, C Section       Family History:  History reviewed. No pertinent family history. Social History:  Social History     Tobacco Use    Smoking status: Never Smoker    Smokeless tobacco: Never Used   Substance Use Topics    Alcohol use: Never     Frequency: Never    Drug use: Not Currently       Allergies: Allergies   Allergen Reactions    Compazine [Prochlorperazine] Other (comments)     Dystonic Reaction         Review of Systems   Review of Systems   Constitutional: Positive for appetite change. Negative for chills and fever. Gastrointestinal: Positive for abdominal pain, nausea and vomiting. Negative for diarrhea. Genitourinary: Negative for dysuria. Musculoskeletal: Negative for back pain. Neurological: Negative for dizziness and light-headedness. All other systems reviewed and are negative. Physical Exam     Vitals:    02/13/20 1203 02/13/20 1230 02/13/20 1245 02/13/20 1500   BP: (!) 167/96 135/80 145/80 135/78   Pulse: (!) 101   89   Resp: 18   16   Temp: 98.6 °F (37 °C)   98.1 °F (36.7 °C)   SpO2: 100% 99% 100% 100%   Weight: 57.6 kg (127 lb)      Height: 5' 4\" (1.626 m)        Physical Exam  Vitals signs and nursing note reviewed. Constitutional:       General: She is not in acute distress. Appearance: She is well-developed. She is not diaphoretic. Comments: AA female in NAD. Alert. HENT:      Head: Normocephalic and atraumatic. Right Ear: External ear normal.      Left Ear: External ear normal.      Nose: Nose normal.   Eyes:      Conjunctiva/sclera: Conjunctivae normal.   Neck:      Musculoskeletal: Normal range of motion.    Cardiovascular:      Rate and Rhythm: Normal rate and regular rhythm. Heart sounds: Normal heart sounds. No murmur. No friction rub. No gallop. Pulmonary:      Effort: Pulmonary effort is normal. No tachypnea, accessory muscle usage or respiratory distress. Breath sounds: Normal breath sounds. No decreased breath sounds, wheezing, rhonchi or rales. Abdominal:      Palpations: Abdomen is soft. Tenderness: There is generalized abdominal tenderness (generalized poorly localized abdominal tenderness). There is no right CVA tenderness, left CVA tenderness, guarding or rebound. Negative signs include McBurney's sign. Musculoskeletal: Normal range of motion. Skin:     General: Skin is warm and dry. Neurological:      Mental Status: She is alert and oriented to person, place, and time. Psychiatric:         Judgment: Judgment normal.             Diagnostic Study Results     Labs -     Recent Results (from the past 12 hour(s))   GLUCOSE, POC    Collection Time: 02/13/20 12:03 PM   Result Value Ref Range    Glucose (POC) 158 (H) 70 - 110 mg/dL   CBC WITH AUTOMATED DIFF    Collection Time: 02/13/20 12:07 PM   Result Value Ref Range    WBC 11.3 4.6 - 13.2 K/uL    RBC 2.85 (L) 4.20 - 5.30 M/uL    HGB 7.9 (L) 12.0 - 16.0 g/dL    HCT 24.2 (L) 35.0 - 45.0 %    MCV 84.9 74.0 - 97.0 FL    MCH 27.7 24.0 - 34.0 PG    MCHC 32.6 31.0 - 37.0 g/dL    RDW 14.9 (H) 11.6 - 14.5 %    PLATELET 462 435 - 536 K/uL    MPV 9.1 (L) 9.2 - 11.8 FL    NEUTROPHILS 58 40 - 73 %    LYMPHOCYTES 31 21 - 52 %    MONOCYTES 8 3 - 10 %    EOSINOPHILS 3 0 - 5 %    BASOPHILS 0 0 - 2 %    ABS. NEUTROPHILS 6.6 1.8 - 8.0 K/UL    ABS. LYMPHOCYTES 3.5 0.9 - 3.6 K/UL    ABS. MONOCYTES 0.9 0.05 - 1.2 K/UL    ABS. EOSINOPHILS 0.3 0.0 - 0.4 K/UL    ABS.  BASOPHILS 0.0 0.0 - 0.1 K/UL    DF AUTOMATED     METABOLIC PANEL, COMPREHENSIVE    Collection Time: 02/13/20 12:07 PM   Result Value Ref Range    Sodium 140 136 - 145 mmol/L    Potassium 3.6 3.5 - 5.5 mmol/L    Chloride 108 100 - 111 mmol/L    CO2 25 21 - 32 mmol/L    Anion gap 7 3.0 - 18 mmol/L    Glucose 151 (H) 74 - 99 mg/dL    BUN 23 (H) 7.0 - 18 MG/DL    Creatinine 2.68 (H) 0.6 - 1.3 MG/DL    BUN/Creatinine ratio 9 (L) 12 - 20      GFR est AA 24 (L) >60 ml/min/1.73m2    GFR est non-AA 20 (L) >60 ml/min/1.73m2    Calcium 8.1 (L) 8.5 - 10.1 MG/DL    Bilirubin, total 0.1 (L) 0.2 - 1.0 MG/DL    ALT (SGPT) 11 (L) 13 - 56 U/L    AST (SGOT) 17 10 - 38 U/L    Alk.  phosphatase 158 (H) 45 - 117 U/L    Protein, total 6.4 6.4 - 8.2 g/dL    Albumin 2.7 (L) 3.4 - 5.0 g/dL    Globulin 3.7 2.0 - 4.0 g/dL    A-G Ratio 0.7 (L) 0.8 - 1.7     MAGNESIUM    Collection Time: 02/13/20 12:07 PM   Result Value Ref Range    Magnesium 2.1 1.6 - 2.6 mg/dL   LIPASE    Collection Time: 02/13/20 12:07 PM   Result Value Ref Range    Lipase 225 73 - 393 U/L   HEMOGLOBIN A1C WITH EAG    Collection Time: 02/13/20 12:07 PM   Result Value Ref Range    Hemoglobin A1c 8.1 (H) 4.2 - 5.6 %    Est. average glucose 186 mg/dL   PROTHROMBIN TIME + INR    Collection Time: 02/13/20 12:07 PM   Result Value Ref Range    Prothrombin time 12.1 11.5 - 15.2 sec    INR 0.9 0.8 - 1.2     PTT    Collection Time: 02/13/20 12:07 PM   Result Value Ref Range    aPTT 26.9 23.0 - 36.4 SEC   HCG QL SERUM    Collection Time: 02/13/20 12:07 PM   Result Value Ref Range    HCG, Ql. NEGATIVE  NEG     URINALYSIS W/ RFLX MICROSCOPIC    Collection Time: 02/13/20  1:05 PM   Result Value Ref Range    Color YELLOW      Appearance CLEAR      Specific gravity 1.007 1.005 - 1.030      pH (UA) 6.0 5.0 - 8.0      Protein 300 (A) NEG mg/dL    Glucose 100 (A) NEG mg/dL    Ketone NEGATIVE  NEG mg/dL    Bilirubin NEGATIVE  NEG      Blood SMALL (A) NEG      Urobilinogen 0.2 0.2 - 1.0 EU/dL    Nitrites NEGATIVE  NEG      Leukocyte Esterase NEGATIVE  NEG     URINE MICROSCOPIC ONLY    Collection Time: 02/13/20  1:05 PM   Result Value Ref Range    WBC 4 to 6 0 - 5 /hpf    RBC 2 to 4 0 - 5 /hpf    Epithelial cells 2+ 0 - 5 /lpf    Bacteria 1+ (A) NEG /hpf           No orders to display     CT Results  (Last 48 hours)    None        CXR Results  (Last 48 hours)    None          Medications given in the ED-  Medications   pantoprazole (PROTONIX) injection 40 mg (40 mg IntraVENous Given 2/13/20 1229)   metoclopramide HCl (REGLAN) injection 10 mg (10 mg IntraVENous Given 2/13/20 1231)   sodium chloride 0.9 % bolus infusion 1,000 mL (0 mL IntraVENous IV Completed 2/13/20 1500)   haloperidol lactate (HALDOL) injection 5 mg (5 mg IntraVENous Given 2/13/20 1234)   diphenhydrAMINE (BENADRYL) injection 25 mg (25 mg IntraVENous Given 2/13/20 1233)   acetaminophen (OFIRMEV) infusion 1,000 mg (0 mg IntraVENous IV Completed 2/13/20 1500)         Medical Decision Making   I am the first provider for this patient. I reviewed the vital signs, available nursing notes, past medical history, past surgical history, family history and social history. Vital Signs-Reviewed the patient's vital signs. Pulse Oximetry Analysis - 100% on RA     Records Reviewed: Nursing Notes, Old Medical Records, Previous Radiology Studies and Previous Laboratory Studies    Provider Notes (Medical Decision Making): gastroparesis, gastritis, PUD, GERD, pancreatitis, hepatitis, colitis, diverticulitis, gastroenteritis, chronic pain    Procedures:  Procedures    ED Course:   7:49 PM Initial assessment performed. The patients presenting problems have been discussed, and they are in agreement with the care plan formulated and outlined with them. I have encouraged them to ask questions as they arise throughout their visit. Diagnosis and Disposition       Feels better. IVF given. Labs unchanged or improved from most recent. Hx of gastroparesis presenting with same. No emesis in ED. GI FU. Reasons to RTED discussed with pt. All questions answered. Pt feels comfortable going home at this time. Pt expressed understanding and she agrees with plan.         1. Chronic abdominal pain    2. History of diabetic gastroparesis        PLAN:  1. D/C Home  2. Discharge Medication List as of 2/13/2020  1:58 PM      START taking these medications    Details   metoclopramide HCl (REGLAN) 10 mg tablet Take 1 Tab by mouth every six (6) hours as needed for Nausea for up to 10 days. , Print, Disp-12 Tab, R-0         CONTINUE these medications which have NOT CHANGED    Details   acetaminophen (TYLENOL) 500 mg tablet Take 2 Tabs by mouth every eight (8) hours as needed for Pain., Print, Disp-30 Tab, R-0      lidocaine (LIDODERM) 5 % Apply patch to the affected area for 12 hours a day and remove for 12 hours a day., Normal, Disp-15 Each, R-0      ciprofloxacin HCl (CIPRO) 250 mg tablet Take 1 Tab by mouth two (2) times a day for 3 days. , Normal, Disp-6 Tab, R-0      potassium chloride SA (MICRO-K) 10 mEq capsule Take 1 Cap by mouth daily. , Print, Disp-5 Cap, R-0      ondansetron (ZOFRAN ODT) 4 mg disintegrating tablet Take 1 Tab by mouth every eight (8) hours as needed for Nausea or Vomiting., Normal, Disp-20 Tab, R-0      pantoprazole (PROTONIX) 40 mg tablet Take 1 Tab by mouth two (2) times a day., Normal, Disp-60 Tab, R-0      amLODIPine (NORVASC) 5 mg tablet Take 1 Tab by mouth daily. , Normal, Disp-30 Tab, R-0      insulin glargine (LANTUS U-100 INSULIN) 100 unit/mL injection 30 Units by SubCUTAneous route nightly. Indications: type 2 diabetes mellitus, Historical Med      insulin admin. supplies (INPEN, FOR NOVOLOG, SC) 10-12 Units by SubCUTAneous route Before breakfast, lunch, and dinner., Historical Med           3.    Follow-up Information     Follow up With Specialties Details Why Contact Info    Elsie Jones MD Gastroenterology   [address] [address] [address]  9 Shayla Crocker MD [facility]   [address] [facility] [address] [phone]      [facility] EMERGENCY DEPT Emergency Medicine  As needed, If symptoms worsen [address] [place] [address]  [phone]        _______________________________    Attestations: This note is prepared by Carmine Munguia PA-C.  _______________________________          Please note that this dictation was completed with [employer], the computer voice recognition software. Quite often unanticipated grammatical, syntax, homophones, and other interpretive errors are inadvertently transcribed by the computer software. Please disregard these errors. Please excuse any errors that have escaped final proofreading.

## 2020-02-15 LAB
BACTERIA SPEC CULT: NORMAL
SERVICE CMNT-IMP: NORMAL

## 2020-02-16 LAB
BACTERIA SPEC CULT: NORMAL
SERVICE CMNT-IMP: NORMAL

## 2020-02-17 NOTE — ADT AUTH CERT NOTES
DOWNGRADED TO OBSERVATION 
 
ONCE RECEIVED AND COMPLETED, PLEASE FAX BACK CONFIRMATION AND NEW OBS AUTH# OR WHETHER OR NOT OBS REQUIRES AN AUTH.  
 
Abdirizak Pérez

## 2020-02-18 ENCOUNTER — HOSPITAL ENCOUNTER (EMERGENCY)
Age: 35
Discharge: HOME | End: 2020-02-18
Attending: EMERGENCY MEDICINE
Payer: MEDICAID

## 2020-02-18 VITALS
HEIGHT: 64 IN | HEART RATE: 92 BPM | BODY MASS INDEX: 21.68 KG/M2 | RESPIRATION RATE: 16 BRPM | TEMPERATURE: 98.3 F | DIASTOLIC BLOOD PRESSURE: 57 MMHG | WEIGHT: 127 LBS | OXYGEN SATURATION: 100 % | SYSTOLIC BLOOD PRESSURE: 118 MMHG

## 2020-02-18 DIAGNOSIS — E11.43 DIABETIC GASTROPARALYSIS (HCC): ICD-10-CM

## 2020-02-18 DIAGNOSIS — R10.9 RECURRENT ABDOMINAL PAIN: Primary | ICD-10-CM

## 2020-02-18 DIAGNOSIS — D64.9 CHRONIC ANEMIA: ICD-10-CM

## 2020-02-18 DIAGNOSIS — K31.84 DIABETIC GASTROPARALYSIS (HCC): ICD-10-CM

## 2020-02-18 LAB
ALBUMIN SERPL-MCNC: 2.6 G/DL (ref 3.4–5)
ALBUMIN/GLOB SERPL: 0.7 {RATIO} (ref 0.8–1.7)
ALP SERPL-CCNC: 119 U/L (ref 45–117)
ALT SERPL-CCNC: 9 U/L (ref 13–56)
ANION GAP SERPL CALC-SCNC: 6 MMOL/L (ref 3–18)
AST SERPL-CCNC: 19 U/L (ref 10–38)
BASOPHILS # BLD: 0 K/UL (ref 0–0.1)
BASOPHILS NFR BLD: 0 % (ref 0–2)
BILIRUB SERPL-MCNC: 0.2 MG/DL (ref 0.2–1)
BUN SERPL-MCNC: 22 MG/DL (ref 7–18)
BUN/CREAT SERPL: 8 (ref 12–20)
CALCIUM SERPL-MCNC: 7.9 MG/DL (ref 8.5–10.1)
CHLORIDE SERPL-SCNC: 107 MMOL/L (ref 100–111)
CO2 SERPL-SCNC: 26 MMOL/L (ref 21–32)
CREAT SERPL-MCNC: 2.79 MG/DL (ref 0.6–1.3)
DIFFERENTIAL METHOD BLD: ABNORMAL
EOSINOPHIL # BLD: 0.2 K/UL (ref 0–0.4)
EOSINOPHIL NFR BLD: 2 % (ref 0–5)
ERYTHROCYTE [DISTWIDTH] IN BLOOD BY AUTOMATED COUNT: 14.9 % (ref 11.6–14.5)
GLOBULIN SER CALC-MCNC: 3.6 G/DL (ref 2–4)
GLUCOSE SERPL-MCNC: 185 MG/DL (ref 74–99)
HCT VFR BLD AUTO: 22.3 % (ref 35–45)
HGB BLD-MCNC: 7.1 G/DL (ref 12–16)
LIPASE SERPL-CCNC: 209 U/L (ref 73–393)
LYMPHOCYTES # BLD: 3 K/UL (ref 0.9–3.6)
LYMPHOCYTES NFR BLD: 28 % (ref 21–52)
MAGNESIUM SERPL-MCNC: 2 MG/DL (ref 1.6–2.6)
MCH RBC QN AUTO: 27.2 PG (ref 24–34)
MCHC RBC AUTO-ENTMCNC: 31.8 G/DL (ref 31–37)
MCV RBC AUTO: 85.4 FL (ref 74–97)
MONOCYTES # BLD: 1 K/UL (ref 0.05–1.2)
MONOCYTES NFR BLD: 9 % (ref 3–10)
NEUTS SEG # BLD: 6.7 K/UL (ref 1.8–8)
NEUTS SEG NFR BLD: 61 % (ref 40–73)
PLATELET # BLD AUTO: 339 K/UL (ref 135–420)
PMV BLD AUTO: 8.6 FL (ref 9.2–11.8)
POTASSIUM SERPL-SCNC: 3.5 MMOL/L (ref 3.5–5.5)
PROT SERPL-MCNC: 6.2 G/DL (ref 6.4–8.2)
RBC # BLD AUTO: 2.61 M/UL (ref 4.2–5.3)
SODIUM SERPL-SCNC: 139 MMOL/L (ref 136–145)
WBC # BLD AUTO: 11 K/UL (ref 4.6–13.2)

## 2020-02-18 PROCEDURE — 83735 ASSAY OF MAGNESIUM: CPT

## 2020-02-18 PROCEDURE — 96372 THER/PROPH/DIAG INJ SC/IM: CPT

## 2020-02-18 PROCEDURE — C9113 INJ PANTOPRAZOLE SODIUM, VIA: HCPCS | Performed by: EMERGENCY MEDICINE

## 2020-02-18 PROCEDURE — 96374 THER/PROPH/DIAG INJ IV PUSH: CPT

## 2020-02-18 PROCEDURE — 99284 EMERGENCY DEPT VISIT MOD MDM: CPT

## 2020-02-18 PROCEDURE — 85025 COMPLETE CBC W/AUTO DIFF WBC: CPT

## 2020-02-18 PROCEDURE — 93005 ELECTROCARDIOGRAM TRACING: CPT

## 2020-02-18 PROCEDURE — 74011250637 HC RX REV CODE- 250/637: Performed by: EMERGENCY MEDICINE

## 2020-02-18 PROCEDURE — 83690 ASSAY OF LIPASE: CPT

## 2020-02-18 PROCEDURE — 80053 COMPREHEN METABOLIC PANEL: CPT

## 2020-02-18 PROCEDURE — 96375 TX/PRO/DX INJ NEW DRUG ADDON: CPT

## 2020-02-18 PROCEDURE — 74011250636 HC RX REV CODE- 250/636: Performed by: EMERGENCY MEDICINE

## 2020-02-18 RX ORDER — ONDANSETRON 2 MG/ML
4 INJECTION INTRAMUSCULAR; INTRAVENOUS
Status: COMPLETED | OUTPATIENT
Start: 2020-02-18 | End: 2020-02-18

## 2020-02-18 RX ORDER — DIPHENHYDRAMINE HCL 25 MG
25 CAPSULE ORAL
Status: COMPLETED | OUTPATIENT
Start: 2020-02-18 | End: 2020-02-18

## 2020-02-18 RX ORDER — HALOPERIDOL 5 MG/ML
5 INJECTION INTRAMUSCULAR ONCE
Status: COMPLETED | OUTPATIENT
Start: 2020-02-18 | End: 2020-02-18

## 2020-02-18 RX ORDER — ACETAMINOPHEN 10 MG/ML
1000 INJECTION, SOLUTION INTRAVENOUS ONCE
Status: COMPLETED | OUTPATIENT
Start: 2020-02-18 | End: 2020-02-18

## 2020-02-18 RX ORDER — PANTOPRAZOLE SODIUM 40 MG/10ML
40 INJECTION, POWDER, LYOPHILIZED, FOR SOLUTION INTRAVENOUS
Status: COMPLETED | OUTPATIENT
Start: 2020-02-18 | End: 2020-02-18

## 2020-02-18 RX ADMIN — HALOPERIDOL LACTATE 5 MG: 5 INJECTION INTRAMUSCULAR at 23:04

## 2020-02-18 RX ADMIN — DIPHENHYDRAMINE HYDROCHLORIDE 25 MG: 25 CAPSULE ORAL at 21:21

## 2020-02-18 RX ADMIN — PANTOPRAZOLE SODIUM 40 MG: 40 INJECTION, POWDER, FOR SOLUTION INTRAVENOUS at 21:24

## 2020-02-18 RX ADMIN — ONDANSETRON 4 MG: 2 INJECTION INTRAMUSCULAR; INTRAVENOUS at 21:24

## 2020-02-18 RX ADMIN — ACETAMINOPHEN 1000 MG: 10 INJECTION, SOLUTION INTRAVENOUS at 21:48

## 2020-02-18 RX ADMIN — SODIUM CHLORIDE 1000 ML: 900 INJECTION, SOLUTION INTRAVENOUS at 21:22

## 2020-02-18 RX ADMIN — DIPHENHYDRAMINE HYDROCHLORIDE 25 MG: 25 CAPSULE ORAL at 22:21

## 2020-02-19 NOTE — ED NOTES
Patient states she is unable to lay down to sleep and keeps pacing the room bc her pain is better when standing. Provider notified. Heat packs provided for comfort.

## 2020-02-19 NOTE — ED NOTES
Reviewed discharge instructions with patient. Patient verbalized understanding of instructions. All questions answered    Armband removed and shredded.  Patient wheeled to exit to wait for her Medicaid cab

## 2020-02-19 NOTE — DISCHARGE INSTRUCTIONS
You were seen and evaluated in the Emergency Department. Please understand that your work up is not all encompassing and you should follow up with your primary care physician for further management and continuity of care. You were noted to be anemic in the emergency department. You do have a history of chronic anemia however you need to follow-up with your primary care physician as soon as possible to get your hemoglobin rechecked. Please return to Emergency Department or seek medical attention immediately if you have acute worsening in your symptoms or develop chest pain, shortness of breath, repeated vomiting, fever, altered level of consciousness, coughing up blood, or start sweating and feel clammy. If you were prescribed any medicine for home, please take as prescribed by your health-care provider. If you were given any follow-up appointments or numbers to call, please do so as instructed. Avoid any tobacco products or excessive alcohol. Patient Education        Anemia: Care Instructions  Your Care Instructions    Anemia is a low level of red blood cells, which carry oxygen throughout your body. Many things can cause anemia. Lack of iron is one of the most common causes. Your body needs iron to make hemoglobin, a substance in red blood cells that carries oxygen from the lungs to your body's cells. Without enough iron, the body produces fewer and smaller red blood cells. As a result, your body's cells do not get enough oxygen, and you feel tired and weak. And you may have trouble concentrating. Bleeding is the most common cause of a lack of iron. You may have heavy menstrual bleeding or bleeding caused by conditions such as ulcers, hemorrhoids, or cancer. Regular use of aspirin or other anti-inflammatory medicines (such as ibuprofen) also can cause bleeding in some people.  A lack of iron in your diet also can cause anemia, especially at times when the body needs more iron, such as during pregnancy, infancy, and the teen years. Your doctor may have prescribed iron pills. It may take several months of treatment for your iron levels to return to normal. Your doctor also may suggest that you eat foods that are rich in iron, such as meat and beans. There are many other causes of anemia. It is not always due to a lack of iron. Finding the specific cause of your anemia will help your doctor find the right treatment for you. Follow-up care is a key part of your treatment and safety. Be sure to make and go to all appointments, and call your doctor if you are having problems. It's also a good idea to know your test results and keep a list of the medicines you take. How can you care for yourself at home? · Take your medicines exactly as prescribed. Call your doctor if you think you are having a problem with your medicine. · If your doctor recommends iron pills, take them as directed:  ? Try to take the pills on an empty stomach about 1 hour before or 2 hours after meals. But you may need to take iron with food to avoid an upset stomach. ? Do not take antacids or drink milk or caffeine drinks (such as coffee, tea, or cola) at the same time or within 2 hours of the time that you take your iron. They can make it hard for your body to absorb the iron. ? Vitamin C (from food or supplements) helps your body absorb iron. Try taking iron pills with a glass of orange juice or some other food that is high in vitamin C, such as citrus fruits. ? Iron pills may cause stomach problems, such as heartburn, nausea, diarrhea, constipation, and cramps. Be sure to drink plenty of fluids, and include fruits, vegetables, and fiber in your diet each day. Iron pills often make your bowel movements dark or green. ? If you forget to take an iron pill, do not take a double dose of iron the next time you take a pill. ? Keep iron pills out of the reach of small children. An overdose of iron can be very dangerous.   · Follow your doctor's advice about eating iron-rich foods. These include red meat, shellfish, poultry, eggs, beans, raisins, whole-grain bread, and leafy green vegetables. · Steam vegetables to help them keep their iron content. When should you call for help? Call 911 anytime you think you may need emergency care. For example, call if:    · You have symptoms of a heart attack. These may include:  ? Chest pain or pressure, or a strange feeling in the chest.  ? Sweating. ? Shortness of breath. ? Nausea or vomiting. ? Pain, pressure, or a strange feeling in the back, neck, jaw, or upper belly or in one or both shoulders or arms. ? Lightheadedness or sudden weakness. ? A fast or irregular heartbeat. After you call 911, the  may tell you to chew 1 adult-strength or 2 to 4 low-dose aspirin. Wait for an ambulance. Do not try to drive yourself.     · You passed out (lost consciousness).    Call your doctor now or seek immediate medical care if:    · You have new or increased shortness of breath.     · You are dizzy or lightheaded, or you feel like you may faint.     · Your fatigue and weakness continue or get worse.     · You have any abnormal bleeding, such as:  ? Nosebleeds. ? Vaginal bleeding that is different (heavier, more frequent, at a different time of the month) than what you are used to.  ? Bloody or black stools, or rectal bleeding. ? Bloody or pink urine.    Watch closely for changes in your health, and be sure to contact your doctor if:    · You do not get better as expected. Where can you learn more? Go to http://anita-chino.info/. Enter R301 in the search box to learn more about \"Anemia: Care Instructions. \"  Current as of: March 28, 2019  Content Version: 12.2  © 9802-6014 [employer]. Care instructions adapted under license by [employer] (which disclaims liability or warranty for this information).  If you have questions about a medical condition or this instruction, always ask your healthcare professional. [place] [address] any warranty or liability for your use of this information. Patient Education        Gastroparesis: Care Instructions  Your Care Instructions    When you have gastroparesis, your stomach takes a lot longer to empty. This delay can cause belly pain, bloating, and belching. It also can cause hiccups, heartburn, nausea or vomiting. You may not feel like eating. These symptoms may come and go. They most often occur during and after meals. You may feel full after only a few bites of food. This condition occurs when the nerves to the stomach don't work properly. Diabetes is the most common cause of this nerve damage. Gastroparesis can make it harder to control your blood sugar levels. But keeping your blood sugar levels under control may help with your symptoms. Parkinson's disease, stroke, and some medicines can also cause this condition. Home treatment can often help. Follow-up care is a key part of your treatment and safety. Be sure to make and go to all appointments, and call your doctor if you are having problems. It's also a good idea to know your test results and keep a list of the medicines you take. How can you care for yourself at home? · Eat several small meals each day rather than three large meals. · Eat foods that are low in fiber and fat. · If your doctor suggests it, take medicines that help the stomach empty more quickly. These are called motility agents. When should you call for help? Call your doctor now or seek immediate medical care if:    · You are vomiting.     · You have new or worse belly pain.     · You have a fever.     · You cannot pass stools or gas.    Watch closely for changes in your health, and be sure to contact your doctor if you have any problems. Where can you learn more? Go to http://anita-chino.info/.   Enter M106 in the search box to learn more about \"Gastroparesis: Care Instructions. \"  Current as of: November 7, 2018  Content Version: 12.2  © 0245-0908 [employer]. Care instructions adapted under license by [employer] (which disclaims liability or warranty for this information). If you have questions about a medical condition or this instruction, always ask your healthcare professional. [address] any warranty or liability for your use of this information.

## 2020-02-19 NOTE — ED NOTES
Patient states medication has done nothing and now her muscles are sore from pacing the room. Provider notified of status. Patient still unable to void at this time.

## 2020-02-19 NOTE — ED TRIAGE NOTES
Patient brought to ED via EMS with c/o NVD x 1 day. Patient states she has not been able to hold anything down and has had diarrhea since completing antibiotics x 4 days ago. Patient also c/o mid sternal CP today. NAD noted in triage. Patient able to make needs known.

## 2020-02-19 NOTE — ED PROVIDER NOTES
EMERGENCY DEPARTMENT HISTORY AND PHYSICAL EXAM    Date: 2/18/2020  Patient Name: Ammon Romero    History of Presenting Illness     Chief Complaint   Patient presents with    Vomiting    Diarrhea         History Provided By: Patient    Additional History (Context): Ammon Romero is a 37 y.o. female with PMHX gastroparesis, diabetes, chronic kidney disease, chronic anemia presents to the emergency department C/O dentalized abdominal pain, nausea, vomiting and diarrhea that is typical of her diabetic gastroparesis x2 days. Patient states that she has been using Reglan and Protonix with some relief. Reports some epigastric pain that radiates up her chest.  Pt denies shortness of breath, fever, and any other sxs or complaints. Also reporting stating that she feels as if she cannot look up. Reports that the last time this occurred she was given Benadryl and \"muscle relaxers\"    PCP: Chantal Varma MD    Current Facility-Administered Medications   Medication Dose Route Frequency Provider Last Rate Last Dose    haloperidol lactate (HALDOL) injection 5 mg  5 mg IntraMUSCular ONCE Vanessa Lindo,          Current Outpatient Medications   Medication Sig Dispense Refill    acetaminophen (TYLENOL) 500 mg tablet Take 2 Tabs by mouth every eight (8) hours as needed for Pain. 30 Tab 0    lidocaine (LIDODERM) 5 % Apply patch to the affected area for 12 hours a day and remove for 12 hours a day. 15 Each 0    metoclopramide HCl (REGLAN) 10 mg tablet Take 1 Tab by mouth every six (6) hours as needed for Nausea for up to 10 days. 12 Tab 0    potassium chloride SA (MICRO-K) 10 mEq capsule Take 1 Cap by mouth daily. 5 Cap 0    ondansetron (ZOFRAN ODT) 4 mg disintegrating tablet Take 1 Tab by mouth every eight (8) hours as needed for Nausea or Vomiting. 20 Tab 0    pantoprazole (PROTONIX) 40 mg tablet Take 1 Tab by mouth two (2) times a day.  60 Tab 0    amLODIPine (NORVASC) 5 mg tablet Take 1 Tab by mouth daily. 30 Tab 0    insulin glargine (LANTUS U-100 INSULIN) 100 unit/mL injection 30 Units by SubCUTAneous route nightly. Indications: type 2 diabetes mellitus      insulin admin. supplies (INPEN, FOR [place], [place]) 10-12 Units by SubCUTAneous route Before breakfast, lunch, and dinner. Past History     Past Medical History:  Past Medical History:   Diagnosis Date    Diabetes ([address]     Gastroparesis     Gastroparesis     Hypertension     UTI (urinary tract infection)        Past Surgical History:  Past Surgical History:   Procedure Laterality Date    HX APPENDECTOMY      HX GYN      tubal ligation, C Section       Family History:  History reviewed. No pertinent family history. Social History:  Social History     Tobacco Use    Smoking status: Never Smoker    Smokeless tobacco: Never Used   Substance Use Topics    Alcohol use: Never     Frequency: Never    Drug use: Not Currently       Allergies: Allergies   Allergen Reactions    Compazine [Prochlorperazine] Other (comments)     Dystonic Reaction         Review of Systems   Review of Systems   Constitutional: Negative for chills and fever. HENT: Negative for congestion, ear pain, sinus pain and sore throat. Eyes: Negative for pain and visual disturbance. Respiratory: Negative for cough and shortness of breath. Cardiovascular: Negative for chest pain and leg swelling. Gastrointestinal: Positive for abdominal pain, diarrhea and vomiting. Negative for constipation and nausea. Genitourinary: Negative for dysuria, hematuria, vaginal bleeding and vaginal discharge. Musculoskeletal: Negative for back pain and neck pain. Skin: Negative for rash and wound. Neurological: Negative for dizziness, tremors, weakness, light-headedness and numbness. All other systems reviewed and are negative.       Physical Exam     Vitals:    02/18/20 2039   BP: 151/84   Pulse: 93   Resp: 18   Temp: 98.3 °F (36.8 °C)   SpO2: 100%   Weight: 57.6 kg (127 lb)   Height: 5' 4\" (1.626 m)     Physical Exam    Nursing note and vitals reviewed    Constitutional: [address] female, pale in appearance,, no acute distress  Head: Normocephalic, Atraumatic  Eyes: Pupils are equal, round, and reactive to light, EOMI  Neck: Supple, non-tender  Cardiovascular: Regular rate and rhythm, no murmurs, rubs, or gallops  Chest: Normal work of breathing and chest excursion bilaterally  Lungs: Clear to ausculation bilaterally, no wheezes, no rhonchi  Abdomen: Soft, generalized tenderness that is not localized, non distended, normoactive bowel sounds  Back: No evidence of trauma or deformity  Extremities: No evidence of trauma or deformity, no LE edema. No streaking erythema, vesicular lesions, ulcerations or bulla  Skin: Warm and dry, normal cap refill  Neuro: Alert and appropriate, CN intact, normal speech, moving all 4 extremities freely and symmetrically  Psychiatric: Normal mood and affect       Diagnostic Study Results     Labs -     Recent Results (from the past 12 hour(s))   CBC WITH AUTOMATED DIFF    Collection Time: 02/18/20  8:52 PM   Result Value Ref Range    WBC 11.0 4.6 - 13.2 K/uL    RBC 2.61 (L) 4.20 - 5.30 M/uL    HGB 7.1 (L) 12.0 - 16.0 g/dL    HCT 22.3 (L) 35.0 - 45.0 %    MCV 85.4 74.0 - 97.0 FL    MCH 27.2 24.0 - 34.0 PG    MCHC 31.8 31.0 - 37.0 g/dL    RDW 14.9 (H) 11.6 - 14.5 %    PLATELET 127 887 - 641 K/uL    MPV 8.6 (L) 9.2 - 11.8 FL    NEUTROPHILS 61 40 - 73 %    LYMPHOCYTES 28 21 - 52 %    MONOCYTES 9 3 - 10 %    EOSINOPHILS 2 0 - 5 %    BASOPHILS 0 0 - 2 %    ABS. NEUTROPHILS 6.7 1.8 - 8.0 K/UL    ABS. LYMPHOCYTES 3.0 0.9 - 3.6 K/UL    ABS. MONOCYTES 1.0 0.05 - 1.2 K/UL    ABS. EOSINOPHILS 0.2 0.0 - 0.4 K/UL    ABS.  BASOPHILS 0.0 0.0 - 0.1 K/UL    DF AUTOMATED     METABOLIC PANEL, COMPREHENSIVE    Collection Time: 02/18/20  8:52 PM   Result Value Ref Range    Sodium 139 136 - 145 mmol/L    Potassium 3.5 3.5 - 5.5 mmol/L    Chloride 107 100 - 111 mmol/L CO2 26 21 - 32 mmol/L    Anion gap 6 3.0 - 18 mmol/L    Glucose 185 (H) 74 - 99 mg/dL    BUN 22 (H) 7.0 - 18 MG/DL    Creatinine 2.79 (H) 0.6 - 1.3 MG/DL    BUN/Creatinine ratio 8 (L) 12 - 20      GFR est AA 23 (L) >60 ml/min/1.73m2    GFR est non-AA 19 (L) >60 ml/min/1.73m2    Calcium 7.9 (L) 8.5 - 10.1 MG/DL    Bilirubin, total 0.2 0.2 - 1.0 MG/DL    ALT (SGPT) 9 (L) 13 - 56 U/L    AST (SGOT) 19 10 - 38 U/L    Alk. phosphatase 119 (H) 45 - 117 U/L    Protein, total 6.2 (L) 6.4 - 8.2 g/dL    Albumin 2.6 (L) 3.4 - 5.0 g/dL    Globulin 3.6 2.0 - 4.0 g/dL    A-G Ratio 0.7 (L) 0.8 - 1.7     LIPASE    Collection Time: 02/18/20  8:52 PM   Result Value Ref Range    Lipase 209 73 - 393 U/L   MAGNESIUM    Collection Time: 02/18/20  8:52 PM   Result Value Ref Range    Magnesium 2.0 1.6 - 2.6 mg/dL   EKG, 12 LEAD, INITIAL    Collection Time: 02/18/20  9:04 PM   Result Value Ref Range    Ventricular Rate 95 BPM    Atrial Rate 95 BPM    P-R Interval 120 ms    QRS Duration 80 ms    Q-T Interval 386 ms    QTC Calculation (Bezet) 485 ms    Calculated P Axis 53 degrees    Calculated R Axis 77 degrees    Calculated T Axis 42 degrees    Diagnosis       Normal sinus rhythm  Prolonged QT  Abnormal ECG  When compared with ECG of 09-FEB-2020 20:08,  No significant change was found         Radiologic Studies -   No orders to display     CT Results  (Last 48 hours)    None        CXR Results  (Last 48 hours)    None            Medical Decision Making   I am the first provider for this patient. I reviewed the vital signs, available nursing notes, past medical history, past surgical history, family history and social history. Vital Signs-Reviewed the patient's vital signs. Pulse Oximetry Analysis -100 % on room air    Cardiac Monitor:  Rate: 93 bpm  Rhythm: Regular    EKG interpretation: (Preliminary)  8:41 PM   Normal sinus rhythm at 95 beats minute. QTc 485 ms.   No acute ST elevation    Records Reviewed: Nursing Notes and Old Medical Records    Provider Notes:   37 y.o. female with a history of diabetic gastroparesis, chronic kidney disease, chronic anemia presenting with nausea, vomiting, diarrhea, generalized abdominal pain, epigastric pain. On exam patient does not appear acutely ill or toxic. She is afebrile, noted to be mildly hypertensive. Abdomen soft and nondistended however with generalized tenderness that is nonlocalized. Will obtain labs to eval for any metabolic derangements. Will also check lipase. Will provide symptom control and reassess. Patient complaining that she cannot look up however has intact extraocular movements. When reviewing the patient's medical records, patient recently diagnosed with dystonic reaction due to her medications and was given Ativan and Benadryl. Will avoid benzodiazepines at this time as her reaction is not consistent with dystonia. Will provide Benadryl and reassess. Procedures:  Procedures    ED Course:   8:41 PM   Initial assessment performed. The patients presenting problems have been discussed, and they are in agreement with the care plan formulated and outlined with them. I have encouraged them to ask questions as they arise throughout their visit. 10:34 PM  Patient noted to have a hemoglobin was 7.1. However in reviewing the patient's labs, patient has a history of chronic anemia and during her recent admission also had a hemoglobin of 7.1. As the patient is hemodynamic stable, there is no indication for emergent transfusion at this time. Chronic anemia likely secondary to patient's CKD. Patient with a creatinine of 2.79. This is around her baseline. Electrolytes within normal limits. Lipase within normal limits. Since being in the emergency department, patient has had no episodes of vomiting or diarrhea. On reexamination, patient laying down on the stretcher. Reports some persistent pain however does not appear in acute distress or acutely ill.   Will give IM Haldol for symptom control. Patient urged close follow-up with her PCP to discuss her anemia, recheck of her hemoglobin and management of her gastroparesis. Diagnosis and Disposition       DISCHARGE NOTE:  56:89 PM    Phyllis Chong's  results have been reviewed with her. She has been counseled regarding her diagnosis, treatment, and plan. She verbally conveys understanding and agreement of the signs, symptoms, diagnosis, treatment and prognosis and additionally agrees to follow up as discussed. She also agrees with the care-plan and conveys that all of her questions have been answered. I have also provided discharge instructions for her that include: educational information regarding their diagnosis and treatment, and list of reasons why they would want to return to the ED prior to their follow-up appointment, should her condition change. She has been provided with education for proper emergency department utilization. CLINICAL IMPRESSION:    1. Recurrent abdominal pain    2. Diabetic gastroparalysis ([address]    3. Chronic anemia        PLAN:  1. D/C Home  2. Current Discharge Medication List        3. Follow-up Information     Follow up With Specialties Details Why Contact Info    Jenna Silva MD [facility] Schedule an appointment as soon as possible for a visit in 2 days  [id number] [facility] [address]  [phone]      [facility] EMERGENCY DEPT Emergency Medicine  As needed if symptoms worsen [address] [address]  [phone]        ____________________________________     Please note that this dictation was completed with [employer], the computer voice recognition software. Quite often unanticipated grammatical, syntax, homophones, and other interpretive errors are inadvertently transcribed by the computer software. Please disregard these errors. Please excuse any errors that have escaped final proofreading.

## 2020-02-20 ENCOUNTER — APPOINTMENT (OUTPATIENT)
Dept: GENERAL RADIOLOGY | Age: 35
End: 2020-02-20
Attending: EMERGENCY MEDICINE
Payer: MEDICAID

## 2020-02-20 ENCOUNTER — HOSPITAL ENCOUNTER (EMERGENCY)
Age: 35
Discharge: HOME | End: 2020-02-20
Attending: EMERGENCY MEDICINE
Payer: MEDICAID

## 2020-02-20 VITALS
WEIGHT: 127 LBS | BODY MASS INDEX: 21.68 KG/M2 | SYSTOLIC BLOOD PRESSURE: 167 MMHG | HEIGHT: 64 IN | RESPIRATION RATE: 18 BRPM | HEART RATE: 94 BPM | DIASTOLIC BLOOD PRESSURE: 85 MMHG | TEMPERATURE: 98.4 F | OXYGEN SATURATION: 100 %

## 2020-02-20 DIAGNOSIS — R11.2 NON-INTRACTABLE VOMITING WITH NAUSEA, UNSPECIFIED VOMITING TYPE: ICD-10-CM

## 2020-02-20 DIAGNOSIS — Z86.39 HISTORY OF DIABETIC GASTROPARESIS: Primary | ICD-10-CM

## 2020-02-20 DIAGNOSIS — G47.00 INSOMNIA, UNSPECIFIED TYPE: ICD-10-CM

## 2020-02-20 DIAGNOSIS — R10.13 ABDOMINAL PAIN, EPIGASTRIC: ICD-10-CM

## 2020-02-20 PROBLEM — R11.10 NON-INTRACTABLE VOMITING: Status: ACTIVE | Noted: 2020-02-20

## 2020-02-20 LAB
ALBUMIN SERPL-MCNC: 2.6 G/DL (ref 3.4–5)
ALBUMIN/GLOB SERPL: 0.7 {RATIO} (ref 0.8–1.7)
ALP SERPL-CCNC: 127 U/L (ref 45–117)
ALT SERPL-CCNC: 11 U/L (ref 13–56)
ANION GAP SERPL CALC-SCNC: 8 MMOL/L (ref 3–18)
APPEARANCE UR: ABNORMAL
AST SERPL-CCNC: 27 U/L (ref 10–38)
ATRIAL RATE: 95 BPM
BACTERIA URNS QL MICRO: ABNORMAL /HPF
BASOPHILS # BLD: 0 K/UL (ref 0–0.1)
BASOPHILS NFR BLD: 0 % (ref 0–2)
BILIRUB SERPL-MCNC: 0.1 MG/DL (ref 0.2–1)
BILIRUB UR QL: NEGATIVE
BUN SERPL-MCNC: 21 MG/DL (ref 7–18)
BUN/CREAT SERPL: 9 (ref 12–20)
CALCIUM SERPL-MCNC: 7.7 MG/DL (ref 8.5–10.1)
CALCULATED P AXIS, ECG09: 53 DEGREES
CALCULATED R AXIS, ECG10: 77 DEGREES
CALCULATED T AXIS, ECG11: 42 DEGREES
CHLORIDE SERPL-SCNC: 107 MMOL/L (ref 100–111)
CO2 SERPL-SCNC: 27 MMOL/L (ref 21–32)
COLOR UR: YELLOW
CREAT SERPL-MCNC: 2.47 MG/DL (ref 0.6–1.3)
DIAGNOSIS, 93000: NORMAL
DIFFERENTIAL METHOD BLD: ABNORMAL
EOSINOPHIL # BLD: 0.2 K/UL (ref 0–0.4)
EOSINOPHIL NFR BLD: 2 % (ref 0–5)
EPITH CASTS URNS QL MICRO: ABNORMAL /LPF (ref 0–5)
ERYTHROCYTE [DISTWIDTH] IN BLOOD BY AUTOMATED COUNT: 15 % (ref 11.6–14.5)
GLOBULIN SER CALC-MCNC: 3.6 G/DL (ref 2–4)
GLUCOSE SERPL-MCNC: 98 MG/DL (ref 74–99)
GLUCOSE UR STRIP.AUTO-MCNC: 250 MG/DL
HCG UR QL: NEGATIVE
HCT VFR BLD AUTO: 21.6 % (ref 35–45)
HGB BLD-MCNC: 7 G/DL (ref 12–16)
HGB UR QL STRIP: ABNORMAL
HYALINE CASTS URNS QL MICRO: ABNORMAL /LPF (ref 0–2)
KETONES UR QL STRIP.AUTO: NEGATIVE MG/DL
LEUKOCYTE ESTERASE UR QL STRIP.AUTO: NEGATIVE
LIPASE SERPL-CCNC: 179 U/L (ref 73–393)
LYMPHOCYTES # BLD: 2.5 K/UL (ref 0.9–3.6)
LYMPHOCYTES NFR BLD: 23 % (ref 21–52)
MCH RBC QN AUTO: 27.7 PG (ref 24–34)
MCHC RBC AUTO-ENTMCNC: 32.4 G/DL (ref 31–37)
MCV RBC AUTO: 85.4 FL (ref 74–97)
MONOCYTES # BLD: 0.9 K/UL (ref 0.05–1.2)
MONOCYTES NFR BLD: 8 % (ref 3–10)
NEUTS SEG # BLD: 7.2 K/UL (ref 1.8–8)
NEUTS SEG NFR BLD: 67 % (ref 40–73)
NITRITE UR QL STRIP.AUTO: NEGATIVE
P-R INTERVAL, ECG05: 120 MS
PH UR STRIP: 6 [PH] (ref 5–8)
PLATELET # BLD AUTO: 351 K/UL (ref 135–420)
PMV BLD AUTO: 8.5 FL (ref 9.2–11.8)
POTASSIUM SERPL-SCNC: 4.2 MMOL/L (ref 3.5–5.5)
PROT SERPL-MCNC: 6.2 G/DL (ref 6.4–8.2)
PROT UR STRIP-MCNC: 300 MG/DL
Q-T INTERVAL, ECG07: 386 MS
QRS DURATION, ECG06: 80 MS
QTC CALCULATION (BEZET), ECG08: 485 MS
RBC # BLD AUTO: 2.53 M/UL (ref 4.2–5.3)
RBC #/AREA URNS HPF: ABNORMAL /HPF (ref 0–5)
SODIUM SERPL-SCNC: 142 MMOL/L (ref 136–145)
SP GR UR REFRACTOMETRY: 1.01 (ref 1–1.03)
UROBILINOGEN UR QL STRIP.AUTO: 0.2 EU/DL (ref 0.2–1)
VENTRICULAR RATE, ECG03: 95 BPM
WBC # BLD AUTO: 10.9 K/UL (ref 4.6–13.2)
WBC URNS QL MICRO: ABNORMAL /HPF (ref 0–5)

## 2020-02-20 PROCEDURE — 81025 URINE PREGNANCY TEST: CPT

## 2020-02-20 PROCEDURE — 96374 THER/PROPH/DIAG INJ IV PUSH: CPT

## 2020-02-20 PROCEDURE — 81001 URINALYSIS AUTO W/SCOPE: CPT

## 2020-02-20 PROCEDURE — 80053 COMPREHEN METABOLIC PANEL: CPT

## 2020-02-20 PROCEDURE — 74011250636 HC RX REV CODE- 250/636: Performed by: EMERGENCY MEDICINE

## 2020-02-20 PROCEDURE — 71045 X-RAY EXAM CHEST 1 VIEW: CPT

## 2020-02-20 PROCEDURE — 96361 HYDRATE IV INFUSION ADD-ON: CPT

## 2020-02-20 PROCEDURE — 96375 TX/PRO/DX INJ NEW DRUG ADDON: CPT

## 2020-02-20 PROCEDURE — 74011000258 HC RX REV CODE- 258: Performed by: EMERGENCY MEDICINE

## 2020-02-20 PROCEDURE — 74011250637 HC RX REV CODE- 250/637: Performed by: EMERGENCY MEDICINE

## 2020-02-20 PROCEDURE — 85025 COMPLETE CBC W/AUTO DIFF WBC: CPT

## 2020-02-20 PROCEDURE — 99285 EMERGENCY DEPT VISIT HI MDM: CPT

## 2020-02-20 PROCEDURE — 93005 ELECTROCARDIOGRAM TRACING: CPT

## 2020-02-20 PROCEDURE — 83690 ASSAY OF LIPASE: CPT

## 2020-02-20 PROCEDURE — 74018 RADEX ABDOMEN 1 VIEW: CPT

## 2020-02-20 RX ORDER — TRAZODONE HYDROCHLORIDE 50 MG/1
50 TABLET ORAL
Status: COMPLETED | OUTPATIENT
Start: 2020-02-20 | End: 2020-02-20

## 2020-02-20 RX ORDER — LORAZEPAM 2 MG/ML
2 INJECTION INTRAMUSCULAR ONCE
Status: COMPLETED | OUTPATIENT
Start: 2020-02-20 | End: 2020-02-20

## 2020-02-20 RX ORDER — METOCLOPRAMIDE HYDROCHLORIDE 5 MG/ML
10 INJECTION INTRAMUSCULAR; INTRAVENOUS
Status: COMPLETED | OUTPATIENT
Start: 2020-02-20 | End: 2020-02-20

## 2020-02-20 RX ORDER — DIPHENHYDRAMINE HYDROCHLORIDE 50 MG/ML
50 INJECTION, SOLUTION INTRAMUSCULAR; INTRAVENOUS
Status: COMPLETED | OUTPATIENT
Start: 2020-02-20 | End: 2020-02-20

## 2020-02-20 RX ORDER — TRAZODONE HYDROCHLORIDE 50 MG/1
50 TABLET ORAL
Qty: 10 TAB | Refills: 0 | Status: SHIPPED | OUTPATIENT
Start: 2020-02-20 | End: 2020-03-01

## 2020-02-20 RX ORDER — METOCLOPRAMIDE HYDROCHLORIDE 5 MG/1
10 TABLET, ORALLY DISINTEGRATING ORAL
Qty: 30 TAB | Refills: 0 | Status: SHIPPED | OUTPATIENT
Start: 2020-02-20 | End: 2020-03-12

## 2020-02-20 RX ADMIN — SODIUM CHLORIDE, SODIUM LACTATE, POTASSIUM CHLORIDE, AND CALCIUM CHLORIDE 1000 ML: 600; 310; 30; 20 INJECTION, SOLUTION INTRAVENOUS at 21:42

## 2020-02-20 RX ADMIN — PROMETHAZINE HYDROCHLORIDE 25 MG: 25 INJECTION, SOLUTION INTRAMUSCULAR; INTRAVENOUS at 22:28

## 2020-02-20 RX ADMIN — DIPHENHYDRAMINE HYDROCHLORIDE 50 MG: 50 INJECTION, SOLUTION INTRAMUSCULAR; INTRAVENOUS at 21:04

## 2020-02-20 RX ADMIN — METOCLOPRAMIDE 10 MG: 5 INJECTION, SOLUTION INTRAMUSCULAR; INTRAVENOUS at 21:04

## 2020-02-20 RX ADMIN — TRAZODONE HYDROCHLORIDE 50 MG: 50 TABLET ORAL at 23:32

## 2020-02-20 RX ADMIN — LORAZEPAM 2 MG: 2 INJECTION INTRAMUSCULAR; INTRAVENOUS at 21:41

## 2020-02-20 RX ADMIN — SODIUM CHLORIDE, SODIUM LACTATE, POTASSIUM CHLORIDE, AND CALCIUM CHLORIDE 1000 ML: 600; 310; 30; 20 INJECTION, SOLUTION INTRAVENOUS at 21:04

## 2020-02-21 NOTE — ED NOTES
Pt up and walking in room despite being asked wit stay in bed d/t administration of phenergan, benadryl, and ativan. Pt states her legs hurt and requesting pain medication because her gabapentin doesn't work.  MD aware, at bedside discussing discharge with pt at this time

## 2020-02-21 NOTE — ED PROVIDER NOTES
EMERGENCY DEPARTMENT HISTORY AND PHYSICAL EXAM    Date: 2/20/2020  Patient Name: Thea Gonzales    History of Presenting Illness     Chief Complaint   Patient presents with    Abdominal Pain         History Provided By: Patient    Thea Gonzales is a 37 y.o. female who presents to the emergency department C/O abdominal pain and lower back pain. Patient states she has a history of gastroparesis and has been acting up and been vomiting all day. Patient reports she has been taking all of her medications at home without relief. It is noted that the patient has been seen in the emergency department several times for this issue and was recently admitted for similar problem. Patient states her abdominal pain is located in the epigastric region and radiates up into her chest.  Denies any fevers or chills or diarrhea. Notes she has been having a hard time sleeping due to her symptoms. PCP: Susan Connell MD    Current Facility-Administered Medications   Medication Dose Route Frequency Provider Last Rate Last Dose    traZODone (DESYREL) tablet 50 mg  50 mg Oral NOW Aftab Ayala A, DO         Current Outpatient Medications   Medication Sig Dispense Refill    metoclopramide HCl (METOZOLV ODT) 5 mg rapid dissolve tab Take 2 Tabs by mouth Before breakfast, lunch, dinner and at bedtime. 30 Tab 0    traZODone (DESYREL) 50 mg tablet Take 1 Tab by mouth nightly for 10 days. 10 Tab 0    acetaminophen (TYLENOL) 500 mg tablet Take 2 Tabs by mouth every eight (8) hours as needed for Pain. 30 Tab 0    potassium chloride SA (MICRO-K) 10 mEq capsule Take 1 Cap by mouth daily. 5 Cap 0    ondansetron (ZOFRAN ODT) 4 mg disintegrating tablet Take 1 Tab by mouth every eight (8) hours as needed for Nausea or Vomiting. 20 Tab 0    pantoprazole (PROTONIX) 40 mg tablet Take 1 Tab by mouth two (2) times a day. 60 Tab 0    amLODIPine (NORVASC) 5 mg tablet Take 1 Tab by mouth daily.  30 Tab 0    insulin glargine (LANTUS U-100 INSULIN) 100 unit/mL injection 30 Units by SubCUTAneous route nightly. Indications: type 2 diabetes mellitus      insulin admin. supplies (INPEN, FOR [place], [place]) 10-12 Units by SubCUTAneous route Before breakfast, lunch, and dinner. Past History     Past Medical History:  Past Medical History:   Diagnosis Date    Diabetes ([address]     Gastroparesis     Gastroparesis     Hypertension     UTI (urinary tract infection)        Past Surgical History:  Past Surgical History:   Procedure Laterality Date    HX APPENDECTOMY      HX GYN      tubal ligation, C Section       Family History:  History reviewed. No pertinent family history. Social History:  Social History     Tobacco Use    Smoking status: Never Smoker    Smokeless tobacco: Never Used   Substance Use Topics    Alcohol use: Never     Frequency: Never    Drug use: Not Currently       Allergies: Allergies   Allergen Reactions    Compazine [Prochlorperazine] Other (comments)     Dystonic Reaction         Review of Systems   Review of Systems   Constitutional: Positive for fatigue. Cardiovascular: Positive for chest pain. Gastrointestinal: Positive for abdominal pain, nausea and vomiting. Psychiatric/Behavioral: Positive for sleep disturbance. All other systems reviewed and are negative.         Physical Exam     Vitals:    02/20/20 1928 02/20/20 2230   BP: 147/73 167/85   Pulse: 93 94   Resp: 18    Temp: 98.4 °F (36.9 °C)    SpO2: 100% 100%   Weight: 57.6 kg (127 lb)    Height: 5' 4\" (1.626 m)      Physical Exam    Nursing notes and vital signs reviewed    Airway: intact, speaking normally  Breathing: No apparent distress, no cyanosis  Circulation: Peripheral pulses equal    Constitutional: chronically ill-appearing, unkempt, uncomfortable but nontoxic  HEENT & Neck: Normocephalic, Atraumatic, PERRL, EOMI, No rhinorrhea, external nose normal, external ears normal, mucous membranes moist, No stridor, No JVD  Cardiovascular: Regular rate and rhythm, no murmurs  Chest: Normal work of breathing and chest excursion bilaterally  Lungs: Clear to ausculation bilaterally  Abdomen: Soft, mild distention, soft, decreased bowel sounds, generalized tenderness, normoactive bowel sounds, No rigidity, no peritoneal signs  Musculoskeletal: No evidence of trauma or deformity to the back or neck  Extremities: No evidence of trauma or deformity, no LE edema  Skin: Warm, No obvious rashes  Neuro: Alert and oriented x 3, CN 2-12 intact, normal speech, strength and sensation full and symmetric bilaterally, normal coordination  Psychiatric: Normal mood and affect      Diagnostic Study Results     Labs -     Recent Results (from the past 72 hour(s))   CBC WITH AUTOMATED DIFF    Collection Time: 02/18/20  8:52 PM   Result Value Ref Range    WBC 11.0 4.6 - 13.2 K/uL    RBC 2.61 (L) 4.20 - 5.30 M/uL    HGB 7.1 (L) 12.0 - 16.0 g/dL    HCT 22.3 (L) 35.0 - 45.0 %    MCV 85.4 74.0 - 97.0 FL    MCH 27.2 24.0 - 34.0 PG    MCHC 31.8 31.0 - 37.0 g/dL    RDW 14.9 (H) 11.6 - 14.5 %    PLATELET 099 758 - 187 K/uL    MPV 8.6 (L) 9.2 - 11.8 FL    NEUTROPHILS 61 40 - 73 %    LYMPHOCYTES 28 21 - 52 %    MONOCYTES 9 3 - 10 %    EOSINOPHILS 2 0 - 5 %    BASOPHILS 0 0 - 2 %    ABS. NEUTROPHILS 6.7 1.8 - 8.0 K/UL    ABS. LYMPHOCYTES 3.0 0.9 - 3.6 K/UL    ABS. MONOCYTES 1.0 0.05 - 1.2 K/UL    ABS. EOSINOPHILS 0.2 0.0 - 0.4 K/UL    ABS.  BASOPHILS 0.0 0.0 - 0.1 K/UL    DF AUTOMATED     METABOLIC PANEL, COMPREHENSIVE    Collection Time: 02/18/20  8:52 PM   Result Value Ref Range    Sodium 139 136 - 145 mmol/L    Potassium 3.5 3.5 - 5.5 mmol/L    Chloride 107 100 - 111 mmol/L    CO2 26 21 - 32 mmol/L    Anion gap 6 3.0 - 18 mmol/L    Glucose 185 (H) 74 - 99 mg/dL    BUN 22 (H) 7.0 - 18 MG/DL    Creatinine 2.79 (H) 0.6 - 1.3 MG/DL    BUN/Creatinine ratio 8 (L) 12 - 20      GFR est AA 23 (L) >60 ml/min/1.73m2    GFR est non-AA 19 (L) >60 ml/min/1.73m2    Calcium 7.9 (L) 8.5 - 10.1 MG/DL    Bilirubin, total 0.2 0.2 - 1.0 MG/DL    ALT (SGPT) 9 (L) 13 - 56 U/L    AST (SGOT) 19 10 - 38 U/L    Alk.  phosphatase 119 (H) 45 - 117 U/L    Protein, total 6.2 (L) 6.4 - 8.2 g/dL    Albumin 2.6 (L) 3.4 - 5.0 g/dL    Globulin 3.6 2.0 - 4.0 g/dL    A-G Ratio 0.7 (L) 0.8 - 1.7     LIPASE    Collection Time: 02/18/20  8:52 PM   Result Value Ref Range    Lipase 209 73 - 393 U/L   MAGNESIUM    Collection Time: 02/18/20  8:52 PM   Result Value Ref Range    Magnesium 2.0 1.6 - 2.6 mg/dL   EKG, 12 LEAD, INITIAL    Collection Time: 02/18/20  9:04 PM   Result Value Ref Range    Ventricular Rate 95 BPM    Atrial Rate 95 BPM    P-R Interval 120 ms    QRS Duration 80 ms    Q-T Interval 386 ms    QTC Calculation (Bezet) 485 ms    Calculated P Axis 53 degrees    Calculated R Axis 77 degrees    Calculated T Axis 42 degrees    Diagnosis       Poor data quality, interpretation may be adversely affected  Normal sinus rhythm  Poor R Wave Progression  Prolonged QT  Abnormal ECG  Confirmed by Tabitha uLnd MD, [facility] ([id number]) on 2/20/2020 10:29:15 PM     URINALYSIS W/ RFLX MICROSCOPIC    Collection Time: 02/20/20  8:30 PM   Result Value Ref Range    Color YELLOW      Appearance CLOUDY      Specific gravity 1.012 1.005 - 1.030      pH (UA) 6.0 5.0 - 8.0      Protein 300 (A) NEG mg/dL    Glucose 250 (A) NEG mg/dL    Ketone NEGATIVE  NEG mg/dL    Bilirubin NEGATIVE  NEG      Blood MODERATE (A) NEG      Urobilinogen 0.2 0.2 - 1.0 EU/dL    Nitrites NEGATIVE  NEG      Leukocyte Esterase NEGATIVE  NEG     URINE MICROSCOPIC ONLY    Collection Time: 02/20/20  8:30 PM   Result Value Ref Range    WBC 0 to 3 0 - 5 /hpf    RBC 0 to 3 0 - 5 /hpf    Epithelial cells 1+ 0 - 5 /lpf    Bacteria FEW (A) NEG /hpf    Hyaline cast 0 to 3 0 - 2 /lpf   HCG URINE, QL. - POC    Collection Time: 02/20/20  8:50 PM   Result Value Ref Range    Pregnancy test,urine (POC) NEGATIVE  NEG     CBC WITH AUTOMATED DIFF    Collection Time: 02/20/20  8:54 PM   Result Value Ref Range    WBC 10.9 4.6 - 13.2 K/uL    RBC 2.53 (L) 4.20 - 5.30 M/uL    HGB 7.0 (L) 12.0 - 16.0 g/dL    HCT 21.6 (L) 35.0 - 45.0 %    MCV 85.4 74.0 - 97.0 FL    MCH 27.7 24.0 - 34.0 PG    MCHC 32.4 31.0 - 37.0 g/dL    RDW 15.0 (H) 11.6 - 14.5 %    PLATELET 722 644 - 016 K/uL    MPV 8.5 (L) 9.2 - 11.8 FL    NEUTROPHILS 67 40 - 73 %    LYMPHOCYTES 23 21 - 52 %    MONOCYTES 8 3 - 10 %    EOSINOPHILS 2 0 - 5 %    BASOPHILS 0 0 - 2 %    ABS. NEUTROPHILS 7.2 1.8 - 8.0 K/UL    ABS. LYMPHOCYTES 2.5 0.9 - 3.6 K/UL    ABS. MONOCYTES 0.9 0.05 - 1.2 K/UL    ABS. EOSINOPHILS 0.2 0.0 - 0.4 K/UL    ABS. BASOPHILS 0.0 0.0 - 0.1 K/UL    DF AUTOMATED     METABOLIC PANEL, COMPREHENSIVE    Collection Time: 02/20/20  8:54 PM   Result Value Ref Range    Sodium 142 136 - 145 mmol/L    Potassium 4.2 3.5 - 5.5 mmol/L    Chloride 107 100 - 111 mmol/L    CO2 27 21 - 32 mmol/L    Anion gap 8 3.0 - 18 mmol/L    Glucose 98 74 - 99 mg/dL    BUN 21 (H) 7.0 - 18 MG/DL    Creatinine 2.47 (H) 0.6 - 1.3 MG/DL    BUN/Creatinine ratio 9 (L) 12 - 20      GFR est AA 27 (L) >60 ml/min/1.73m2    GFR est non-AA 22 (L) >60 ml/min/1.73m2    Calcium 7.7 (L) 8.5 - 10.1 MG/DL    Bilirubin, total 0.1 (L) 0.2 - 1.0 MG/DL    ALT (SGPT) 11 (L) 13 - 56 U/L    AST (SGOT) 27 10 - 38 U/L    Alk.  phosphatase 127 (H) 45 - 117 U/L    Protein, total 6.2 (L) 6.4 - 8.2 g/dL    Albumin 2.6 (L) 3.4 - 5.0 g/dL    Globulin 3.6 2.0 - 4.0 g/dL    A-G Ratio 0.7 (L) 0.8 - 1.7     LIPASE    Collection Time: 02/20/20  8:54 PM   Result Value Ref Range    Lipase 179 73 - 393 U/L   EKG, 12 LEAD, INITIAL    Collection Time: 02/20/20  9:12 PM   Result Value Ref Range    Ventricular Rate 95 BPM    Atrial Rate 95 BPM    P-R Interval 116 ms    QRS Duration 70 ms    Q-T Interval 388 ms    QTC Calculation (Bezet) 487 ms    Calculated P Axis 55 degrees    Calculated R Axis 71 degrees    Calculated T Axis 44 degrees    Diagnosis       Normal sinus rhythm  Low voltage QRS  Prolonged QT  Abnormal ECG  When compared with ECG of 18-FEB-2020 21:04,  No significant change was found         Radiologic Studies -   XR ABD (KUB)   Final Result   IMPRESSION:      Air-fluid level seen throughout the colon suggesting diarrheal illness. No free   air seen. Evaluation of the pelvis is limited on this study. If clinically   warranted repeat abdominal x-ray to include the pelvis can be obtained for   further evaluation. XR CHEST PORT   Final Result   IMPRESSION:  No acute process        CT Results  (Last 48 hours)    None        CXR Results  (Last 48 hours)               02/20/20 2128  XR CHEST PORT Final result    Impression:  IMPRESSION:  No acute process       Narrative:  EXAM:  AP Portable Chest X-ray 1 view        INDICATION: Vomiting       COMPARISON: February 10, 2020       _______________       FINDINGS:  Heart and mediastinal contours are within normal limits for portable   radiograph. Lungs are clear of active disease. There are no pleural effusions. No acute osseous findings. ________________                     Medications given in the ED-  Medications   traZODone (DESYREL) tablet 50 mg (has no administration in time range)   metoclopramide HCl (REGLAN) injection 10 mg (10 mg IntraVENous Given 2/20/20 2104)   diphenhydrAMINE (BENADRYL) injection 50 mg (50 mg IntraVENous Given 2/20/20 2104)   lactated ringers bolus infusion 1,000 mL (0 mL IntraVENous IV Completed 2/20/20 2134)     Followed by   lactated ringers bolus infusion 1,000 mL (1,000 mL IntraVENous New Bag 2/20/20 2142)   LORazepam (ATIVAN) injection 2 mg (2 mg IntraVENous Given 2/20/20 2141)   promethazine (PHENERGAN) 25 mg in 0.9% sodium chloride 50 mL IVPB (0 mg IntraVENous IV Completed 2/20/20 2243)         Medical Decision Making   I am the first provider for this patient. I reviewed the vital signs, available nursing notes, past medical history, past surgical history, family history and social history.     Vital Signs-Reviewed the patient's vital signs.    Pulse Oximetry Analysis - 100% on Room air     Cardiac Monitor:  Rate: 93 bpm  Rhythm: sinus    Records Reviewed: Nursing Notes    Procedures:  Procedures    ED Course:   Patient was given Reglan, Benadryl and Phenergan with some improvement of her abdominal discomfort and nausea. She states she has been having a lot of difficulty with insomnia recently due to her gastroparesis keeping her up at night. She was given 2 mg of Ativan with minimal improvement. She is somnolent but is still awake, I discussed with her treating her insomnia with trazodone and she is agreeable at this time. I recommended she continue with trazodone as directed for her insomnia as well as her Reglan at home for her gastroparesis and keep her diabetes and check and continue with fluid rehydration and follow-up with her primary care physician. Otherwise come back to the emergency department if symptoms worsen. She understands and agrees to plan    Diagnosis and Disposition         DISCHARGE NOTE:    Clark Lugo  results have been reviewed with her. She has been counseled regarding her diagnosis, treatment, and plan. She verbally conveys understanding and agreement of the signs, symptoms, diagnosis, treatment and prognosis and additionally agrees to follow up as discussed. She also agrees with the care-plan and conveys that all of her questions have been answered. I have also provided discharge instructions for her that include: educational information regarding their diagnosis and treatment, and list of reasons why they would want to return to the ED prior to their follow-up appointment, should her condition change. She has been provided with education for proper emergency department utilization. CLINICAL IMPRESSION:    1. History of diabetic gastroparesis    2. Insomnia, unspecified type    3. Abdominal pain, epigastric    4.  Non-intractable vomiting with nausea, unspecified vomiting type        PLAN:  1. D/C Home  2. Current Discharge Medication List      START taking these medications    Details   metoclopramide HCl (METOZOLV ODT) 5 mg rapid dissolve tab Take 2 Tabs by mouth Before breakfast, lunch, dinner and at bedtime. Qty: 30 Tab, Refills: 0      traZODone (DESYREL) 50 mg tablet Take 1 Tab by mouth nightly for 10 days. Qty: 10 Tab, Refills: 0         STOP taking these medications       metoclopramide HCl (REGLAN) 10 mg tablet Comments:   Reason for Stopping:             3.   Follow-up Information     Follow up With Specialties Details Why Contact Info    Chantal Varma MD Family Practice Schedule an appointment as soon as possible for a visit   [phone] [address] [facility]  [phone]      [facility] EMERGENCY DEPT Emergency Medicine Go to  If symptoms worsen [address] [facility] [id number] [phone]        _______________________________      Please note that this dictation was completed with [employer], the computer voice recognition software. Quite often unanticipated grammatical, syntax, homophones, and other interpretive errors are inadvertently transcribed by the computer software. Please disregard these errors. Please excuse any errors that have escaped final proofreading.

## 2020-02-21 NOTE — ED NOTES
Discharge instructions reviewed with pt, pt verbalizes understanding, discharged in stable condition  Pt called clarissa for ride home

## 2020-02-21 NOTE — ED TRIAGE NOTES
Patient reports to ED with c/c of lower back pain, abdominal pain. Patient reports her gastroparesis is acting up and she has been vomiting all day. Patient reports taking all her medications at home with no relief, patient is not actively vomiting in triage.

## 2020-02-23 LAB
ATRIAL RATE: 95 BPM
CALCULATED P AXIS, ECG09: 55 DEGREES
CALCULATED R AXIS, ECG10: 71 DEGREES
CALCULATED T AXIS, ECG11: 44 DEGREES
DIAGNOSIS, 93000: NORMAL
P-R INTERVAL, ECG05: 116 MS
Q-T INTERVAL, ECG07: 388 MS
QRS DURATION, ECG06: 70 MS
QTC CALCULATION (BEZET), ECG08: 487 MS
VENTRICULAR RATE, ECG03: 95 BPM

## 2020-02-27 ENCOUNTER — HOSPITAL ENCOUNTER (EMERGENCY)
Age: 35
Discharge: HOME | End: 2020-02-27
Attending: EMERGENCY MEDICINE
Payer: MEDICAID

## 2020-02-27 VITALS
SYSTOLIC BLOOD PRESSURE: 134 MMHG | HEIGHT: 64 IN | WEIGHT: 127 LBS | TEMPERATURE: 98.7 F | DIASTOLIC BLOOD PRESSURE: 90 MMHG | OXYGEN SATURATION: 100 % | BODY MASS INDEX: 21.68 KG/M2 | RESPIRATION RATE: 18 BRPM | HEART RATE: 94 BPM

## 2020-02-27 DIAGNOSIS — H61.22 IMPACTED CERUMEN OF LEFT EAR: Primary | ICD-10-CM

## 2020-02-27 PROCEDURE — 75810000150 HC RMVL IMPACTED CERUMEN 1 / 2

## 2020-02-27 PROCEDURE — 99283 EMERGENCY DEPT VISIT LOW MDM: CPT

## 2020-02-27 NOTE — ED PROVIDER NOTES
EMERGENCY DEPARTMENT HISTORY AND PHYSICAL EXAM    Date: 2/27/2020  Patient Name: Lawernce Cowden    History of Presenting Illness     Chief Complaint   Patient presents with    Ear Pain       History Provided By: Patient    Chief Complaint: ear clogged     Additional History (Context):   4:68 PM  Lawernce Cowden is a 37 y.o. female presents to the emergency department C/O decreased hearing in the left ear for the last 10 minutes. She has some slight pain on the outside of the ear. PCP: Frances Gillette MD    Current Outpatient Medications   Medication Sig Dispense Refill    metoclopramide HCl (METOZOLV ODT) 5 mg rapid dissolve tab Take 2 Tabs by mouth Before breakfast, lunch, dinner and at bedtime. 30 Tab 0    traZODone (DESYREL) 50 mg tablet Take 1 Tab by mouth nightly for 10 days. 10 Tab 0    acetaminophen (TYLENOL) 500 mg tablet Take 2 Tabs by mouth every eight (8) hours as needed for Pain. 30 Tab 0    potassium chloride SA (MICRO-K) 10 mEq capsule Take 1 Cap by mouth daily. 5 Cap 0    ondansetron (ZOFRAN ODT) 4 mg disintegrating tablet Take 1 Tab by mouth every eight (8) hours as needed for Nausea or Vomiting. 20 Tab 0    pantoprazole (PROTONIX) 40 mg tablet Take 1 Tab by mouth two (2) times a day. 60 Tab 0    amLODIPine (NORVASC) 5 mg tablet Take 1 Tab by mouth daily. 30 Tab 0    insulin glargine (LANTUS U-100 INSULIN) 100 unit/mL injection 30 Units by SubCUTAneous route nightly. Indications: type 2 diabetes mellitus      insulin admin. supplies ([facility] [place] 10-12 Units by SubCUTAneous route Before breakfast, lunch, and dinner.          Past History     Past Medical History:  Past Medical History:   Diagnosis Date    Diabetes ([address]     Gastroparesis     Gastroparesis     Hypertension     UTI (urinary tract infection)        Past Surgical History:  Past Surgical History:   Procedure Laterality Date    HX APPENDECTOMY      HX GYN      tubal ligation, C Section       Family History:  History reviewed. No pertinent family history. Social History:  Social History     Tobacco Use    Smoking status: Never Smoker    Smokeless tobacco: Never Used   Substance Use Topics    Alcohol use: Never     Frequency: Never    Drug use: Not Currently       Allergies: Allergies   Allergen Reactions    Compazine [Prochlorperazine] Other (comments)     Dystonic Reaction       Review of Systems   Review of Systems   Constitutional: Negative for chills and fever. HENT: Positive for ear pain. Negative for ear discharge. All other systems reviewed and are negative. Physical Exam     Vitals:    02/27/20 1822   BP: 134/90   Pulse: 94   Resp: 18   Temp: 98.7 °F (37.1 °C)   SpO2: 100%   Weight: 57.6 kg (127 lb)   Height: 5' 4\" (1.626 m)     Physical Exam  Vitals signs and nursing note reviewed. Constitutional:       General: She is not in acute distress. Appearance: She is well-developed. Comments: Alert, well appearing, non toxic, speaking in full sentences without difficulty    HENT:      Head: Normocephalic and atraumatic. Right Ear: Tympanic membrane, ear canal and external ear normal. Tympanic membrane is not perforated, erythematous, retracted or bulging. Left Ear: Tympanic membrane and external ear normal. Tympanic membrane is not perforated, erythematous, retracted or bulging. Ears:      Comments: Initially left TM obstructed by cerumen impaction and left canal, cerumen was removed with curette   Canal and TM normal     Nose: No mucosal edema. Right Sinus: No maxillary sinus tenderness or frontal sinus tenderness. Left Sinus: No maxillary sinus tenderness or frontal sinus tenderness. Mouth/Throat:      Mouth: Mucous membranes are not dry. Pharynx: Uvula midline. No uvula swelling. Tonsils: No tonsillar abscesses. Neck:      Musculoskeletal: Normal range of motion and neck supple.    Cardiovascular:      Rate and Rhythm: Normal rate and regular rhythm. Heart sounds: Normal heart sounds. No murmur. Pulmonary:      Effort: Pulmonary effort is normal. No respiratory distress. Breath sounds: Normal breath sounds. No wheezing or rales. Lymphadenopathy:      Cervical: No cervical adenopathy. Skin:     General: Skin is warm and dry. Findings: No rash. Neurological:      Mental Status: She is alert and oriented to person, place, and time. Psychiatric:         Judgment: Judgment normal.         Diagnostic Study Results     Labs:   No results found for this or any previous visit (from the past 12 hour(s)). Radiologic Studies:   No orders to display     CT Results  (Last 48 hours)    None        CXR Results  (Last 48 hours)    None          Medical Decision Making   I am the first provider for this patient. I reviewed the vital signs, available nursing notes, past medical history, past surgical history, family history and social history. Vital Signs: Reviewed the patient's vital signs. Pulse Oximetry Analysis: 100% on RA       Records Reviewed: Nursing Notes and Old Medical Records    Procedures:  EAR CERUMEN REMOVAL NOTEWRITER (ASAP ONLY)  Date/Time: 2/27/2020 6:39 PM  Performed by: EYAD Hood  Authorized by: Bobby Delgado DO     Consent:     Consent obtained:  Verbal    Consent given by:  Patient    Risks discussed:  Incomplete removal, pain, TM perforation, infection and bleeding    Alternatives discussed:  No treatment  Procedure details:     Location:  L ear    Procedure type: curette    Post-procedure details: Inspection:  TM intact    Hearing quality:  Normal    Patient tolerance of procedure: Tolerated well, no immediate complications        ED Course:   6:22 PM Initial assessment performed. The patients presenting problems have been discussed, and they are in agreement with the care plan formulated and outlined with them.   I have encouraged them to ask questions as they arise throughout their visit. Discussion:  Pt presents with decreased hearing on the left, cerumen impaction removed with curette. Canal and TM normal after removal.  Patient's hearing back to normal. Strict return precautions given, pt offering no questions or complaints. Diagnosis and Disposition     DISCHARGE NOTE:  Phyllis Chong's  results have been reviewed with her. She has been counseled regarding her diagnosis, treatment, and plan. She verbally conveys understanding and agreement of the signs, symptoms, diagnosis, treatment and prognosis and additionally agrees to follow up as discussed. She also agrees with the care-plan and conveys that all of her questions have been answered. I have also provided discharge instructions for her that include: educational information regarding their diagnosis and treatment, and list of reasons why they would want to return to the ED prior to their follow-up appointment, should her condition change. She has been provided with education for proper emergency department utilization. CLINICAL IMPRESSION:    1. Impacted cerumen of left ear        PLAN:  1. D/C Home  2. Current Discharge Medication List        3. Follow-up Information     Follow up With Specialties Details Why Contact Samra Hunter MD [facility]  call for follow up and recheck  [phone] [facility]  [phone]      [facility] EMERGENCY DEPT Emergency Medicine  If symptoms worsen [address] [place] [id number] [phone]                 Please note that this dictation was completed with [employer], the computer voice recognition software. Quite often unanticipated grammatical, syntax, homophones, and other interpretive errors are inadvertently transcribed by the computer software. Please disregard these errors. Please excuse any errors that have escaped final proofreading.

## 2020-03-05 ENCOUNTER — HOSPITAL ENCOUNTER (OUTPATIENT)
Age: 35
Setting detail: OBSERVATION
Discharge: HOME | End: 2020-03-06
Attending: EMERGENCY MEDICINE | Admitting: FAMILY MEDICINE
Payer: MEDICAID

## 2020-03-05 ENCOUNTER — APPOINTMENT (OUTPATIENT)
Dept: CT IMAGING | Age: 35
End: 2020-03-05
Attending: EMERGENCY MEDICINE
Payer: MEDICAID

## 2020-03-05 ENCOUNTER — APPOINTMENT (OUTPATIENT)
Dept: GENERAL RADIOLOGY | Age: 35
End: 2020-03-05
Attending: EMERGENCY MEDICINE
Payer: MEDICAID

## 2020-03-05 DIAGNOSIS — D64.9 ANEMIA, UNSPECIFIED TYPE: Primary | ICD-10-CM

## 2020-03-05 DIAGNOSIS — K31.84 GASTROPARESIS: ICD-10-CM

## 2020-03-05 DIAGNOSIS — N18.4 STAGE 4 CHRONIC KIDNEY DISEASE (HCC): ICD-10-CM

## 2020-03-05 DIAGNOSIS — R10.13 ABDOMINAL PAIN, EPIGASTRIC: ICD-10-CM

## 2020-03-05 LAB
ALBUMIN SERPL-MCNC: 2.6 G/DL (ref 3.4–5)
ALBUMIN/GLOB SERPL: 0.7 {RATIO} (ref 0.8–1.7)
ALP SERPL-CCNC: 122 U/L (ref 45–117)
ALT SERPL-CCNC: 11 U/L (ref 13–56)
AMPHET UR QL SCN: NEGATIVE
ANION GAP SERPL CALC-SCNC: 8 MMOL/L (ref 3–18)
APPEARANCE UR: ABNORMAL
APTT PPP: 29.7 SEC (ref 23–36.4)
AST SERPL-CCNC: 15 U/L (ref 10–38)
BACTERIA URNS QL MICRO: ABNORMAL /HPF
BARBITURATES UR QL SCN: NEGATIVE
BASOPHILS # BLD: 0 K/UL (ref 0–0.1)
BASOPHILS NFR BLD: 0 % (ref 0–2)
BENZODIAZ UR QL: NEGATIVE
BILIRUB SERPL-MCNC: 0.1 MG/DL (ref 0.2–1)
BILIRUB UR QL: NEGATIVE
BUN SERPL-MCNC: 38 MG/DL (ref 7–18)
BUN/CREAT SERPL: 13 (ref 12–20)
CALCIUM SERPL-MCNC: 8 MG/DL (ref 8.5–10.1)
CANNABINOIDS UR QL SCN: NEGATIVE
CHLORIDE SERPL-SCNC: 109 MMOL/L (ref 100–111)
CO2 SERPL-SCNC: 24 MMOL/L (ref 21–32)
COCAINE UR QL SCN: NEGATIVE
COLOR UR: YELLOW
CREAT SERPL-MCNC: 2.97 MG/DL (ref 0.6–1.3)
DIFFERENTIAL METHOD BLD: ABNORMAL
EOSINOPHIL # BLD: 0.3 K/UL (ref 0–0.4)
EOSINOPHIL NFR BLD: 2 % (ref 0–5)
EPITH CASTS URNS QL MICRO: ABNORMAL /LPF (ref 0–5)
ERYTHROCYTE [DISTWIDTH] IN BLOOD BY AUTOMATED COUNT: 14.9 % (ref 11.6–14.5)
GLOBULIN SER CALC-MCNC: 3.8 G/DL (ref 2–4)
GLUCOSE BLD STRIP.AUTO-MCNC: 107 MG/DL (ref 70–110)
GLUCOSE BLD STRIP.AUTO-MCNC: 113 MG/DL (ref 70–110)
GLUCOSE BLD STRIP.AUTO-MCNC: 183 MG/DL (ref 70–110)
GLUCOSE BLD STRIP.AUTO-MCNC: 87 MG/DL (ref 70–110)
GLUCOSE SERPL-MCNC: 134 MG/DL (ref 74–99)
GLUCOSE UR STRIP.AUTO-MCNC: 250 MG/DL
HBA1C MFR BLD: 7.5 % (ref 4.2–5.6)
HCT VFR BLD AUTO: 19.4 % (ref 35–45)
HCT VFR BLD AUTO: 20.7 % (ref 35–45)
HDSCOM,HDSCOM: NORMAL
HEMOCCULT STL QL: NEGATIVE
HGB BLD-MCNC: 6.1 G/DL (ref 12–16)
HGB BLD-MCNC: 6.5 G/DL (ref 12–16)
HGB UR QL STRIP: ABNORMAL
INR PPP: 1.1 (ref 0.8–1.2)
KETONES UR QL STRIP.AUTO: NEGATIVE MG/DL
LEUKOCYTE ESTERASE UR QL STRIP.AUTO: NEGATIVE
LIPASE SERPL-CCNC: 155 U/L (ref 73–393)
LYMPHOCYTES # BLD: 2.6 K/UL (ref 0.9–3.6)
LYMPHOCYTES NFR BLD: 23 % (ref 21–52)
MCH RBC QN AUTO: 26.8 PG (ref 24–34)
MCHC RBC AUTO-ENTMCNC: 31.4 G/DL (ref 31–37)
MCV RBC AUTO: 85.1 FL (ref 74–97)
METHADONE UR QL: NEGATIVE
MONOCYTES # BLD: 1.1 K/UL (ref 0.05–1.2)
MONOCYTES NFR BLD: 9 % (ref 3–10)
NEUTS SEG # BLD: 7.3 K/UL (ref 1.8–8)
NEUTS SEG NFR BLD: 66 % (ref 40–73)
NITRITE UR QL STRIP.AUTO: NEGATIVE
OPIATES UR QL: NEGATIVE
PCP UR QL: NEGATIVE
PH UR STRIP: 5.5 [PH] (ref 5–8)
PLATELET # BLD AUTO: 411 K/UL (ref 135–420)
PMV BLD AUTO: 8.8 FL (ref 9.2–11.8)
POTASSIUM SERPL-SCNC: 3.8 MMOL/L (ref 3.5–5.5)
PROT SERPL-MCNC: 6.4 G/DL (ref 6.4–8.2)
PROT UR STRIP-MCNC: >1000 MG/DL
PROTHROMBIN TIME: 14.2 SEC (ref 11.5–15.2)
RBC # BLD AUTO: 2.28 M/UL (ref 4.2–5.3)
RBC #/AREA URNS HPF: ABNORMAL /HPF (ref 0–5)
SODIUM SERPL-SCNC: 141 MMOL/L (ref 136–145)
SP GR UR REFRACTOMETRY: 1.02 (ref 1–1.03)
UROBILINOGEN UR QL STRIP.AUTO: 0.2 EU/DL (ref 0.2–1)
WBC # BLD AUTO: 11.2 K/UL (ref 4.6–13.2)
WBC URNS QL MICRO: ABNORMAL /HPF (ref 0–5)

## 2020-03-05 PROCEDURE — 74011250637 HC RX REV CODE- 250/637: Performed by: EMERGENCY MEDICINE

## 2020-03-05 PROCEDURE — 80307 DRUG TEST PRSMV CHEM ANLYZR: CPT

## 2020-03-05 PROCEDURE — 99218 HC RM OBSERVATION: CPT

## 2020-03-05 PROCEDURE — 96375 TX/PRO/DX INJ NEW DRUG ADDON: CPT

## 2020-03-05 PROCEDURE — 82272 OCCULT BLD FECES 1-3 TESTS: CPT

## 2020-03-05 PROCEDURE — 86923 COMPATIBILITY TEST ELECTRIC: CPT

## 2020-03-05 PROCEDURE — 99284 EMERGENCY DEPT VISIT MOD MDM: CPT

## 2020-03-05 PROCEDURE — 74176 CT ABD & PELVIS W/O CONTRAST: CPT

## 2020-03-05 PROCEDURE — 85730 THROMBOPLASTIN TIME PARTIAL: CPT

## 2020-03-05 PROCEDURE — 86900 BLOOD TYPING SEROLOGIC ABO: CPT

## 2020-03-05 PROCEDURE — 85014 HEMATOCRIT: CPT

## 2020-03-05 PROCEDURE — 85610 PROTHROMBIN TIME: CPT

## 2020-03-05 PROCEDURE — 74011250636 HC RX REV CODE- 250/636: Performed by: EMERGENCY MEDICINE

## 2020-03-05 PROCEDURE — 80053 COMPREHEN METABOLIC PANEL: CPT

## 2020-03-05 PROCEDURE — 36415 COLL VENOUS BLD VENIPUNCTURE: CPT

## 2020-03-05 PROCEDURE — 83690 ASSAY OF LIPASE: CPT

## 2020-03-05 PROCEDURE — 82962 GLUCOSE BLOOD TEST: CPT

## 2020-03-05 PROCEDURE — P9016 RBC LEUKOCYTES REDUCED: HCPCS

## 2020-03-05 PROCEDURE — 74011636637 HC RX REV CODE- 636/637: Performed by: FAMILY MEDICINE

## 2020-03-05 PROCEDURE — 65270000029 HC RM PRIVATE

## 2020-03-05 PROCEDURE — 83036 HEMOGLOBIN GLYCOSYLATED A1C: CPT

## 2020-03-05 PROCEDURE — 74018 RADEX ABDOMEN 1 VIEW: CPT

## 2020-03-05 PROCEDURE — 36430 TRANSFUSION BLD/BLD COMPNT: CPT

## 2020-03-05 PROCEDURE — 81001 URINALYSIS AUTO W/SCOPE: CPT

## 2020-03-05 PROCEDURE — 85025 COMPLETE CBC W/AUTO DIFF WBC: CPT

## 2020-03-05 PROCEDURE — 96374 THER/PROPH/DIAG INJ IV PUSH: CPT

## 2020-03-05 RX ORDER — HALOPERIDOL 5 MG/ML
2 INJECTION INTRAMUSCULAR ONCE
Status: COMPLETED | OUTPATIENT
Start: 2020-03-05 | End: 2020-03-05

## 2020-03-05 RX ORDER — SODIUM CHLORIDE 9 MG/ML
75 INJECTION, SOLUTION INTRAVENOUS ONCE
Status: DISPENSED | OUTPATIENT
Start: 2020-03-05 | End: 2020-03-05

## 2020-03-05 RX ORDER — KETOROLAC TROMETHAMINE 30 MG/ML
30 INJECTION, SOLUTION INTRAMUSCULAR; INTRAVENOUS
Status: DISCONTINUED | OUTPATIENT
Start: 2020-03-05 | End: 2020-03-05

## 2020-03-05 RX ORDER — ACETAMINOPHEN 10 MG/ML
1000 INJECTION, SOLUTION INTRAVENOUS ONCE
Status: COMPLETED | OUTPATIENT
Start: 2020-03-05 | End: 2020-03-05

## 2020-03-05 RX ORDER — DEXTROSE MONOHYDRATE 100 MG/ML
125-250 INJECTION, SOLUTION INTRAVENOUS AS NEEDED
Status: DISCONTINUED | OUTPATIENT
Start: 2020-03-05 | End: 2020-03-06 | Stop reason: HOSPADM

## 2020-03-05 RX ORDER — SODIUM CHLORIDE 9 MG/ML
250 INJECTION, SOLUTION INTRAVENOUS AS NEEDED
Status: DISCONTINUED | OUTPATIENT
Start: 2020-03-05 | End: 2020-03-06 | Stop reason: HOSPADM

## 2020-03-05 RX ORDER — MAGNESIUM SULFATE 100 %
16 CRYSTALS MISCELLANEOUS AS NEEDED
Status: DISCONTINUED | OUTPATIENT
Start: 2020-03-05 | End: 2020-03-06 | Stop reason: HOSPADM

## 2020-03-05 RX ORDER — ONDANSETRON 2 MG/ML
4 INJECTION INTRAMUSCULAR; INTRAVENOUS
Status: COMPLETED | OUTPATIENT
Start: 2020-03-05 | End: 2020-03-05

## 2020-03-05 RX ORDER — FAMOTIDINE 10 MG/ML
20 INJECTION INTRAVENOUS
Status: COMPLETED | OUTPATIENT
Start: 2020-03-05 | End: 2020-03-05

## 2020-03-05 RX ORDER — METOCLOPRAMIDE HYDROCHLORIDE 5 MG/ML
10 INJECTION INTRAMUSCULAR; INTRAVENOUS
Status: COMPLETED | OUTPATIENT
Start: 2020-03-05 | End: 2020-03-05

## 2020-03-05 RX ORDER — METOCLOPRAMIDE HYDROCHLORIDE 5 MG/ML
5 INJECTION INTRAMUSCULAR; INTRAVENOUS EVERY 6 HOURS
Status: DISCONTINUED | OUTPATIENT
Start: 2020-03-05 | End: 2020-03-05

## 2020-03-05 RX ORDER — ZOLPIDEM TARTRATE 5 MG/1
5 TABLET ORAL
Status: COMPLETED | OUTPATIENT
Start: 2020-03-05 | End: 2020-03-05

## 2020-03-05 RX ORDER — INSULIN LISPRO 100 [IU]/ML
INJECTION, SOLUTION INTRAVENOUS; SUBCUTANEOUS EVERY 6 HOURS
Status: DISCONTINUED | OUTPATIENT
Start: 2020-03-05 | End: 2020-03-06 | Stop reason: HOSPADM

## 2020-03-05 RX ORDER — PANTOPRAZOLE SODIUM 40 MG/10ML
40 INJECTION, POWDER, LYOPHILIZED, FOR SOLUTION INTRAVENOUS EVERY 24 HOURS
Status: DISCONTINUED | OUTPATIENT
Start: 2020-03-06 | End: 2020-03-06 | Stop reason: HOSPADM

## 2020-03-05 RX ORDER — SODIUM CHLORIDE 9 MG/ML
100 INJECTION, SOLUTION INTRAVENOUS CONTINUOUS
Status: DISCONTINUED | OUTPATIENT
Start: 2020-03-05 | End: 2020-03-06 | Stop reason: HOSPADM

## 2020-03-05 RX ADMIN — ONDANSETRON 4 MG: 2 INJECTION INTRAMUSCULAR; INTRAVENOUS at 00:40

## 2020-03-05 RX ADMIN — INSULIN LISPRO 2 UNITS: 100 INJECTION, SOLUTION INTRAVENOUS; SUBCUTANEOUS at 18:36

## 2020-03-05 RX ADMIN — FAMOTIDINE 20 MG: 10 INJECTION, SOLUTION INTRAVENOUS at 03:13

## 2020-03-05 RX ADMIN — ACETAMINOPHEN 1000 MG: 10 INJECTION, SOLUTION INTRAVENOUS at 03:13

## 2020-03-05 RX ADMIN — ZOLPIDEM TARTRATE 5 MG: 5 TABLET ORAL at 01:48

## 2020-03-05 RX ADMIN — SODIUM CHLORIDE 250 ML: 900 INJECTION, SOLUTION INTRAVENOUS at 03:22

## 2020-03-05 RX ADMIN — SODIUM CHLORIDE 1000 ML: 900 INJECTION, SOLUTION INTRAVENOUS at 00:40

## 2020-03-05 RX ADMIN — METOCLOPRAMIDE 10 MG: 5 INJECTION, SOLUTION INTRAMUSCULAR; INTRAVENOUS at 00:40

## 2020-03-05 RX ADMIN — HALOPERIDOL LACTATE 2 MG: 5 INJECTION INTRAMUSCULAR at 03:12

## 2020-03-05 NOTE — PROGRESS NOTES
Tawana Siddiqui RN, ED, stated pt arrived by EMS w/ complaint of epigastric pain, ABD distention, H&H of 6.1 & 19.4. Pt frequents ED w/ Gastroparesis. Pt has not been physically transferred to unit. Zoë Ham RN transported pt to room 310.  mL/hr administration started. Call bell and phone w/pt. Pt informed to utilize call bell for any concerns, needs, or questions. Pt requested briefs. Briefs and wipes placed in room. Bedside and Verbal shift change report given to Imtiaz Fagan by Unique Gomes RN. Report included the following information SBAR, Kardex, OR Summary, Intake/Output and MAR.

## 2020-03-05 NOTE — ED PROVIDER NOTES
EMERGENCY DEPARTMENT HISTORY AND PHYSICAL EXAM    Date: 3/5/2020  Patient Name: Oly Cedillo    History of Presenting Illness     Chief Complaint   Patient presents with    Abdominal Pain         History Provided By: Patient and EMS    Oly Cedillo is a 37 y.o. female who presents to the emergency department C/O abdominal pain, nausea, vomiting, decreased oral intake. Patient arrives by EMS for this problem. She has been seen in the emergency several times for similar symptoms. Has history of diabetes with gastroparesis. . She is also complaining of insomnia which she has complained of this problem before. States her abdominal pain is located in the epigastric region without radiation. PCP: None    Current Facility-Administered Medications   Medication Dose Route Frequency Provider Last Rate Last Dose    0.9% sodium chloride infusion 250 mL  250 mL IntraVENous PRN Luke Gutierres DO        famotidine (PF) (PEPCID) injection 20 mg  20 mg IntraVENous NOW Luke Gutierres DO        0.9% sodium chloride infusion  75 mL/hr IntraVENous ONCE Luke Gutierres DO        acetaminophen (OFIRMEV) infusion 1,000 mg  1,000 mg IntraVENous ONCE Luke Gutierres DO        haloperidol lactate (HALDOL) injection 2 mg  2 mg IntraVENous ONCE Luke Gutierres DO         Current Outpatient Medications   Medication Sig Dispense Refill    metoclopramide HCl (METOZOLV ODT) 5 mg rapid dissolve tab Take 2 Tabs by mouth Before breakfast, lunch, dinner and at bedtime. 30 Tab 0    acetaminophen (TYLENOL) 500 mg tablet Take 2 Tabs by mouth every eight (8) hours as needed for Pain. 30 Tab 0    potassium chloride SA (MICRO-K) 10 mEq capsule Take 1 Cap by mouth daily. 5 Cap 0    ondansetron (ZOFRAN ODT) 4 mg disintegrating tablet Take 1 Tab by mouth every eight (8) hours as needed for Nausea or Vomiting. 20 Tab 0    pantoprazole (PROTONIX) 40 mg tablet Take 1 Tab by mouth two (2) times a day.  60 Tab 0    amLODIPine (NORVASC) 5 mg tablet Take 1 Tab by mouth daily. 30 Tab 0    insulin glargine (LANTUS U-100 INSULIN) 100 unit/mL injection 30 Units by SubCUTAneous route nightly. Indications: type 2 diabetes mellitus      insulin admin. supplies (INPEN, FOR NOVOLOG, SC) 10-12 Units by SubCUTAneous route Before breakfast, lunch, and dinner. Past History     Past Medical History:  Past Medical History:   Diagnosis Date    Diabetes ([address]     Gastroparesis     Gastroparesis     Hypertension     UTI (urinary tract infection)        Past Surgical History:  Past Surgical History:   Procedure Laterality Date    HX APPENDECTOMY      HX GYN      tubal ligation, C Section       Family History:  History reviewed. No pertinent family history. Social History:  Social History     Tobacco Use    Smoking status: Never Smoker    Smokeless tobacco: Never Used   Substance Use Topics    Alcohol use: Never     Frequency: Never    Drug use: Not Currently       Allergies: Allergies   Allergen Reactions    Compazine [Prochlorperazine] Other (comments)     Dystonic Reaction         Review of Systems   Review of Systems   Constitutional: Positive for appetite change. Gastrointestinal: Positive for abdominal pain, nausea and vomiting. Psychiatric/Behavioral: Positive for sleep disturbance. All other systems reviewed and are negative.         Physical Exam     Vitals:    03/05/20 0026   BP: 163/87   Pulse: 78   Resp: 16   Temp: 98.4 °F (36.9 °C)   SpO2: 100%   Weight: 68.7 kg (151 lb 6.4 oz)     Physical Exam    Nursing notes and vital signs reviewed    Airway: intact, speaking normally  Breathing: No apparent distress, no cyanosis  Circulation: Peripheral pulses equal    Constitutional: Non toxic appearing, no acute distress, chronically ill-appearing  HEENT & Neck: Normocephalic, Atraumatic, PERRL, EOMI, No rhinorrhea, external nose normal, external ears normal, mucous membranes dry, No stridor, No JVD  Cardiovascular: Regular rate and rhythm, no murmurs  Chest: Normal work of breathing and chest excursion bilaterally  Lungs: Clear to ausculation bilaterally  Abdomen: Soft, mild epigastric tenderness, non distended, normoactive bowel sounds, No rigidity, no peritoneal signs  Musculoskeletal: No evidence of trauma or deformity to the back or neck  Extremities: No evidence of trauma or deformity, no LE edema  Skin: Cool extremities, slightly pale no obvious rashes  Neuro: Somnolent oriented x 3, CN 2-12 intact, normal speech, strength and sensation full and symmetric bilaterally, normal coordination  Psychiatric: Normal mood and affect      Diagnostic Study Results     Labs -     Recent Results (from the past 72 hour(s))   CBC WITH AUTOMATED DIFF    Collection Time: 03/05/20 12:30 AM   Result Value Ref Range    WBC 11.2 4.6 - 13.2 K/uL    RBC 2.28 (L) 4.20 - 5.30 M/uL    HGB 6.1 (L) 12.0 - 16.0 g/dL    HCT 19.4 (L) 35.0 - 45.0 %    MCV 85.1 74.0 - 97.0 FL    MCH 26.8 24.0 - 34.0 PG    MCHC 31.4 31.0 - 37.0 g/dL    RDW 14.9 (H) 11.6 - 14.5 %    PLATELET 365 930 - 565 K/uL    MPV 8.8 (L) 9.2 - 11.8 FL    NEUTROPHILS 66 40 - 73 %    LYMPHOCYTES 23 21 - 52 %    MONOCYTES 9 3 - 10 %    EOSINOPHILS 2 0 - 5 %    BASOPHILS 0 0 - 2 %    ABS. NEUTROPHILS 7.3 1.8 - 8.0 K/UL    ABS. LYMPHOCYTES 2.6 0.9 - 3.6 K/UL    ABS. MONOCYTES 1.1 0.05 - 1.2 K/UL    ABS. EOSINOPHILS 0.3 0.0 - 0.4 K/UL    ABS.  BASOPHILS 0.0 0.0 - 0.1 K/UL    DF AUTOMATED     METABOLIC PANEL, COMPREHENSIVE    Collection Time: 03/05/20 12:30 AM   Result Value Ref Range    Sodium 141 136 - 145 mmol/L    Potassium 3.8 3.5 - 5.5 mmol/L    Chloride 109 100 - 111 mmol/L    CO2 24 21 - 32 mmol/L    Anion gap 8 3.0 - 18 mmol/L    Glucose 134 (H) 74 - 99 mg/dL    BUN 38 (H) 7.0 - 18 MG/DL    Creatinine 2.97 (H) 0.6 - 1.3 MG/DL    BUN/Creatinine ratio 13 12 - 20      GFR est AA 22 (L) >60 ml/min/1.73m2    GFR est non-AA 18 (L) >60 ml/min/1.73m2    Calcium 8.0 (L) 8.5 - 10.1 MG/DL    Bilirubin, total 0.1 (L) 0.2 - 1.0 MG/DL    ALT (SGPT) 11 (L) 13 - 56 U/L    AST (SGOT) 15 10 - 38 U/L    Alk. phosphatase 122 (H) 45 - 117 U/L    Protein, total 6.4 6.4 - 8.2 g/dL    Albumin 2.6 (L) 3.4 - 5.0 g/dL    Globulin 3.8 2.0 - 4.0 g/dL    A-G Ratio 0.7 (L) 0.8 - 1.7     LIPASE    Collection Time: 03/05/20 12:30 AM   Result Value Ref Range    Lipase 155 73 - 393 U/L   URINALYSIS W/ RFLX MICROSCOPIC    Collection Time: 03/05/20 12:47 AM   Result Value Ref Range    Color YELLOW      Appearance CLOUDY      Specific gravity 1.016 1.005 - 1.030      pH (UA) 5.5 5.0 - 8.0      Protein >1,000 (A) NEG mg/dL    Glucose 250 (A) NEG mg/dL    Ketone NEGATIVE  NEG mg/dL    Bilirubin NEGATIVE  NEG      Blood SMALL (A) NEG      Urobilinogen 0.2 0.2 - 1.0 EU/dL    Nitrites NEGATIVE  NEG      Leukocyte Esterase NEGATIVE  NEG     URINE MICROSCOPIC ONLY    Collection Time: 03/05/20 12:47 AM   Result Value Ref Range    WBC 5 to 10 0 - 5 /hpf    RBC 3 to 6 0 - 5 /hpf    Epithelial cells 4+ 0 - 5 /lpf    Bacteria 3+ (A) NEG /hpf   TYPE & SCREEN    Collection Time: 03/05/20  1:20 AM   Result Value Ref Range    Crossmatch Expiration 03/08/2020     ABO/Rh(D) O POSITIVE     Antibody screen NEG     CALLED TO:       [facility], [place], operated by [facility] DAY RN ER BY [facility] AT 0246 03/05/20, BLOOD IS READY    Unit number [id number]     Blood component type RC LR     Unit division 00     Status of unit ISSUED     Crossmatch result Compatible    PROTHROMBIN TIME + INR    Collection Time: 03/05/20  1:20 AM   Result Value Ref Range    Prothrombin time 14.2 11.5 - 15.2 sec    INR 1.1 0.8 - 1.2     PTT    Collection Time: 03/05/20  1:20 AM   Result Value Ref Range    aPTT 29.7 23.0 - 36.4 SEC       Radiologic Studies -   XR ABD (KUB)   Final Result   Impression:   --------------      Portions of the lower abdomen/pelvis not imaged limiting evaluation. No acute process identified.             CT ABD PELV WO CONT    (Results Pending)     CT Results  (Last 48 hours)    None CXR Results  (Last 48 hours)    None          Medications given in the ED-  Medications   0.9% sodium chloride infusion 250 mL (has no administration in time range)   famotidine (PF) (PEPCID) injection 20 mg (has no administration in time range)   0.9% sodium chloride infusion (has no administration in time range)   acetaminophen (OFIRMEV) infusion 1,000 mg (has no administration in time range)   haloperidol lactate (HALDOL) injection 2 mg (has no administration in time range)   sodium chloride 0.9 % bolus infusion 1,000 mL (1,000 mL IntraVENous New Bag 3/5/20 0040)   metoclopramide HCl (REGLAN) injection 10 mg (10 mg IntraVENous Given 3/5/20 0040)   ondansetron (ZOFRAN) injection 4 mg (4 mg IntraVENous Given 3/5/20 0040)   zolpidem (AMBIEN) tablet 5 mg (5 mg Oral Given 3/5/20 0148)         Medical Decision Making   I am the first provider for this patient. I reviewed the vital signs, available nursing notes, past medical history, past surgical history, family history and social history. Vital Signs-Reviewed the patient's vital signs. Records Reviewed: Nursing Notes and Old Medical Records    Procedures:  Procedures    ED Course:   Patient was given Reglan, Zofran and IV fluids. She has had no subsequent nausea or vomiting. Patient continues to complain of abdominal pain. I discussed with her that giving narcotics will only worsen her gastroparesis. She was subsequently given Toradol and Pepcid. Her laboratory findings now show worsening kidney function on top of her chronic kidney disease as well as anemia with hemoglobin of 6. Patient states she has had blood transfusions in the past.  We will give 1 unit packed red blood cells. Patient has no active bleeding at this time, question if her anemia is due to chronic disease. Considering her repeat ER visits for this abdominal pain will obtain a dry CT scan of her abdomen and plan for admission.   Patient is agreeable to this plan    Diagnosis and Disposition     Core Measures:  For Hospitalized Patients:    1. Hospitalization Decision Time:  The decision to hospitalize the patient was made by Marissa Ybarra DO at 3:02 AM on 3/5/2020    2. Aspirin: Aspirin was not given because the patient did not present with a stroke at the time of their Emergency Department evaluation    3:02 AM  Patient is being admitted to the hospital by Dr. Adrienne Song. The results of their tests and reasons for their admission have been discussed with them and/or available family. They convey agreement and understanding for the need to be admitted and for their admission diagnosis. CONDITIONS ON ADMISSION:  Sepsis is not present at the time of admission. Deep Vein Thrombosis is not present at the time of admission. Thrombosis is not present at the time of admission. Urinary Tract Infection is not present at the time of admission. Pneumonia is not present at the time of admission. MRSA is not present at the time of admission. Wound infection is not present at the time of admission. Pressure Ulcer is not present at the time of admission. CLINICAL IMPRESSION:    1. Anemia, unspecified type    2. Abdominal pain, epigastric    3. Gastroparesis    4. Stage 4 chronic kidney disease ([address]          Please note that this dictation was completed with [employer], the computer voice recognition software. Quite often unanticipated grammatical, syntax, homophones, and other interpretive errors are inadvertently transcribed by the computer software. Please disregard these errors. Please excuse any errors that have escaped final proofreading.

## 2020-03-05 NOTE — H&P
History & Physical    Patient: Rosa Peoples MRN: [id number]  CSN: [id number]    YOB: 1985  Age: 37 y.o. Sex: female      DOA: 3/5/2020  Primary Care Provider:  None      Assessment/Plan     Patient Active Problem List   Diagnosis Code    Gastroparesis K31.84    Dehydration E86.0    Leukocytosis D72.829    Abdominal pain, epigastric R10.13    Lactic acid acidosis E87.2    Acute kidney injury ([address] N17.9    Intractable vomiting with nausea R11.2    Uncontrolled type 2 diabetes mellitus with hyperglycemia (HCC) E11.65    UTI (urinary tract infection) N39.0    Chronic anemia D64.9    Chest pain R07.9    Anemia D64.9    Hyperglycemia R73.9    Acute renal failure superimposed on stage 3 chronic kidney disease (HCC) N17.9, N18.3    Hordeolum externum of right upper eyelid H00.011    Intractable nausea and vomiting R11.2    Abdominal pain, generalized R10.84    History of diabetic gastroparesis Z86.39    Type 2 diabetes mellitus with hyperglycemia, with long-term current use of insulin (HCC) E11.65, Z79.4    Diabetic gastroparalysis ([facility]) E11.43, K31.84    Occult blood positive stool R19.5    Recurrent abdominal pain R10.9    Insomnia G47.00    Non-intractable vomiting R11.10    Stage 4 chronic kidney disease (HCC) N18.4    Symptomatic anemia D64.9     38 y/o female with hx of diabetic gastroparesis is admitted with UTI and symptomatic anemia which may be due to chronic kidney disease with no evidence of active bleeding. CT abdomen done to rule out any acute intraabdominal process (without contrast due to poor renal function).     -antiemetics prn  -rocephin 1 g q24 hrs  -transfuse 1U PRBC, recheck H/H  -MIVF  -consider GI consult if she continues to have unresolved N/V  -f/u UDS    Prophylaxis: SCDs, protonix IV, no heparin due to anemia    Estimated length of stay : 2 nights    Xu Ayala MD Nino  --------------------------------------------------------------------------------------------------------------------------------------------------------  CC: N/V, abd pain       HPI:     Kelechi Valdez is a 37 y.o. female who has a hx of diabetic gastroparesis with frequent episodes of intractable N/V who presents with abdominal pain, nausea, and vomiting. Has mild dysuria. No fevers. Was feeling well prior to this morning. Past Medical History:   Diagnosis Date    Diabetes ([address]     Gastroparesis     Gastroparesis     Hypertension     UTI (urinary tract infection)        Past Surgical History:   Procedure Laterality Date    HX APPENDECTOMY      HX GYN      tubal ligation, C Section       History reviewed. No pertinent family history. Social History     Socioeconomic History    Marital status: SINGLE     Spouse name: Not on file    Number of children: Not on file    Years of education: Not on file    Highest education level: Not on file   Tobacco Use    Smoking status: Never Smoker    Smokeless tobacco: Never Used   Substance and Sexual Activity    Alcohol use: Never     Frequency: Never    Drug use: Not Currently       Prior to Admission medications    Medication Sig Start Date End Date Taking? Authorizing Provider   metoclopramide HCl (METOZOLV ODT) 5 mg rapid dissolve tab Take 2 Tabs by mouth Before breakfast, lunch, dinner and at bedtime. 2/20/20   Rylee Arita DO   acetaminophen (TYLENOL) 500 mg tablet Take 2 Tabs by mouth every eight (8) hours as needed for Pain. 2/13/20   Heriberto Mallory MD   potassium chloride SA (MICRO-K) 10 mEq capsule Take 1 Cap by mouth daily. 2/8/20   Samanta De La Paz MD   ondansetron (ZOFRAN ODT) 4 mg disintegrating tablet Take 1 Tab by mouth every eight (8) hours as needed for Nausea or Vomiting. 2/3/20   Chikis Teixeira PA   pantoprazole (PROTONIX) 40 mg tablet Take 1 Tab by mouth two (2) times a day.  12/31/19   Preeti Watson MD amLODIPine (NORVASC) 5 mg tablet Take 1 Tab by mouth daily. 19   Jay Jay Rutledge MD   insulin glargine (LANTUS U-100 INSULIN) 100 unit/mL injection 30 Units by SubCUTAneous route nightly. Indications: type 2 diabetes mellitus    Tameka Balderas MD   insulin admin. supplies (INPEN, FOR NOVOLOG, SC) 10-12 Units by SubCUTAneous route Before breakfast, lunch, and dinner. Other, MD Tameka       Allergies   Allergen Reactions    Compazine [Prochlorperazine] Other (comments)     Dystonic Reaction       Review of Systems  Gen: No fever, chills, malaise, weight loss/gain. Heent: No headache, rhinorrhea, epistaxis, ear pain, hearing loss, sinus pain, neck pain/stiffness, sore throat. Heart: No chest pain, palpitations, STEVENS, pnd, or orthopnea. Resp: No cough, hemoptysis, wheezing and shortness of breath. GI: +nausea, vomiting; no diarrhea, constipation, melena or hematochezia. : No urinary obstruction, dysuria or hematuria. Derm: No rash, new skin lesion or pruritis. Musc/skeletal: no bone or joint complaints. Vasc: No edema, cyanosis or claudication. Endo: No heat/cold intolerance, no polyuria,polydipsia or polyphagia. Neuro: No unilateral weakness, numbness, tingling. No seizures. Heme: No easy bruising or bleeding. Physical Exam:     Physical Exam:  Visit Vitals  /69   Pulse 94   Temp 98 °F (36.7 °C)   Resp (!) 6   Wt 68.7 kg (151 lb 6.4 oz)   SpO2 100%   BMI 25.99 kg/m²      O2 Device: Room air    Temp (24hrs), Av.2 °F (36.8 °C), Min:98 °F (36.7 °C), Max:98.4 °F (36.9 °C)     190 -  0700  In: 310   Out: -    No intake/output data recorded. General:  Awake, cooperative, laying on abdomen in discomfort, adult brief in place. Head:  Normocephalic, without obvious abnormality, atraumatic. Eyes:  Conjunctivae/corneas clear, sclera anicteric. Neck: Supple, symmetrical, trachea midline. Lungs:   Clear to auscultation bilaterally.    Heart:  Regular rate and rhythm, S1, S2 normal, no murmur, click, rub or gallop. Abdomen: Soft, mildly ttp, no rebound. Bowel sounds normal. No masses,  No organomegaly. Extremities: Extremities normal, atraumatic, no cyanosis, 1+ edema in b/l LE. Capillary refill normal.   Pulses: 2+ and symmetric all extremities. Skin: Skin color pink, turgor normal. No rashes or lesions   Neurologic: No focal motor or sensory deficit. Labs Reviewed: All lab results for the last 24 hours reviewed. Recent Results (from the past 24 hour(s))   CBC WITH AUTOMATED DIFF    Collection Time: 03/05/20 12:30 AM   Result Value Ref Range    WBC 11.2 4.6 - 13.2 K/uL    RBC 2.28 (L) 4.20 - 5.30 M/uL    HGB 6.1 (L) 12.0 - 16.0 g/dL    HCT 19.4 (L) 35.0 - 45.0 %    MCV 85.1 74.0 - 97.0 FL    MCH 26.8 24.0 - 34.0 PG    MCHC 31.4 31.0 - 37.0 g/dL    RDW 14.9 (H) 11.6 - 14.5 %    PLATELET 987 836 - 385 K/uL    MPV 8.8 (L) 9.2 - 11.8 FL    NEUTROPHILS 66 40 - 73 %    LYMPHOCYTES 23 21 - 52 %    MONOCYTES 9 3 - 10 %    EOSINOPHILS 2 0 - 5 %    BASOPHILS 0 0 - 2 %    ABS. NEUTROPHILS 7.3 1.8 - 8.0 K/UL    ABS. LYMPHOCYTES 2.6 0.9 - 3.6 K/UL    ABS. MONOCYTES 1.1 0.05 - 1.2 K/UL    ABS. EOSINOPHILS 0.3 0.0 - 0.4 K/UL    ABS. BASOPHILS 0.0 0.0 - 0.1 K/UL    DF AUTOMATED     METABOLIC PANEL, COMPREHENSIVE    Collection Time: 03/05/20 12:30 AM   Result Value Ref Range    Sodium 141 136 - 145 mmol/L    Potassium 3.8 3.5 - 5.5 mmol/L    Chloride 109 100 - 111 mmol/L    CO2 24 21 - 32 mmol/L    Anion gap 8 3.0 - 18 mmol/L    Glucose 134 (H) 74 - 99 mg/dL    BUN 38 (H) 7.0 - 18 MG/DL    Creatinine 2.97 (H) 0.6 - 1.3 MG/DL    BUN/Creatinine ratio 13 12 - 20      GFR est AA 22 (L) >60 ml/min/1.73m2    GFR est non-AA 18 (L) >60 ml/min/1.73m2    Calcium 8.0 (L) 8.5 - 10.1 MG/DL    Bilirubin, total 0.1 (L) 0.2 - 1.0 MG/DL    ALT (SGPT) 11 (L) 13 - 56 U/L    AST (SGOT) 15 10 - 38 U/L    Alk.  phosphatase 122 (H) 45 - 117 U/L    Protein, total 6.4 6.4 - 8.2 g/dL    Albumin 2.6 (L) 3.4 - 5.0 g/dL    Globulin 3.8 2.0 - 4.0 g/dL    A-G Ratio 0.7 (L) 0.8 - 1.7     LIPASE    Collection Time: 03/05/20 12:30 AM   Result Value Ref Range    Lipase 155 73 - 393 U/L   URINALYSIS W/ RFLX MICROSCOPIC    Collection Time: 03/05/20 12:47 AM   Result Value Ref Range    Color YELLOW      Appearance CLOUDY      Specific gravity 1.016 1.005 - 1.030      pH (UA) 5.5 5.0 - 8.0      Protein >1,000 (A) NEG mg/dL    Glucose 250 (A) NEG mg/dL    Ketone NEGATIVE  NEG mg/dL    Bilirubin NEGATIVE  NEG      Blood SMALL (A) NEG      Urobilinogen 0.2 0.2 - 1.0 EU/dL    Nitrites NEGATIVE  NEG      Leukocyte Esterase NEGATIVE  NEG     URINE MICROSCOPIC ONLY    Collection Time: 03/05/20 12:47 AM   Result Value Ref Range    WBC 5 to 10 0 - 5 /hpf    RBC 3 to 6 0 - 5 /hpf    Epithelial cells 4+ 0 - 5 /lpf    Bacteria 3+ (A) NEG /hpf   TYPE & SCREEN    Collection Time: 03/05/20  1:20 AM   Result Value Ref Range    Crossmatch Expiration 03/08/2020     ABO/Rh(D) O POSITIVE     Antibody screen NEG     CALLED TO:       [facility] DAY RN ER BY DARRELL AT 0246 03/05/20, BLOOD IS READY    Unit number [id number]     Blood component type RC LR     Unit division 00     Status of unit ISSUED     Crossmatch result Compatible    PROTHROMBIN TIME + INR    Collection Time: 03/05/20  1:20 AM   Result Value Ref Range    Prothrombin time 14.2 11.5 - 15.2 sec    INR 1.1 0.8 - 1.2     PTT    Collection Time: 03/05/20  1:20 AM   Result Value Ref Range    aPTT 29.7 23.0 - 36.4 SEC   OCCULT BLOOD, STOOL    Collection Time: 03/05/20  3:19 AM   Result Value Ref Range    Occult blood, stool NEGATIVE  NEG       Results   CT ABD PELV WO CONT (Accession [id number]) (Order [id number])   Allergies      Not Specified: Compazine [Prochlorperazine]   Exam Information     Status Exam Begun  Exam Ended    Final [99] 2/10/2020 00:19 2/10/2020 00:29   Result Information     Status: Final result (Exam End: 2/10/2020 00:29) Provider Status: Open   Study Result _______________     EXAM: CT ABD PELV WO CONT     CLINICAL INDICATION/HISTORY: belly pain, leukocytosis  -Additional: None.     COMPARISON: CT of 12/29/2019.     TECHNIQUE:   Abdominopelvic CT examination was performed without oral contrast. Evaluation of  the bowel is limited in the absence of oral contrast. Intravenous contrast was  not administered, limiting evaluation of solid organs and vascular structures. Sagittal and coronal series were reformatted from the axial source images.     One or more dose reduction techniques were used on this CT: automated exposure  control, adjustment of the mAs and/or kVp according to patient size, and  iterative reconstruction techniques. The specific techniques used on this CT  exam have been documented in the patient's electronic medical record. Digital  Imaging and Communications in Medicine (DICOM) format image data are available  to nonaffiliated external healthcare facilities or entities on a secure, media  free, reciprocally searchable basis with patient authorization for at least a  12-month period after this study. _______________     FINDINGS:     VISUALIZED LUNG BASES and LOWER CHEST: Mild dependent hypoventilatory changes. Heart size is normal.     LIVER: Normal in size and contour.     GALLBLADDER and BILE DUCTS: No calcified gallstones or pericholecystic  inflammation. No intrahepatic or extrahepatic biliary ductal dilatation.     PANCREAS: Within normal limits.     SPLEEN: Within normal limits.     ADRENALS: Within normal limits.     KIDNEYS, URETERS, URINARY BLADDER: No hydronephrosis or calculi. Stable left  renal cyst inferiorly. Normal ureters and bladder.     REPRODUCTIVE ORGANS: Within normal limits.     STOMACH and BOWEL: Evaluation of the bowel is limited in the absence of oral  contrast. Bowel caliber is normal without evidence of obstruction or focal  inflammatory process.  The appendix is not visualized, but no focal inflammatory  changes are seen in the pericecal region to suggest CT evidence of acute  appendicitis.     LYMPH NODES: No lymphadenopathy.     PERITONEUM/RETROPERITONEUM: No free fluid. No extraluminal free air or drainable  collection.     VESSELS: Within normal limits.      BODY WALL: Within normal limits.     BONES: No acute or suspicious osseous findings. _______________     IMPRESSION  IMPRESSION:     No acute inflammatory or obstructive process of the abdomen or pelvis. Results   XR ABD (KUB) (Accession [id number]) (Order [id number])   Allergies      Not Specified: Compazine [Prochlorperazine]   Exam Information     Status Exam Begun  Exam Ended    Final [99] 3/05/2020 00:42 3/05/2020 00:52   Result Information     Status: Final result (Exam End: 3/5/2020 00:52) Provider Status: Open   Study Result     ---------------------------------------------------------------------------  <<<<<<<<<           [facility]           >>>>>>>>>   ---------------------------------------------------------------------------     CLINICAL HISTORY: Epigastric pain.     COMPARISON EXAMINATIONS: None.        --- [SINGLE PORTABLE SUPINE VIEW OF THE ABDOMEN ---     Portions of the lower abdomen/pelvis are not imaged. No bowel dilatation.   No  gross evidence of free intraperitoneal air, though this is suboptimally assessed  on supine radiograph.      No soft tissue abnormalities are identified.       No fracture or suspicious bone lesion.        --------------  IMPRESSION  Impression:  --------------     Portions of the lower abdomen/pelvis not imaged limiting evaluation.     No acute process identified.

## 2020-03-05 NOTE — ED NOTES
Pt to ct per w/c with [facility] EMT-P, then pt will go to her room 310.  Pt alert and awake

## 2020-03-05 NOTE — PROGRESS NOTES
TRANSFER - IN REPORT:    Verbal report received from Mukesh Fisher [id number] RN(name) on Dany Weldon  being received from ED(unit) for routine progression of care      Report consisted of patients Situation, Background, Assessment and   Recommendations(SBAR). Information from the following report(s) SBAR, Kardex, ED Summary, [facility] and Recent Results was reviewed with the receiving nurse. Opportunity for questions and clarification was provided. Assessment completed upon patients arrival to unit and care assumed.

## 2020-03-05 NOTE — ED NOTES
Iv to left wrist with most of cathter pulled out of vein and kinked. D/c'ed catheter intact.  Site w/o redness or swelling

## 2020-03-05 NOTE — PROGRESS NOTES
0800  Bedside and Verbal shift change report given by ARIN Barreto (off going nurse) to Luca Peña RN (on coming nurse). Report included the following information SBAR, Kardex, OR Summary, Intake/Output and MAR    1812  Blood transfusion started. Pt is stable. 1827  Transfusion rate changed (125mL/h). Pt is stable. 1842  Pt is stable. 1912  Pt is stable. 1915  Bedside and Verbal shift change report given by Luca Peña RN (off going nurse) to CURT Morton RN (on coming nurse). Report included the following information SBAR, Kardex, OR Summary, Intake/Output and MAR.     Patient Vitals for the past 24 hrs:   Temp Pulse Resp BP SpO2   03/05/20 1912 97.6 °F (36.4 °C) 90 19 155/75 100 %   03/05/20 1842 97.8 °F (36.6 °C) 87 16 165/85 99 %   03/05/20 1827 97.9 °F (36.6 °C) 92 17 168/89 100 %   03/05/20 1812 97.9 °F (36.6 °C) 90 19 163/79 100 %   03/05/20 1601 98.6 °F (37 °C) 84 17 174/86 100 %   03/05/20 1152 98 °F (36.7 °C) 92 17 153/80 100 %   03/05/20 0810     98 %   03/05/20 0738 97.9 °F (36.6 °C) 90 16 133/63 97 %   03/05/20 0604 98 °F (36.7 °C) 91 16 150/76 100 %   03/05/20 0549  90 16 146/73 100 %   03/05/20 0500 98.1 °F (36.7 °C) 92 16 137/81 100 %   03/05/20 0445  91 15 125/70 98 %   03/05/20 0430   16 162/85 100 %   03/05/20 0415 97.8 °F (36.6 °C) 93 18 147/87 100 %   03/05/20 0400  94 18 155/72 100 %   03/05/20 0345  94 18 146/80 100 %   03/05/20 0330 98 °F (36.7 °C) 94 18 129/69 100 %   03/05/20 0315 98 °F (36.7 °C) 94 18 130/71 100 %   03/05/20 0305 98.2 °F (36.8 °C) 94 18 134/63 100 %   03/05/20 0026 98.4 °F (36.9 °C) 78 16 163/87 100 %

## 2020-03-05 NOTE — PROGRESS NOTES
Problem: Diabetes Self-Management  Goal: *Disease process and treatment process  Description  Define diabetes and identify own type of diabetes; list 3 options for treating diabetes. Outcome: Progressing Towards Goal  Goal: *Incorporating nutritional management into lifestyle  Description  Describe effect of type, amount and timing of food on blood glucose; list 3 methods for planning meals. Outcome: Progressing Towards Goal  Goal: *Incorporating physical activity into lifestyle  Description  State effect of exercise on blood glucose levels. Outcome: Progressing Towards Goal  Goal: *Developing strategies to promote health/change behavior  Description  Define the ABC's of diabetes; identify appropriate screenings, schedule and personal plan for screenings. Outcome: Progressing Towards Goal  Goal: *Using medications safely  Description  State effect of diabetes medications on diabetes; name diabetes medication taking, action and side effects. Outcome: Progressing Towards Goal  Goal: *Monitoring blood glucose, interpreting and using results  Description  Identify recommended blood glucose targets  and personal targets. Outcome: Progressing Towards Goal  Goal: *Prevention, detection, treatment of acute complications  Description  List symptoms of hyper- and hypoglycemia; describe how to treat low blood sugar and actions for lowering  high blood glucose level. Outcome: Progressing Towards Goal  Goal: *Prevention, detection and treatment of chronic complications  Description  Define the natural course of diabetes and describe the relationship of blood glucose levels to long term complications of diabetes.   Outcome: Progressing Towards Goal  Goal: *Developing strategies to address psychosocial issues  Description  Describe feelings about living with diabetes; identify support needed and support network  Outcome: Progressing Towards Goal  Goal: *Insulin pump training  Outcome: Progressing Towards Goal  Goal: *Sick day guidelines  Outcome: Progressing Towards Goal  Goal: *Patient Specific Goal (EDIT GOAL, INSERT TEXT)  Outcome: Progressing Towards Goal     Problem: Patient Education: Go to Patient Education Activity  Goal: Patient/Family Education  Outcome: Progressing Towards Goal     Problem: Pain  Goal: *Control of Pain  Outcome: Progressing Towards Goal     Problem: Patient Education: Go to Patient Education Activity  Goal: Patient/Family Education  Outcome: Progressing Towards Goal

## 2020-03-05 NOTE — PROGRESS NOTES
Reason for Admission:  N/V, abd pain                    RUR Score:  48%                    Plan for utilizing home health:    Unlikely       PCP: First and Last name:     Name of Practice:    Are you a current patient: Yes/No:    Approximate date of last visit:                     Current Advanced Directive/Advance Care Plan: not on file                         Transition of Care Plan:     Home with physician follow up                  Chart reviewed. Per H&P Yao Haskins is a 37 y.o. female who has a hx of diabetic gastroparesis with frequent episodes of intractable N/V who presents with abdominal pain, nausea, and vomiting. Has mild dysuria. No fevers. Was feeling well prior to this morning. \"    CM met with pt at bedside to discuss transition of care. Pt lives with her fiance and he will assist as needed. Pt is independent. Please encourage ambulation to assist with identifying potential transition of care needs. Pt has indicated she will need assistance with a obtaining a PCP. CMS has been notified to assist.  CM to continue to follow and assist.    Care Management Interventions  Mode of Transport at Discharge: Other (see comment)(medicaid transport)  Transition of Care Consult (CM Consult): Discharge Planning  Health Maintenance Reviewed: Yes  Current Support Network:  Other(lives with fiance)  Confirm Follow Up Transport: Other (see comment)  The Plan for Transition of Care is Related to the Following Treatment Goals : home with physician follow up   Discharge Location  Discharge Placement: Home with family assistance

## 2020-03-06 VITALS
RESPIRATION RATE: 16 BRPM | OXYGEN SATURATION: 98 % | WEIGHT: 151.4 LBS | DIASTOLIC BLOOD PRESSURE: 63 MMHG | SYSTOLIC BLOOD PRESSURE: 136 MMHG | BODY MASS INDEX: 25.99 KG/M2 | HEART RATE: 90 BPM | TEMPERATURE: 98.3 F

## 2020-03-06 LAB
ABO + RH BLD: NORMAL
ALBUMIN SERPL-MCNC: 2.4 G/DL (ref 3.4–5)
ALBUMIN/GLOB SERPL: 0.6 {RATIO} (ref 0.8–1.7)
ALP SERPL-CCNC: 126 U/L (ref 45–117)
ALT SERPL-CCNC: 10 U/L (ref 13–56)
ANION GAP SERPL CALC-SCNC: 10 MMOL/L (ref 3–18)
AST SERPL-CCNC: 16 U/L (ref 10–38)
BILIRUB SERPL-MCNC: 0.3 MG/DL (ref 0.2–1)
BLD PROD TYP BPU: NORMAL
BLD PROD TYP BPU: NORMAL
BLOOD GROUP ANTIBODIES SERPL: NORMAL
BPU ID: NORMAL
BPU ID: NORMAL
BUN SERPL-MCNC: 30 MG/DL (ref 7–18)
BUN/CREAT SERPL: 11 (ref 12–20)
CALCIUM SERPL-MCNC: 7.7 MG/DL (ref 8.5–10.1)
CALLED TO:,BCALL1: NORMAL
CALLED TO:,BCALL2: NORMAL
CHLORIDE SERPL-SCNC: 114 MMOL/L (ref 100–111)
CO2 SERPL-SCNC: 20 MMOL/L (ref 21–32)
CREAT SERPL-MCNC: 2.64 MG/DL (ref 0.6–1.3)
CROSSMATCH RESULT,%XM: NORMAL
CROSSMATCH RESULT,%XM: NORMAL
ERYTHROCYTE [DISTWIDTH] IN BLOOD BY AUTOMATED COUNT: 14.6 % (ref 11.6–14.5)
GLOBULIN SER CALC-MCNC: 3.7 G/DL (ref 2–4)
GLUCOSE BLD STRIP.AUTO-MCNC: 124 MG/DL (ref 70–110)
GLUCOSE BLD STRIP.AUTO-MCNC: 209 MG/DL (ref 70–110)
GLUCOSE SERPL-MCNC: 143 MG/DL (ref 74–99)
HCT VFR BLD AUTO: 28.1 % (ref 35–45)
HGB BLD-MCNC: 9.1 G/DL (ref 12–16)
MCH RBC QN AUTO: 27.3 PG (ref 24–34)
MCHC RBC AUTO-ENTMCNC: 32.4 G/DL (ref 31–37)
MCV RBC AUTO: 84.4 FL (ref 74–97)
PLATELET # BLD AUTO: 405 K/UL (ref 135–420)
PMV BLD AUTO: 9.3 FL (ref 9.2–11.8)
POTASSIUM SERPL-SCNC: 4.1 MMOL/L (ref 3.5–5.5)
PROT SERPL-MCNC: 6.1 G/DL (ref 6.4–8.2)
RBC # BLD AUTO: 3.33 M/UL (ref 4.2–5.3)
SODIUM SERPL-SCNC: 144 MMOL/L (ref 136–145)
SPECIMEN EXP DATE BLD: NORMAL
STATUS OF UNIT,%ST: NORMAL
STATUS OF UNIT,%ST: NORMAL
UNIT DIVISION, %UDIV: 0
UNIT DIVISION, %UDIV: 0
WBC # BLD AUTO: 10.7 K/UL (ref 4.6–13.2)

## 2020-03-06 PROCEDURE — 74011250636 HC RX REV CODE- 250/636: Performed by: INTERNAL MEDICINE

## 2020-03-06 PROCEDURE — 74011000250 HC RX REV CODE- 250: Performed by: FAMILY MEDICINE

## 2020-03-06 PROCEDURE — 74011250636 HC RX REV CODE- 250/636: Performed by: FAMILY MEDICINE

## 2020-03-06 PROCEDURE — C9113 INJ PANTOPRAZOLE SODIUM, VIA: HCPCS | Performed by: FAMILY MEDICINE

## 2020-03-06 PROCEDURE — 96375 TX/PRO/DX INJ NEW DRUG ADDON: CPT

## 2020-03-06 PROCEDURE — 80053 COMPREHEN METABOLIC PANEL: CPT

## 2020-03-06 PROCEDURE — 99218 HC RM OBSERVATION: CPT

## 2020-03-06 PROCEDURE — 85027 COMPLETE CBC AUTOMATED: CPT

## 2020-03-06 PROCEDURE — 74011250637 HC RX REV CODE- 250/637: Performed by: INTERNAL MEDICINE

## 2020-03-06 PROCEDURE — 74011250637 HC RX REV CODE- 250/637: Performed by: FAMILY MEDICINE

## 2020-03-06 PROCEDURE — 96376 TX/PRO/DX INJ SAME DRUG ADON: CPT

## 2020-03-06 PROCEDURE — 82962 GLUCOSE BLOOD TEST: CPT

## 2020-03-06 PROCEDURE — 74011636637 HC RX REV CODE- 636/637: Performed by: FAMILY MEDICINE

## 2020-03-06 RX ORDER — ONDANSETRON 2 MG/ML
4 INJECTION INTRAMUSCULAR; INTRAVENOUS
Status: DISCONTINUED | OUTPATIENT
Start: 2020-03-06 | End: 2020-03-06 | Stop reason: HOSPADM

## 2020-03-06 RX ORDER — TRAZODONE HYDROCHLORIDE 50 MG/1
50 TABLET ORAL
Status: DISCONTINUED | OUTPATIENT
Start: 2020-03-06 | End: 2020-03-06 | Stop reason: HOSPADM

## 2020-03-06 RX ORDER — METOCLOPRAMIDE 5 MG/1
10 TABLET ORAL
Status: DISCONTINUED | OUTPATIENT
Start: 2020-03-06 | End: 2020-03-06 | Stop reason: HOSPADM

## 2020-03-06 RX ORDER — DIPHENHYDRAMINE HYDROCHLORIDE 50 MG/ML
25 INJECTION, SOLUTION INTRAMUSCULAR; INTRAVENOUS ONCE
Status: COMPLETED | OUTPATIENT
Start: 2020-03-06 | End: 2020-03-06

## 2020-03-06 RX ORDER — ACETAMINOPHEN 325 MG/1
650 TABLET ORAL
Status: DISCONTINUED | OUTPATIENT
Start: 2020-03-06 | End: 2020-03-06 | Stop reason: HOSPADM

## 2020-03-06 RX ADMIN — ACETAMINOPHEN 650 MG: 325 TABLET ORAL at 00:47

## 2020-03-06 RX ADMIN — ONDANSETRON 4 MG: 2 INJECTION INTRAMUSCULAR; INTRAVENOUS at 10:17

## 2020-03-06 RX ADMIN — PANTOPRAZOLE SODIUM 40 MG: 40 INJECTION, POWDER, FOR SOLUTION INTRAVENOUS at 10:12

## 2020-03-06 RX ADMIN — INSULIN LISPRO 4 UNITS: 100 INJECTION, SOLUTION INTRAVENOUS; SUBCUTANEOUS at 13:03

## 2020-03-06 RX ADMIN — CEFTRIAXONE 1 G: 1 INJECTION, POWDER, FOR SOLUTION INTRAMUSCULAR; INTRAVENOUS at 04:02

## 2020-03-06 RX ADMIN — TRAZODONE HYDROCHLORIDE 50 MG: 50 TABLET ORAL at 00:47

## 2020-03-06 RX ADMIN — METOCLOPRAMIDE HYDROCHLORIDE 10 MG: 5 TABLET ORAL at 13:03

## 2020-03-06 RX ADMIN — SODIUM CHLORIDE 100 ML/HR: 900 INJECTION, SOLUTION INTRAVENOUS at 09:10

## 2020-03-06 RX ADMIN — DIPHENHYDRAMINE HYDROCHLORIDE 25 MG: 50 INJECTION, SOLUTION INTRAMUSCULAR; INTRAVENOUS at 04:02

## 2020-03-06 NOTE — PROGRESS NOTES
Shift summary  Patient received   A unit of PRBC'S last night and was effective with hemoglobin up to 9.1 this am,prn trazodone given for sleep and tylenol for pain,additional 25 mg of benadryl given for sleep. Reports pain around her right  Flank area MD aware and CT scan already done. Patient Vitals for the past 12 hrs:   Temp Pulse Resp BP SpO2   03/06/20 0804 98 °F (36.7 °C) 97 16 158/89 99 %   03/06/20 0400    164/86    03/06/20 0358    (!) 173/96    03/06/20 0356 98.6 °F (37 °C) 88 16 (!) 169/100 100 %   03/06/20 0044 99 °F (37.2 °C) 94 18 177/84 100 %   03/05/20 2320 98.6 °F (37 °C) 89 17 169/78 99 %   03/05/20 2138    178/84    03/05/20 2058 98.5 °F (36.9 °C) 87 17 (!) 185/92 100 %     Bedside, Verbal and Written shift change report given to Ling Isaacs (oncoming nurse) by Maia Gonsalez RN   (offgoing nurse). Report included the following information SBAR, Kardex and MAR.

## 2020-03-06 NOTE — DISCHARGE SUMMARY
Discharge Summary  Subjective:       Admit Date: 3/5/2020  Discharge Date:  3/6/2020, 1:05 PM    Discharging Physician: Mercedes Davis pager [phone]  Primary Care Provider:  None    Patient ID:  Rosa Peoples  37 y.o. female  1985    Reason for Admission:  Symptomatic anemia [D64.9]    Discharge Diagnosis:   - intractable  [place]/ vomiting  - symptomatic anemia with out signs of bleeding  - CKD  - dehydration      Patient Active Problem List   Diagnosis Code    Gastroparesis K31.84    Dehydration E86.0    Leukocytosis D72.829    Abdominal pain, epigastric R10.13    Lactic acid acidosis E87.2    Acute kidney injury ([facility] [address] N17.9    Intractable vomiting with nausea R11.2    Uncontrolled type 2 diabetes mellitus with hyperglycemia ([facility]) E11.65    UTI (urinary tract infection) N39.0    Chronic anemia D64.9    Chest pain R07.9    Anemia D64.9    Hyperglycemia R73.9    Acute renal failure superimposed on stage 3 chronic kidney disease (HCC) N17.9, N18.3    Hordeolum externum of right upper eyelid H00.011    Intractable nausea and vomiting R11.2    Abdominal pain, generalized R10.84    History of diabetic gastroparesis Z86.39    Type 2 diabetes mellitus with hyperglycemia, with long-term current use of insulin ([facility]) E11.65, Z79.4    Diabetic gastroparalysis ([facility]) E11.43, K31.84    Occult blood positive stool R19.5    Recurrent abdominal pain R10.9    Insomnia G47.00    Non-intractable vomiting R11.10    Stage 4 chronic kidney disease ([facility] [address] N18.4    Symptomatic anemia D64.9       Consultants:    none    Hospital Course:   Reason for admission ( Admitting HPI): \" Rosa Peoples is a 37 y.o. female who has a hx of diabetic gastroparesis with frequent episodes of intractable N/V who presents with abdominal pain, nausea, and vomiting. Has mild dysuria. No fevers. Was feeling well prior to this morning. \"    She was admitted to the hospitalist service.  SHe was hydrated, transfused prbc which she tolerated w appropriate response in her hemoglobin. There was concern for UTI and she was started on empiric ceftriaxone, however, upon review of her UA it appeared to be a contaminated specimen with negative nitrite and leukocyte esterase. She was given reglan and is able to tolerate po. She is currently stable for DC home with out pt follow up. Pertinent Imaging/ Operative procedures:   CT abdomen/pelvis;\"    1. No CT evidence of acute abdominal abnormality.     2. Few indeterminate inguinal nodes without change. Appendectomy.     3. Subcutaneous edema. Few atelectatic streaks right middle lobe. \"        Pertinent Results:    CMP:   Lab Results   Component Value Date/Time     03/06/2020 01:10 AM    K 4.1 03/06/2020 01:10 AM     (H) 03/06/2020 01:10 AM    CO2 20 (L) 03/06/2020 01:10 AM    AGAP 10 03/06/2020 01:10 AM     (H) 03/06/2020 01:10 AM    BUN 30 (H) 03/06/2020 01:10 AM    CREA 2.64 (H) 03/06/2020 01:10 AM    GFRAA 25 (L) 03/06/2020 01:10 AM    GFRNA 20 (L) 03/06/2020 01:10 AM    CA 7.7 (L) 03/06/2020 01:10 AM    ALB 2.4 (L) 03/06/2020 01:10 AM    TP 6.1 (L) 03/06/2020 01:10 AM    GLOB 3.7 03/06/2020 01:10 AM    AGRAT 0.6 (L) 03/06/2020 01:10 AM    SGOT 16 03/06/2020 01:10 AM    ALT 10 (L) 03/06/2020 01:10 AM     CBC:   Lab Results   Component Value Date/Time    WBC 10.7 03/06/2020 01:10 AM    HGB 9.1 (L) 03/06/2020 01:10 AM    HCT 28.1 (L) 03/06/2020 01:10 AM     03/06/2020 01:10 AM     All Cardiac Markers in the last 24 hours: No results found for: CPK, CK, CKMMB, CKMB, RCK3, CKMBT, CKNDX, CKND1, TESS, TROPT, TROIQ, DARYL, TROPT, TNIPOC, BNP, BNPP      Physical Exam on Discharge:  Visit Vitals  /63 (BP 1 Location: Right arm, BP Patient Position: At rest)   Pulse 90   Temp 98.3 °F (36.8 °C)   Resp 16   Wt 68.7 kg (151 lb 6.4 oz)   SpO2 98%   BMI 25.99 kg/m²     Body mass index is 25.99 kg/m².   GEN: NAD  HEART: S1 S2  NEURO: nonfocal   Condition at discharge: stable    Discharge Medications  Current Discharge Medication List      CONTINUE these medications which have NOT CHANGED    Details   metoclopramide HCl (METOZOLV ODT) 5 mg rapid dissolve tab Take 2 Tabs by mouth Before breakfast, lunch, dinner and at bedtime. Qty: 30 Tab, Refills: 0      acetaminophen (TYLENOL) 500 mg tablet Take 2 Tabs by mouth every eight (8) hours as needed for Pain. Qty: 30 Tab, Refills: 0      pantoprazole (PROTONIX) 40 mg tablet Take 1 Tab by mouth two (2) times a day. Qty: 60 Tab, Refills: 0      amLODIPine (NORVASC) 5 mg tablet Take 1 Tab by mouth daily. Qty: 30 Tab, Refills: 0      insulin glargine (LANTUS U-100 INSULIN) 100 unit/mL injection 30 Units by SubCUTAneous route nightly. Indications: type 2 diabetes mellitus      insulin admin. supplies (INPEN, FOR NOVOLOG, SC) 10-12 Units by SubCUTAneous route Before breakfast, lunch, and dinner. potassium chloride SA (MICRO-K) 10 mEq capsule Take 1 Cap by mouth daily. Qty: 5 Cap, Refills: 0      ondansetron (ZOFRAN ODT) 4 mg disintegrating tablet Take 1 Tab by mouth every eight (8) hours as needed for Nausea or Vomiting.   Qty: 20 Tab, Refills: 0             Disposition: home   Follow up:  pcp  Restrictions: none    Diagnostic Imaging/Labs pending at discharge: none      Susan Caldera, [address] [facility]  Internal Medicine/Geriatrics    Time Spent 45minutes with >50% coordination of care          CC: None

## 2020-03-06 NOTE — PROGRESS NOTES
Noted pt is to be discharged today. Transition of care plan would be home with family assistance, pt will need transportation scheduled for hospital discharge ([phone]) through her Medicaid insurance provider for pt and boyfriend to her home. No other transition of care needs have been identified at this time. CM to continue to follow and assist.    Care Management Interventions  Mode of Transport at Discharge: Other (see comment)(medicaid transport)  Transition of Care Consult (CM Consult): Discharge Planning  Health Maintenance Reviewed: Yes  Current Support Network:  Other(lives with fiance)  Confirm Follow Up Transport: Other (see comment)  The Plan for Transition of Care is Related to the Following Treatment Goals : home with physician follow up   Discharge Location  Discharge Placement: Home with family assistance

## 2020-03-06 NOTE — PROGRESS NOTES
Hospitalist Progress Note    Patient: Andrea Mayers MRN: [id number]  CSN: [id number]    YOB: 1985  Age: 37 y.o. Sex: female    DOA: 3/5/2020 LOS:  LOS: 1 day            Assessment/Plan     1. intractable nausea/ vomiting related to diabetic gastoparesis  2. Symptomatic anemia, stable, no bleeding note  3. UTI    Plan:  - continue ceftriaxone  - give po reglan now and trial regular diet, if tolerates can DC home this afternoon      Patient Active Problem List   Diagnosis Code    Gastroparesis K31.84    Dehydration E86.0    Leukocytosis D72.829    Abdominal pain, epigastric R10.13    Lactic acid acidosis E87.2    Acute kidney injury ([facility] [address] N17.9    Intractable vomiting with nausea R11.2    Uncontrolled type 2 diabetes mellitus with hyperglycemia (HCC) E11.65    UTI (urinary tract infection) N39.0    Chronic anemia D64.9    Chest pain R07.9    Anemia D64.9    Hyperglycemia R73.9    Acute renal failure superimposed on stage 3 chronic kidney disease (HCC) N17.9, N18.3    Hordeolum externum of right upper eyelid H00.011    Intractable nausea and vomiting R11.2    Abdominal pain, generalized R10.84    History of diabetic gastroparesis Z86.39    Type 2 diabetes mellitus with hyperglycemia, with long-term current use of insulin ([facility]) E11.65, Z79.4    Diabetic gastroparalysis ([facility]) E11.43, K31.84    Occult blood positive stool R19.5    Recurrent abdominal pain R10.9    Insomnia G47.00    Non-intractable vomiting R11.10    Stage 4 chronic kidney disease (HCC) N18.4    Symptomatic anemia D64.9               Subjective:    cc: \" Im OK\"  Pt w mild nausea this AM  Asking to try to eat something  No evidence of bleeding noted. REVIEW OF SYSTEMS:  General: No fevers or chills. Cardiovascular: No chest pain or pressure. No palpitations. Pulmonary: No shortness of breath. Gastrointestinal: No nausea, vomiting.      Objective:        Vital signs/Intake and Output:  Visit Vitals  /63 (BP 1 Location: Right arm, BP Patient Position: At rest)   Pulse 90   Temp 98.3 °F (36.8 °C)   Resp 16   Wt 68.7 kg (151 lb 6.4 oz)   SpO2 98%   BMI 25.99 kg/m²     Current Shift:  No intake/output data recorded. Last three shifts:  03/04 1901 - 03/06 0700  In: 2978 [P.O.:925; I.V.:1100]  Out: 925 [Urine:925]    Body mass index is 25.99 kg/m².     Physical Exam:  GEN: Alert and oriented times three NAD  EYES: conjunctiva normal, lids with out lesions  HEENT: MMM, No thyromegaly, no lymphadenopathy  HEART: RRR +S1 +S2, no JVD, pulses 2+ distally  LUNGS: CTA B/L no wheezes, rales or rhonchi  ABDOMEN: + BS, soft NT/ND no organomegaly,  No rebound  EXTREMITIES: No edema cyanosis, cap refill normal   SKIN: no rashes or skin breakdown, no nodules, normal turgor  Current Facility-Administered Medications   Medication Dose Route Frequency    traZODone (DESYREL) tablet 50 mg  50 mg Oral QHS PRN    acetaminophen (TYLENOL) tablet 650 mg  650 mg Oral Q4H PRN    ondansetron (ZOFRAN) injection 4 mg  4 mg IntraVENous Q4H PRN    metoclopramide HCl (REGLAN) tablet 10 mg  10 mg Oral TIDAC    0.9% sodium chloride infusion 250 mL  250 mL IntraVENous PRN    0.9% sodium chloride infusion  100 mL/hr IntraVENous CONTINUOUS    insulin lispro (HUMALOG) injection   SubCUTAneous Q6H    glucose chewable tablet 16 g  16 g Oral PRN    glucagon (GLUCAGEN) injection 1 mg  1 mg IntraMUSCular PRN    dextrose 10% infusion 125-250 mL  125-250 mL IntraVENous PRN    cefTRIAXone (ROCEPHIN) 1 g in sterile water (preservative free) 10 mL IV syringe  1 g IntraVENous Q24H    0.9% sodium chloride infusion 250 mL  250 mL IntraVENous PRN    pantoprazole (PROTONIX) injection 40 mg  40 mg IntraVENous Q24H    0.9% sodium chloride infusion 250 mL  250 mL IntraVENous PRN         All the patient's labs over the past 24 hours were reviewed both during my initial daily workflow process and at the time notated as \"note time\" in [address] [facility]. (It is not time stamped separately in this workflow.)  Select labs are listed below.         Labs: Results:       Chemistry Recent Labs     03/06/20  0110 03/05/20  0030   * 134*    141   K 4.1 3.8   * 109   CO2 20* 24   BUN 30* 38*   CREA 2.64* 2.97*   CA 7.7* 8.0*   AGAP 10 8   BUCR 11* 13   * 122*   TP 6.1* 6.4   ALB 2.4* 2.6*   GLOB 3.7 3.8   AGRAT 0.6* 0.7*      CBC w/Diff Recent Labs     03/06/20  0110 03/05/20  0835 03/05/20  0030   WBC 10.7  --  11.2   RBC 3.33*  --  2.28*   HGB 9.1* 6.5* 6.1*   HCT 28.1* 20.7* 19.4*     --  411   GRANS  --   --  66   LYMPH  --   --  23   EOS  --   --  2          Coagulation Recent Labs     03/05/20  0120   PTP 14.2   INR 1.1   APTT 29.7               Liver Enzymes Recent Labs     03/06/20  0110   TP 6.1*   ALB 2.4*   *   SGOT 16      Thyroid Studies Lab Results   Component Value Date/Time    TSH 2.09 12/31/2019 04:00 AM        Procedures/imaging: see electronic medical records for all procedures/Xrays and details which were not copied into this note but were reviewed prior to creation of [facility] , DO  Internal Medicine/Geriatrics

## 2020-03-06 NOTE — DISCHARGE INSTRUCTIONS

## 2020-03-06 NOTE — PROGRESS NOTES
0730  Bedside and Verbal shift change report given by CURT Morton RN (off going nurse) to Thomas Lyons RN (on coming nurse). Report included the following information SBAR, Kardex, OR Summary, Intake/Output and MAR    1010  Pt threw up. Notified Dr. Scott Ramos. [facility]  Dr. Scott Ramos ordered to administer zofran (4mg IV). 1406  ELIZABETH Beckman RN discharged the pt.

## 2020-03-08 ENCOUNTER — HOSPITAL ENCOUNTER (EMERGENCY)
Age: 35
Discharge: HOME | End: 2020-03-08
Attending: EMERGENCY MEDICINE
Payer: MEDICAID

## 2020-03-08 ENCOUNTER — HOSPITAL ENCOUNTER (EMERGENCY)
Age: 35
Discharge: HOME | End: 2020-03-09
Attending: EMERGENCY MEDICINE
Payer: MEDICAID

## 2020-03-08 VITALS
OXYGEN SATURATION: 100 % | TEMPERATURE: 98.6 F | WEIGHT: 150 LBS | BODY MASS INDEX: 25.61 KG/M2 | SYSTOLIC BLOOD PRESSURE: 185 MMHG | HEIGHT: 64 IN | DIASTOLIC BLOOD PRESSURE: 94 MMHG | RESPIRATION RATE: 16 BRPM | HEART RATE: 95 BPM

## 2020-03-08 DIAGNOSIS — K31.84 GASTROPARESIS: ICD-10-CM

## 2020-03-08 DIAGNOSIS — K31.84 GASTROPARESIS: Primary | ICD-10-CM

## 2020-03-08 DIAGNOSIS — E13.22 OTHER SPECIFIED DIABETES MELLITUS WITH STAGE 4 CHRONIC KIDNEY DISEASE, WITH LONG-TERM CURRENT USE OF INSULIN (HCC): ICD-10-CM

## 2020-03-08 DIAGNOSIS — Z79.4 OTHER SPECIFIED DIABETES MELLITUS WITH STAGE 4 CHRONIC KIDNEY DISEASE, WITH LONG-TERM CURRENT USE OF INSULIN (HCC): ICD-10-CM

## 2020-03-08 DIAGNOSIS — R11.2 NON-INTRACTABLE VOMITING WITH NAUSEA, UNSPECIFIED VOMITING TYPE: Primary | ICD-10-CM

## 2020-03-08 DIAGNOSIS — G47.00 INSOMNIA, UNSPECIFIED TYPE: ICD-10-CM

## 2020-03-08 DIAGNOSIS — N18.4 OTHER SPECIFIED DIABETES MELLITUS WITH STAGE 4 CHRONIC KIDNEY DISEASE, WITH LONG-TERM CURRENT USE OF INSULIN (HCC): ICD-10-CM

## 2020-03-08 DIAGNOSIS — D64.9 CHRONIC ANEMIA: ICD-10-CM

## 2020-03-08 LAB
APPEARANCE UR: ABNORMAL
ATRIAL RATE: 94 BPM
BACTERIA URNS QL MICRO: ABNORMAL /HPF
BILIRUB UR QL: NEGATIVE
CALCULATED P AXIS, ECG09: 62 DEGREES
CALCULATED R AXIS, ECG10: 110 DEGREES
CALCULATED T AXIS, ECG11: 29 DEGREES
COLOR UR: YELLOW
DIAGNOSIS, 93000: NORMAL
EPITH CASTS URNS QL MICRO: ABNORMAL /LPF (ref 0–5)
GLUCOSE BLD STRIP.AUTO-MCNC: 158 MG/DL (ref 70–110)
GLUCOSE UR STRIP.AUTO-MCNC: 250 MG/DL
HCG UR QL: NEGATIVE
HGB UR QL STRIP: ABNORMAL
KETONES UR QL STRIP.AUTO: NEGATIVE MG/DL
LEUKOCYTE ESTERASE UR QL STRIP.AUTO: NEGATIVE
NITRITE UR QL STRIP.AUTO: NEGATIVE
P-R INTERVAL, ECG05: 124 MS
PH UR STRIP: 7 [PH] (ref 5–8)
PROT UR STRIP-MCNC: >1000 MG/DL
Q-T INTERVAL, ECG07: 388 MS
QRS DURATION, ECG06: 72 MS
QTC CALCULATION (BEZET), ECG08: 485 MS
RBC #/AREA URNS HPF: ABNORMAL /HPF (ref 0–5)
SP GR UR REFRACTOMETRY: 1.02 (ref 1–1.03)
UROBILINOGEN UR QL STRIP.AUTO: 0.2 EU/DL (ref 0.2–1)
VENTRICULAR RATE, ECG03: 94 BPM
WBC URNS QL MICRO: ABNORMAL /HPF (ref 0–5)

## 2020-03-08 PROCEDURE — 81025 URINE PREGNANCY TEST: CPT

## 2020-03-08 PROCEDURE — 96374 THER/PROPH/DIAG INJ IV PUSH: CPT

## 2020-03-08 PROCEDURE — 99284 EMERGENCY DEPT VISIT MOD MDM: CPT

## 2020-03-08 PROCEDURE — 74011250636 HC RX REV CODE- 250/636: Performed by: EMERGENCY MEDICINE

## 2020-03-08 PROCEDURE — 96372 THER/PROPH/DIAG INJ SC/IM: CPT

## 2020-03-08 PROCEDURE — 93005 ELECTROCARDIOGRAM TRACING: CPT

## 2020-03-08 PROCEDURE — 96375 TX/PRO/DX INJ NEW DRUG ADDON: CPT

## 2020-03-08 PROCEDURE — 82962 GLUCOSE BLOOD TEST: CPT

## 2020-03-08 PROCEDURE — 81001 URINALYSIS AUTO W/SCOPE: CPT

## 2020-03-08 PROCEDURE — 96361 HYDRATE IV INFUSION ADD-ON: CPT

## 2020-03-08 RX ORDER — HALOPERIDOL 5 MG/ML
5 INJECTION INTRAMUSCULAR ONCE
Status: COMPLETED | OUTPATIENT
Start: 2020-03-08 | End: 2020-03-08

## 2020-03-08 RX ADMIN — HALOPERIDOL LACTATE 5 MG: 5 INJECTION INTRAMUSCULAR at 06:38

## 2020-03-08 NOTE — ED PROVIDER NOTES
EMERGENCY DEPARTMENT HISTORY AND PHYSICAL EXAM    Date: 3/8/2020  Patient Name: Martita Lloyd    History of Presenting Illness     Chief Complaint   Patient presents with    Vomiting         History Provided By: Patient and Patient's Mother    Jasmine Ba is a 37 y.o. female with PMHX of diabetes, gastroparesis who presents to the emergency department C/O nausea and vomiting. Well-known to emergency department. Patient presents with her typical gastroparesis symptoms. States she feels nauseous and is throwing up. Patient recently admitted to the hospital for anemia of chronic disease requiring 2 units of blood transfusion. Patient reports that she has been eating her normal diet and has not been eating anything she should not. She takes Reglan at home. Patient also complaining that she is been unable to sleep due to insomnia. She took benadryl prior to arrival.    PCP: None    Current Outpatient Medications   Medication Sig Dispense Refill    metoclopramide HCl (METOZOLV ODT) 5 mg rapid dissolve tab Take 2 Tabs by mouth Before breakfast, lunch, dinner and at bedtime. 30 Tab 0    pantoprazole (PROTONIX) 40 mg tablet Take 1 Tab by mouth two (2) times a day. 60 Tab 0    insulin glargine (LANTUS U-100 INSULIN) 100 unit/mL injection 30 Units by SubCUTAneous route nightly. Indications: type 2 diabetes mellitus      insulin admin. supplies (INPEN, FOR NOVOLOG, SC) 10-12 Units by SubCUTAneous route Before breakfast, lunch, and dinner.  acetaminophen (TYLENOL) 500 mg tablet Take 2 Tabs by mouth every eight (8) hours as needed for Pain. 30 Tab 0    potassium chloride SA (MICRO-K) 10 mEq capsule Take 1 Cap by mouth daily. 5 Cap 0    ondansetron (ZOFRAN ODT) 4 mg disintegrating tablet Take 1 Tab by mouth every eight (8) hours as needed for Nausea or Vomiting. 20 Tab 0    amLODIPine (NORVASC) 5 mg tablet Take 1 Tab by mouth daily.  30 Tab 0       Past History     Past Medical History:  Past Medical History:   Diagnosis Date    Diabetes ([facility] [address]     Gastroparesis     Gastroparesis     Hypertension     UTI (urinary tract infection)        Past Surgical History:  Past Surgical History:   Procedure Laterality Date    HX APPENDECTOMY      HX GYN      tubal ligation, C Section       Family History:  History reviewed. No pertinent family history. Social History:  Social History     Tobacco Use    Smoking status: Never Smoker    Smokeless tobacco: Never Used   Substance Use Topics    Alcohol use: Never     Frequency: Never    Drug use: Not Currently       Allergies: Allergies   Allergen Reactions    Compazine [Prochlorperazine] Other (comments)     Dystonic Reaction         Review of Systems   Review of Systems   Constitutional: Negative for chills, fatigue and fever. Respiratory: Negative for chest tightness and shortness of breath. Cardiovascular: Negative for chest pain. Gastrointestinal: Positive for nausea and vomiting. Genitourinary: Negative for dysuria. Neurological: Negative for seizures, speech difficulty and headaches. Psychiatric/Behavioral: Positive for sleep disturbance. All other systems reviewed and are negative. Physical Exam     Vitals:    03/08/20 0538   BP: (!) 185/94   Pulse: 95   Resp: 16   Temp: 98.6 °F (37 °C)   SpO2: 100%   Weight: 68 kg (150 lb)   Height: 5' 4\" (1.626 m)     Physical Exam  Vitals signs and nursing note reviewed. Constitutional:       General: She is not in acute distress. Appearance: She is well-developed. Comments: Chronically ill appearing   HENT:      Head: Normocephalic and atraumatic. Mouth/Throat:      Comments: Poor dentition  Eyes:      Conjunctiva/sclera: Conjunctivae normal.      Pupils: Pupils are equal, round, and reactive to light. Neck:      Musculoskeletal: Normal range of motion and neck supple. Cardiovascular:      Rate and Rhythm: Normal rate and regular rhythm. Heart sounds: Normal heart sounds. Pulmonary:      Effort: Pulmonary effort is normal. No respiratory distress. Breath sounds: Normal breath sounds. No wheezing or rales. Chest:      Chest wall: No tenderness. Abdominal:      General: There is no distension. Palpations: Abdomen is soft. Tenderness: There is no abdominal tenderness. There is no guarding or rebound. Musculoskeletal: Normal range of motion. General: No tenderness. Lymphadenopathy:      Cervical: No cervical adenopathy. Skin:     General: Skin is warm and dry. Neurological:      Mental Status: She is alert and oriented to person, place, and time. Cranial Nerves: No cranial nerve deficit. Motor: No abnormal muscle tone.       Coordination: Coordination normal.   Psychiatric:         Behavior: Behavior normal.             Diagnostic Study Results     Labs -     Recent Results (from the past 12 hour(s))   GLUCOSE, POC    Collection Time: 03/08/20  5:59 AM   Result Value Ref Range    Glucose (POC) 158 (H) 70 - 110 mg/dL   URINALYSIS W/ RFLX MICROSCOPIC    Collection Time: 03/08/20  6:38 AM   Result Value Ref Range    Color YELLOW      Appearance CLOUDY      Specific gravity 1.018 1.005 - 1.030      pH (UA) 7.0 5.0 - 8.0      Protein >1,000 (A) NEG mg/dL    Glucose 250 (A) NEG mg/dL    Ketone NEGATIVE  NEG mg/dL    Bilirubin NEGATIVE  NEG      Blood LARGE (A) NEG      Urobilinogen 0.2 0.2 - 1.0 EU/dL    Nitrites NEGATIVE  NEG      Leukocyte Esterase NEGATIVE  NEG     URINE MICROSCOPIC ONLY    Collection Time: 03/08/20  6:38 AM   Result Value Ref Range    WBC 10 to 15 0 - 5 /hpf    RBC 3 to 5 0 - 5 /hpf    Epithelial cells 4+ 0 - 5 /lpf    Bacteria 4+ (A) NEG /hpf   HCG URINE, QL    Collection Time: 03/08/20  6:39 AM   Result Value Ref Range    HCG urine, QL NEGATIVE  NEG         Radiologic Studies -   No orders to display     CT Results  (Last 48 hours)    None        CXR Results  (Last 48 hours)    None          Medications given in the ED-  Medications   haloperidol lactate (HALDOL) injection 5 mg (5 mg IntraMUSCular Given 3/8/20 7404)         Medical Decision Making   I am the first provider for this patient. I reviewed the vital signs, available nursing notes, past medical history, past surgical history, family history and social history. Vital Signs-Reviewed the patient's vital signs. Pulse Oximetry Analysis - 100% on RA       EKG interpretation: (Preliminary)  EKG read by Dr. Shahla Miguel at 9039   Normal sinus rhythm rate of 94, QTc 485    Records Reviewed: Nursing Notes and Old Medical Records    Provider Notes (Medical Decision Making): Thea Gonzales is a 37 y.o. female presents of her typical gastroparesis symptoms. Not actively throwing up in emergency department. Blood sugar mildly elevated at 158. Plan on IM Haldol and will p.o. challenge patient with some gentle fluids. Procedures:  Procedures    ED Course:   No episodes of vomiting in ED and patient tolerating PO fluids. The patient and her mother were requesting medication to help her sleep. She says that [employer] has helped her in the past. I discussed that this is not a medication I prescribe from the ER and that she would need to discus this with her primary care doctor or a psychiatrist. I discussed good sleep hygiene and directed her that benadryl is OK in appropriate doses or she can use melatonin although if she continues to use these frequently she may become dependent. Diagnosis and Disposition     Critical Care:     DISCHARGE NOTE:    Sanchez Adamespramod  results have been reviewed with her. She has been counseled regarding her diagnosis, treatment, and plan. She verbally conveys understanding and agreement of the signs, symptoms, diagnosis, treatment and prognosis and additionally agrees to follow up as discussed. She also agrees with the care-plan and conveys that all of her questions have been answered.   I have also provided discharge instructions for her that include: educational information regarding their diagnosis and treatment, and list of reasons why they would want to return to the ED prior to their follow-up appointment, should her condition change. She has been provided with education for proper emergency department utilization. CLINICAL IMPRESSION:    1. Gastroparesis        PLAN:  1. D/C Home  2. Discharge Medication List as of 3/8/2020  7:12 AM        3. Follow-up Information     Follow up With Specialties Details Why Contact Info    Your primary care doctor            _______________________________      Please note that this dictation was completed with [employer], the computer voice recognition software. Quite often unanticipated grammatical, syntax, homophones, and other interpretive errors are inadvertently transcribed by the computer software. Please disregard these errors. Please excuse any errors that have escaped final proofreading.

## 2020-03-08 NOTE — ED NOTES
I have reviewed discharge instructions with the patient and caregiver. The patient and caregiver verbalized understanding.   Patient armband removed and shredded

## 2020-03-09 VITALS
OXYGEN SATURATION: 99 % | DIASTOLIC BLOOD PRESSURE: 97 MMHG | TEMPERATURE: 96 F | WEIGHT: 127 LBS | BODY MASS INDEX: 21.68 KG/M2 | HEIGHT: 64 IN | SYSTOLIC BLOOD PRESSURE: 168 MMHG | RESPIRATION RATE: 16 BRPM | HEART RATE: 87 BPM

## 2020-03-09 LAB
ALBUMIN SERPL-MCNC: 2.7 G/DL (ref 3.4–5)
ALBUMIN/GLOB SERPL: 0.7 {RATIO} (ref 0.8–1.7)
ALP SERPL-CCNC: 146 U/L (ref 45–117)
ALT SERPL-CCNC: 10 U/L (ref 13–56)
ANION GAP SERPL CALC-SCNC: 7 MMOL/L (ref 3–18)
APPEARANCE UR: CLEAR
AST SERPL-CCNC: 13 U/L (ref 10–38)
BACTERIA URNS QL MICRO: ABNORMAL /HPF
BASOPHILS # BLD: 0.1 K/UL (ref 0–0.1)
BASOPHILS NFR BLD: 0 % (ref 0–2)
BILIRUB SERPL-MCNC: 0.2 MG/DL (ref 0.2–1)
BILIRUB UR QL: NEGATIVE
BUN SERPL-MCNC: 32 MG/DL (ref 7–18)
BUN/CREAT SERPL: 11 (ref 12–20)
CALCIUM SERPL-MCNC: 8 MG/DL (ref 8.5–10.1)
CHLORIDE SERPL-SCNC: 112 MMOL/L (ref 100–111)
CO2 SERPL-SCNC: 22 MMOL/L (ref 21–32)
COLOR UR: YELLOW
CREAT SERPL-MCNC: 2.84 MG/DL (ref 0.6–1.3)
DIFFERENTIAL METHOD BLD: ABNORMAL
EOSINOPHIL # BLD: 0.4 K/UL (ref 0–0.4)
EOSINOPHIL NFR BLD: 3 % (ref 0–5)
EPITH CASTS URNS QL MICRO: ABNORMAL /LPF (ref 0–5)
ERYTHROCYTE [DISTWIDTH] IN BLOOD BY AUTOMATED COUNT: 14.6 % (ref 11.6–14.5)
GLOBULIN SER CALC-MCNC: 3.7 G/DL (ref 2–4)
GLUCOSE SERPL-MCNC: 144 MG/DL (ref 74–99)
GLUCOSE UR STRIP.AUTO-MCNC: 250 MG/DL
HCT VFR BLD AUTO: 27.3 % (ref 35–45)
HGB BLD-MCNC: 8.8 G/DL (ref 12–16)
HGB UR QL STRIP: ABNORMAL
KETONES UR QL STRIP.AUTO: NEGATIVE MG/DL
LEUKOCYTE ESTERASE UR QL STRIP.AUTO: NEGATIVE
LIPASE SERPL-CCNC: 132 U/L (ref 73–393)
LYMPHOCYTES # BLD: 2.8 K/UL (ref 0.9–3.6)
LYMPHOCYTES NFR BLD: 23 % (ref 21–52)
MAGNESIUM SERPL-MCNC: 2 MG/DL (ref 1.6–2.6)
MCH RBC QN AUTO: 27.4 PG (ref 24–34)
MCHC RBC AUTO-ENTMCNC: 32.2 G/DL (ref 31–37)
MCV RBC AUTO: 85 FL (ref 74–97)
MONOCYTES # BLD: 0.8 K/UL (ref 0.05–1.2)
MONOCYTES NFR BLD: 7 % (ref 3–10)
NEUTS SEG # BLD: 8.2 K/UL (ref 1.8–8)
NEUTS SEG NFR BLD: 67 % (ref 40–73)
NITRITE UR QL STRIP.AUTO: NEGATIVE
PH UR STRIP: 6.5 [PH] (ref 5–8)
PLATELET # BLD AUTO: 386 K/UL (ref 135–420)
PMV BLD AUTO: 9 FL (ref 9.2–11.8)
POTASSIUM SERPL-SCNC: 4 MMOL/L (ref 3.5–5.5)
PROT SERPL-MCNC: 6.4 G/DL (ref 6.4–8.2)
PROT UR STRIP-MCNC: >1000 MG/DL
RBC # BLD AUTO: 3.21 M/UL (ref 4.2–5.3)
RBC #/AREA URNS HPF: ABNORMAL /HPF (ref 0–5)
SODIUM SERPL-SCNC: 141 MMOL/L (ref 136–145)
SP GR UR REFRACTOMETRY: 1.01 (ref 1–1.03)
UROBILINOGEN UR QL STRIP.AUTO: 0.2 EU/DL (ref 0.2–1)
WBC # BLD AUTO: 12.3 K/UL (ref 4.6–13.2)
WBC URNS QL MICRO: ABNORMAL /HPF (ref 0–5)

## 2020-03-09 PROCEDURE — 80053 COMPREHEN METABOLIC PANEL: CPT

## 2020-03-09 PROCEDURE — 74011250636 HC RX REV CODE- 250/636: Performed by: PHYSICIAN ASSISTANT

## 2020-03-09 PROCEDURE — 87086 URINE CULTURE/COLONY COUNT: CPT

## 2020-03-09 PROCEDURE — C9113 INJ PANTOPRAZOLE SODIUM, VIA: HCPCS | Performed by: PHYSICIAN ASSISTANT

## 2020-03-09 PROCEDURE — 83690 ASSAY OF LIPASE: CPT

## 2020-03-09 PROCEDURE — 85025 COMPLETE CBC W/AUTO DIFF WBC: CPT

## 2020-03-09 PROCEDURE — 81001 URINALYSIS AUTO W/SCOPE: CPT

## 2020-03-09 PROCEDURE — 83735 ASSAY OF MAGNESIUM: CPT

## 2020-03-09 PROCEDURE — 74011250637 HC RX REV CODE- 250/637: Performed by: PHYSICIAN ASSISTANT

## 2020-03-09 RX ORDER — PANTOPRAZOLE SODIUM 40 MG/10ML
40 INJECTION, POWDER, LYOPHILIZED, FOR SOLUTION INTRAVENOUS
Status: COMPLETED | OUTPATIENT
Start: 2020-03-09 | End: 2020-03-09

## 2020-03-09 RX ORDER — METOCLOPRAMIDE HYDROCHLORIDE 5 MG/ML
10 INJECTION INTRAMUSCULAR; INTRAVENOUS
Status: COMPLETED | OUTPATIENT
Start: 2020-03-09 | End: 2020-03-09

## 2020-03-09 RX ORDER — LORAZEPAM 1 MG/1
1 TABLET ORAL
Status: DISCONTINUED | OUTPATIENT
Start: 2020-03-09 | End: 2020-03-09 | Stop reason: HOSPADM

## 2020-03-09 RX ORDER — TEMAZEPAM 15 MG/1
15 CAPSULE ORAL
Qty: 10 CAP | Refills: 0 | Status: SHIPPED | OUTPATIENT
Start: 2020-03-09

## 2020-03-09 RX ADMIN — LORAZEPAM 1 MG: 1 TABLET ORAL at 01:16

## 2020-03-09 RX ADMIN — PANTOPRAZOLE SODIUM 40 MG: 40 INJECTION, POWDER, FOR SOLUTION INTRAVENOUS at 01:16

## 2020-03-09 RX ADMIN — SODIUM CHLORIDE 1000 ML: 900 INJECTION, SOLUTION INTRAVENOUS at 01:14

## 2020-03-09 RX ADMIN — METOCLOPRAMIDE 10 MG: 5 INJECTION, SOLUTION INTRAMUSCULAR; INTRAVENOUS at 01:17

## 2020-03-09 NOTE — ED PROVIDER NOTES
EMERGENCY DEPARTMENT HISTORY AND PHYSICAL EXAM    Date: 3/8/2020  Patient Name: Martita Lloyd    History of Presenting Illness     Chief Complaint   Patient presents with    Vomiting       History Provided By: Patient and Patient's Mother    Additional History (Context): Martita Lloyd is a 37 y.o. female with a history of severe gastroparesis, diabetes mellitus presents emergency room now for the second time in 24 hours with nausea and vomiting. Was seen earlier today diagnosed with same. Was given a shot of Haldol which helped with her nausea. However when she woke up she started having vomiting spells again. Unable to hold anything down on her stomach. Complains of some epigastric abdominal pain. Current pain scale 8 out of 10. Aching pain. Nonradiating. Patient typically takes Reglan and Protonix at home for her symptoms. Afebrile. No chills or aches. Does complain of of energy. Also complains of lack of sleep requesting medication for insomnia. PCP: None    Current Facility-Administered Medications   Medication Dose Route Frequency Provider Last Rate Last Dose    LORazepam (ATIVAN) tablet 1 mg  1 mg Oral Q4H PRN EYAD Ace   1 mg at 03/09/20 0116     Current Outpatient Medications   Medication Sig Dispense Refill    temazepam (RESTORIL) 15 mg capsule Take 1 Cap by mouth nightly as needed for Sleep. Max Daily Amount: 15 mg. Indications: difficulty sleeping 10 Cap 0    metoclopramide HCl (METOZOLV ODT) 5 mg rapid dissolve tab Take 2 Tabs by mouth Before breakfast, lunch, dinner and at bedtime. 30 Tab 0    acetaminophen (TYLENOL) 500 mg tablet Take 2 Tabs by mouth every eight (8) hours as needed for Pain. 30 Tab 0    potassium chloride SA (MICRO-K) 10 mEq capsule Take 1 Cap by mouth daily. 5 Cap 0    ondansetron (ZOFRAN ODT) 4 mg disintegrating tablet Take 1 Tab by mouth every eight (8) hours as needed for Nausea or Vomiting.  20 Tab 0    pantoprazole (PROTONIX) 40 mg tablet Take 1 Tab by mouth two (2) times a day. 60 Tab 0    amLODIPine (NORVASC) 5 mg tablet Take 1 Tab by mouth daily. 30 Tab 0    insulin glargine (LANTUS U-100 INSULIN) 100 unit/mL injection 30 Units by SubCUTAneous route nightly. Indications: type 2 diabetes mellitus      insulin admin. supplies (INPEN, FOR NOVOLOG, SC) 10-12 Units by SubCUTAneous route Before breakfast, lunch, and dinner. Past History     Past Medical History:  Past Medical History:   Diagnosis Date    Diabetes ([address]     Gastroparesis     Gastroparesis     Hypertension     UTI (urinary tract infection)        Past Surgical History:  Past Surgical History:   Procedure Laterality Date    HX APPENDECTOMY      HX GYN      tubal ligation, C Section       Family History:  History reviewed. No pertinent family history. Social History:  Social History     Tobacco Use    Smoking status: Never Smoker    Smokeless tobacco: Never Used   Substance Use Topics    Alcohol use: Never     Frequency: Never    Drug use: Not Currently       Allergies: Allergies   Allergen Reactions    Compazine [Prochlorperazine] Other (comments)     Dystonic Reaction         Review of Systems   Review of Systems   Constitutional: Positive for activity change, appetite change and fatigue. Negative for fever. Insomnia   Respiratory: Negative. Cardiovascular: Negative. Gastrointestinal: Positive for abdominal pain, nausea and vomiting. History of gastroparesis   Neurological: Negative for dizziness, syncope and light-headedness. Physical Exam     Vitals:    03/08/20 2248   BP: 176/83   Pulse: 90   Resp: 18   Temp: 96 °F (35.6 °C)   SpO2: 100%   Weight: 57.6 kg (127 lb)   Height: 5' 4\" (1.626 m)     Physical Exam  Vitals signs and nursing note reviewed. Constitutional:       General: She is not in acute distress. Appearance: Normal appearance. She is normal weight. She is not ill-appearing.    HENT:      Mouth/Throat:      Mouth: Mucous membranes are moist.      Comments: Very poor dentition  Eyes:      Extraocular Movements: Extraocular movements intact. Conjunctiva/sclera: Conjunctivae normal.      Pupils: Pupils are equal, round, and reactive to light. Cardiovascular:      Rate and Rhythm: Normal rate and regular rhythm. Heart sounds: Normal heart sounds. Pulmonary:      Effort: Pulmonary effort is normal.   Abdominal:      General: Abdomen is flat. There is no distension. Palpations: Abdomen is soft. Tenderness: There is abdominal tenderness. There is no guarding or rebound. Negative signs include Pearson's sign. Skin:     General: Skin is warm and dry. Neurological:      Mental Status: She is alert and oriented to person, place, and time.    Psychiatric:         Mood and Affect: Mood normal.         Behavior: Behavior normal.         Nursing note and vitals reviewed         Diagnostic Study Results     Labs -     Recent Results (from the past 12 hour(s))   URINALYSIS W/ RFLX MICROSCOPIC    Collection Time: 03/09/20  1:16 AM   Result Value Ref Range    Color YELLOW      Appearance CLEAR      Specific gravity 1.015 1.005 - 1.030      pH (UA) 6.5 5.0 - 8.0      Protein >1,000 (A) NEG mg/dL    Glucose 250 (A) NEG mg/dL    Ketone NEGATIVE  NEG mg/dL    Bilirubin NEGATIVE  NEG      Blood MODERATE (A) NEG      Urobilinogen 0.2 0.2 - 1.0 EU/dL    Nitrites NEGATIVE  NEG      Leukocyte Esterase NEGATIVE  NEG     URINE MICROSCOPIC ONLY    Collection Time: 03/09/20  1:16 AM   Result Value Ref Range    WBC 3 to 5 0 - 5 /hpf    RBC 3 to 5 0 - 5 /hpf    Epithelial cells 1+ 0 - 5 /lpf    Bacteria 1+ (A) NEG /hpf   CBC WITH AUTOMATED DIFF    Collection Time: 03/09/20  1:30 AM   Result Value Ref Range    WBC 12.3 4.6 - 13.2 K/uL    RBC 3.21 (L) 4.20 - 5.30 M/uL    HGB 8.8 (L) 12.0 - 16.0 g/dL    HCT 27.3 (L) 35.0 - 45.0 %    MCV 85.0 74.0 - 97.0 FL    MCH 27.4 24.0 - 34.0 PG    MCHC 32.2 31.0 - 37.0 g/dL    RDW 14.6 (H) 11.6 - 14.5 %    PLATELET 014 591 - 888 K/uL    MPV 9.0 (L) 9.2 - 11.8 FL    NEUTROPHILS 67 40 - 73 %    LYMPHOCYTES 23 21 - 52 %    MONOCYTES 7 3 - 10 %    EOSINOPHILS 3 0 - 5 %    BASOPHILS 0 0 - 2 %    ABS. NEUTROPHILS 8.2 (H) 1.8 - 8.0 K/UL    ABS. LYMPHOCYTES 2.8 0.9 - 3.6 K/UL    ABS. MONOCYTES 0.8 0.05 - 1.2 K/UL    ABS. EOSINOPHILS 0.4 0.0 - 0.4 K/UL    ABS. BASOPHILS 0.1 0.0 - 0.1 K/UL    DF AUTOMATED     METABOLIC PANEL, COMPREHENSIVE    Collection Time: 03/09/20  1:30 AM   Result Value Ref Range    Sodium 141 136 - 145 mmol/L    Potassium 4.0 3.5 - 5.5 mmol/L    Chloride 112 (H) 100 - 111 mmol/L    CO2 22 21 - 32 mmol/L    Anion gap 7 3.0 - 18 mmol/L    Glucose 144 (H) 74 - 99 mg/dL    BUN 32 (H) 7.0 - 18 MG/DL    Creatinine 2.84 (H) 0.6 - 1.3 MG/DL    BUN/Creatinine ratio 11 (L) 12 - 20      GFR est AA 23 (L) >60 ml/min/1.73m2    GFR est non-AA 19 (L) >60 ml/min/1.73m2    Calcium 8.0 (L) 8.5 - 10.1 MG/DL    Bilirubin, total 0.2 0.2 - 1.0 MG/DL    ALT (SGPT) 10 (L) 13 - 56 U/L    AST (SGOT) 13 10 - 38 U/L    Alk. phosphatase 146 (H) 45 - 117 U/L    Protein, total 6.4 6.4 - 8.2 g/dL    Albumin 2.7 (L) 3.4 - 5.0 g/dL    Globulin 3.7 2.0 - 4.0 g/dL    A-G Ratio 0.7 (L) 0.8 - 1.7     LIPASE    Collection Time: 03/09/20  1:30 AM   Result Value Ref Range    Lipase 132 73 - 393 U/L   MAGNESIUM    Collection Time: 03/09/20  1:30 AM   Result Value Ref Range    Magnesium 2.0 1.6 - 2.6 mg/dL       Radiologic Studies   No orders to display     CT Results  (Last 48 hours)    None        CXR Results  (Last 48 hours)    None            Medical Decision Making   I am the first provider for this patient. I reviewed the vital signs, available nursing notes, past medical history, past surgical history, family history and social history. Vital Signs-Reviewed the patient's vital signs.     Pulse Oximetry Analysis 100% on room air    Records Reviewed: Nursing Notes, Old Medical Records, Previous Radiology Studies and Previous Laboratory Studies    DDX: Gastroparesis with intractable nausea and vomiting, diabetes mellitus    Provider Notes:   37 y.o. female   CBC shows an H&H of 8.8 and 27.3 respectively. Antral.  Urinalysis shows greater than thousand protein, 250 glucose, moderate blood. 3-5 white blood cells, 3-5 red blood cells with 1+ bacteria. Urine culture sent    Chemistry show BUN and creatinine of 32 and 2.84 respectively with a GFR of 23. Glucose currently 144    Patient receiving IV fluids along with Reglan and Protonix. Patient feeling better with medication. Advised of all of her labs. No real significant changes to her labs. Recommended close follow-up with her PCP, endocrinologist and nephrologist.  Has medicine at home for her gastroparesis to include Reglan and Protonix. We will prescribe her a few Restoril for insomnia. Discharge home.    2:01 AM  Case turned over to ER attending Dr. Jef Pappas    Procedures:  Procedures    ED Course:   Initial assessment performed. The patients presenting problems have been discussed, and they are in agreement with the care plan formulated and outlined with them. I have encouraged them to ask questions as they arise throughout their visit. ED Physician Discussion Note: Case discussed with ER attending Dr. Jef Pappas    Diagnosis and Disposition       DISCHARGE NOTE:  8:66 AM    Prem Benjamin  results have been reviewed with her. She has been counseled regarding her diagnosis, treatment, and plan. She verbally conveys understanding and agreement of the signs, symptoms, diagnosis, treatment and prognosis and additionally agrees to follow up as discussed. She also agrees with the care-plan and conveys that all of her questions have been answered.   I have also provided discharge instructions for her that include: educational information regarding their diagnosis and treatment, and list of reasons why they would want to return to the ED prior to their follow-up appointment, should her condition change. She has been provided with education for proper emergency department utilization. CLINICAL IMPRESSION:    1. Non-intractable vomiting with nausea, unspecified vomiting type    2. Gastroparesis    3. Other specified diabetes mellitus with stage 4 chronic kidney disease, with long-term current use of insulin ([address]    4. Chronic anemia    5. Insomnia, unspecified type        PLAN:  1. D/C Home  2. Current Discharge Medication List      START taking these medications    Details   temazepam (RESTORIL) 15 mg capsule Take 1 Cap by mouth nightly as needed for Sleep. Max Daily Amount: 15 mg. Indications: difficulty sleeping  Qty: 10 Cap, Refills: 0    Associated Diagnoses: Insomnia, unspecified type           3. Follow-up Information     Follow up With Specialties Details Why Contact Info    PCP  Call Or your PCP later today to set an appointment to be seen this week     THE [facility] EMERGENCY DEPT Emergency Medicine  If symptoms worsen, As needed [address] [id number]  [phone]        ____________________________________     Please note that this dictation was completed with [employer], the computer voice recognition software. Quite often unanticipated grammatical, syntax, homophones, and other interpretive errors are inadvertently transcribed by the computer software. Please disregard these errors. Please excuse any errors that have escaped final proofreading.

## 2020-03-09 NOTE — ED TRIAGE NOTES
Patient reports to ED via EMS with c/c of vomiting. Patient was discharged from this ER this am. Patient was given haldol, she reports it put her to sleep and when she woke up she didn't feel any better.

## 2020-03-09 NOTE — ED NOTES
Pt home ambulatory with steady gait. VSS. Mother at side as . To f/u with her PCP in AM. Pain and nausea greatly improved. Rx in hand  Patient armband removed and shredded  I have reviewed discharge instructions with the patient and parent. The patient and parent verbalized understanding. Discharge medications reviewed with patient, guardian and caregiver and appropriate educational materials and side effects teaching were provided.   Follow-up Information     Follow up With Specialties Details Why Contact Info    PCP  Call Or your PCP later today to set an appointment to be seen this week     [facility] Emergency Medicine  If symptoms worsen, As needed [address] [place] [id number] [phone]

## 2020-03-09 NOTE — DISCHARGE INSTRUCTIONS
Regular medicines at home  Medication as prescribed for insomnia  Push liquids, bland diet  Follow-up with PCP this week     Chronic Kidney Disease: Care Instructions  Your Care Instructions    Chronic kidney disease happens when your kidneys don't work as well as they should. Your kidneys have a few important jobs. They remove waste from your blood. This waste leaves your body in your urine. They also balance your body's fluids and chemicals. When your kidneys don't work well, extra waste and fluid can build up. This can poison the body and sometimes cause death. The most common causes of this disease are diabetes and high blood pressure. In some cases, the disease develops in 2 to 3 months. But it usually develops over many years. If you take medicine and make healthy changes to your lifestyle, you may be able to prevent the disease from getting worse. But if your kidney damage gets worse, you may need dialysis or a kidney transplant. Dialysis uses a machine to filter waste from the blood. A transplant is surgery to give you a healthy kidney from another person. Follow-up care is a key part of your treatment and safety. Be sure to make and go to all appointments, and call your doctor if you are having problems. It's also a good idea to know your test results and keep a list of the medicines you take. How can you care for yourself at home?   Treatments and appointments    · Be safe with medicines. Take your medicines exactly as prescribed. Call your doctor if you have any problems with your medicine. You also may take medicine to control your blood pressure or to treat diabetes. Many people who have diabetes take blood pressure medicine.     · If you have diabetes, do your best to keep your blood sugar in your target range. You may do this by eating healthy food and exercising.  You may also take medicines.     · Go to your dialysis appointments if you have this treatment.     · Do not take ibuprofen (Advil, Motrin), naproxen (Aleve), or similar medicines, unless your doctor tells you to. These may make the disease worse.     · Do not take any vitamins, over-the-counter medicines, or herbal products without talking to your doctor first.     · Do not smoke or use other tobacco products. Smoking can reduce blood flow to the kidneys. If you need help quitting, talk to your doctor about stop-smoking programs and medicines. These can increase your chances of quitting for good.     · Do not drink alcohol or use illegal drugs.     · Talk to your doctor about an exercise plan. Exercise helps lower your blood pressure. It also makes you feel better.     · If you have an advance directive, let your doctor know. It may include a living will and a durable power of  for health care. If you don't have one, you may want to prepare one. It lets your doctor and loved ones know your health care wishes if you become unable to speak for yourself. Diet    · Talk to a registered dietitian. He or she can help you make a meal plan that is right for you. Most people with kidney disease need to limit salt (sodium), fluids, and protein. Some also have to limit potassium and phosphorus.     · You may have to give up many foods you like. But try to focus on the fact that this will help you stay healthy for as long as possible.     · If you have a hard time eating enough, talk to your doctor or dietitian about ways to add calories to your diet.     · Your diet may change as your disease changes. See your doctor for regular testing. And work with a dietitian to change your diet as needed. When should you call for help? Call 911 anytime you think you may need emergency care.  For example, call if:    · You passed out (lost consciousness).    Call your doctor now or seek immediate medical care if:    · You have less urine than normal or no urine.     · You have trouble urinating or can urinate only very small amounts.     · You are confused or have trouble thinking clearly.     · You feel weaker or more tired than usual.     · You are very thirsty, lightheaded, or dizzy.     · You have nausea and vomiting.     · You have new swelling of your arms or feet, or your swelling is worse.     · You have blood in your urine.     · You have new or worse trouble breathing.    Watch closely for changes in your health, and be sure to contact your doctor if:    · You have any problems with your medicine or other treatment. Where can you learn more? Go to http://anita-chino.info/. Enter N276 in the search box to learn more about \"Chronic Kidney Disease: Care Instructions. \"  Current as of: October 31, 2018  Content Version: 12.2  © 0781-8945 [employer]. Care instructions adapted under license by [employer] (which disclaims liability or warranty for this information). If you have questions about a medical condition or this instruction, always ask your healthcare professional. [place] [address] any warranty or liability for your use of this information. Nausea and Vomiting: Care Instructions  Your Care Instructions    When you are nauseated, you may feel weak and sweaty and notice a lot of saliva in your mouth. Nausea often leads to vomiting. Most of the time you do not need to worry about nausea and vomiting, but they can be signs of other illnesses. Two common causes of nausea and vomiting are stomach flu and food poisoning. Nausea and vomiting from viral stomach flu will usually start to improve within 24 hours. Nausea and vomiting from food poisoning may last from 12 to 48 hours. The doctor has checked you carefully, but problems can develop later. If you notice any problems or new symptoms, get medical treatment right away. Follow-up care is a key part of your treatment and safety. Be sure to make and go to all appointments, and call your doctor if you are having problems.  It's also a good idea to know your test results and keep a list of the medicines you take. How can you care for yourself at home? · To prevent dehydration, drink plenty of fluids, enough so that your urine is light yellow or clear like water. Choose water and other caffeine-free clear liquids until you feel better. If you have kidney, heart, or liver disease and have to limit fluids, talk with your doctor before you increase the amount of fluids you drink. · Rest in bed until you feel better. · When you are able to eat, try clear soups, mild foods, and liquids until all symptoms are gone for 12 to 48 hours. Other good choices include dry toast, crackers, cooked cereal, and gelatin dessert, such as Jell-O. When should you call for help? Call 911 anytime you think you may need emergency care. For example, call if:    · You passed out (lost consciousness).    Call your doctor now or seek immediate medical care if:    · You have symptoms of dehydration, such as:  ? Dry eyes and a dry mouth. ? Passing only a little dark urine. ? Feeling thirstier than usual.     · You have new or worsening belly pain.     · You have a new or higher fever.     · You vomit blood or what looks like coffee grounds.    Watch closely for changes in your health, and be sure to contact your doctor if:    · You have ongoing nausea and vomiting.     · Your vomiting is getting worse.     · Your vomiting lasts longer than 2 days.     · You are not getting better as expected. Where can you learn more? Go to http://anita-chino.info/. Enter 25 [id number] in the search box to learn more about \"Nausea and Vomiting: Care Instructions. \"  Current as of: June 26, 2019  Content Version: 12.2  © 7978-8450 [employer]. Care instructions adapted under license by [employer] (which disclaims liability or warranty for this information).  If you have questions about a medical condition or this instruction, always ask your healthcare professional. [place] [address] any warranty or liability for your use of this information. Gastroparesis: Care Instructions  Your Care Instructions    When you have gastroparesis, your stomach takes a lot longer to empty. This delay can cause belly pain, bloating, and belching. It also can cause hiccups, heartburn, nausea or vomiting. You may not feel like eating. These symptoms may come and go. They most often occur during and after meals. You may feel full after only a few bites of food. This condition occurs when the nerves to the stomach don't work properly. Diabetes is the most common cause of this nerve damage. Gastroparesis can make it harder to control your blood sugar levels. But keeping your blood sugar levels under control may help with your symptoms. Parkinson's disease, stroke, and some medicines can also cause this condition. Home treatment can often help. Follow-up care is a key part of your treatment and safety. Be sure to make and go to all appointments, and call your doctor if you are having problems. It's also a good idea to know your test results and keep a list of the medicines you take. How can you care for yourself at home? · Eat several small meals each day rather than three large meals. · Eat foods that are low in fiber and fat. · If your doctor suggests it, take medicines that help the stomach empty more quickly. These are called motility agents. When should you call for help? Call your doctor now or seek immediate medical care if:    · You are vomiting.     · You have new or worse belly pain.     · You have a fever.     · You cannot pass stools or gas.    Watch closely for changes in your health, and be sure to contact your doctor if you have any problems. Where can you learn more? Go to http://anita-chino.info/. Enter M106 in the search box to learn more about \"Gastroparesis: Care Instructions. \"  Current as of: November 7, 2018  Content Version: 12.2  © 5161-7510 [employer]. Care instructions adapted under license by [employer] (which disclaims liability or warranty for this information). If you have questions about a medical condition or this instruction, always ask your healthcare professional. [address] any warranty or liability for your use of this information.

## 2020-03-10 LAB
BACTERIA SPEC CULT: NORMAL
SERVICE CMNT-IMP: NORMAL

## 2020-03-12 ENCOUNTER — HOSPITAL ENCOUNTER (EMERGENCY)
Age: 35
Discharge: HOME | End: 2020-03-12
Attending: EMERGENCY MEDICINE
Payer: MEDICAID

## 2020-03-12 VITALS
HEART RATE: 86 BPM | RESPIRATION RATE: 16 BRPM | WEIGHT: 127 LBS | BODY MASS INDEX: 21.68 KG/M2 | TEMPERATURE: 96.6 F | HEIGHT: 64 IN | SYSTOLIC BLOOD PRESSURE: 145 MMHG | DIASTOLIC BLOOD PRESSURE: 79 MMHG | OXYGEN SATURATION: 98 %

## 2020-03-12 DIAGNOSIS — N18.4 STAGE 4 CHRONIC KIDNEY DISEASE (HCC): ICD-10-CM

## 2020-03-12 DIAGNOSIS — E11.43 DIABETIC GASTROPARESIS (HCC): Primary | ICD-10-CM

## 2020-03-12 DIAGNOSIS — D64.9 CHRONIC ANEMIA: ICD-10-CM

## 2020-03-12 DIAGNOSIS — K31.84 DIABETIC GASTROPARESIS (HCC): Primary | ICD-10-CM

## 2020-03-12 LAB
ALBUMIN SERPL-MCNC: 2.5 G/DL (ref 3.4–5)
ALBUMIN/GLOB SERPL: 0.7 {RATIO} (ref 0.8–1.7)
ALP SERPL-CCNC: 135 U/L (ref 45–117)
ALT SERPL-CCNC: 13 U/L (ref 13–56)
ANION GAP SERPL CALC-SCNC: 7 MMOL/L (ref 3–18)
AST SERPL-CCNC: 17 U/L (ref 10–38)
ATRIAL RATE: 84 BPM
BASOPHILS # BLD: 0.1 K/UL (ref 0–0.1)
BASOPHILS NFR BLD: 1 % (ref 0–2)
BILIRUB SERPL-MCNC: 0.1 MG/DL (ref 0.2–1)
BUN SERPL-MCNC: 34 MG/DL (ref 7–18)
BUN/CREAT SERPL: 11 (ref 12–20)
CALCIUM SERPL-MCNC: 7.6 MG/DL (ref 8.5–10.1)
CALCULATED P AXIS, ECG09: 42 DEGREES
CALCULATED R AXIS, ECG10: 103 DEGREES
CALCULATED T AXIS, ECG11: 18 DEGREES
CHLORIDE SERPL-SCNC: 112 MMOL/L (ref 100–111)
CO2 SERPL-SCNC: 21 MMOL/L (ref 21–32)
CREAT SERPL-MCNC: 3.08 MG/DL (ref 0.6–1.3)
DIAGNOSIS, 93000: NORMAL
DIFFERENTIAL METHOD BLD: ABNORMAL
EOSINOPHIL # BLD: 0.4 K/UL (ref 0–0.4)
EOSINOPHIL NFR BLD: 3 % (ref 0–5)
ERYTHROCYTE [DISTWIDTH] IN BLOOD BY AUTOMATED COUNT: 14.7 % (ref 11.6–14.5)
GLOBULIN SER CALC-MCNC: 3.5 G/DL (ref 2–4)
GLUCOSE SERPL-MCNC: 144 MG/DL (ref 74–99)
HCT VFR BLD AUTO: 24.6 % (ref 35–45)
HGB BLD-MCNC: 7.9 G/DL (ref 12–16)
LYMPHOCYTES # BLD: 3.1 K/UL (ref 0.9–3.6)
LYMPHOCYTES NFR BLD: 28 % (ref 21–52)
MAGNESIUM SERPL-MCNC: 2 MG/DL (ref 1.6–2.6)
MCH RBC QN AUTO: 27.5 PG (ref 24–34)
MCHC RBC AUTO-ENTMCNC: 32.1 G/DL (ref 31–37)
MCV RBC AUTO: 85.7 FL (ref 74–97)
MONOCYTES # BLD: 1 K/UL (ref 0.05–1.2)
MONOCYTES NFR BLD: 9 % (ref 3–10)
NEUTS SEG # BLD: 6.7 K/UL (ref 1.8–8)
NEUTS SEG NFR BLD: 59 % (ref 40–73)
P-R INTERVAL, ECG05: 130 MS
PLATELET # BLD AUTO: 318 K/UL (ref 135–420)
PMV BLD AUTO: 9 FL (ref 9.2–11.8)
POTASSIUM SERPL-SCNC: 4.2 MMOL/L (ref 3.5–5.5)
PROT SERPL-MCNC: 6 G/DL (ref 6.4–8.2)
Q-T INTERVAL, ECG07: 404 MS
QRS DURATION, ECG06: 72 MS
QTC CALCULATION (BEZET), ECG08: 477 MS
RBC # BLD AUTO: 2.87 M/UL (ref 4.2–5.3)
SODIUM SERPL-SCNC: 140 MMOL/L (ref 136–145)
VENTRICULAR RATE, ECG03: 84 BPM
WBC # BLD AUTO: 11.2 K/UL (ref 4.6–13.2)

## 2020-03-12 PROCEDURE — 96365 THER/PROPH/DIAG IV INF INIT: CPT

## 2020-03-12 PROCEDURE — 99284 EMERGENCY DEPT VISIT MOD MDM: CPT

## 2020-03-12 PROCEDURE — 96375 TX/PRO/DX INJ NEW DRUG ADDON: CPT

## 2020-03-12 PROCEDURE — 74011250636 HC RX REV CODE- 250/636: Performed by: EMERGENCY MEDICINE

## 2020-03-12 PROCEDURE — 80053 COMPREHEN METABOLIC PANEL: CPT

## 2020-03-12 PROCEDURE — P9047 ALBUMIN (HUMAN), 25%, 50ML: HCPCS | Performed by: EMERGENCY MEDICINE

## 2020-03-12 PROCEDURE — 85025 COMPLETE CBC W/AUTO DIFF WBC: CPT

## 2020-03-12 PROCEDURE — 93005 ELECTROCARDIOGRAM TRACING: CPT

## 2020-03-12 PROCEDURE — 83735 ASSAY OF MAGNESIUM: CPT

## 2020-03-12 RX ORDER — METOCLOPRAMIDE HYDROCHLORIDE 5 MG/1
10 TABLET, ORALLY DISINTEGRATING ORAL
Qty: 30 TAB | Refills: 0 | Status: SHIPPED | OUTPATIENT
Start: 2020-03-12 | End: 2020-04-12

## 2020-03-12 RX ORDER — HALOPERIDOL 5 MG/ML
3 INJECTION INTRAMUSCULAR ONCE
Status: COMPLETED | OUTPATIENT
Start: 2020-03-12 | End: 2020-03-12

## 2020-03-12 RX ORDER — ALBUMIN HUMAN 250 G/1000ML
12.5 SOLUTION INTRAVENOUS ONCE
Status: COMPLETED | OUTPATIENT
Start: 2020-03-12 | End: 2020-03-12

## 2020-03-12 RX ORDER — DIPHENHYDRAMINE HYDROCHLORIDE 50 MG/ML
25 INJECTION, SOLUTION INTRAMUSCULAR; INTRAVENOUS ONCE
Status: COMPLETED | OUTPATIENT
Start: 2020-03-12 | End: 2020-03-12

## 2020-03-12 RX ADMIN — ALBUMIN (HUMAN) 12.5 G: 0.25 INJECTION, SOLUTION INTRAVENOUS at 04:37

## 2020-03-12 RX ADMIN — DIPHENHYDRAMINE HYDROCHLORIDE 25 MG: 50 INJECTION, SOLUTION INTRAMUSCULAR; INTRAVENOUS at 03:58

## 2020-03-12 RX ADMIN — SODIUM CHLORIDE 1000 ML: 900 INJECTION, SOLUTION INTRAVENOUS at 03:57

## 2020-03-12 RX ADMIN — HALOPERIDOL LACTATE 3 MG: 5 INJECTION INTRAMUSCULAR at 03:58

## 2020-03-12 NOTE — DISCHARGE INSTRUCTIONS
Patient Education   Patient Education        Gastroparesis: Care Instructions  Your Care Instructions    When you have gastroparesis, your stomach takes a lot longer to empty. This delay can cause belly pain, bloating, and belching. It also can cause hiccups, heartburn, nausea or vomiting. You may not feel like eating. These symptoms may come and go. They most often occur during and after meals. You may feel full after only a few bites of food. This condition occurs when the nerves to the stomach don't work properly. Diabetes is the most common cause of this nerve damage. Gastroparesis can make it harder to control your blood sugar levels. But keeping your blood sugar levels under control may help with your symptoms. Parkinson's disease, stroke, and some medicines can also cause this condition. Home treatment can often help. Follow-up care is a key part of your treatment and safety. Be sure to make and go to all appointments, and call your doctor if you are having problems. It's also a good idea to know your test results and keep a list of the medicines you take. How can you care for yourself at home? · Eat several small meals each day rather than three large meals. · Eat foods that are low in fiber and fat. · If your doctor suggests it, take medicines that help the stomach empty more quickly. These are called motility agents. When should you call for help? Call your doctor now or seek immediate medical care if:    · You are vomiting.     · You have new or worse belly pain.     · You have a fever.     · You cannot pass stools or gas.    Watch closely for changes in your health, and be sure to contact your doctor if you have any problems. Where can you learn more? Go to http://anita-chino.info/. Enter M106 in the search box to learn more about \"Gastroparesis: Care Instructions. \"  Current as of: November 7, 2018  Content Version: 12.2  © 5424-2155 [employer], [facility].  Care instructions adapted under license by [employer] (which disclaims liability or warranty for this information). If you have questions about a medical condition or this instruction, always ask your healthcare professional. [address] any warranty or liability for your use of this information. Chronic Kidney Disease: Care Instructions  Your Care Instructions    Chronic kidney disease happens when your kidneys don't work as well as they should. Your kidneys have a few important jobs. They remove waste from your blood. This waste leaves your body in your urine. They also balance your body's fluids and chemicals. When your kidneys don't work well, extra waste and fluid can build up. This can poison the body and sometimes cause death. The most common causes of this disease are diabetes and high blood pressure. In some cases, the disease develops in 2 to 3 months. But it usually develops over many years. If you take medicine and make healthy changes to your lifestyle, you may be able to prevent the disease from getting worse. But if your kidney damage gets worse, you may need dialysis or a kidney transplant. Dialysis uses a machine to filter waste from the blood. A transplant is surgery to give you a healthy kidney from another person. Follow-up care is a key part of your treatment and safety. Be sure to make and go to all appointments, and call your doctor if you are having problems. It's also a good idea to know your test results and keep a list of the medicines you take. How can you care for yourself at home?   Treatments and appointments    · Be safe with medicines. Take your medicines exactly as prescribed. Call your doctor if you have any problems with your medicine. You also may take medicine to control your blood pressure or to treat diabetes.  Many people who have diabetes take blood pressure medicine.     · If you have diabetes, do your best to keep your blood sugar in your target range. You may do this by eating healthy food and exercising. You may also take medicines.     · Go to your dialysis appointments if you have this treatment.     · Do not take ibuprofen (Advil, Motrin), naproxen (Aleve), or similar medicines, unless your doctor tells you to. These may make the disease worse.     · Do not take any vitamins, over-the-counter medicines, or herbal products without talking to your doctor first.     · Do not smoke or use other tobacco products. Smoking can reduce blood flow to the kidneys. If you need help quitting, talk to your doctor about stop-smoking programs and medicines. These can increase your chances of quitting for good.     · Do not drink alcohol or use illegal drugs.     · Talk to your doctor about an exercise plan. Exercise helps lower your blood pressure. It also makes you feel better.     · If you have an advance directive, let your doctor know. It may include a living will and a durable power of  for health care. If you don't have one, you may want to prepare one. It lets your doctor and loved ones know your health care wishes if you become unable to speak for yourself. Diet    · Talk to a registered dietitian. He or she can help you make a meal plan that is right for you. Most people with kidney disease need to limit salt (sodium), fluids, and protein. Some also have to limit potassium and phosphorus.     · You may have to give up many foods you like. But try to focus on the fact that this will help you stay healthy for as long as possible.     · If you have a hard time eating enough, talk to your doctor or dietitian about ways to add calories to your diet.     · Your diet may change as your disease changes. See your doctor for regular testing. And work with a dietitian to change your diet as needed. When should you call for help? Call 911 anytime you think you may need emergency care.  For example, call if:    · You passed out (lost consciousness).    Call your doctor now or seek immediate medical care if:    · You have less urine than normal or no urine.     · You have trouble urinating or can urinate only very small amounts.     · You are confused or have trouble thinking clearly.     · You feel weaker or more tired than usual.     · You are very thirsty, lightheaded, or dizzy.     · You have nausea and vomiting.     · You have new swelling of your arms or feet, or your swelling is worse.     · You have blood in your urine.     · You have new or worse trouble breathing.    Watch closely for changes in your health, and be sure to contact your doctor if:    · You have any problems with your medicine or other treatment. Where can you learn more? Go to http://anita-chino.info/. Enter N276 in the search box to learn more about \"Chronic Kidney Disease: Care Instructions. \"  Current as of: October 31, 2018  Content Version: 12.2  © 7432-0441 [employer]. Care instructions adapted under license by [employer] (which disclaims liability or warranty for this information). If you have questions about a medical condition or this instruction, always ask your healthcare professional. [address] any warranty or liability for your use of this information.

## 2020-03-12 NOTE — ED PROVIDER NOTES
EMERGENCY DEPARTMENT HISTORY AND PHYSICAL EXAM    Date: 3/12/2020  Patient Name: Martita Lloyd    History of Presenting Illness     Chief Complaint   Patient presents with    Abdominal Pain         History Provided By: Patient    Additional History (Context):     6:32 AM    Martita Lloyd is a 37 y.o. female with pertinent PMHx of DM, HTN, UTI, and gastroparesis presenting via EMS to the ED c/o abdominal pain x ~45 minutes PTA in the ED. Pt notes associated symptoms of N/V/D (watery stools) and leg swelling; but denies fevers or urinary sxs. Pt states that her sxs feel like her h/o gastroparesis. She has not had any sick contacts with similar sxs. Per chart review, the pt has been seen in this ED multiple times for similar sxs. She normally takes Reglan and Protonix for her sxs, but did not take anything specifically for her pain tonight. Pt has a FHx of DM. She denies any new allergies to medications. PCP: None  Pt does not smoke tobacco, drink EtOH excessively, or do illicit drugs. There are no other complaints, changes, or physical findings at this time. Current Outpatient Medications   Medication Sig Dispense Refill    metoclopramide HCl (METOZOLV ODT) 5 mg rapid dissolve tab Take 2 Tabs by mouth Before breakfast, lunch, dinner and at bedtime. 30 Tab 0    temazepam (RESTORIL) 15 mg capsule Take 1 Cap by mouth nightly as needed for Sleep. Max Daily Amount: 15 mg. Indications: difficulty sleeping 10 Cap 0    pantoprazole (PROTONIX) 40 mg tablet Take 1 Tab by mouth two (2) times a day. 60 Tab 0    amLODIPine (NORVASC) 5 mg tablet Take 1 Tab by mouth daily. 30 Tab 0    insulin glargine (LANTUS U-100 INSULIN) 100 unit/mL injection 30 Units by SubCUTAneous route nightly. Indications: type 2 diabetes mellitus      acetaminophen (TYLENOL) 500 mg tablet Take 2 Tabs by mouth every eight (8) hours as needed for Pain. 30 Tab 0    potassium chloride SA (MICRO-K) 10 mEq capsule Take 1 Cap by mouth daily. 5 Cap 0    ondansetron (ZOFRAN ODT) 4 mg disintegrating tablet Take 1 Tab by mouth every eight (8) hours as needed for Nausea or Vomiting. 20 Tab 0    insulin admin. supplies (INPEN, FOR NOVOLOG, SC) 10-12 Units by SubCUTAneous route Before breakfast, lunch, and dinner. Past History     Past Medical History:  Past Medical History:   Diagnosis Date    Diabetes ([address]     Gastroparesis     Gastroparesis     Hypertension     UTI (urinary tract infection)        Past Surgical History:  Past Surgical History:   Procedure Laterality Date    HX APPENDECTOMY      HX GYN      tubal ligation, C Section       Family History:  History reviewed. No pertinent family history. Social History:  Social History     Tobacco Use    Smoking status: Never Smoker    Smokeless tobacco: Never Used   Substance Use Topics    Alcohol use: Never     Frequency: Never    Drug use: Not Currently       Allergies: Allergies   Allergen Reactions    Compazine [Prochlorperazine] Other (comments)     Dystonic Reaction         Review of Systems   Review of Systems   Constitutional: Negative for chills and fever. Cardiovascular: Positive for leg swelling. Gastrointestinal: Positive for abdominal pain, diarrhea, nausea and vomiting. Genitourinary: Negative. All other systems reviewed and are negative. Physical Exam     Vitals:    03/12/20 0307   BP: 145/79   Pulse: 86   Resp: 16   Temp: 96.6 °F (35.9 °C)   SpO2: 98%   Weight: 57.6 kg (127 lb)   Height: 5' 4\" (1.626 m)     Physical Exam  Vitals signs and nursing note reviewed. Constitutional:       Appearance: She is not diaphoretic. Comments: Chronically ill appearing, but nontoxic   HENT:      Head: Normocephalic and atraumatic. Right Ear: External ear normal.      Left Ear: External ear normal.      Mouth/Throat:      Mouth: Mucous membranes are moist.      Pharynx: No oropharyngeal exudate.    Eyes:      Conjunctiva/sclera: Conjunctivae normal. Pupils: Pupils are equal, round, and reactive to light. Comments: Moderate pallor   Neck:      Musculoskeletal: Normal range of motion and neck supple. Thyroid: No thyromegaly. Vascular: No JVD. Trachea: No tracheal deviation. Cardiovascular:      Rate and Rhythm: Normal rate and regular rhythm. Heart sounds: Normal heart sounds. Pulmonary:      Effort: Pulmonary effort is normal. No respiratory distress. Breath sounds: Normal breath sounds. No stridor. Abdominal:      General: Bowel sounds are decreased. There is no distension. Palpations: Abdomen is soft. Tenderness: There is abdominal tenderness in the periumbilical area. There is no guarding or rebound. Musculoskeletal: Normal range of motion. General: No tenderness. Comments: No soft tissue injuries   Lymphadenopathy:      Cervical: No cervical adenopathy. Skin:     General: Skin is warm and dry. Findings: No erythema or rash. Comments: Skin turgor is diminished   Neurological:      Mental Status: She is alert and oriented to person, place, and time. Cranial Nerves: No cranial nerve deficit. Coordination: Coordination normal.      Deep Tendon Reflexes: Reflexes are normal and symmetric. Reflexes normal.   Psychiatric:         Behavior: Behavior normal.         Thought Content:  Thought content normal.         Judgment: Judgment normal.         Diagnostic Study Results     Labs -     Recent Results (from the past 12 hour(s))   CBC WITH AUTOMATED DIFF    Collection Time: 03/12/20  3:13 AM   Result Value Ref Range    WBC 11.2 4.6 - 13.2 K/uL    RBC 2.87 (L) 4.20 - 5.30 M/uL    HGB 7.9 (L) 12.0 - 16.0 g/dL    HCT 24.6 (L) 35.0 - 45.0 %    MCV 85.7 74.0 - 97.0 FL    MCH 27.5 24.0 - 34.0 PG    MCHC 32.1 31.0 - 37.0 g/dL    RDW 14.7 (H) 11.6 - 14.5 %    PLATELET 344 367 - 818 K/uL    MPV 9.0 (L) 9.2 - 11.8 FL    NEUTROPHILS 59 40 - 73 %    LYMPHOCYTES 28 21 - 52 %    MONOCYTES 9 3 - 10 % EOSINOPHILS 3 0 - 5 %    BASOPHILS 1 0 - 2 %    ABS. NEUTROPHILS 6.7 1.8 - 8.0 K/UL    ABS. LYMPHOCYTES 3.1 0.9 - 3.6 K/UL    ABS. MONOCYTES 1.0 0.05 - 1.2 K/UL    ABS. EOSINOPHILS 0.4 0.0 - 0.4 K/UL    ABS. BASOPHILS 0.1 0.0 - 0.1 K/UL    DF AUTOMATED     METABOLIC PANEL, COMPREHENSIVE    Collection Time: 03/12/20  3:13 AM   Result Value Ref Range    Sodium 140 136 - 145 mmol/L    Potassium 4.2 3.5 - 5.5 mmol/L    Chloride 112 (H) 100 - 111 mmol/L    CO2 21 21 - 32 mmol/L    Anion gap 7 3.0 - 18 mmol/L    Glucose 144 (H) 74 - 99 mg/dL    BUN 34 (H) 7.0 - 18 MG/DL    Creatinine 3.08 (H) 0.6 - 1.3 MG/DL    BUN/Creatinine ratio 11 (L) 12 - 20      GFR est AA 21 (L) >60 ml/min/1.73m2    GFR est non-AA 17 (L) >60 ml/min/1.73m2    Calcium 7.6 (L) 8.5 - 10.1 MG/DL    Bilirubin, total 0.1 (L) 0.2 - 1.0 MG/DL    ALT (SGPT) 13 13 - 56 U/L    AST (SGOT) 17 10 - 38 U/L    Alk. phosphatase 135 (H) 45 - 117 U/L    Protein, total 6.0 (L) 6.4 - 8.2 g/dL    Albumin 2.5 (L) 3.4 - 5.0 g/dL    Globulin 3.5 2.0 - 4.0 g/dL    A-G Ratio 0.7 (L) 0.8 - 1.7     MAGNESIUM    Collection Time: 03/12/20  3:13 AM   Result Value Ref Range    Magnesium 2.0 1.6 - 2.6 mg/dL   EKG, 12 LEAD, INITIAL    Collection Time: 03/12/20  3:13 AM   Result Value Ref Range    Ventricular Rate 84 BPM    Atrial Rate 84 BPM    P-R Interval 130 ms    QRS Duration 72 ms    Q-T Interval 404 ms    QTC Calculation (Bezet) 477 ms    Calculated P Axis 42 degrees    Calculated R Axis 103 degrees    Calculated T Axis 18 degrees    Diagnosis       Normal sinus rhythm  Rightward axis  Borderline ECG  When compared with ECG of 08-MAR-2020 04:56,  No significant change was found         Radiologic Studies -   No orders to display         Medical Decision Making   I am the first provider for this patient. I reviewed the vital signs, available nursing notes, past medical history, past surgical history, family history and social history.     Vital Signs-Reviewed the patient's vital signs. Pulse Oximetry Analysis - 98% on RA     EKG interpretation: (Preliminary)  NSR at 84 bpm. Borderline QTc at 477 ms. No STEMI. EKG read by Ritchie Marquez. Chai Craven MD at 1775     Records Reviewed: Nursing Notes and Old Medical Records    Provider Notes (Medical Decision Making): Will check electrolytes for abnormalities and changed in renal function. Her history and exam suggests chronic complaint of gastroparesis. EKG today shows borderline QTc, therefore will use Haldol with Benadryl. Will hydrate. Her sxs do not support appendix or other surgical process. PROCEDURES:  Procedures    MEDICATIONS GIVEN IN THE ED:  Medications   sodium chloride 0.9 % bolus infusion 1,000 mL (0 mL IntraVENous IV Completed 3/12/20 0527)   haloperidol lactate (HALDOL) injection 3 mg (3 mg IntraVENous Given 3/12/20 0358)   diphenhydrAMINE (BENADRYL) injection 25 mg (25 mg IntraVENous Given 3/12/20 0358)   albumin human 25% (BUMINATE) solution 12.5 g (0 g IntraVENous IV Completed 3/12/20 0527)        ED COURSE:   3:37 AM   Initial assessment performed. 3:59 AM - We discussed case with pharmacy to give low dose Albumin for correcting chronic leg edema and preserving IV hydration in the intravascular space. PROGRESS NOTE:  5:45 AM  Pt and/or family have been updated on their results. Pt and/or pt's family are aware of the plan of care and are in agreement. Written by Kaaren Luria Christel Bernheim, ED Scribe, as dictated by Phong Craven MD.      Diagnosis and Disposition       DISCHARGE NOTE:  5:50 AM  The patient is ready for discharge. The patient's signs, symptoms, diagnosis, and discharge instructions have been discussed and the patient and/or family has conveyed their understanding. The patient and/or family is to follow up as recommended or return to the ER should their symptoms worsen. Plan has been discussed and the patient and/or family is in agreement. Written by Kaaren Luria Christel Bernheim, ED Scribe, as dictated by Phong Little Rey Estrella MD.     CLINICAL IMPRESSION:  1. Diabetic [address]roparesis ([facility] [address]    2. Stage 4 chronic kidney disease ([facility] [address]    3. Chronic anemia        PLAN:  1. D/C Home  2. Current Discharge Medication List      CONTINUE these medications which have CHANGED    Details   metoclopramide HCl (METOZOLV ODT) 5 mg rapid dissolve tab Take 2 Tabs by mouth Before breakfast, lunch, dinner and at bedtime. Qty: 30 Tab, Refills: 0         CONTINUE these medications which have NOT CHANGED    Details   temazepam (RESTORIL) 15 mg capsule Take 1 Cap by mouth nightly as needed for Sleep. Max Daily Amount: 15 mg. Indications: difficulty sleeping  Qty: 10 Cap, Refills: 0    Associated Diagnoses: Insomnia, unspecified type      pantoprazole (PROTONIX) 40 mg tablet Take 1 Tab by mouth two (2) times a day. Qty: 60 Tab, Refills: 0      amLODIPine (NORVASC) 5 mg tablet Take 1 Tab by mouth daily. Qty: 30 Tab, Refills: 0      insulin glargine (LANTUS U-100 INSULIN) 100 unit/mL injection 30 Units by SubCUTAneous route nightly. Indications: type 2 diabetes mellitus      acetaminophen (TYLENOL) 500 mg tablet Take 2 Tabs by mouth every eight (8) hours as needed for Pain. Qty: 30 Tab, Refills: 0      potassium chloride SA (MICRO-K) 10 mEq capsule Take 1 Cap by mouth daily. Qty: 5 Cap, Refills: 0      ondansetron (ZOFRAN ODT) 4 mg disintegrating tablet Take 1 Tab by mouth every eight (8) hours as needed for Nausea or Vomiting. Qty: 20 Tab, Refills: 0      insulin admin. supplies (INPEN, FOR NOVOLOG, SC) 10-12 Units by SubCUTAneous route Before breakfast, lunch, and dinner.            3.   Follow-up Information     Follow up With Specialties Details Why Carmen Ocampo MD [facility] Schedule an appointment as soon as possible for a visit for primary care follow up [address] [facility]      Willie Crow MD Gastroenterology Schedule an appointment as soon as possible for a visit for GI follow up [address]      [facility] EMERGENCY DEPT Emergency Medicine  As needed, If symptoms worsen [address] [place] [facility] [address]  [phone]        _______________________________    Attestations: This note is prepared by Malena Higuera, acting as Scribe for Clau. Sarah Patel MD.    Clau. Sarah Patel MD:  The scribe's documentation has been prepared under my direction and personally reviewed by me in its entirety.  I confirm that the note above accurately reflects all work, treatment, procedures, and medical decision making performed by me.  _______________________________

## 2020-03-12 NOTE — ED TRIAGE NOTES
Patient brought to ED by EMS c/c abdominal pain. Pt seen in ED multiple times for similar symptoms. Pt has hx of gastroparesis. Pt reports nausea and vomiting onset 45 mins PTA.

## 2020-03-14 ENCOUNTER — HOSPITAL ENCOUNTER (EMERGENCY)
Age: 35
Discharge: HOME | End: 2020-03-14
Attending: EMERGENCY MEDICINE
Payer: MEDICAID

## 2020-03-14 VITALS
HEART RATE: 88 BPM | WEIGHT: 127 LBS | OXYGEN SATURATION: 95 % | DIASTOLIC BLOOD PRESSURE: 71 MMHG | SYSTOLIC BLOOD PRESSURE: 131 MMHG | HEIGHT: 64 IN | TEMPERATURE: 98.5 F | RESPIRATION RATE: 16 BRPM | BODY MASS INDEX: 21.68 KG/M2

## 2020-03-14 DIAGNOSIS — R10.9 CHRONIC ABDOMINAL PAIN: ICD-10-CM

## 2020-03-14 DIAGNOSIS — N18.4 STAGE 4 CHRONIC KIDNEY DISEASE (HCC): Primary | ICD-10-CM

## 2020-03-14 DIAGNOSIS — K31.84 DIABETIC GASTROPARALYSIS (HCC): ICD-10-CM

## 2020-03-14 DIAGNOSIS — R11.2 NON-INTRACTABLE VOMITING WITH NAUSEA, UNSPECIFIED VOMITING TYPE: ICD-10-CM

## 2020-03-14 DIAGNOSIS — E11.43 DIABETIC GASTROPARALYSIS (HCC): ICD-10-CM

## 2020-03-14 DIAGNOSIS — G89.29 CHRONIC ABDOMINAL PAIN: ICD-10-CM

## 2020-03-14 LAB
ALBUMIN SERPL-MCNC: 2.6 G/DL (ref 3.4–5)
ALBUMIN/GLOB SERPL: 0.7 {RATIO} (ref 0.8–1.7)
ALP SERPL-CCNC: 131 U/L (ref 45–117)
ALT SERPL-CCNC: 16 U/L (ref 13–56)
ANION GAP SERPL CALC-SCNC: 6 MMOL/L (ref 3–18)
AST SERPL-CCNC: 16 U/L (ref 10–38)
BASOPHILS # BLD: 0.1 K/UL (ref 0–0.1)
BASOPHILS NFR BLD: 1 % (ref 0–2)
BILIRUB SERPL-MCNC: 0.2 MG/DL (ref 0.2–1)
BUN SERPL-MCNC: 33 MG/DL (ref 7–18)
BUN/CREAT SERPL: 11 (ref 12–20)
CALCIUM SERPL-MCNC: 8.1 MG/DL (ref 8.5–10.1)
CHLORIDE SERPL-SCNC: 116 MMOL/L (ref 100–111)
CO2 SERPL-SCNC: 20 MMOL/L (ref 21–32)
CREAT SERPL-MCNC: 3.08 MG/DL (ref 0.6–1.3)
DIFFERENTIAL METHOD BLD: ABNORMAL
EOSINOPHIL # BLD: 0.3 K/UL (ref 0–0.4)
EOSINOPHIL NFR BLD: 3 % (ref 0–5)
ERYTHROCYTE [DISTWIDTH] IN BLOOD BY AUTOMATED COUNT: 15 % (ref 11.6–14.5)
GLOBULIN SER CALC-MCNC: 3.5 G/DL (ref 2–4)
GLUCOSE SERPL-MCNC: 139 MG/DL (ref 74–99)
HCT VFR BLD AUTO: 24.7 % (ref 35–45)
HGB BLD-MCNC: 7.9 G/DL (ref 12–16)
LIPASE SERPL-CCNC: 134 U/L (ref 73–393)
LYMPHOCYTES # BLD: 2.8 K/UL (ref 0.9–3.6)
LYMPHOCYTES NFR BLD: 28 % (ref 21–52)
MAGNESIUM SERPL-MCNC: 2.2 MG/DL (ref 1.6–2.6)
MCH RBC QN AUTO: 27.5 PG (ref 24–34)
MCHC RBC AUTO-ENTMCNC: 32 G/DL (ref 31–37)
MCV RBC AUTO: 86.1 FL (ref 74–97)
MONOCYTES # BLD: 1 K/UL (ref 0.05–1.2)
MONOCYTES NFR BLD: 10 % (ref 3–10)
NEUTS SEG # BLD: 5.9 K/UL (ref 1.8–8)
NEUTS SEG NFR BLD: 58 % (ref 40–73)
PLATELET # BLD AUTO: 285 K/UL (ref 135–420)
PMV BLD AUTO: 9.2 FL (ref 9.2–11.8)
POTASSIUM SERPL-SCNC: 4.3 MMOL/L (ref 3.5–5.5)
PROT SERPL-MCNC: 6.1 G/DL (ref 6.4–8.2)
RBC # BLD AUTO: 2.87 M/UL (ref 4.2–5.3)
SODIUM SERPL-SCNC: 142 MMOL/L (ref 136–145)
WBC # BLD AUTO: 10.1 K/UL (ref 4.6–13.2)

## 2020-03-14 PROCEDURE — 74011250636 HC RX REV CODE- 250/636: Performed by: EMERGENCY MEDICINE

## 2020-03-14 PROCEDURE — 99283 EMERGENCY DEPT VISIT LOW MDM: CPT

## 2020-03-14 PROCEDURE — 96375 TX/PRO/DX INJ NEW DRUG ADDON: CPT

## 2020-03-14 PROCEDURE — 96374 THER/PROPH/DIAG INJ IV PUSH: CPT

## 2020-03-14 PROCEDURE — 83735 ASSAY OF MAGNESIUM: CPT

## 2020-03-14 PROCEDURE — 80053 COMPREHEN METABOLIC PANEL: CPT

## 2020-03-14 PROCEDURE — 85025 COMPLETE CBC W/AUTO DIFF WBC: CPT

## 2020-03-14 PROCEDURE — 83690 ASSAY OF LIPASE: CPT

## 2020-03-14 PROCEDURE — 96361 HYDRATE IV INFUSION ADD-ON: CPT

## 2020-03-14 RX ORDER — ONDANSETRON 4 MG/1
4 TABLET, ORALLY DISINTEGRATING ORAL
Qty: 12 TAB | Refills: 0 | Status: SHIPPED | OUTPATIENT
Start: 2020-03-14

## 2020-03-14 RX ORDER — ACETAMINOPHEN 10 MG/ML
1000 INJECTION, SOLUTION INTRAVENOUS ONCE
Status: COMPLETED | OUTPATIENT
Start: 2020-03-14 | End: 2020-03-14

## 2020-03-14 RX ORDER — ONDANSETRON 2 MG/ML
4 INJECTION INTRAMUSCULAR; INTRAVENOUS
Status: COMPLETED | OUTPATIENT
Start: 2020-03-14 | End: 2020-03-14

## 2020-03-14 RX ADMIN — ONDANSETRON 4 MG: 2 INJECTION INTRAMUSCULAR; INTRAVENOUS at 03:25

## 2020-03-14 RX ADMIN — ACETAMINOPHEN 1000 MG: 10 INJECTION, SOLUTION INTRAVENOUS at 03:50

## 2020-03-14 RX ADMIN — SODIUM CHLORIDE 1000 ML: 900 INJECTION, SOLUTION INTRAVENOUS at 03:25

## 2020-03-14 NOTE — DISCHARGE INSTRUCTIONS
You were seen and evaluated in the Emergency Department. Please understand that your work up is not all encompassing and you should follow up with your primary care physician for further management and continuity of care. Please return to Emergency Department or seek medical attention immediately if you have acute worsening in your symptoms or develop chest pain, shortness of breath, repeated vomiting, fever, altered level of consciousness, coughing up blood, or start sweating and feel clammy. If you were prescribed any medicine for home, please take as prescribed by your health-care provider. If you were given any follow-up appointments or numbers to call, please do so as instructed. Avoid any tobacco products or excessive alcohol. Patient Education        Abdominal Pain: Care Instructions  Your Care Instructions    Abdominal pain has many possible causes. Some aren't serious and get better on their own in a few days. Others need more testing and treatment. If your pain continues or gets worse, you need to be rechecked and may need more tests to find out what is wrong. You may need surgery to correct the problem. Don't ignore new symptoms, such as fever, nausea and vomiting, urination problems, pain that gets worse, and dizziness. These may be signs of a more serious problem. Your doctor may have recommended a follow-up visit in the next 8 to 12 hours. If you are not getting better, you may need more tests or treatment. The doctor has checked you carefully, but problems can develop later. If you notice any problems or new symptoms, get medical treatment right away. Follow-up care is a key part of your treatment and safety. Be sure to make and go to all appointments, and call your doctor if you are having problems. It's also a good idea to know your test results and keep a list of the medicines you take. How can you care for yourself at home? · Rest until you feel better.   · To prevent dehydration, drink plenty of fluids, enough so that your urine is light yellow or clear like water. Choose water and other caffeine-free clear liquids until you feel better. If you have kidney, heart, or liver disease and have to limit fluids, talk with your doctor before you increase the amount of fluids you drink. · If your stomach is upset, eat mild foods, such as rice, dry toast or crackers, bananas, and applesauce. Try eating several small meals instead of two or three large ones. · Wait until 48 hours after all symptoms have gone away before you have spicy foods, alcohol, and drinks that contain caffeine. · Do not eat foods that are high in fat. · Avoid anti-inflammatory medicines such as aspirin, ibuprofen (Advil, Motrin), and naproxen (Aleve). These can cause stomach upset. Talk to your doctor if you take daily aspirin for another health problem. When should you call for help? Call 911 anytime you think you may need emergency care. For example, call if:    · You passed out (lost consciousness).     · You pass maroon or very bloody stools.     · You vomit blood or what looks like coffee grounds.     · You have new, severe belly pain.    Call your doctor now or seek immediate medical care if:    · Your pain gets worse, especially if it becomes focused in one area of your belly.     · You have a new or higher fever.     · Your stools are black and look like tar, or they have streaks of blood.     · You have unexpected vaginal bleeding.     · You have symptoms of a urinary tract infection. These may include:  ? Pain when you urinate. ? Urinating more often than usual.  ? Blood in your urine.     · You are dizzy or lightheaded, or you feel like you may faint.    Watch closely for changes in your health, and be sure to contact your doctor if:    · You are not getting better after 1 day (24 hours). Where can you learn more?   Go to http://anita-chino.info/  Enter W078 in the search box to learn more about \"Abdominal Pain: Care Instructions. \"  Current as of: June 26, 2019Content Version: 12.4  © 7305-8375 [employer]. Care instructions adapted under license by [employer] (which disclaims liability or warranty for this information). If you have questions about a medical condition or this instruction, always ask your healthcare professional. [address] any warranty or liability for your use of this information. Patient Education        Chronic Pain: Care Instructions  Your Care Instructions    Chronic pain is pain that lasts a long time (months or even years) and may or may not have a clear cause. It is different from acute pain, which usually does have a clear cause--like an injury or illness--and gets better over time. Chronic pain:  · Lasts over time but may vary from day to day. · Does not go away despite efforts to end it. · May disrupt your sleep and lead to fatigue. · May cause depression or anxiety. · May make your muscles tense, causing more pain. · Can disrupt your work, hobbies, home life, and relationships with friends and family. Chronic pain is a very real condition. It is not just in your head. Treatment can help and usually includes several methods used together, such as medicines, physical therapy, exercise, and other treatments. Learning how to relax and changing negative thought patterns can also help you cope. Chronic pain is complex. Taking an active role in your treatment will help you better manage your pain. Tell your doctor if you have trouble dealing with your pain. You may have to try several things before you find what works best for you. Follow-up care is a key part of your treatment and safety. Be sure to make and go to all appointments, and call your doctor if you are having problems. It's also a good idea to know your test results and keep a list of the medicines you take.   How can you care for yourself at home?  · [employer]. Break up large jobs into smaller tasks. Save harder tasks for days when you have less pain, or go back and forth between hard tasks and easier ones. Take rest breaks. · Relax, and reduce stress. Relaxation techniques such as deep breathing or meditation can help. · Keep moving. Gentle, daily exercise can help reduce pain over the long run. Try low- or no-impact exercises such as walking, swimming, and stationary biking. Do stretches to stay flexible. · Try heat, cold packs, and massage. · Get enough sleep. Chronic pain can make you tired and drain your energy. Talk with your doctor if you have trouble sleeping because of pain. · Think positive. Your thoughts can affect your pain level. Do things that you enjoy to distract yourself when you have pain instead of focusing on the pain. See a movie, read a book, listen to music, or spend time with a friend. · If you think you are depressed, talk to your doctor about treatment. · Keep a daily pain diary. Record how your moods, thoughts, sleep patterns, activities, and medicine affect your pain. You may find that your pain is worse during or after certain activities or when you are feeling a certain emotion. Having a record of your pain can help you and your doctor find the best ways to treat your pain. · Take pain medicines exactly as directed. ? If the doctor gave you a prescription medicine for pain, take it as prescribed. ? If you are not taking a prescription pain medicine, ask your doctor if you can take an over-the-counter medicine. Reducing constipation caused by pain medicine  · Include fruits, vegetables, beans, and whole grains in your diet each day. These foods are high in fiber. · Drink plenty of fluids, enough so that your urine is light yellow or clear like water. If you have kidney, heart, or liver disease and have to limit fluids, talk with your doctor before you increase the amount of fluids you drink.   · If your doctor recommends it, get more exercise. Walking is a good choice. Bit by bit, increase the amount you walk every day. Try for at least 30 minutes on most days of the week. · Schedule time each day for a bowel movement. A daily routine may help. Take your time and do not strain when having a bowel movement. When should you call for help? Call your doctor now or seek immediate medical care if:    · Your pain gets worse or is out of control.     · You feel down or blue, or you do not enjoy things like you once did. You may be depressed, which is common in people with chronic pain. Depression can be treated.     · You have vomiting or cramps for more than 2 hours.    Watch closely for changes in your health, and be sure to contact your doctor if:    · You cannot sleep because of pain.     · You are very worried or anxious about your pain.     · You have trouble taking your pain medicine.     · You have any concerns about your pain medicine.     · You have trouble with bowel movements, such as:  ? No bowel movement in 3 days. ? Blood in the anal area, in your stool, or on the toilet paper. ? Diarrhea for more than 24 hours. Where can you learn more? Go to http://anita-chino.info/  Enter N004 in the search box to learn more about \"Chronic Pain: Care Instructions. \"  Current as of: November 19, 2019Content Version: 12.4  © 8324-0177 [employer]. Care instructions adapted under license by [employer] (which disclaims liability or warranty for this information). If you have questions about a medical condition or this instruction, always ask your healthcare professional. [place] [address] any warranty or liability for your use of this information.

## 2020-03-14 NOTE — ED PROVIDER NOTES
EMERGENCY DEPARTMENT HISTORY AND PHYSICAL EXAM    Date: 3/14/2020  Patient Name: Kelechi Valdez    History of Presenting Illness     Chief Complaint   Patient presents with    Vomiting    Diarrhea         History Provided By: Patient    Additional History (Context): Kelechi Valdez is a 37 y.o. female with PMHX gastroparesis, history of CKD, history of chronic anemia,, diabetes history of medication noncompliance presents to the emergency department C/O diarrhea, vomiting, generalized abdominal pain that the patient states is typical of her gastroparesis. Patient reports that the symptoms started yesterday. Has been using Reglan with minimal relief. Patient states that her gastroenterologist is Dr. Choco Desai. States that he manages her gastroparesis with Reglan. Pt denies fever, dysuria, hematuria, and any other sxs or complaints. PCP: None    Current Facility-Administered Medications   Medication Dose Route Frequency Provider Last Rate Last Dose    sodium chloride 0.9 % bolus infusion 1,000 mL  1,000 mL IntraVENous ONCE Lashanda Lindo,  1,000 mL/hr at 03/14/20 0325 1,000 mL at 03/14/20 0325     Current Outpatient Medications   Medication Sig Dispense Refill    ondansetron (Zofran ODT) 4 mg disintegrating tablet Take 1 Tab by mouth every eight (8) hours as needed for Nausea or Vomiting. 12 Tab 0    metoclopramide HCl (METOZOLV ODT) 5 mg rapid dissolve tab Take 2 Tabs by mouth Before breakfast, lunch, dinner and at bedtime. 30 Tab 0    temazepam (RESTORIL) 15 mg capsule Take 1 Cap by mouth nightly as needed for Sleep. Max Daily Amount: 15 mg. Indications: difficulty sleeping 10 Cap 0    acetaminophen (TYLENOL) 500 mg tablet Take 2 Tabs by mouth every eight (8) hours as needed for Pain. 30 Tab 0    potassium chloride SA (MICRO-K) 10 mEq capsule Take 1 Cap by mouth daily.  5 Cap 0    ondansetron (ZOFRAN ODT) 4 mg disintegrating tablet Take 1 Tab by mouth every eight (8) hours as needed for Nausea or Vomiting. 20 Tab 0    pantoprazole (PROTONIX) 40 mg tablet Take 1 Tab by mouth two (2) times a day. 60 Tab 0    amLODIPine (NORVASC) 5 mg tablet Take 1 Tab by mouth daily. 30 Tab 0    insulin glargine (LANTUS U-100 INSULIN) 100 unit/mL injection 30 Units by SubCUTAneous route nightly. Indications: type 2 diabetes mellitus      insulin admin. supplies (INPEN, FOR NOVOLOG, SC) 10-12 Units by SubCUTAneous route Before breakfast, lunch, and dinner. Past History     Past Medical History:  Past Medical History:   Diagnosis Date    Diabetes ([facility] [address]     Gastroparesis     Gastroparesis     Hypertension     UTI (urinary tract infection)        Past Surgical History:  Past Surgical History:   Procedure Laterality Date    HX APPENDECTOMY      HX GYN      tubal ligation, C Section       Family History:  No family history on file. Social History:  Social History     Tobacco Use    Smoking status: Never Smoker    Smokeless tobacco: Never Used   Substance Use Topics    Alcohol use: Never     Frequency: Never    Drug use: Not Currently       Allergies: Allergies   Allergen Reactions    Compazine [Prochlorperazine] Other (comments)     Dystonic Reaction         Review of Systems   Review of Systems   Constitutional: Negative for chills and fever. HENT: Negative for congestion, ear pain, sinus pain and sore throat. Eyes: Negative for pain and visual disturbance. Respiratory: Negative for cough and shortness of breath. Cardiovascular: Negative for chest pain and leg swelling. Gastrointestinal: Positive for abdominal pain, diarrhea, nausea and vomiting. Negative for constipation. Genitourinary: Negative for dysuria, hematuria, vaginal bleeding and vaginal discharge. Musculoskeletal: Negative for back pain and neck pain. Skin: Negative for rash and wound. Neurological: Negative for dizziness, tremors, weakness, light-headedness and numbness.    All other systems reviewed and are negative. Physical Exam     Vitals:    03/14/20 0259   BP: 131/71   Pulse: 88   Resp: 16   Temp: 98.5 °F (36.9 °C)   SpO2: 95%   Weight: 57.6 kg (127 lb)   Height: 5' 4\" (1.626 m)     Physical Exam    Nursing note and vitals reviewed    Constitutional: Vance Dixon female who appears older than stated age, no acute distress  Head: Normocephalic, Atraumatic  Eyes: Pupils are equal, round, and reactive to light, EOMI  ENT: Poor dentition  Neck: Supple, non-tender  Cardiovascular: Regular rate and rhythm, no murmurs, rubs, or gallops  Chest: Normal work of breathing and chest excursion bilaterally  Lungs: Clear to ausculation bilaterally, no wheezes, no rhonchi  Abdomen: Soft, non tender, non distended, normoactive bowel sounds  Back: No evidence of trauma or deformity  Extremities: No evidence of trauma or deformity, no LE edema. No streaking erythema, vesicular lesions, ulcerations or bulla  Skin: Warm and dry, normal cap refill  Neuro: Alert and appropriate, CN intact, normal speech, moving all 4 extremities freely and symmetrically  Psychiatric: Normal mood and affect       Diagnostic Study Results     Labs -     Recent Results (from the past 12 hour(s))   CBC WITH AUTOMATED DIFF    Collection Time: 03/14/20  3:19 AM   Result Value Ref Range    WBC 10.1 4.6 - 13.2 K/uL    RBC 2.87 (L) 4.20 - 5.30 M/uL    HGB 7.9 (L) 12.0 - 16.0 g/dL    HCT 24.7 (L) 35.0 - 45.0 %    MCV 86.1 74.0 - 97.0 FL    MCH 27.5 24.0 - 34.0 PG    MCHC 32.0 31.0 - 37.0 g/dL    RDW 15.0 (H) 11.6 - 14.5 %    PLATELET 571 199 - 676 K/uL    MPV 9.2 9.2 - 11.8 FL    NEUTROPHILS 58 40 - 73 %    LYMPHOCYTES 28 21 - 52 %    MONOCYTES 10 3 - 10 %    EOSINOPHILS 3 0 - 5 %    BASOPHILS 1 0 - 2 %    ABS. NEUTROPHILS 5.9 1.8 - 8.0 K/UL    ABS. LYMPHOCYTES 2.8 0.9 - 3.6 K/UL    ABS. MONOCYTES 1.0 0.05 - 1.2 K/UL    ABS. EOSINOPHILS 0.3 0.0 - 0.4 K/UL    ABS.  BASOPHILS 0.1 0.0 - 0.1 K/UL    DF AUTOMATED     METABOLIC PANEL, COMPREHENSIVE    Collection Time: 03/14/20  3:19 AM   Result Value Ref Range    Sodium 142 136 - 145 mmol/L    Potassium 4.3 3.5 - 5.5 mmol/L    Chloride 116 (H) 100 - 111 mmol/L    CO2 20 (L) 21 - 32 mmol/L    Anion gap 6 3.0 - 18 mmol/L    Glucose 139 (H) 74 - 99 mg/dL    BUN 33 (H) 7.0 - 18 MG/DL    Creatinine 3.08 (H) 0.6 - 1.3 MG/DL    BUN/Creatinine ratio 11 (L) 12 - 20      GFR est AA 21 (L) >60 ml/min/1.73m2    GFR est non-AA 17 (L) >60 ml/min/1.73m2    Calcium 8.1 (L) 8.5 - 10.1 MG/DL    Bilirubin, total 0.2 0.2 - 1.0 MG/DL    ALT (SGPT) 16 13 - 56 U/L    AST (SGOT) 16 10 - 38 U/L    Alk. phosphatase 131 (H) 45 - 117 U/L    Protein, total 6.1 (L) 6.4 - 8.2 g/dL    Albumin 2.6 (L) 3.4 - 5.0 g/dL    Globulin 3.5 2.0 - 4.0 g/dL    A-G Ratio 0.7 (L) 0.8 - 1.7     MAGNESIUM    Collection Time: 03/14/20  3:19 AM   Result Value Ref Range    Magnesium 2.2 1.6 - 2.6 mg/dL   LIPASE    Collection Time: 03/14/20  3:19 AM   Result Value Ref Range    Lipase 134 73 - 393 U/L       Radiologic Studies -   No orders to display     CT Results  (Last 48 hours)    None        CXR Results  (Last 48 hours)    None            Medical Decision Making   I am the first provider for this patient. I reviewed the vital signs, available nursing notes, past medical history, past surgical history, family history and social history. Vital Signs-Reviewed the patient's vital signs. Records Reviewed: Nursing Notes and Old Medical Records    Provider Notes:   37 y.o. female with a history of gastroparesis, CKD, diabetes, chronic anemia presenting with nausea, vomiting, diarrhea and abdominal pain that is typical of her gastroparesis. On exam patient is afebrile with appropriate vital signs. She does not appear toxic or acutely ill. Abdomen soft, nondistended. Patient with a benign abdominal exam.  No indication for advanced imaging at this time. Will obtain labs to eval for any metabolic derangements.   Will provide symptom control and reassess. Procedures:  Procedures    ED Course:   2:58 AM   Initial assessment performed. The patients presenting problems have been discussed, and they are in agreement with the care plan formulated and outlined with them. I have encouraged them to ask questions as they arise throughout their visit. 4:19 AM  Patient's hemoglobin is stable at 7.9. This is around her baseline, patient normotensive and not tachycardic, no indication for emergent transfusion. Creatinine 3.09. Patient with known CKD. Patient tolerating p.o. with no episodes of diarrhea or vomiting in the emergency department. Diagnosis and Disposition       DISCHARGE NOTE:  3:62 AM    Phyllis Chong's  results have been reviewed with her. She has been counseled regarding her diagnosis, treatment, and plan. She verbally conveys understanding and agreement of the signs, symptoms, diagnosis, treatment and prognosis and additionally agrees to follow up as discussed. She also agrees with the care-plan and conveys that all of her questions have been answered. I have also provided discharge instructions for her that include: educational information regarding their diagnosis and treatment, and list of reasons why they would want to return to the ED prior to their follow-up appointment, should her condition change. She has been provided with education for proper emergency department utilization. CLINICAL IMPRESSION:    1. Stage 4 chronic kidney disease ([address]    2. Non-intractable vomiting with nausea, unspecified vomiting type    3. Diabetic gastroparalysis ([address]    4. Chronic abdominal pain        PLAN:  1. D/C Home  2. Current Discharge Medication List      START taking these medications    Details   !! ondansetron (Zofran ODT) 4 mg disintegrating tablet Take 1 Tab by mouth every eight (8) hours as needed for Nausea or Vomiting. Qty: 12 Tab, Refills: 0       !! - Potential duplicate medications found.  Please discuss with provider. CONTINUE these medications which have NOT CHANGED    Details   !! ondansetron (ZOFRAN ODT) 4 mg disintegrating tablet Take 1 Tab by mouth every eight (8) hours as needed for Nausea or Vomiting. Qty: 20 Tab, Refills: 0       !! - Potential duplicate medications found. Please discuss with provider. 3.   Follow-up Information     Follow up With Specialties Details Why Contact Info    [id number] [facility]  Schedule an appointment as soon as possible for a visit in 2 days  [address] [facility], [address] [place] [address] [address] [facility],[address]    Elsie Jones MD Gastroenterology Schedule an appointment as soon as possible for a visit in 2 days  [address] [facility] [id number]  [phone]      [facility] EMERGENCY DEPT Emergency Medicine  As needed if symptoms worsen [address] [address]  [phone]        ____________________________________     Please note that this dictation was completed with [employer], the computer voice recognition software. Quite often unanticipated grammatical, syntax, homophones, and other interpretive errors are inadvertently transcribed by the computer software. Please disregard these errors. Please excuse any errors that have escaped final proofreading.

## 2020-03-14 NOTE — ED NOTES
Pt tolerated po fluids. Pt told where to pick upprescribed medication. Pt given verbal and written d/c instructions.  pt ambulated from ED in NAD

## 2020-04-11 ENCOUNTER — HOSPITAL ENCOUNTER (EMERGENCY)
Age: 35
Discharge: HOME | End: 2020-04-11
Attending: EMERGENCY MEDICINE
Payer: MEDICAID

## 2020-04-11 VITALS
HEART RATE: 86 BPM | WEIGHT: 148 LBS | RESPIRATION RATE: 16 BRPM | TEMPERATURE: 98.3 F | HEIGHT: 64 IN | DIASTOLIC BLOOD PRESSURE: 92 MMHG | SYSTOLIC BLOOD PRESSURE: 178 MMHG | OXYGEN SATURATION: 97 % | BODY MASS INDEX: 25.27 KG/M2

## 2020-04-11 DIAGNOSIS — H51.9 EYE MOVEMENT ABNORMALITY: Primary | ICD-10-CM

## 2020-04-11 PROCEDURE — 74011250636 HC RX REV CODE- 250/636: Performed by: EMERGENCY MEDICINE

## 2020-04-11 PROCEDURE — 99284 EMERGENCY DEPT VISIT MOD MDM: CPT

## 2020-04-11 PROCEDURE — 96372 THER/PROPH/DIAG INJ SC/IM: CPT

## 2020-04-11 RX ORDER — DIPHENHYDRAMINE HCL 25 MG
25 CAPSULE ORAL
Qty: 20 CAP | Refills: 0 | Status: SHIPPED | OUTPATIENT
Start: 2020-04-11

## 2020-04-11 RX ORDER — DIPHENHYDRAMINE HYDROCHLORIDE 50 MG/ML
50 INJECTION, SOLUTION INTRAMUSCULAR; INTRAVENOUS
Status: COMPLETED | OUTPATIENT
Start: 2020-04-11 | End: 2020-04-11

## 2020-04-11 RX ADMIN — DIPHENHYDRAMINE HYDROCHLORIDE 50 MG: 50 INJECTION, SOLUTION INTRAMUSCULAR; INTRAVENOUS at 05:17

## 2020-04-11 NOTE — ED TRIAGE NOTES
Pt presents to ED via EMS from home with c/o \"looking up and can't look anywhere else\"  EMS reports pt looked at their computer when asked to sign before they left the ED.

## 2020-04-11 NOTE — DISCHARGE INSTRUCTIONS
You were seen and evaluated in the Emergency Department. Please understand that your work up is not all encompassing and you should follow up with your primary care physician for further management and continuity of care. Please return to Emergency Department or seek medical attention immediately if you have acute worsening in your symptoms or develop chest pain, shortness of breath, repeated vomiting, fever, altered level of consciousness, coughing up blood, or start sweating and feel clammy. If you were prescribed any medicine for home, please take as prescribed by your health-care provider. If you were given any follow-up appointments or numbers to call, please do so as instructed. Avoid any tobacco products or excessive alcohol. Patient Education        Diabetic Retinopathy: Care Instructions  Your Care Instructions    Diabetes can damage the small blood vessels in part of your eye. This part of the eye is called the retina. It detects light that enters the eye. Then it sends signals to your brain about what the eye sees. When this type of eye damage happens, it's called diabetic retinopathy. It can lead to poor vision and even blindness. But if you keep your blood sugar and blood pressure levels in your target range, you can help avoid or slow the damage. Follow-up care is a key part of your treatment and safety. Be sure to make and go to all appointments, and call your doctor if you are having problems. It's also a good idea to know your test results and keep a list of the medicines you take. How can you care for yourself at home? · Have regular eye exams. Tell your doctor about any changes in your vision. · Keep blood sugar in a target range. ? Eat a variety of healthy foods, and spread carbohydrate throughout the day. You may want to have a dietitian help you plan meals. ? If your doctor recommends it, get more exercise. Walking is a good choice.  Bit by bit, increase the amount you walk every day. Try for at least 30 minutes on most days of the week. ? Be safe with medicines. Take your medicine exactly as prescribed. Call your doctor if you think you are having a problem with your medicine. ? Check your blood sugar as often as your doctor recommends. · Eat a low-salt diet. This may help keep your blood pressure at a normal level. You may also need to take medicines to reach your goals. · Do not smoke. Smoking can make this condition worse. If you need help quitting, talk to your doctor about stop-smoking programs and medicines. These can increase your chances of quitting for good. When should you call for help? Call your doctor now or seek immediate medical care if:    · You have vision changes.    Watch closely for changes in your health, and be sure to contact your doctor if:    · You do not get better as expected. Where can you learn more? Go to http://anita-chino.info/  Enter R396 in the search box to learn more about \"Diabetic Retinopathy: Care Instructions. \"  Current as of: December 19, 2019Content Version: 12.4  © 3820-2343 [employer]. Care instructions adapted under license by [employer] (which disclaims liability or warranty for this information). If you have questions about a medical condition or this instruction, always ask your healthcare professional. [address] any warranty or liability for your use of this information.

## 2020-04-11 NOTE — ED PROVIDER NOTES
EMERGENCY DEPARTMENT HISTORY AND PHYSICAL EXAM    Date: 4/11/2020  Patient Name: Mariah Johansen    History of Presenting Illness     Chief Complaint   Patient presents with    Allergic Reaction         History Provided By: Patient    Additional History (Context): Mariah Johansen is a 37 y.o. female with PMHX CKD stage IV, gastroparesis presents to the emergency department via EMS for inability to look down. Patient reports that she is only able to look up and to the right. States that this startedapproximately 45 minutes ago. Denies any recent trauma or injury. States that she has had a similar episode when she she started taking Compazine. States that she is only currently taking Protonix and Zofran for her gastroparesis. Denies any new medications. Pt denies headache, numbness, numbness, and any other sxs or complaints. Denies any change to her chronic abdominal pain, nausea or vomiting. PCP: None    Current Outpatient Medications   Medication Sig Dispense Refill    diphenhydrAMINE (BenadryL) 25 mg capsule Take 1 Cap by mouth every six (6) hours as needed for Sleep. 20 Cap 0    ondansetron (Zofran ODT) 4 mg disintegrating tablet Take 1 Tab by mouth every eight (8) hours as needed for Nausea or Vomiting. 12 Tab 0    metoclopramide HCl (METOZOLV ODT) 5 mg rapid dissolve tab Take 2 Tabs by mouth Before breakfast, lunch, dinner and at bedtime. 30 Tab 0    temazepam (RESTORIL) 15 mg capsule Take 1 Cap by mouth nightly as needed for Sleep. Max Daily Amount: 15 mg. Indications: difficulty sleeping 10 Cap 0    acetaminophen (TYLENOL) 500 mg tablet Take 2 Tabs by mouth every eight (8) hours as needed for Pain. 30 Tab 0    potassium chloride SA (MICRO-K) 10 mEq capsule Take 1 Cap by mouth daily. 5 Cap 0    ondansetron (ZOFRAN ODT) 4 mg disintegrating tablet Take 1 Tab by mouth every eight (8) hours as needed for Nausea or Vomiting.  20 Tab 0    pantoprazole (PROTONIX) 40 mg tablet Take 1 Tab by mouth two (2) times a day. 60 Tab 0    amLODIPine (NORVASC) 5 mg tablet Take 1 Tab by mouth daily. 30 Tab 0    insulin glargine (LANTUS U-100 INSULIN) 100 unit/mL injection 30 Units by SubCUTAneous route nightly. Indications: type 2 diabetes mellitus      insulin admin. supplies (INPEN, FOR NOVOLOG, SC) 10-12 Units by SubCUTAneous route Before breakfast, lunch, and dinner. Past History     Past Medical History:  Past Medical History:   Diagnosis Date    Diabetes ([address]     Gastroparesis     Gastroparesis     Hypertension     UTI (urinary tract infection)        Past Surgical History:  Past Surgical History:   Procedure Laterality Date    HX APPENDECTOMY      HX GYN      tubal ligation, C Section       Family History:  No family history on file. Social History:  Social History     Tobacco Use    Smoking status: Never Smoker    Smokeless tobacco: Never Used   Substance Use Topics    Alcohol use: Never     Frequency: Never    Drug use: Not Currently       Allergies: Allergies   Allergen Reactions    Compazine [Prochlorperazine] Other (comments)     Dystonic Reaction         Review of Systems   Review of Systems   Constitutional: Negative for chills and fever. HENT: Negative for congestion, ear pain, sinus pain and sore throat. Eyes: Negative for pain and visual disturbance. Respiratory: Negative for cough and shortness of breath. Cardiovascular: Negative for chest pain and leg swelling. Gastrointestinal: Positive for abdominal pain, nausea and vomiting. Negative for constipation and diarrhea. Genitourinary: Negative for dysuria, hematuria, vaginal bleeding and vaginal discharge. Musculoskeletal: Negative for back pain and neck pain. Skin: Negative for rash and wound. Neurological: Negative for dizziness, tremors, weakness, light-headedness and numbness. All other systems reviewed and are negative.       Physical Exam     Vitals:    04/11/20 0500   BP: 181/89   Pulse: 86   Resp: 16   Temp: 98.3 °F (36.8 °C)   SpO2: 97%   Weight: 67.1 kg (148 lb)   Height: 5' 4\" (1.626 m)     Physical Exam    Nursing note and vitals reviewed    Constitutional: Chuck daley, appears older than stated age, nontoxic  Head: Normocephalic, Atraumatic  Eyes: Pupils are 3 mm bilaterally, round and reactive, round, and reactive to light, patient looking superiorly into the right, able to track superiorly into the left. Not tracking inferiorly. Positive corneal reflex, will look inferiorly after reflux initiated. Neck: Supple, non-tender  Chest: Normal work of breathing and chest excursion bilaterallyi  Abdomen: Soft, non tender, non distended, normoactive bowel sounds  Back: No evidence of trauma or deformity  Extremities: No evidence of trauma or deformity, no LE edema. No streaking erythema, vesicular lesions, ulcerations or bulla  Skin: Warm and dry, normal cap refill  Neuro: Alert and appropriate, CN intact, normal speech, moving all 4 extremities freely and symmetrically  Psychiatric: Normal mood and affect       Diagnostic Study Results     Labs -   No results found for this or any previous visit (from the past 12 hour(s)). Radiologic Studies -   No orders to display     CT Results  (Last 48 hours)    None        CXR Results  (Last 48 hours)    None            Medical Decision Making   I am the first provider for this patient. I reviewed the vital signs, available nursing notes, past medical history, past surgical history, family history and social history. Vital Signs-Reviewed the patient's vital signs. Pulse Oximetry Analysis -97 % on room air    Records Reviewed: Nursing Notes and Old Medical Records    Provider Notes:   37 y.o. female with a history of gastroparesis, CKD presenting complaining that she is unable to look down. On exam patient is awake and alert. She is afebrile and not tachycardic. She does not appear acutely ill or toxic.   Although patient is not looking inferiorly with her extraocular movements, she has reactive pupils are 3 mm bilaterally. Patient has positive corneal reflex and after reflex is initiated, does have extra ocular movements inferiorly. However at rest patient looking superiorly and to the right. Will track superiorly into the left. Patient speaking in full sentences, no slurred speech, no facial droop. Patient's exam not consistent with acute stroke. There is no indication for advanced imaging. When reviewing past medical history, noted possible history of dystonia reaction to Compazine. However patient denies any recent Compazine use. In addition her physical exam is not completely consistent with dystonia questionable if there is some component of conversion disorder. In the past, her symptoms have improved with Ativan and Benadryl. Will give Benadryl for symptom control and reassess her symptoms. Patient denies any change to her abdominal pain, nausea and vomiting which is chronic. Patient seen 3 days ago at an outlying emergency department where she had lab work which showed baseline CKD with a creatinine of 3.51. Bicarb 21, potassium 3.7, sodium of 138. No indication to repeat blood work today. Procedures:  Procedures    ED Course:   4:53 AM   Initial assessment performed. The patients presenting problems have been discussed, and they are in agreement with the care plan formulated and outlined with them. I have encouraged them to ask questions as they arise throughout their visit. 5:40 AM  On reassessment, patient states that she feels improved. Extraocular movements are intact at 15 minutes after receiving Benadryl. Diagnosis and Disposition       DISCHARGE NOTE:  4:79 AM    Phyllis Chong's  results have been reviewed with her. She has been counseled regarding her diagnosis, treatment, and plan.   She verbally conveys understanding and agreement of the signs, symptoms, diagnosis, treatment and prognosis and additionally agrees to follow up as discussed. She also agrees with the care-plan and conveys that all of her questions have been answered. I have also provided discharge instructions for her that include: educational information regarding their diagnosis and treatment, and list of reasons why they would want to return to the ED prior to their follow-up appointment, should her condition change. She has been provided with education for proper emergency department utilization. CLINICAL IMPRESSION:    1. Eye movement abnormality        PLAN:  1. D/C Home  2. Current Discharge Medication List      START taking these medications    Details   diphenhydrAMINE (BenadryL) 25 mg capsule Take 1 Cap by mouth every six (6) hours as needed for Sleep. Qty: 20 Cap, Refills: 0           3. Follow-up Information     Follow up With Specialties Details Why [address] [facility]  Schedule an appointment as soon as possible for a visit in 2 days  [address] [facility]    Leonard Mishra MD Ophthalmology Schedule an appointment as soon as possible for a visit in 2 days  [address] [facility] EMERGENCY DEPT Emergency Medicine  As needed if symptoms worsen [address] [place] [address] [phone]        ____________________________________     Please note that this dictation was completed with [employer], the computer voice recognition software. Quite often unanticipated grammatical, syntax, homophones, and other interpretive errors are inadvertently transcribed by the computer software. Please disregard these errors. Please excuse any errors that have escaped final proofreading.

## 2020-04-12 ENCOUNTER — HOSPITAL ENCOUNTER (EMERGENCY)
Age: 35
Discharge: HOME | End: 2020-04-12
Attending: EMERGENCY MEDICINE | Admitting: EMERGENCY MEDICINE
Payer: MEDICAID

## 2020-04-12 VITALS
DIASTOLIC BLOOD PRESSURE: 76 MMHG | BODY MASS INDEX: 25.27 KG/M2 | RESPIRATION RATE: 16 BRPM | WEIGHT: 148 LBS | HEIGHT: 64 IN | TEMPERATURE: 98.7 F | SYSTOLIC BLOOD PRESSURE: 143 MMHG | OXYGEN SATURATION: 99 % | HEART RATE: 99 BPM

## 2020-04-12 DIAGNOSIS — E11.43 DIABETES MELLITUS WITH GASTROPARESIS (HCC): Primary | ICD-10-CM

## 2020-04-12 DIAGNOSIS — N18.9 CHRONIC KIDNEY DISEASE, UNSPECIFIED CKD STAGE: ICD-10-CM

## 2020-04-12 LAB
ALBUMIN SERPL-MCNC: 2.9 G/DL (ref 3.4–5)
ALBUMIN/GLOB SERPL: 0.7 {RATIO} (ref 0.8–1.7)
ALP SERPL-CCNC: 138 U/L (ref 45–117)
ALT SERPL-CCNC: 10 U/L (ref 13–56)
ANION GAP SERPL CALC-SCNC: 9 MMOL/L (ref 3–18)
AST SERPL-CCNC: 18 U/L (ref 10–38)
ATRIAL RATE: 105 BPM
BASOPHILS # BLD: 0 K/UL (ref 0–0.1)
BASOPHILS NFR BLD: 0 % (ref 0–2)
BILIRUB SERPL-MCNC: 0.4 MG/DL (ref 0.2–1)
BUN SERPL-MCNC: 19 MG/DL (ref 7–18)
BUN/CREAT SERPL: 6 (ref 12–20)
CALCIUM SERPL-MCNC: 8.1 MG/DL (ref 8.5–10.1)
CALCULATED P AXIS, ECG09: 60 DEGREES
CALCULATED R AXIS, ECG10: 73 DEGREES
CALCULATED T AXIS, ECG11: 52 DEGREES
CHLORIDE SERPL-SCNC: 107 MMOL/L (ref 100–111)
CK MB CFR SERPL CALC: 1.3 % (ref 0–4)
CK MB SERPL-MCNC: 2.5 NG/ML (ref 5–25)
CK SERPL-CCNC: 200 U/L (ref 26–192)
CO2 SERPL-SCNC: 23 MMOL/L (ref 21–32)
CREAT SERPL-MCNC: 3.1 MG/DL (ref 0.6–1.3)
DIAGNOSIS, 93000: NORMAL
DIFFERENTIAL METHOD BLD: ABNORMAL
EOSINOPHIL # BLD: 0.1 K/UL (ref 0–0.4)
EOSINOPHIL NFR BLD: 1 % (ref 0–5)
ERYTHROCYTE [DISTWIDTH] IN BLOOD BY AUTOMATED COUNT: 15.4 % (ref 11.6–14.5)
EST. AVERAGE GLUCOSE BLD GHB EST-MCNC: 157 MG/DL
GLOBULIN SER CALC-MCNC: 4 G/DL (ref 2–4)
GLUCOSE SERPL-MCNC: 180 MG/DL (ref 74–99)
HBA1C MFR BLD: 7.1 % (ref 4.2–5.6)
HCT VFR BLD AUTO: 28.6 % (ref 35–45)
HGB BLD-MCNC: 9.4 G/DL (ref 12–16)
LIPASE SERPL-CCNC: 117 U/L (ref 73–393)
LYMPHOCYTES # BLD: 1.2 K/UL (ref 0.9–3.6)
LYMPHOCYTES NFR BLD: 9 % (ref 21–52)
MAGNESIUM SERPL-MCNC: 2.2 MG/DL (ref 1.6–2.6)
MCH RBC QN AUTO: 27.6 PG (ref 24–34)
MCHC RBC AUTO-ENTMCNC: 32.9 G/DL (ref 31–37)
MCV RBC AUTO: 84.1 FL (ref 74–97)
MONOCYTES # BLD: 0.3 K/UL (ref 0.05–1.2)
MONOCYTES NFR BLD: 2 % (ref 3–10)
NEUTS SEG # BLD: 12.6 K/UL (ref 1.8–8)
NEUTS SEG NFR BLD: 88 % (ref 40–73)
P-R INTERVAL, ECG05: 124 MS
PLATELET # BLD AUTO: 336 K/UL (ref 135–420)
PMV BLD AUTO: 9.3 FL (ref 9.2–11.8)
POTASSIUM SERPL-SCNC: 3.6 MMOL/L (ref 3.5–5.5)
PROT SERPL-MCNC: 6.9 G/DL (ref 6.4–8.2)
Q-T INTERVAL, ECG07: 388 MS
QRS DURATION, ECG06: 74 MS
QTC CALCULATION (BEZET), ECG08: 512 MS
RBC # BLD AUTO: 3.4 M/UL (ref 4.2–5.3)
SODIUM SERPL-SCNC: 139 MMOL/L (ref 136–145)
TROPONIN I SERPL-MCNC: 0.02 NG/ML (ref 0–0.04)
VENTRICULAR RATE, ECG03: 105 BPM
WBC # BLD AUTO: 14.2 K/UL (ref 4.6–13.2)

## 2020-04-12 PROCEDURE — 83735 ASSAY OF MAGNESIUM: CPT

## 2020-04-12 PROCEDURE — 85025 COMPLETE CBC W/AUTO DIFF WBC: CPT

## 2020-04-12 PROCEDURE — 83690 ASSAY OF LIPASE: CPT

## 2020-04-12 PROCEDURE — 80053 COMPREHEN METABOLIC PANEL: CPT

## 2020-04-12 PROCEDURE — 74011000258 HC RX REV CODE- 258: Performed by: PHYSICIAN ASSISTANT

## 2020-04-12 PROCEDURE — 93005 ELECTROCARDIOGRAM TRACING: CPT

## 2020-04-12 PROCEDURE — 83036 HEMOGLOBIN GLYCOSYLATED A1C: CPT

## 2020-04-12 PROCEDURE — 99285 EMERGENCY DEPT VISIT HI MDM: CPT

## 2020-04-12 PROCEDURE — 96372 THER/PROPH/DIAG INJ SC/IM: CPT

## 2020-04-12 PROCEDURE — 96375 TX/PRO/DX INJ NEW DRUG ADDON: CPT

## 2020-04-12 PROCEDURE — 74011250636 HC RX REV CODE- 250/636: Performed by: PHYSICIAN ASSISTANT

## 2020-04-12 PROCEDURE — C9113 INJ PANTOPRAZOLE SODIUM, VIA: HCPCS | Performed by: PHYSICIAN ASSISTANT

## 2020-04-12 PROCEDURE — 96365 THER/PROPH/DIAG IV INF INIT: CPT

## 2020-04-12 PROCEDURE — 96361 HYDRATE IV INFUSION ADD-ON: CPT

## 2020-04-12 PROCEDURE — 82550 ASSAY OF CK (CPK): CPT

## 2020-04-12 RX ORDER — ONDANSETRON 2 MG/ML
4 INJECTION INTRAMUSCULAR; INTRAVENOUS
Status: COMPLETED | OUTPATIENT
Start: 2020-04-12 | End: 2020-04-12

## 2020-04-12 RX ORDER — HALOPERIDOL 5 MG/ML
5 INJECTION INTRAMUSCULAR ONCE
Status: COMPLETED | OUTPATIENT
Start: 2020-04-12 | End: 2020-04-12

## 2020-04-12 RX ORDER — DICYCLOMINE HYDROCHLORIDE 10 MG/ML
20 INJECTION INTRAMUSCULAR ONCE
Status: COMPLETED | OUTPATIENT
Start: 2020-04-12 | End: 2020-04-12

## 2020-04-12 RX ORDER — MORPHINE SULFATE 4 MG/ML
4 INJECTION INTRAVENOUS
Status: COMPLETED | OUTPATIENT
Start: 2020-04-12 | End: 2020-04-12

## 2020-04-12 RX ORDER — ACETAMINOPHEN 10 MG/ML
1000 INJECTION, SOLUTION INTRAVENOUS ONCE
Status: COMPLETED | OUTPATIENT
Start: 2020-04-12 | End: 2020-04-12

## 2020-04-12 RX ORDER — LABETALOL HCL 20 MG/4 ML
20 SYRINGE (ML) INTRAVENOUS
Status: COMPLETED | OUTPATIENT
Start: 2020-04-12 | End: 2020-04-12

## 2020-04-12 RX ORDER — PANTOPRAZOLE SODIUM 40 MG/10ML
40 INJECTION, POWDER, LYOPHILIZED, FOR SOLUTION INTRAVENOUS
Status: COMPLETED | OUTPATIENT
Start: 2020-04-12 | End: 2020-04-12

## 2020-04-12 RX ADMIN — DICYCLOMINE HYDROCHLORIDE 20 MG: 20 INJECTION INTRAMUSCULAR at 10:35

## 2020-04-12 RX ADMIN — LABETALOL 20 MG/4 ML (5 MG/ML) INTRAVENOUS SYRINGE 20 MG: at 10:52

## 2020-04-12 RX ADMIN — PROMETHAZINE HYDROCHLORIDE 25 MG: 25 INJECTION, SOLUTION INTRAMUSCULAR; INTRAVENOUS at 13:20

## 2020-04-12 RX ADMIN — HALOPERIDOL LACTATE 5 MG: 5 INJECTION INTRAMUSCULAR at 12:10

## 2020-04-12 RX ADMIN — PANTOPRAZOLE SODIUM 40 MG: 40 INJECTION, POWDER, FOR SOLUTION INTRAVENOUS at 10:35

## 2020-04-12 RX ADMIN — SODIUM CHLORIDE 1000 ML: 900 INJECTION, SOLUTION INTRAVENOUS at 10:34

## 2020-04-12 RX ADMIN — MORPHINE SULFATE 4 MG: 4 INJECTION INTRAVENOUS at 12:41

## 2020-04-12 RX ADMIN — ONDANSETRON 4 MG: 2 INJECTION INTRAMUSCULAR; INTRAVENOUS at 10:34

## 2020-04-12 RX ADMIN — SODIUM CHLORIDE 1000 ML: 900 INJECTION, SOLUTION INTRAVENOUS at 13:20

## 2020-04-12 RX ADMIN — ACETAMINOPHEN 1000 MG: 10 INJECTION, SOLUTION INTRAVENOUS at 10:35

## 2020-04-12 NOTE — ED NOTES
Pt reported chest pain. Provider made aware at this time. Order for EKG and cardiac panel provided. Provider in to eval pt. Pt reports pain is typical/assosciated w/Gastroperisis.

## 2020-04-12 NOTE — ED PROVIDER NOTES
EMERGENCY DEPARTMENT HISTORY AND PHYSICAL EXAM    Date: 4/12/2020  Patient Name: Jody Demarco    History of Presenting Illness     Chief Complaint   Patient presents with    Vomiting         History Provided By: Patient    Chief Complaint: vomiting    HPI(Context):   74:25 AM  Jody Demarco is a 37 y.o. female with PMHX of gastroparesis, poorly controlled DM, HTN, anxiety, and CKD who presents to the emergency department C/O vomiting. Associated sxs include epigastric abdominal pain. Sxs started with n/v this AM. Reports felt poor last night. Hx of gastroparesis. Reports takes zofran and protonix. Not able to take HTN meds today. Pt followed by Sandy Song MD (GI) and Joey Godinez MD (renal) Pt denies fever, chills, diarrhea, back pain, cough, dysuria, and any other sxs or complaints. Pt denies tobacco use, ETOH use, or illicit drug use. Pt has allergy to Reglan, compazine    PCP: None    Current Outpatient Medications   Medication Sig Dispense Refill    diphenhydrAMINE (BenadryL) 25 mg capsule Take 1 Cap by mouth every six (6) hours as needed for Sleep. 20 Cap 0    potassium chloride SA (MICRO-K) 10 mEq capsule Take 1 Cap by mouth daily. 5 Cap 0    ondansetron (ZOFRAN ODT) 4 mg disintegrating tablet Take 1 Tab by mouth every eight (8) hours as needed for Nausea or Vomiting. 20 Tab 0    pantoprazole (PROTONIX) 40 mg tablet Take 1 Tab by mouth two (2) times a day. 60 Tab 0    amLODIPine (NORVASC) 5 mg tablet Take 1 Tab by mouth daily. 30 Tab 0    insulin glargine (LANTUS U-100 INSULIN) 100 unit/mL injection 30 Units by SubCUTAneous route nightly. Indications: type 2 diabetes mellitus      ondansetron (Zofran ODT) 4 mg disintegrating tablet Take 1 Tab by mouth every eight (8) hours as needed for Nausea or Vomiting. 12 Tab 0    temazepam (RESTORIL) 15 mg capsule Take 1 Cap by mouth nightly as needed for Sleep. Max Daily Amount: 15 mg.  Indications: difficulty sleeping 10 Cap 0    acetaminophen (TYLENOL) 500 mg tablet Take 2 Tabs by mouth every eight (8) hours as needed for Pain. 30 Tab 0    insulin admin. supplies (INPEN, FOR NOVOLOG, SC) 10-12 Units by SubCUTAneous route Before breakfast, lunch, and dinner. Past History     Past Medical History:  Past Medical History:   Diagnosis Date    Diabetes ([address]     Gastroparesis     Gastroparesis     Hypertension     UTI (urinary tract infection)        Past Surgical History:  Past Surgical History:   Procedure Laterality Date    HX APPENDECTOMY      HX GYN      tubal ligation, C Section       Family History:  History reviewed. No pertinent family history. Social History:  Social History     Tobacco Use    Smoking status: Never Smoker    Smokeless tobacco: Never Used   Substance Use Topics    Alcohol use: Never     Frequency: Never    Drug use: Not Currently       Allergies: Allergies   Allergen Reactions    Compazine [Prochlorperazine] Other (comments)     Dystonic Reaction    Reglan [Metoclopramide Hcl] Other (comments)     Pt states she starts looking up/cant see         Review of Systems   Review of Systems   Constitutional: Positive for appetite change. Negative for chills and fever. Respiratory: Negative for cough and shortness of breath. Cardiovascular: Negative for chest pain. Gastrointestinal: Positive for abdominal pain, nausea and vomiting. Negative for constipation and diarrhea. Genitourinary: Negative for dysuria. Musculoskeletal: Negative for back pain. Neurological: Negative for dizziness and light-headedness. All other systems reviewed and are negative. Physical Exam     Vitals:    04/12/20 1200 04/12/20 1234 04/12/20 1300 04/12/20 1330   BP: (!) 183/101 (!) 166/97 150/83 143/76   Pulse: (!) 105 (!) 104 (!) 101 99   Resp: 10 19 17 16   Temp:       SpO2: 99% 99%     Weight:       Height:         Physical Exam  Vitals signs and nursing note reviewed.    Constitutional:       General: She is not in acute distress. Appearance: She is well-developed. She is not diaphoretic. Comments: AA female in NAD. Alert. Appears uncomfortable   HENT:      Head: Normocephalic and atraumatic. Right Ear: External ear normal.      Left Ear: External ear normal.      Nose: Nose normal.   Eyes:      Conjunctiva/sclera: Conjunctivae normal.   Neck:      Musculoskeletal: Normal range of motion. Cardiovascular:      Rate and Rhythm: Normal rate and regular rhythm. Heart sounds: Normal heart sounds. No murmur. No friction rub. No gallop. Pulmonary:      Effort: Pulmonary effort is normal. No tachypnea, accessory muscle usage or respiratory distress. Breath sounds: Normal breath sounds. No decreased breath sounds, wheezing, rhonchi or rales. Abdominal:      Palpations: Abdomen is soft. Tenderness: There is abdominal tenderness in the epigastric area. There is no right CVA tenderness, left CVA tenderness, guarding or rebound. Negative signs include Pearson's sign. Musculoskeletal: Normal range of motion. Skin:     General: Skin is warm and dry. Findings: No rash. Neurological:      Mental Status: She is alert and oriented to person, place, and time. Psychiatric:         Judgment: Judgment normal.             Diagnostic Study Results     Labs -     Recent Results (from the past 12 hour(s))   CBC WITH AUTOMATED DIFF    Collection Time: 04/12/20 10:10 AM   Result Value Ref Range    WBC 14.2 (H) 4.6 - 13.2 K/uL    RBC 3.40 (L) 4.20 - 5.30 M/uL    HGB 9.4 (L) 12.0 - 16.0 g/dL    HCT 28.6 (L) 35.0 - 45.0 %    MCV 84.1 74.0 - 97.0 FL    MCH 27.6 24.0 - 34.0 PG    MCHC 32.9 31.0 - 37.0 g/dL    RDW 15.4 (H) 11.6 - 14.5 %    PLATELET 447 567 - 487 K/uL    MPV 9.3 9.2 - 11.8 FL    NEUTROPHILS 88 (H) 40 - 73 %    LYMPHOCYTES 9 (L) 21 - 52 %    MONOCYTES 2 (L) 3 - 10 %    EOSINOPHILS 1 0 - 5 %    BASOPHILS 0 0 - 2 %    ABS. NEUTROPHILS 12.6 (H) 1.8 - 8.0 K/UL    ABS. LYMPHOCYTES 1.2 0.9 - 3.6 K/UL    ABS. MONOCYTES 0.3 0.05 - 1.2 K/UL    ABS. EOSINOPHILS 0.1 0.0 - 0.4 K/UL    ABS. BASOPHILS 0.0 0.0 - 0.1 K/UL    DF AUTOMATED     METABOLIC PANEL, COMPREHENSIVE    Collection Time: 04/12/20 10:10 AM   Result Value Ref Range    Sodium 139 136 - 145 mmol/L    Potassium 3.6 3.5 - 5.5 mmol/L    Chloride 107 100 - 111 mmol/L    CO2 23 21 - 32 mmol/L    Anion gap 9 3.0 - 18 mmol/L    Glucose 180 (H) 74 - 99 mg/dL    BUN 19 (H) 7.0 - 18 MG/DL    Creatinine 3.10 (H) 0.6 - 1.3 MG/DL    BUN/Creatinine ratio 6 (L) 12 - 20      GFR est AA 21 (L) >60 ml/min/1.73m2    GFR est non-AA 17 (L) >60 ml/min/1.73m2    Calcium 8.1 (L) 8.5 - 10.1 MG/DL    Bilirubin, total 0.4 0.2 - 1.0 MG/DL    ALT (SGPT) 10 (L) 13 - 56 U/L    AST (SGOT) 18 10 - 38 U/L    Alk.  phosphatase 138 (H) 45 - 117 U/L    Protein, total 6.9 6.4 - 8.2 g/dL    Albumin 2.9 (L) 3.4 - 5.0 g/dL    Globulin 4.0 2.0 - 4.0 g/dL    A-G Ratio 0.7 (L) 0.8 - 1.7     LIPASE    Collection Time: 04/12/20 10:10 AM   Result Value Ref Range    Lipase 117 73 - 393 U/L   MAGNESIUM    Collection Time: 04/12/20 10:10 AM   Result Value Ref Range    Magnesium 2.2 1.6 - 2.6 mg/dL   HEMOGLOBIN A1C WITH EAG    Collection Time: 04/12/20 10:10 AM   Result Value Ref Range    Hemoglobin A1c 7.1 (H) 4.2 - 5.6 %    Est. average glucose 157 mg/dL   CARDIAC PANEL,(CK, CKMB & TROPONIN)    Collection Time: 04/12/20 10:10 AM   Result Value Ref Range     (H) 26 - 192 U/L    CK - MB 2.5 <3.6 ng/ml    CK-MB Index 1.3 0.0 - 4.0 %    Troponin-I, QT 0.02 0.0 - 0.045 NG/ML   EKG, 12 LEAD, INITIAL    Collection Time: 04/12/20 11:55 AM   Result Value Ref Range    Ventricular Rate 105 BPM    Atrial Rate 105 BPM    P-R Interval 124 ms    QRS Duration 74 ms    Q-T Interval 388 ms    QTC Calculation (Bezet) 512 ms    Calculated P Axis 60 degrees    Calculated R Axis 73 degrees    Calculated T Axis 52 degrees    Diagnosis       Sinus tachycardia  Possible Left atrial enlargement  Low voltage QRS  Nonspecific ST abnormality  Abnormal ECG  When compared with ECG of 12-MAR-2020 03:13,  No significant change was found             No orders to display     CT Results  (Last 48 hours)    None        CXR Results  (Last 48 hours)    None          Medications given in the ED-  Medications   sodium chloride 0.9 % bolus infusion 1,000 mL (0 mL IntraVENous IV Completed 4/12/20 1134)   pantoprazole (PROTONIX) injection 40 mg (40 mg IntraVENous Given 4/12/20 1035)   ondansetron (ZOFRAN) injection 4 mg (4 mg IntraVENous Given 4/12/20 1034)   dicyclomine (BENTYL) 10 mg/mL injection 20 mg (20 mg IntraMUSCular Given 4/12/20 1035)   acetaminophen (OFIRMEV) infusion 1,000 mg (0 mg IntraVENous IV Completed 4/12/20 1050)   labetaloL (NORMODYNE;TRANDATE) 20 mg/4 mL (5 mg/mL) injection 20 mg (20 mg IntraVENous Given 4/12/20 1052)   haloperidol lactate (HALDOL) injection 5 mg (5 mg IntraMUSCular Given 4/12/20 1210)   morphine injection 4 mg (4 mg IntraVENous Given 4/12/20 1241)   promethazine (PHENERGAN) 25 mg in 0.9% sodium chloride 50 mL IVPB (0 mg IntraVENous IV Completed 4/12/20 1335)   sodium chloride 0.9 % bolus infusion 1,000 mL (0 mL IntraVENous IV Completed 4/12/20 1342)         Medical Decision Making   I am the first provider for this patient. I reviewed the vital signs, available nursing notes, past medical history, past surgical history, family history and social history. Vital Signs-Reviewed the patient's vital signs. Pulse Oximetry Analysis - 99% on RA     Records Reviewed: Nursing Notes    Provider Notes (Medical Decision Making): gastroparesis, pancreatitis, hepatitis, PUD, GERD, food borne, gastroenteritis    Procedures:  Procedures    ED Course:   10:09 AM Initial assessment performed. The patients presenting problems have been discussed, and they are in agreement with the care plan formulated and outlined with them. I have encouraged them to ask questions as they arise throughout their visit.     Diagnosis and Disposition       11:49 AM  Pt began to c/o chest pain. Ordered EKG and cardiac enzymes. Pt feels better at discharge. No further emesis. IVF given. Creatinine baseline. Not in DKA. Mild leukocytosis that is likely related to emesis today. Discussed CT but pt has had multiple scans in past and pt reports sxs same as previous gastroparesis episodes. Reasons to RTED discussed with pt. All questions answered. Pt feels comfortable going home at this time. Pt expressed understanding and she agrees with plan. 1. Diabetes mellitus with gastroparesis ([address]    2. Chronic kidney disease, unspecified CKD stage        PLAN:  1. D/C Home  2. Discharge Medication List as of 4/12/2020  1:30 PM        3. Follow-up Information     Follow up With Specialties Details Why Contact Info    Ivette Hanna MD Gastroenterology   [address] [place] [address]  [address] [address]  [phone]      [facility] EMERGENCY DEPT Emergency Medicine  As needed, If symptoms worsen [address] [place] [address]  [phone]        _______________________________    Attestations: This note is prepared by Fanta Gavin PA-C.  _______________________________        Please note that this dictation was completed with Buttercoin, the computer voice recognition software. Quite often unanticipated grammatical, syntax, homophones, and other interpretive errors are inadvertently transcribed by the computer software. Please disregard these errors. Please excuse any errors that have escaped final proofreading.

## 2020-04-13 ENCOUNTER — PATIENT OUTREACH (OUTPATIENT)
Dept: CASE MANAGEMENT | Age: 35
End: 2020-04-13

## 2020-04-13 NOTE — PROGRESS NOTES
4/13/2020 10:50 AM     CTN attempted to contact patient for COVID Risk. Patient admitted to [facility] on 4/12/2020 and discharged on 4/12/2020 for vomiting. Message left introducing myself, the purpose of the call and giving my contact information. Requested that patient call back at her earliest convenience.

## 2020-04-14 ENCOUNTER — PATIENT OUTREACH (OUTPATIENT)
Dept: CASE MANAGEMENT | Age: 35
End: 2020-04-14

## 2020-04-14 NOTE — PROGRESS NOTES
4/14/2020 01:33 PM     CTN attempted to contact patient for COVID Risk. Patient admitted to [facility] on 4/12/2020 and discharged on 4/12/2020 for vomiting. Message left introducing myself, the purpose of the call and giving my contact information. Requested that patient call back at her earliest convenience. Today was second call to patient and message left for her to return my call.

## 2020-04-15 ENCOUNTER — PATIENT OUTREACH (OUTPATIENT)
Dept: CASE MANAGEMENT | Age: 35
End: 2020-04-15

## 2020-04-15 NOTE — PROGRESS NOTES
4/15/2020 01:44 PM     CTN attempted to contact patient for COVID Risk. Patient admitted to [facility] on 4/12/2020 and discharged on 4/12/2020 for vomiting. Message left introducing myself, the purpose of the call and giving my contact information. Requested that patient call back at her earliest convenience. Today was third call to patient and message left for her to return my call. Also called emergency contact and left a message for her to have patient call. Return phone number given. Awaiting return call.

## 2020-04-16 ENCOUNTER — PATIENT OUTREACH (OUTPATIENT)
Dept: CASE MANAGEMENT | Age: 35
End: 2020-04-16

## 2020-04-16 NOTE — PROGRESS NOTES
4/16/2020 01:44 PM     CTN attempted to contact patient for COVID Risk. Patient admitted to [facility] on 4/12/2020 and discharged on 4/12/2020 for vomiting. Message left introducing myself, the purpose of the call and giving my contact information. Requested that patient call back at her earliest convenience. Today was fourth call to patient and message left for her to return my call. Will resolve episode at present time as there have been no return calls from patient.

## 2020-06-17 ENCOUNTER — APPOINTMENT (OUTPATIENT)
Dept: GENERAL RADIOLOGY | Age: 35
End: 2020-06-17
Attending: EMERGENCY MEDICINE
Payer: MEDICAID

## 2020-06-17 ENCOUNTER — HOSPITAL ENCOUNTER (EMERGENCY)
Age: 35
Discharge: STILL ADMITTED OR ADMINISTRATIVE | End: 2020-06-17

## 2020-06-17 ENCOUNTER — HOSPITAL ENCOUNTER (EMERGENCY)
Age: 35
Discharge: HOME | End: 2020-06-17
Attending: EMERGENCY MEDICINE
Payer: MEDICAID

## 2020-06-17 VITALS
TEMPERATURE: 98.7 F | SYSTOLIC BLOOD PRESSURE: 164 MMHG | RESPIRATION RATE: 22 BRPM | BODY MASS INDEX: 24.07 KG/M2 | HEART RATE: 83 BPM | WEIGHT: 141 LBS | HEIGHT: 64 IN | OXYGEN SATURATION: 97 % | DIASTOLIC BLOOD PRESSURE: 90 MMHG

## 2020-06-17 DIAGNOSIS — K31.84 GASTROPARESIS: Primary | ICD-10-CM

## 2020-06-17 DIAGNOSIS — N18.9 CHRONIC KIDNEY DISEASE, UNSPECIFIED CKD STAGE: ICD-10-CM

## 2020-06-17 LAB
ALBUMIN SERPL-MCNC: 2.6 G/DL (ref 3.4–5)
ALBUMIN/GLOB SERPL: 0.7 {RATIO} (ref 0.8–1.7)
ALP SERPL-CCNC: 94 U/L (ref 45–117)
ALT SERPL-CCNC: 7 U/L (ref 13–56)
ANION GAP SERPL CALC-SCNC: 7 MMOL/L (ref 3–18)
AST SERPL-CCNC: 15 U/L (ref 10–38)
BASOPHILS # BLD: 0 K/UL (ref 0–0.1)
BASOPHILS NFR BLD: 0 % (ref 0–2)
BILIRUB SERPL-MCNC: 0.4 MG/DL (ref 0.2–1)
BNP SERPL-MCNC: ABNORMAL PG/ML (ref 0–450)
BUN SERPL-MCNC: 36 MG/DL (ref 7–18)
BUN/CREAT SERPL: 10 (ref 12–20)
CALCIUM SERPL-MCNC: 8 MG/DL (ref 8.5–10.1)
CHLORIDE SERPL-SCNC: 106 MMOL/L (ref 100–111)
CK MB CFR SERPL CALC: 1.3 % (ref 0–4)
CK MB SERPL-MCNC: 2.6 NG/ML (ref 5–25)
CK SERPL-CCNC: 198 U/L (ref 26–192)
CO2 SERPL-SCNC: 29 MMOL/L (ref 21–32)
CREAT SERPL-MCNC: 3.6 MG/DL (ref 0.6–1.3)
DIFFERENTIAL METHOD BLD: ABNORMAL
EOSINOPHIL # BLD: 0.1 K/UL (ref 0–0.4)
EOSINOPHIL NFR BLD: 1 % (ref 0–5)
ERYTHROCYTE [DISTWIDTH] IN BLOOD BY AUTOMATED COUNT: 17.9 % (ref 11.6–14.5)
ETHANOL SERPL-MCNC: <3 MG/DL (ref 0–3)
GLOBULIN SER CALC-MCNC: 3.6 G/DL (ref 2–4)
GLUCOSE BLD STRIP.AUTO-MCNC: 132 MG/DL (ref 70–110)
GLUCOSE SERPL-MCNC: 134 MG/DL (ref 74–99)
HCG SERPL QL: NEGATIVE
HCT VFR BLD AUTO: 30.9 % (ref 35–45)
HGB BLD-MCNC: 9.6 G/DL (ref 12–16)
LIPASE SERPL-CCNC: 201 U/L (ref 73–393)
LYMPHOCYTES # BLD: 1.2 K/UL (ref 0.9–3.6)
LYMPHOCYTES NFR BLD: 14 % (ref 21–52)
MAGNESIUM SERPL-MCNC: 3 MG/DL (ref 1.6–2.6)
MCH RBC QN AUTO: 29.3 PG (ref 24–34)
MCHC RBC AUTO-ENTMCNC: 31.1 G/DL (ref 31–37)
MCV RBC AUTO: 94.2 FL (ref 74–97)
MONOCYTES # BLD: 0.5 K/UL (ref 0.05–1.2)
MONOCYTES NFR BLD: 6 % (ref 3–10)
NEUTS SEG # BLD: 6.8 K/UL (ref 1.8–8)
NEUTS SEG NFR BLD: 79 % (ref 40–73)
PLATELET # BLD AUTO: 206 K/UL (ref 135–420)
PMV BLD AUTO: 9.5 FL (ref 9.2–11.8)
POTASSIUM SERPL-SCNC: 3.8 MMOL/L (ref 3.5–5.5)
PROT SERPL-MCNC: 6.2 G/DL (ref 6.4–8.2)
RBC # BLD AUTO: 3.28 M/UL (ref 4.2–5.3)
SODIUM SERPL-SCNC: 142 MMOL/L (ref 136–145)
TROPONIN I SERPL-MCNC: 0.02 NG/ML (ref 0–0.04)
WBC # BLD AUTO: 8.7 K/UL (ref 4.6–13.2)

## 2020-06-17 PROCEDURE — 84703 CHORIONIC GONADOTROPIN ASSAY: CPT

## 2020-06-17 PROCEDURE — 80307 DRUG TEST PRSMV CHEM ANLYZR: CPT

## 2020-06-17 PROCEDURE — 83735 ASSAY OF MAGNESIUM: CPT

## 2020-06-17 PROCEDURE — 83690 ASSAY OF LIPASE: CPT

## 2020-06-17 PROCEDURE — 96361 HYDRATE IV INFUSION ADD-ON: CPT

## 2020-06-17 PROCEDURE — 74011250636 HC RX REV CODE- 250/636: Performed by: EMERGENCY MEDICINE

## 2020-06-17 PROCEDURE — 96374 THER/PROPH/DIAG INJ IV PUSH: CPT

## 2020-06-17 PROCEDURE — 85025 COMPLETE CBC W/AUTO DIFF WBC: CPT

## 2020-06-17 PROCEDURE — 80053 COMPREHEN METABOLIC PANEL: CPT

## 2020-06-17 PROCEDURE — 74022 RADEX COMPL AQT ABD SERIES: CPT

## 2020-06-17 PROCEDURE — 83880 ASSAY OF NATRIURETIC PEPTIDE: CPT

## 2020-06-17 PROCEDURE — 99285 EMERGENCY DEPT VISIT HI MDM: CPT

## 2020-06-17 PROCEDURE — 93005 ELECTROCARDIOGRAM TRACING: CPT

## 2020-06-17 PROCEDURE — 82962 GLUCOSE BLOOD TEST: CPT

## 2020-06-17 PROCEDURE — 96375 TX/PRO/DX INJ NEW DRUG ADDON: CPT

## 2020-06-17 PROCEDURE — 82550 ASSAY OF CK (CPK): CPT

## 2020-06-17 RX ORDER — ONDANSETRON 2 MG/ML
8 INJECTION INTRAMUSCULAR; INTRAVENOUS
Status: COMPLETED | OUTPATIENT
Start: 2020-06-17 | End: 2020-06-17

## 2020-06-17 RX ORDER — FAMOTIDINE 10 MG/ML
20 INJECTION INTRAVENOUS
Status: COMPLETED | OUTPATIENT
Start: 2020-06-17 | End: 2020-06-17

## 2020-06-17 RX ORDER — INSULIN GLARGINE 100 [IU]/ML
5 INJECTION, SOLUTION SUBCUTANEOUS
COMMUNITY

## 2020-06-17 RX ORDER — PROMETHAZINE HYDROCHLORIDE 12.5 MG/1
TABLET ORAL
COMMUNITY

## 2020-06-17 RX ORDER — LORAZEPAM 2 MG/ML
1 INJECTION INTRAMUSCULAR ONCE
Status: COMPLETED | OUTPATIENT
Start: 2020-06-17 | End: 2020-06-17

## 2020-06-17 RX ORDER — ONDANSETRON 4 MG/1
4 TABLET, FILM COATED ORAL
COMMUNITY

## 2020-06-17 RX ADMIN — ONDANSETRON 8 MG: 2 INJECTION INTRAMUSCULAR; INTRAVENOUS at 04:29

## 2020-06-17 RX ADMIN — SODIUM CHLORIDE 1000 ML: 900 INJECTION, SOLUTION INTRAVENOUS at 04:29

## 2020-06-17 RX ADMIN — FAMOTIDINE 20 MG: 10 INJECTION, SOLUTION INTRAVENOUS at 04:54

## 2020-06-17 RX ADMIN — LORAZEPAM 1 MG: 2 INJECTION INTRAMUSCULAR; INTRAVENOUS at 04:29

## 2020-06-17 NOTE — ED TRIAGE NOTES
Pt co generalized abd pain,N/V x 24 hrs, has zofran at home but has not taken it, \"it makes me throw up\". Pt has hx of DM, CKD. Pt report she had a fistula placed 1 wk ago, has not been dialyzed yet. Pt denies fevers, SOB, D, urinary symptoms, but endorses epigastric pain that she describes as stabbing, rates 10/10.

## 2020-06-17 NOTE — DISCHARGE INSTRUCTIONS
Patient Education        Gastroparesis: Care Instructions  Your Care Instructions     When you have gastroparesis, your stomach takes a lot longer to empty. This delay can cause belly pain, bloating, and belching. It also can cause hiccups, heartburn, nausea or vomiting. You may not feel like eating. These symptoms may come and go. They most often occur during and after meals. You may feel full after only a few bites of food. This condition occurs when the nerves to the stomach don't work properly. Diabetes is the most common cause of this nerve damage. Gastroparesis can make it harder to control your blood sugar levels. But keeping your blood sugar levels under control may help with your symptoms. Parkinson's disease, stroke, and some medicines can also cause this condition. Home treatment can often help. Follow-up care is a key part of your treatment and safety. Be sure to make and go to all appointments, and call your doctor if you are having problems. It's also a good idea to know your test results and keep a list of the medicines you take. How can you care for yourself at home? · Eat several small meals each day rather than three large meals. · Eat foods that are low in fiber and fat. · If your doctor suggests it, take medicines that help the stomach empty more quickly. These are called motility agents. When should you call for help? Call your doctor now or seek immediate medical care if:  · You are vomiting. · You have new or worse belly pain. · You have a fever. · You cannot pass stools or gas. Watch closely for changes in your health, and be sure to contact your doctor if you have any problems. Where can you learn more? Go to http://anita-chino.info/  Enter M106 in the search box to learn more about \"Gastroparesis: Care Instructions. \"  Current as of: August 12, 2019               Content Version: 12.5  © 9023-8049 Healthwise, Incorporated.    Care instructions adapted under license by [employer] (which disclaims liability or warranty for this information). If you have questions about a medical condition or this instruction, always ask your healthcare professional. [address] any warranty or liability for your use of this information.

## 2020-06-17 NOTE — CALL BACK NOTE
Called mobile number on file, no answer, VM full. Will send certified letter requesting call back for results review. Pt with CXR suggesting apiration PNA. Pt may require abx.

## 2020-06-17 NOTE — LETTER
6/17/2020 [id number] 
[address] [place] [address] Dear Ms. Maikel Otero, You were recently seen in the Emergency Department of [place] [address] and had lab work performed. We would like to discuss these results with you. Please call the Emergency Department at your earliest convenience at [phone] between 10am-8pm to speak with one of our providers. Sincerely, Physician Emergency, MD 
 
 
[address] 
[phone]

## 2020-06-18 ENCOUNTER — PATIENT OUTREACH (OUTPATIENT)
Dept: CASE MANAGEMENT | Age: 35
End: 2020-06-18

## 2020-06-18 LAB
ATRIAL RATE: 89 BPM
CALCULATED P AXIS, ECG09: 64 DEGREES
CALCULATED R AXIS, ECG10: 90 DEGREES
CALCULATED T AXIS, ECG11: 65 DEGREES
DIAGNOSIS, 93000: NORMAL
P-R INTERVAL, ECG05: 132 MS
Q-T INTERVAL, ECG07: 412 MS
QRS DURATION, ECG06: 78 MS
QTC CALCULATION (BEZET), ECG08: 501 MS
VENTRICULAR RATE, ECG03: 89 BPM

## 2020-06-18 NOTE — PROGRESS NOTES
Date/Time:  6/18/2020 9:55 AM  Attempted to reach patient by telephone. Unable to reach as vmb was full. Will attempt to reach patient again.

## 2020-06-19 ENCOUNTER — PATIENT OUTREACH (OUTPATIENT)
Dept: CASE MANAGEMENT | Age: 35
End: 2020-06-19

## 2020-09-01 ENCOUNTER — HOSPITAL ENCOUNTER (EMERGENCY)
Age: 35
Discharge: HOME | End: 2020-09-01
Attending: EMERGENCY MEDICINE
Payer: MEDICAID

## 2020-09-01 VITALS
HEIGHT: 64 IN | DIASTOLIC BLOOD PRESSURE: 77 MMHG | RESPIRATION RATE: 18 BRPM | TEMPERATURE: 99.1 F | BODY MASS INDEX: 23.39 KG/M2 | SYSTOLIC BLOOD PRESSURE: 174 MMHG | OXYGEN SATURATION: 100 % | HEART RATE: 90 BPM | WEIGHT: 137 LBS

## 2020-09-01 DIAGNOSIS — H61.22 IMPACTED CERUMEN OF LEFT EAR: Primary | ICD-10-CM

## 2020-09-01 PROCEDURE — 99284 EMERGENCY DEPT VISIT MOD MDM: CPT

## 2020-09-01 NOTE — DISCHARGE INSTRUCTIONS
Patient Education        Earwax Blockage: Care Instructions  Your Care Instructions     Earwax is a natural substance that protects the ear canal. Normally, earwax drains from the ears and does not cause problems. Sometimes earwax builds up and hardens. Earwax blockage (also called cerumen impaction) can cause some loss of hearing and pain. When wax is tightly packed, you will need to have your doctor remove it. Follow-up care is a key part of your treatment and safety. Be sure to make and go to all appointments, and call your doctor if you are having problems. It's also a good idea to know your test results and keep a list of the medicines you take. How can you care for yourself at home? · Do not try to remove earwax with cotton swabs, fingers, or other objects. This can make the blockage worse and damage the eardrum. · If your doctor recommends that you try to remove earwax at home:  ? Soften and loosen the earwax with warm mineral oil. You also can try hydrogen peroxide mixed with an equal amount of room temperature water. Place 2 drops of the fluid, warmed to body temperature, in the ear two times a day for up to 5 days. ? Once the wax is loose and soft, all that is usually needed to remove it from the ear canal is a gentle, warm shower. Direct the water into the ear, then tip your head to let the earwax drain out. Dry your ear thoroughly with a hair dryer set on low. Hold the dryer several inches from your ear. ? If the warm mineral oil and shower do not work, use an over-the-counter wax softener. Read and follow all instructions on the label. After using the wax softener, use an ear syringe to gently flush the ear. Make sure the flushing solution is body temperature. Cool or hot fluids in the ear can cause dizziness. When should you call for help? Call your doctor now or seek immediate medical care if:  · Pus or blood drains from your ear. · Your ears are ringing or feel full.   · You have a loss of hearing. Watch closely for changes in your health, and be sure to contact your doctor if:  · You have pain or reduced hearing after 1 week of home treatment. · You have any new symptoms, such as nausea or balance problems. Where can you learn more? Go to http://anita-chino.info/  Enter Q495 in the search box to learn more about \"Earwax Blockage: Care Instructions. \"  Current as of: June 26, 2019               Content Version: 12.5  © 9831-7357 Healthwise, [employer]. Care instructions adapted under license by [employer] (which disclaims liability or warranty for this information). If you have questions about a medical condition or this instruction, always ask your healthcare professional. [address] any warranty or liability for your use of this information.

## 2020-09-01 NOTE — ED TRIAGE NOTES
Patient reports to ED via EMS with c/c of left ear pain. Patient reports feeling her ear is filled up with wax.

## 2020-09-01 NOTE — ED NOTES
Discharge medications reviewed with patient and appropriate educational materials and side effects teaching were provided. I have reviewed discharge instructions with the patient. The patient verbalized understanding. Follow-up Information     Follow up With Specialties Details Why Contact Mary Jane Steinberg NP Family Medicine Schedule an appointment as soon as possible for a visit in 2 days  [address] [facility] [address]  [phone]        Patient armband removed and given to patient to take home.   Patient was informed of the privacy risks if armband lost or stolen

## 2020-09-01 NOTE — ED PROVIDER NOTES
EMERGENCY DEPARTMENT HISTORY AND PHYSICAL EXAM    Date: 9/1/2020  Patient Name: Abdulaziz Barclay    History of Presenting Illness     Chief Complaint   Patient presents with    Ear Pain         History Provided By: Patient      Abdulaziz Barclay is a 38 y.o. female with PMHX of type 1 diabetes with retinopathy, nephropathy, neuropathy, neurogenic bladder, severe gastroparesis with a PEG tube who was recently admitted to [facility] for gastroparesis abdominal pain she was discharged earlier today who presents to the emergency department C/O earwax. Patient notes that she feels that her ear is clogged on the left side. She notes this feels like when she has had a prior buildup of earwax. She notes that last time she came into the ER they flushed it out and she felt much better. She otherwise denies headache, changes in vision, shortness of breath, chest pain, neck pain, nausea, abdominal pain, weakness in arms or legs. PCP: Kelly Diaz NP    Current Outpatient Medications   Medication Sig Dispense Refill    promethazine (PHENERGAN) 12.5 mg tablet Take  by mouth every six (6) hours as needed for Nausea.  ondansetron hcl (Zofran) 4 mg tablet Take 4 mg by mouth every eight (8) hours as needed for Nausea or Vomiting.  insulin glargine (Lantus U-100 Insulin) 100 unit/mL injection 5 Units by SubCUTAneous route nightly.  diphenhydrAMINE (BenadryL) 25 mg capsule Take 1 Cap by mouth every six (6) hours as needed for Sleep. 20 Cap 0    ondansetron (Zofran ODT) 4 mg disintegrating tablet Take 1 Tab by mouth every eight (8) hours as needed for Nausea or Vomiting. 12 Tab 0    temazepam (RESTORIL) 15 mg capsule Take 1 Cap by mouth nightly as needed for Sleep. Max Daily Amount: 15 mg. Indications: difficulty sleeping 10 Cap 0    acetaminophen (TYLENOL) 500 mg tablet Take 2 Tabs by mouth every eight (8) hours as needed for Pain.  30 Tab 0    potassium chloride SA (MICRO-K) 10 mEq capsule Take 1 Cap by mouth daily. 5 Cap 0    ondansetron (ZOFRAN ODT) 4 mg disintegrating tablet Take 1 Tab by mouth every eight (8) hours as needed for Nausea or Vomiting. 20 Tab 0    pantoprazole (PROTONIX) 40 mg tablet Take 1 Tab by mouth two (2) times a day. 60 Tab 0    amLODIPine (NORVASC) 5 mg tablet Take 1 Tab by mouth daily. 30 Tab 0    insulin glargine (LANTUS U-100 INSULIN) 100 unit/mL injection 30 Units by SubCUTAneous route nightly. Indications: type 2 diabetes mellitus      insulin admin. supplies (IN[place]EN[facility] [place] 10-12 Units by SubCUTAneous route Before breakfast, lunch, and dinner. Past History     Past Medical History:  Past Medical History:   Diagnosis Date    Chronic kidney disease     Diabetes ([facility] [place] [address]     ESRD on dialysis ([facility] [place] [address]     M,T, Thu, F    Gastroparesis     Gastroparesis     Hypertension     UTI (urinary tract infection)        Past Surgical History:  Past Surgical History:   Procedure Laterality Date    HX APPENDECTOMY      HX ARTERIOVENOUS FISTULA Left     HX GYN      tubal ligation, C Section       Family History:  History reviewed. No pertinent family history. Social History:  Social History     Tobacco Use    Smoking status: Never Smoker    Smokeless tobacco: Never Used   Substance Use Topics    Alcohol use: Not Currently     Frequency: Never    Drug use: Not Currently       Allergies: Allergies   Allergen Reactions    Compazine [Prochlorperazine] Other (comments)     Dystonic Reaction    Reglan [Metoclopramide Hcl] Other (comments)     Pt states she starts looking up/cant see    Reglan [Metoclopramide Hcl] Other (comments)     \"it makes my eyes lock up\"         Review of Systems   Review of Systems   Constitutional: Negative. HENT: Positive for ear pain. Eyes: Negative. Respiratory: Negative. Cardiovascular: Negative. Gastrointestinal: Negative. Endocrine: Negative. Genitourinary: Negative. Musculoskeletal: Negative. Allergic/Immunologic: Negative. Neurological: Negative. Hematological: Negative. Psychiatric/Behavioral: Negative. Physical Exam     Vitals:    09/01/20 0259   BP: 174/77   Pulse: 90   Resp: 18   Temp: 99.1 °F (37.3 °C)   SpO2: 100%   Weight: 62.1 kg (137 lb)   Height: 5' 4\" (1.626 m)     Physical Exam    Nursing notes and vital signs reviewed    Constitutional: Non toxic appearing, no acute distress  Head: Normocephalic, Atraumatic  Eyes: Pupils are equal, round, and reactive to light  Ears: Earwax impaction on the left side  Neck: Supple  Cardiovascular: Regular rate and rhythm,  Chest: Normal work of breathing   Abdomen:  non distended  Back: No evidence of trauma or deformity  Extremities: No evidence of trauma or deformity  Skin: Warm and dry,  Neuro: Alert and appropriate,  Psychiatric: Normal mood and affect      Diagnostic Study Results     Labs -   No results found for this or any previous visit (from the past 12 hour(s)). Radiologic Studies -   No orders to display     CT Results  (Last 48 hours)    None        CXR Results  (Last 48 hours)    None          Medications given in the ED-  Medications - No data to display      Medical Decision Making   I am the first provider for this patient. I reviewed the vital signs, available nursing notes, past medical history, past surgical history, family history and social history. Records Reviewed: Nursing Notes and Old Medical Records    Provider Notes (Medical Decision Making): George Resendiz is a 38 y.o. female who presented today with ear pain on the right side. Evidence of cerumen impaction cerumen was removed with marked improvement in symptoms. She was discharged home hemodynamically stable I discussed the plan with the patient she understood and agreed    Procedures:  Procedures        Diagnosis and Disposition       DISCHARGE NOTE:    George Seen  results have been reviewed with her.   She has been counseled regarding her diagnosis, treatment, and plan. She verbally conveys understanding and agreement of the signs, symptoms, diagnosis, treatment and prognosis and additionally agrees to follow up as discussed. She also agrees with the care-plan and conveys that all of her questions have been answered. I have also provided discharge instructions for her that include: educational information regarding their diagnosis and treatment, and list of reasons why they would want to return to the ED prior to their follow-up appointment, should her condition change. She has been provided with education for proper emergency department utilization. CLINICAL IMPRESSION:    1. Impacted cerumen of left ear        PLAN:  1. D/C Home  2. Current Discharge Medication List        3. Follow-up Information     Follow up With Specialties Details Why Contact Info    Lalo Barcenas NP Family Medicine Schedule an appointment as soon as possible for a visit in 2 days  [address] [place] [address] E  [phone]          _______________________________      Please note that this dictation was completed with F[employer]du, the computer voice recognition software. Quite often unanticipated grammatical, syntax, homophones, and other interpretive errors are inadvertently transcribed by the computer software. Please disregard these errors. Please excuse any errors that have escaped final proofreading.

## 2021-08-03 PROBLEM — D64.9 ANEMIA: Status: RESOLVED | Noted: 2019-11-01 | Resolved: 2021-08-03

## 2021-09-23 NOTE — ED TRIAGE NOTES
Pt brought into er from home for complaints of lower back pain that started this morning.  Pt reports \"my back pain is due to my kidneys\" pt discharged yesterday from this facility due to gastroparesis Refugio Asher(Attending)

## 2024-01-24 NOTE — DISCHARGE INSTRUCTIONS
Earwax Blockage: Care Instructions  Your Care Instructions    Earwax is a natural substance that protects the ear canal. Normally, earwax drains from the ears and does not cause problems. Sometimes earwax builds up and hardens. Earwax blockage (also called cerumen impaction) can cause some loss of hearing and pain. When wax is tightly packed, you will need to have your doctor remove it. Follow-up care is a key part of your treatment and safety. Be sure to make and go to all appointments, and call your doctor if you are having problems. It's also a good idea to know your test results and keep a list of the medicines you take. How can you care for yourself at home? · Do not try to remove earwax with cotton swabs, fingers, or other objects. This can make the blockage worse and damage the eardrum. · If your doctor recommends that you try to remove earwax at home:  ? Soften and loosen the earwax with warm mineral oil. You also can try hydrogen peroxide mixed with an equal amount of room temperature water. Place 2 drops of the fluid, warmed to body temperature, in the ear two times a day for up to 5 days. ? Once the wax is loose and soft, all that is usually needed to remove it from the ear canal is a gentle, warm shower. Direct the water into the ear, then tip your head to let the earwax drain out. Dry your ear thoroughly with a hair dryer set on low. Hold the dryer several inches from your ear. ? If the warm mineral oil and shower do not work, use an over-the-counter wax softener. Read and follow all instructions on the label. After using the wax softener, use an ear syringe to gently flush the ear. Make sure the flushing solution is body temperature. Cool or hot fluids in the ear can cause dizziness. When should you call for help? Call your doctor now or seek immediate medical care if:    · Pus or blood drains from your ear.     · Your ears are ringing or feel full.     · You have a loss of hearing. HPI  Pt here for labs follow up.     Has felt like she has been sick since November-has runny nose, cough. Her voice is slightly hoarse.   Uses over the counter allergy meds when symptoms are severe.   Pt noticed increase in sinus congestion and felt some sinus pressure and dizziness when she bent down yesterday.     Is not taking iron supplement    Diet fair  Exercise-15 min/day      Review of Systems   Constitutional:  Positive for activity change. Negative for fatigue.   HENT:  Positive for congestion, rhinorrhea, sinus pressure and sinus pain. Negative for sore throat.    Respiratory:  Positive for cough. Negative for shortness of breath and wheezing.    Cardiovascular:  Negative for chest pain.   Gastrointestinal:  Negative for abdominal pain.   Neurological:  Positive for dizziness.       Vitals:    24 0748   BP: 122/80   Pulse: 75   Weight: 178 lb (80.7 kg)   Height: 5' 4\" (1.626 m)     Body mass index is 30.55 kg/m².  Wt Readings from Last 6 Encounters:   24 178 lb (80.7 kg)   23 168 lb (76.2 kg)   08/10/22 166 lb (75.3 kg)   22 165 lb (74.8 kg)   07/15/22 167 lb (75.8 kg)   22 169 lb (76.7 kg)        Health Maintenance   Topic Date Due    DTaP,Tdap,and Td Vaccines (1 - Tdap) Never done    COVID-19 Vaccine (3 -  season) 2023    Influenza Vaccine (1) Never done    Annual Depression Screening  Never done    Annual Physical  2024    Pap Smear  2026    Pneumococcal Vaccine: Birth to 64yrs  Aged Out       Patient's last menstrual period was 2023 (exact date).    No past medical history on file.    .  Past Surgical History:   Procedure Laterality Date          x1          x1       Family History   Problem Relation Age of Onset    Diabetes Father     Diabetes Mother     Hypertension Mother     Hyperlipidemia Mother     Cancer Neg        Social History     Socioeconomic History    Marital status: Single     Spouse name: Not on file    Number  of children: Not on file    Years of education: Not on file    Highest education level: Not on file   Occupational History    Not on file   Tobacco Use    Smoking status: Never    Smokeless tobacco: Never   Substance and Sexual Activity    Alcohol use: Not Currently    Drug use: Not Currently    Sexual activity: Not on file   Other Topics Concern    Not on file   Social History Narrative    Not on file     Social Determinants of Health     Financial Resource Strain: Not on file   Food Insecurity: Not on file   Transportation Needs: Not on file   Physical Activity: Not on file   Stress: Not on file   Social Connections: Not on file   Housing Stability: Not on file       Current Outpatient Medications   Medication Sig Dispense Refill    Cholecalciferol (VITAMIN D3) 1.25 MG (56719 UT) Oral Cap Take 50,000 Units by mouth once a week. 12 capsule 3    Ferrous Sulfate 325 (65 Fe) MG Oral Tab Take 1 tablet (325 mg total) by mouth daily with breakfast. With orange juice. 90 tablet 1    amoxicillin clavulanate 875-125 MG Oral Tab Take 1 tablet by mouth 2 (two) times daily for 10 days. 20 tablet 0       Allergies:  No Known Allergies    Physical Exam  Vitals and nursing note reviewed.   Constitutional:       General: She is not in acute distress.     Appearance: Normal appearance.   HENT:      Head: Normocephalic.      Right Ear: Tympanic membrane, ear canal and external ear normal.      Left Ear: Tympanic membrane, ear canal and external ear normal.      Nose: Congestion and rhinorrhea present.      Comments: Decreased transillumination of bilat max sinuses     Mouth/Throat:      Mouth: Mucous membranes are moist.      Pharynx: Oropharynx is clear. Posterior oropharyngeal erythema present. No oropharyngeal exudate.   Eyes:      Conjunctiva/sclera: Conjunctivae normal.   Cardiovascular:      Rate and Rhythm: Normal rate and regular rhythm.      Heart sounds: Normal heart sounds.   Pulmonary:      Effort: Pulmonary effort is   Watch closely for changes in your health, and be sure to contact your doctor if:    · You have pain or reduced hearing after 1 week of home treatment.     · You have any new symptoms, such as nausea or balance problems. Where can you learn more? Go to http://anita-chino.info/. Enter V749 in the search box to learn more about \"Earwax Blockage: Care Instructions. \"  Current as of: June 26, 2019  Content Version: 12.2  © 6429-6941 [employer]. Care instructions adapted under license by [employer] (which disclaims liability or warranty for this information). If you have questions about a medical condition or this instruction, always ask your healthcare professional. [address] any warranty or liability for your use of this information. normal. No respiratory distress.      Breath sounds: Normal breath sounds.   Musculoskeletal:         General: Normal range of motion.      Cervical back: Normal range of motion and neck supple.   Lymphadenopathy:      Cervical: No cervical adenopathy.   Skin:     General: Skin is warm and dry.   Neurological:      Mental Status: She is alert and oriented to person, place, and time.   Psychiatric:         Mood and Affect: Mood normal.         Behavior: Behavior normal.       Iron sat 10%  Ferritin 9  Tot cholesterol 228  Hdl 52  Triglycerides 170  Ldl 145  Non hdl 176  Ratio 4.4  Vitamin d 14  Assessment and Plan:  Problem List Items Addressed This Visit       Acute non-recurrent maxillary sinusitis     Aumentin 875mg I po twice a day x 10 days  Supportive care discussed to help alleviate symptoms  Please call if symptoms worsen or are not resolving.           Relevant Medications    amoxicillin clavulanate 875-125 MG Oral Tab    Hypercholesterolemia    Iron deficiency     Feso4 325 mg I po q am w sm glass orange juice           Relevant Medications    Ferrous Sulfate 325 (65 Fe) MG Oral Tab    Vitamin D deficiency - Primary     Vitamin D3 50,000 international units weekly           Relevant Medications    Cholecalciferol (VITAMIN D3) 1.25 MG (78049 UT) Oral Cap                   Discussed plan of care with pt and pt is in agreement.All questions answered. Pt to call with questions or concerns.    Encouraged to sign up for My Chart if not already registered.

## 2024-03-28 NOTE — ED PROVIDER NOTES
EMERGENCY DEPARTMENT HISTORY AND PHYSICAL EXAM    Date: 6/17/2020  Patient Name: Sarahy Garcia    History of Presenting Illness     Chief Complaint   Patient presents with    Vomiting         History Provided By: Patient and EMS    1:27 AM  Sarahy Garcia is a 38 y.o. female with PMHX of diabetes, chronic kidney disease gastroparesis who presents to the emergency department C/O nausea and vomiting. Per patient she started having vomiting yesterday morning. Reports she has Zofran at home but has not helped. She states she has pain in her upper abdomen. She denies any fever, bowel or urinary complaints, cough, sick contacts, recent travel, alcohol or substance use. PCP: None    Current Outpatient Medications   Medication Sig Dispense Refill    insulin glargine (Lantus U-100 Insulin) 100 unit/mL injection 5 Units by SubCUTAneous route nightly.  promethazine (PHENERGAN) 12.5 mg tablet Take  by mouth every six (6) hours as needed for Nausea.  ondansetron hcl (Zofran) 4 mg tablet Take 4 mg by mouth every eight (8) hours as needed for Nausea or Vomiting. Past History     Past Medical History:  Past Medical History:   Diagnosis Date    Chronic kidney disease     Diabetes ([facility] [address]     Hypertension        Past Surgical History:  Past Surgical History:   Procedure Laterality Date    HX ARTERIOVENOUS FISTULA Left        Family History:  No family history on file. Social History:  Social History     Tobacco Use    Smoking status: Never Smoker    Smokeless tobacco: Never Used   Substance Use Topics    Alcohol use: Not Currently    Drug use: Not Currently       Allergies: Allergies   Allergen Reactions    Reglan [Metoclopramide Hcl] Other (comments)     \"it makes my eyes lock up\"         Review of Systems   Review of Systems   Constitutional: Negative for fever. Respiratory: Negative for cough. Gastrointestinal: Positive for abdominal pain, nausea and vomiting.    All other systems reviewed and are negative. Physical Exam     Vitals:    06/17/20 0410   BP: (!) 185/95   Pulse: 90   Resp: 16   Temp: 98.7 °F (37.1 °C)   SpO2: 94%   Weight: 64 kg (141 lb)   Height: 5' 4\" (1.626 m)     Physical Exam    Nursing notes and vital signs reviewed    Constitutional: Non toxic appearing, mild distress  Head: Normocephalic, Atraumatic  Eyes: EOMI  Neck: Supple  Cardiovascular: Regular rate and rhythm, no murmurs, rubs, or gallops  Chest: Normal work of breathing and chest excursion bilaterally  Lungs: Clear to ausculation bilaterally  Abdomen: Soft, mild diffuse tenderness without rebound or guarding, non distended, normoactive bowel sounds  Back: No evidence of trauma or deformity  Extremities: No evidence of trauma or deformity  Skin: Warm and dry, normal cap refill  Neuro: Alert and appropriate  Psychiatric: Normal mood and affect      Diagnostic Study Results     Labs -     Recent Results (from the past 12 hour(s))   CBC WITH AUTOMATED DIFF    Collection Time: 06/17/20  4:15 AM   Result Value Ref Range    WBC 8.7 4.6 - 13.2 K/uL    RBC 3.28 (L) 4.20 - 5.30 M/uL    HGB 9.6 (L) 12.0 - 16.0 g/dL    HCT 30.9 (L) 35.0 - 45.0 %    MCV 94.2 74.0 - 97.0 FL    MCH 29.3 24.0 - 34.0 PG    MCHC 31.1 31.0 - 37.0 g/dL    RDW 17.9 (H) 11.6 - 14.5 %    PLATELET 475 170 - 727 K/uL    MPV 9.5 9.2 - 11.8 FL    NEUTROPHILS 79 (H) 40 - 73 %    LYMPHOCYTES 14 (L) 21 - 52 %    MONOCYTES 6 3 - 10 %    EOSINOPHILS 1 0 - 5 %    BASOPHILS 0 0 - 2 %    ABS. NEUTROPHILS 6.8 1.8 - 8.0 K/UL    ABS. LYMPHOCYTES 1.2 0.9 - 3.6 K/UL    ABS. MONOCYTES 0.5 0.05 - 1.2 K/UL    ABS. EOSINOPHILS 0.1 0.0 - 0.4 K/UL    ABS.  BASOPHILS 0.0 0.0 - 0.1 K/UL    DF AUTOMATED     METABOLIC PANEL, COMPREHENSIVE    Collection Time: 06/17/20  4:15 AM   Result Value Ref Range    Sodium 142 136 - 145 mmol/L    Potassium 3.8 3.5 - 5.5 mmol/L    Chloride 106 100 - 111 mmol/L    CO2 29 21 - 32 mmol/L    Anion gap 7 3.0 - 18 mmol/L    Glucose 134 (H) 74 - 99 mg/dL    BUN 36 (H) 7.0 - 18 MG/DL    Creatinine 3.60 (H) 0.6 - 1.3 MG/DL    BUN/Creatinine ratio 10 (L) 12 - 20      GFR est AA 17 (L) >60 ml/min/1.73m2    GFR est non-AA 14 (L) >60 ml/min/1.73m2    Calcium 8.0 (L) 8.5 - 10.1 MG/DL    Bilirubin, total 0.4 0.2 - 1.0 MG/DL    ALT (SGPT) 7 (L) 13 - 56 U/L    AST (SGOT) 15 10 - 38 U/L    Alk.  phosphatase 94 45 - 117 U/L    Protein, total 6.2 (L) 6.4 - 8.2 g/dL    Albumin 2.6 (L) 3.4 - 5.0 g/dL    Globulin 3.6 2.0 - 4.0 g/dL    A-G Ratio 0.7 (L) 0.8 - 1.7     CARDIAC PANEL,(CK, CKMB & TROPONIN)    Collection Time: 06/17/20  4:15 AM   Result Value Ref Range    CK - MB 2.6 <3.6 ng/ml    CK-MB Index 1.3 0.0 - 4.0 %     (H) 26 - 192 U/L    Troponin-I, QT 0.02 0.0 - 0.045 NG/ML   LIPASE    Collection Time: 06/17/20  4:15 AM   Result Value Ref Range    Lipase 201 73 - 393 U/L   HCG QL SERUM    Collection Time: 06/17/20  4:15 AM   Result Value Ref Range    HCG, Ql. Negative NEG     MAGNESIUM    Collection Time: 06/17/20  4:15 AM   Result Value Ref Range    Magnesium 3.0 (H) 1.6 - 2.6 mg/dL   ETHYL ALCOHOL    Collection Time: 06/17/20  4:15 AM   Result Value Ref Range    ALCOHOL(ETHYL),SERUM <3 0 - 3 MG/DL   NT-PRO BNP    Collection Time: 06/17/20  4:15 AM   Result Value Ref Range    NT pro-BNP 51,569 (H) 0 - 450 PG/ML   EKG, 12 LEAD, INITIAL    Collection Time: 06/17/20  4:19 AM   Result Value Ref Range    Ventricular Rate 89 BPM    Atrial Rate 89 BPM    P-R Interval 132 ms    QRS Duration 78 ms    Q-T Interval 412 ms    QTC Calculation (Bezet) 501 ms    Calculated P Axis 64 degrees    Calculated R Axis 90 degrees    Calculated T Axis 65 degrees    Diagnosis       Normal sinus rhythm  Possible Left atrial enlargement  Rightward axis  Low voltage QRS  Prolonged QT  Abnormal ECG  No previous ECGs available     GLUCOSE, POC    Collection Time: 06/17/20  4:28 AM   Result Value Ref Range    Glucose (POC) 132 (H) 70 - 110 mg/dL       Radiologic Studies -   XR ABD ACUTE W 1 V CHEST    (Results Pending)   X-RAY FINDINGS:  6:11 AM  Chest abdominal series x-ray shows mild right moderate pulmonary edema, abdomen with no acute process  Read by Dr. Sadie Mcgovern, Pending review by Radiologist.  CT Results  (Last 48 hours)    None        CXR Results  (Last 48 hours)    None          Medications given in the ED-  Medications   sodium chloride 0.9 % bolus infusion 1,000 mL (1,000 mL IntraVENous New Bag 6/17/20 0429)   ondansetron (ZOFRAN) injection 8 mg (8 mg IntraVENous Given 6/17/20 0429)   LORazepam (ATIVAN) injection 1 mg (1 mg IntraVENous Given 6/17/20 0429)   famotidine (PF) (PEPCID) injection 20 mg (20 mg IntraVENous Given 6/17/20 0454)         Medical Decision Making   I am the first provider for this patient. I reviewed the vital signs, available nursing notes, past medical history, past surgical history, family history and social history. Vital Signs-Reviewed the patient's vital signs. Pulse Oximetry Analysis - 94% on room air    Cardiac Monitor:  Rate: 88 bpm  Rhythm: Normal sinus    EKG interpretation: (Preliminary)  EKG read by Dr. Sadie Mcgovern at 4:21 AM  Normal sinus rhythm at a rate of 89 bpm, MI interval 132 ms, QRS duration of 70 ms    Records Reviewed: Nursing Notes and Old Medical Records     6:05 AM  Reviewed outside records. Patient is normally on 2 L/min nasal cannula at home which explains why she dropped her oxygen saturations here on room air. In addition pain patient was seen at [facility] ED yesterday with BUN of 62, and creatinine of 3.5 so her kidney function appears to be at her baseline. Provider Notes (Medical Decision Making): Conrad Luna is a 38 y.o. female presenting for nausea and vomiting similar to prior episodes of gastroparesis. Patient has chronic kidney disease but her kidney function is currently at her baseline.   Patient was hypoxic here on room air but on review of records patient is normally on 2 L nasal cannula at home and she is satting well on her normal 2 L of home O2. Patient has had fistula placed and is being prepared to start dialysis with her nephrology team.  Offered patient additional nausea management for at home but patient declines. Plan for discharge with early primary care, gastroenterology, nephrology follow-up with return precautions. Patient understands and agrees with this plan. Procedures:  Procedures    ED Course:   6:11 AM  Updated patient on all results and plan. All questions answered. Patient is tolerating p.o. Diagnosis and Disposition     Critical Care: None    DISCHARGE NOTE:    Phyllis Chong's  results have been reviewed with her. She has been counseled regarding her diagnosis, treatment, and plan. She verbally conveys understanding and agreement of the signs, symptoms, diagnosis, treatment and prognosis and additionally agrees to follow up as discussed. She also agrees with the care-plan and conveys that all of her questions have been answered. I have also provided discharge instructions for her that include: educational information regarding their diagnosis and treatment, and list of reasons why they would want to return to the ED prior to their follow-up appointment, should her condition change. She has been provided with education for proper emergency department utilization. CLINICAL IMPRESSION:    1. Gastroparesis    2. Chronic kidney disease, unspecified CKD stage        PLAN:  1. D/C Home  2. Current Discharge Medication List        3.    Follow-up Information     Follow up With Specialties Details Why Contact Info    Ang Fox MD Internal Medicine Schedule an appointment as soon as possible for a visit Or Your Primary Care Doctor [facility]  [phone]      Tracy Jane MD Gastroenterology Schedule an appointment as soon as possible for a visit Or Your Gastroenterologist [id number] [facility] Yeni Greer MD Nephrology Schedule an appointment as soon as possible for a visit Or Your Nephrologist [address] [address] [phone]      [facility] EMERGENCY DEPT Emergency Medicine  If symptoms worsen [address] [id number]  [phone]        _______________________________      Please note that this dictation was completed with [employer], the computer voice recognition software. Quite often unanticipated grammatical, syntax, homophones, and other interpretive errors are inadvertently transcribed by the computer software. Please disregard these errors. Please excuse any errors that have escaped final proofreading. (3) slightly limited

## 2024-05-29 NOTE — PROGRESS NOTES
Pt admitted by nocturnist at approximately 0346.    H/H after transfusion still <7, transfuse one more unit of prbcs  NV resolved, pt wanted NPO DC  Changed diet to clears   Pt wanted regular diet as feeling much better >full liquids with advance to regular diet as tolerated Previously Declined (within the last year)

## (undated) DEVICE — REM POLYHESIVE ADULT PATIENT RETURN ELECTRODE: Brand: VALLEYLAB

## (undated) DEVICE — MAJ-1414 SINGLE USE ADPATER BIOPSY VALV: Brand: SINGLE USE ADAPTOR BIOPSY VALVE

## (undated) DEVICE — SINGLE PORT MANIFOLD: Brand: NEPTUNE 2

## (undated) DEVICE — SPONGE GZ W4XL4IN COT 12 PLY TYP VII WVN C FLD DSGN

## (undated) DEVICE — MEDI-VAC NON-CONDUCTIVE SUCTION TUBING: Brand: CARDINAL HEALTH

## (undated) DEVICE — MOUTHPIECE ENDOSCP 20X27MM --

## (undated) DEVICE — GARMENT,MEDLINE,DVT,INT,CALF,MED, GEN2: Brand: MEDLINE

## (undated) DEVICE — TRAP SPEC COLL POLYP POLYSTYR --

## (undated) DEVICE — KENDALL RADIOLUCENT FOAM MONITORING ELECTRODE RECTANGULAR SHAPE: Brand: KENDALL

## (undated) DEVICE — ENDO CARRY-ON PROCEDURE KIT INCLUDES ENZYMATIC SPONGE, GAUZE, BIOHAZARD LABEL, TRAY, LUBRICANT, DIRTY SCOPE LABEL, WATER LABEL, TRAY, DRAWSTRING PAD, AND DEFENDO 4-PIECE KIT.: Brand: ENDO CARRY-ON PROCEDURE KIT